# Patient Record
Sex: MALE | Race: WHITE | NOT HISPANIC OR LATINO | URBAN - METROPOLITAN AREA
[De-identification: names, ages, dates, MRNs, and addresses within clinical notes are randomized per-mention and may not be internally consistent; named-entity substitution may affect disease eponyms.]

---

## 2020-02-09 ENCOUNTER — INPATIENT (INPATIENT)
Facility: HOSPITAL | Age: 80
LOS: 4 days | Discharge: NURSING, REHAB OR LONG-TERM CARE | DRG: 551 | End: 2020-02-14
Attending: FAMILY MEDICINE | Admitting: FAMILY MEDICINE
Payer: MEDICARE

## 2020-02-09 VITALS
RESPIRATION RATE: 16 BRPM | WEIGHT: 138.89 LBS | SYSTOLIC BLOOD PRESSURE: 195 MMHG | HEART RATE: 100 BPM | OXYGEN SATURATION: 100 % | TEMPERATURE: 98 F | HEIGHT: 66 IN | DIASTOLIC BLOOD PRESSURE: 122 MMHG

## 2020-02-09 DIAGNOSIS — M51.26 OTHER INTERVERTEBRAL DISC DISPLACEMENT, LUMBAR REGION: ICD-10-CM

## 2020-02-09 DIAGNOSIS — R53.1 WEAKNESS: ICD-10-CM

## 2020-02-09 DIAGNOSIS — M50.21 OTHER CERVICAL DISC DISPLACEMENT, HIGH CERVICAL REGION: ICD-10-CM

## 2020-02-09 DIAGNOSIS — M43.16 SPONDYLOLISTHESIS, LUMBAR REGION: ICD-10-CM

## 2020-02-09 LAB
ALBUMIN SERPL ELPH-MCNC: 4 G/DL — SIGNIFICANT CHANGE UP (ref 3.3–5)
ALP SERPL-CCNC: 77 U/L — SIGNIFICANT CHANGE UP (ref 40–120)
ALT FLD-CCNC: 24 U/L — SIGNIFICANT CHANGE UP (ref 12–78)
ANION GAP SERPL CALC-SCNC: 6 MMOL/L — SIGNIFICANT CHANGE UP (ref 5–17)
APPEARANCE UR: CLEAR — SIGNIFICANT CHANGE UP
APTT BLD: 37.8 SEC — HIGH (ref 27.5–36.3)
AST SERPL-CCNC: 28 U/L — SIGNIFICANT CHANGE UP (ref 15–37)
BASOPHILS # BLD AUTO: 0.01 K/UL — SIGNIFICANT CHANGE UP (ref 0–0.2)
BASOPHILS NFR BLD AUTO: 0.2 % — SIGNIFICANT CHANGE UP (ref 0–2)
BILIRUB SERPL-MCNC: 1.1 MG/DL — SIGNIFICANT CHANGE UP (ref 0.2–1.2)
BILIRUB UR-MCNC: NEGATIVE — SIGNIFICANT CHANGE UP
BUN SERPL-MCNC: 20 MG/DL — SIGNIFICANT CHANGE UP (ref 7–23)
CALCIUM SERPL-MCNC: 9.1 MG/DL — SIGNIFICANT CHANGE UP (ref 8.5–10.1)
CHLORIDE SERPL-SCNC: 102 MMOL/L — SIGNIFICANT CHANGE UP (ref 96–108)
CO2 SERPL-SCNC: 26 MMOL/L — SIGNIFICANT CHANGE UP (ref 22–31)
COLOR SPEC: YELLOW — SIGNIFICANT CHANGE UP
CREAT SERPL-MCNC: 1.16 MG/DL — SIGNIFICANT CHANGE UP (ref 0.5–1.3)
DIFF PNL FLD: ABNORMAL
EOSINOPHIL # BLD AUTO: 0.02 K/UL — SIGNIFICANT CHANGE UP (ref 0–0.5)
EOSINOPHIL NFR BLD AUTO: 0.3 % — SIGNIFICANT CHANGE UP (ref 0–6)
GLUCOSE SERPL-MCNC: 93 MG/DL — SIGNIFICANT CHANGE UP (ref 70–99)
GLUCOSE UR QL: NEGATIVE MG/DL — SIGNIFICANT CHANGE UP
HCT VFR BLD CALC: 38.9 % — LOW (ref 39–50)
HGB BLD-MCNC: 13.9 G/DL — SIGNIFICANT CHANGE UP (ref 13–17)
IMM GRANULOCYTES NFR BLD AUTO: 0.3 % — SIGNIFICANT CHANGE UP (ref 0–1.5)
INR BLD: 1.14 RATIO — SIGNIFICANT CHANGE UP (ref 0.88–1.16)
KETONES UR-MCNC: ABNORMAL
LEUKOCYTE ESTERASE UR-ACNC: NEGATIVE — SIGNIFICANT CHANGE UP
LYMPHOCYTES # BLD AUTO: 0.94 K/UL — LOW (ref 1–3.3)
LYMPHOCYTES # BLD AUTO: 15.6 % — SIGNIFICANT CHANGE UP (ref 13–44)
MCHC RBC-ENTMCNC: 30.6 PG — SIGNIFICANT CHANGE UP (ref 27–34)
MCHC RBC-ENTMCNC: 35.7 GM/DL — SIGNIFICANT CHANGE UP (ref 32–36)
MCV RBC AUTO: 85.7 FL — SIGNIFICANT CHANGE UP (ref 80–100)
MONOCYTES # BLD AUTO: 0.58 K/UL — SIGNIFICANT CHANGE UP (ref 0–0.9)
MONOCYTES NFR BLD AUTO: 9.6 % — SIGNIFICANT CHANGE UP (ref 2–14)
NEUTROPHILS # BLD AUTO: 4.45 K/UL — SIGNIFICANT CHANGE UP (ref 1.8–7.4)
NEUTROPHILS NFR BLD AUTO: 74 % — SIGNIFICANT CHANGE UP (ref 43–77)
NITRITE UR-MCNC: NEGATIVE — SIGNIFICANT CHANGE UP
PCP SPEC-MCNC: SIGNIFICANT CHANGE UP
PH UR: 6.5 — SIGNIFICANT CHANGE UP (ref 5–8)
PLATELET # BLD AUTO: 184 K/UL — SIGNIFICANT CHANGE UP (ref 150–400)
POTASSIUM SERPL-MCNC: 3.6 MMOL/L — SIGNIFICANT CHANGE UP (ref 3.5–5.3)
POTASSIUM SERPL-SCNC: 3.6 MMOL/L — SIGNIFICANT CHANGE UP (ref 3.5–5.3)
PROT SERPL-MCNC: 7.1 GM/DL — SIGNIFICANT CHANGE UP (ref 6–8.3)
PROT UR-MCNC: NEGATIVE MG/DL — SIGNIFICANT CHANGE UP
PROTHROM AB SERPL-ACNC: 12.7 SEC — SIGNIFICANT CHANGE UP (ref 10–12.9)
RBC # BLD: 4.54 M/UL — SIGNIFICANT CHANGE UP (ref 4.2–5.8)
RBC # FLD: 12.2 % — SIGNIFICANT CHANGE UP (ref 10.3–14.5)
SODIUM SERPL-SCNC: 134 MMOL/L — LOW (ref 135–145)
SP GR SPEC: 1.01 — SIGNIFICANT CHANGE UP (ref 1.01–1.02)
UROBILINOGEN FLD QL: NEGATIVE MG/DL — SIGNIFICANT CHANGE UP
WBC # BLD: 6.02 K/UL — SIGNIFICANT CHANGE UP (ref 3.8–10.5)
WBC # FLD AUTO: 6.02 K/UL — SIGNIFICANT CHANGE UP (ref 3.8–10.5)

## 2020-02-09 PROCEDURE — 72170 X-RAY EXAM OF PELVIS: CPT | Mod: 26

## 2020-02-09 PROCEDURE — 74177 CT ABD & PELVIS W/CONTRAST: CPT

## 2020-02-09 PROCEDURE — 82550 ASSAY OF CK (CPK): CPT

## 2020-02-09 PROCEDURE — 83735 ASSAY OF MAGNESIUM: CPT

## 2020-02-09 PROCEDURE — 86780 TREPONEMA PALLIDUM: CPT

## 2020-02-09 PROCEDURE — 70551 MRI BRAIN STEM W/O DYE: CPT

## 2020-02-09 PROCEDURE — 70450 CT HEAD/BRAIN W/O DYE: CPT | Mod: 26

## 2020-02-09 PROCEDURE — 71045 X-RAY EXAM CHEST 1 VIEW: CPT | Mod: 26

## 2020-02-09 PROCEDURE — 97162 PT EVAL MOD COMPLEX 30 MIN: CPT | Mod: GP

## 2020-02-09 PROCEDURE — 85610 PROTHROMBIN TIME: CPT

## 2020-02-09 PROCEDURE — 85730 THROMBOPLASTIN TIME PARTIAL: CPT

## 2020-02-09 PROCEDURE — 84443 ASSAY THYROID STIM HORMONE: CPT

## 2020-02-09 PROCEDURE — 82607 VITAMIN B-12: CPT

## 2020-02-09 PROCEDURE — 72141 MRI NECK SPINE W/O DYE: CPT

## 2020-02-09 PROCEDURE — 72131 CT LUMBAR SPINE W/O DYE: CPT | Mod: 26

## 2020-02-09 PROCEDURE — 36415 COLL VENOUS BLD VENIPUNCTURE: CPT

## 2020-02-09 PROCEDURE — 74220 X-RAY XM ESOPHAGUS 1CNTRST: CPT

## 2020-02-09 PROCEDURE — 82085 ASSAY OF ALDOLASE: CPT

## 2020-02-09 PROCEDURE — 74018 RADEX ABDOMEN 1 VIEW: CPT

## 2020-02-09 PROCEDURE — 84181 WESTERN BLOT TEST: CPT

## 2020-02-09 PROCEDURE — 84100 ASSAY OF PHOSPHORUS: CPT

## 2020-02-09 PROCEDURE — 93010 ELECTROCARDIOGRAM REPORT: CPT

## 2020-02-09 PROCEDURE — 80053 COMPREHEN METABOLIC PANEL: CPT

## 2020-02-09 PROCEDURE — 99223 1ST HOSP IP/OBS HIGH 75: CPT

## 2020-02-09 PROCEDURE — 85025 COMPLETE CBC W/AUTO DIFF WBC: CPT

## 2020-02-09 PROCEDURE — 86618 LYME DISEASE ANTIBODY: CPT

## 2020-02-09 PROCEDURE — 83520 IMMUNOASSAY QUANT NOS NONAB: CPT

## 2020-02-09 PROCEDURE — 82746 ASSAY OF FOLIC ACID SERUM: CPT

## 2020-02-09 PROCEDURE — 72125 CT NECK SPINE W/O DYE: CPT | Mod: 26

## 2020-02-09 PROCEDURE — 97116 GAIT TRAINING THERAPY: CPT | Mod: GP

## 2020-02-09 PROCEDURE — 72128 CT CHEST SPINE W/O DYE: CPT | Mod: 26

## 2020-02-09 PROCEDURE — 86256 FLUORESCENT ANTIBODY TITER: CPT

## 2020-02-09 PROCEDURE — 84238 ASSAY NONENDOCRINE RECEPTOR: CPT

## 2020-02-09 PROCEDURE — 72148 MRI LUMBAR SPINE W/O DYE: CPT

## 2020-02-09 PROCEDURE — 85652 RBC SED RATE AUTOMATED: CPT

## 2020-02-09 PROCEDURE — 97530 THERAPEUTIC ACTIVITIES: CPT | Mod: GP

## 2020-02-09 PROCEDURE — 72148 MRI LUMBAR SPINE W/O DYE: CPT | Mod: 26

## 2020-02-09 RX ORDER — SODIUM CHLORIDE 9 MG/ML
1000 INJECTION INTRAMUSCULAR; INTRAVENOUS; SUBCUTANEOUS ONCE
Refills: 0 | Status: COMPLETED | OUTPATIENT
Start: 2020-02-09 | End: 2020-02-09

## 2020-02-09 RX ORDER — ACETAMINOPHEN 500 MG
650 TABLET ORAL EVERY 4 HOURS
Refills: 0 | Status: DISCONTINUED | OUTPATIENT
Start: 2020-02-09 | End: 2020-02-14

## 2020-02-09 RX ORDER — METOPROLOL TARTRATE 50 MG
50 TABLET ORAL DAILY
Refills: 0 | Status: DISCONTINUED | OUTPATIENT
Start: 2020-02-09 | End: 2020-02-10

## 2020-02-09 RX ORDER — HEPARIN SODIUM 5000 [USP'U]/ML
5000 INJECTION INTRAVENOUS; SUBCUTANEOUS EVERY 12 HOURS
Refills: 0 | Status: DISCONTINUED | OUTPATIENT
Start: 2020-02-09 | End: 2020-02-14

## 2020-02-09 RX ORDER — SODIUM CHLORIDE 9 MG/ML
1000 INJECTION INTRAMUSCULAR; INTRAVENOUS; SUBCUTANEOUS
Refills: 0 | Status: DISCONTINUED | OUTPATIENT
Start: 2020-02-09 | End: 2020-02-10

## 2020-02-09 RX ORDER — LOSARTAN POTASSIUM 100 MG/1
50 TABLET, FILM COATED ORAL DAILY
Refills: 0 | Status: DISCONTINUED | OUTPATIENT
Start: 2020-02-09 | End: 2020-02-14

## 2020-02-09 RX ORDER — GABAPENTIN 400 MG/1
300 CAPSULE ORAL DAILY
Refills: 0 | Status: DISCONTINUED | OUTPATIENT
Start: 2020-02-09 | End: 2020-02-14

## 2020-02-09 RX ORDER — SERTRALINE 25 MG/1
50 TABLET, FILM COATED ORAL DAILY
Refills: 0 | Status: DISCONTINUED | OUTPATIENT
Start: 2020-02-09 | End: 2020-02-14

## 2020-02-09 RX ORDER — SERTRALINE 25 MG/1
0 TABLET, FILM COATED ORAL
Qty: 0 | Refills: 0 | DISCHARGE

## 2020-02-09 RX ORDER — METOPROLOL TARTRATE 50 MG
25 TABLET ORAL ONCE
Refills: 0 | Status: COMPLETED | OUTPATIENT
Start: 2020-02-09 | End: 2020-02-09

## 2020-02-09 RX ORDER — ACETAMINOPHEN 500 MG
650 TABLET ORAL ONCE
Refills: 0 | Status: COMPLETED | OUTPATIENT
Start: 2020-02-09 | End: 2020-02-09

## 2020-02-09 RX ADMIN — Medication 650 MILLIGRAM(S): at 18:35

## 2020-02-09 RX ADMIN — Medication 25 MILLIGRAM(S): at 19:50

## 2020-02-09 RX ADMIN — SODIUM CHLORIDE 1000 MILLILITER(S): 9 INJECTION INTRAMUSCULAR; INTRAVENOUS; SUBCUTANEOUS at 16:11

## 2020-02-09 RX ADMIN — SODIUM CHLORIDE 1000 MILLILITER(S): 9 INJECTION INTRAMUSCULAR; INTRAVENOUS; SUBCUTANEOUS at 17:11

## 2020-02-09 RX ADMIN — Medication 650 MILLIGRAM(S): at 18:09

## 2020-02-09 RX ADMIN — Medication 10 MILLIGRAM(S): at 23:52

## 2020-02-09 NOTE — ED ADULT NURSE REASSESSMENT NOTE - NS ED NURSE REASSESS COMMENT FT1
Pt repositioned in stretcher and medicated as per MD orders. Pt given menu and wife at bedside assisted in ordering dinner for patient. Plan is for patient to be admitted to hospital. Awaiting disposition and available bed. Comfort and safety maintained.

## 2020-02-09 NOTE — ED PROVIDER NOTE - PROGRESS NOTE DETAILS
signed Eileen Loco PA-C   79M BIB family from home because they couldn't get him up out of bed due to weakness. Wife and son note 15lb wt loss since honorio and general decline and weakness. pt was pursuing outpt workup including PMD Jw, Neuro Dr Jordan, and urology. Had outpt abd sono and abd CT scan which were negative. Pt scheduled for outpt MRI of brain but has not had it yet. Pt has been getting progressively weaker over the past few weeks, normally uses a cane but has needed a walker for 3-4 weeks. Pt fell (unwitnessed) 2 days ago and wife found him on the kitchen floor. Son has had to help pt out of bed every morning but today was unable to bc the pt was too weak. They note pt has had increasing difficulty with going to the bathroom recently. No fever/CP/ N/V/D. Poor appetite. PMH HTN, BPH, sciatica. Pt appears tired and uncomfortable but does not identify anything specific that bothers him. Pt speaks in a slow, hesitant manner, but is oriented x3 without alexia or anomia. Pt with weakness of hip flexors. rectal temp 98.7, normal rectal tone and no perirectal anesthesia or saddle anesthesia. No spinal TTP. Plan labs, CT, UA, admit. signed Eileen Loco PA-C   No significant findings on labwork or imaging. Will admit for new inability to walk due to weakness, inpt workup. Pt agrees with admission and plan of care. Case discussed with and admission accepted by hospitalist Dr. Robles. signed Eileen Loco PA-C   Stat lumbar MRI ordered to r/o cauda equina. Pt agrees with plan of  care. MRI team called in. Dr. Gayle:  MR L-spine: no cauda equina, + mult. levels DDD.

## 2020-02-09 NOTE — ED PROVIDER NOTE - MUSCULOSKELETAL, MLM
MAEx4. No focal swelling or tenderness. Poor SLR 10 degrees b/l, due to weakness, good passive ROM of hips and pain without pain. Pelvis non tender and stable.

## 2020-02-09 NOTE — ED PROVIDER NOTE - CARE PLAN
Principal Discharge DX:	Weakness  Secondary Diagnosis:	Failure to thrive in adult  Secondary Diagnosis:	Unable to walk

## 2020-02-09 NOTE — H&P ADULT - NEUROLOGICAL DETAILS
alert and oriented x 3/responds to verbal commands/sensation intact/strength decreased/cranial nerves intact/responds to pain

## 2020-02-09 NOTE — ED ADULT TRIAGE NOTE - CHIEF COMPLAINT QUOTE
Pt. to the ED BIBA and Son from Home CO Failure to Thrive- Son Reports that patient is not able to care for himself anymore and states mental status has been declining for the past few weeks. Hx. of HTN and Depression- Denies SI and HI

## 2020-02-09 NOTE — ED PROVIDER NOTE - OBJECTIVE STATEMENT
79 YOM PMHx HTN, BPH, sciatica presents to ED BIB family for progressively worsening appetite, weight loss and "difficulty with mobility" x3-4 months. Family report pt had "chest cold" with persistent cough a few months ago, had course of PO Abx with last dose shortly after 01/01. Per family pt with depressed appearance, had evaluation by neurologist Dr. Jordan and has been taking an antidepressant for the past 1 week. Pt has been using a walker to ambulate due to worsening of weakness, but now son says he has to pick pt up to move him. Pt fell on night of 02/07 while not using walker, had to be picked up by family. Since fall pt has had 2 episodes of urinary incontinence at night. Family say pt does not localize pain, but appears weak. Pt lives with spouse and receives assistance from family. Denies any pain, numbness, N/V/D, fever or chills. Takes Advil for symptom management. No BT/AC. NKDA. Non-smoker. No EtOH use. No illicit drug use.

## 2020-02-09 NOTE — H&P ADULT - HISTORY OF PRESENT ILLNESS
80 y/o M with PMH of BPH, spinal stenosis, HTN, dyslipidemia, pre-DM, anxiety, p/w increasing weakness, weight loss and difficulty swallowing. Wife at bedside states that patient has been having issues where his legs collapsed from under him intermittently for the last 2 years. He has been using a cane for the last 2 years. During  / new year holiday season patient developed bronchitis, and since then patient has been getting progressively worse. Patient switched to a walker 3 weeks ago. Two days ago patient tried to ambulate without walker, and wife found him sitting on the kitchen floor, does not think he hit his head. Wife states since the fall, patient has been getting even weaker over the last 2 days. Patient's son has to pick him in order to move him, which is significantly worse. Had two episodes of urinary incontinence as well. States that 2 weeks ago he saw a neurologist, and when pin prick physical exam maneuver was performed, patient was unable to feel much below his knees. Denies saddle anesthesia.     Family states that patient had been losing weight over time. However, in the last month weight loss has been accelerated. States he lost about 15 lbs over 1 month. Wife states that patient also has decreased appetite. Patient states that he is nervous about swallowing as he has some difficulty swallowing, and he has even choken on a pill previously. Wife states that patient has an upcoming gastroenterology appointment to address weight loss and dysphagia. Patient denies nausea, vomiting, abdominal pain, diarrhea.    Wife states that patient had a cystoscopy on Thursday by urologist Dr. Godoy, and was told he did not see masses on it. Patient was told that he had elevated PSA ("4 something" as per wife) and enlarged prostate done on recent CT abdomen.     Patient had a shot of steroids about 6 months provided him with temporary relief.     Patient also has been following up with neurologist who recommended brain MRI, which he hasn't gotten yet.    PSH: Denies    Social Hx: Denies x 3    Family Hx: Father -  at 92 y/o from old age, mother -  at 88 y/o after a fall

## 2020-02-09 NOTE — ED ADULT NURSE NOTE - MUSCULOSKELETAL ASSESSMENT
Procedure To Be Performed At Next Visit: Biopsy by shave method Detail Level: Zone Introduction Text (Please End With A Colon): Tresa Palacios Introduction Text (Please End With A Colon): Larry Williamson Introduction Text (Please End With A Colon): Rosaline Siddiqui Procedure To Be Performed At Next Visit: Cryotherapy Introduction Text (Please End With A Colon): Sima Zhu - - -

## 2020-02-09 NOTE — ED PROVIDER NOTE - ENMT, MLM
Airway patent. Oropharynx clear. Mucous membranes slightly dry. Normal cephalic atraumatic. No cronin signs, or signs of facial injury.

## 2020-02-09 NOTE — ED ADULT NURSE NOTE - OBJECTIVE STATEMENT
Pt BIBA from home for failure to thrieve. As per pts wife, pts condition has been on a steady decline but has gotten worse since December. Pt with 15lb weight loss since Christmas. Pt has been following up with PMD as well as neurology (Dr. Jordan) and was scheduled to see a gastroenterologist tomorrow. As per patients wife, on Friday, she was napping and pt got up went into the kitchen. Pt states that he was feeling good, so he did not use his walker and fell. Pt was unable to get off the ground by himself and had to wait until his son and son in law got home from work to pick him up. Wife and son were able to sit him up against the cabinets until they were able to get him up. Pt did not seek medical attention at that time. Pt has been using a cane to walk for the past two years, but recently over the past month has been using a walker. Pt unable since fall to get himself out of bed. Pts family states that they are unable to get him out of bed and when they were able to get him. he was unable to walk on his own. Family called to have him brought to hospital today, as they were unable to get him out of bed.

## 2020-02-09 NOTE — ED PROVIDER NOTE - ATTENDING CONTRIBUTION TO CARE
I, Bj Gayle MD, personally saw the patient with the PA, and completed the key components of the history and physical exam. I then discussed the management plan with the PA.

## 2020-02-09 NOTE — H&P ADULT - ASSESSMENT
78 y/o M with PMH of BPH, spinal stenosis, HTN, dyslipidemia, pre-DM, anxiety, p/w increasing weakness, weight loss and difficulty swallowing    *Increasing weakness w/ severe spinal stenosis on CT scan  -MRI pending  -Ortho spine consult  -Fall precautions  -Neuro consult   -CTH negative for acute findings  -MRI of brain     *Weight loss / Decreased appetite / Difficulty swallowing  -GI consult  -Will need to r/o underlying malignancy   -IVF    *Hematuria / Elevated PSA in the setting of decreased appetite / weight loss  -Will need to r/o prostate as a possible source of malignancy  - consult  -Patient is s/p recent cystoscopy     *HTN - uncontrolled  -Low salt diet  -Valsartan non-formulary, switched to losartan and increased dose  -C/w lopressor     *Dyslipidemia / Pre-DM / anxiety  -C/w home meds and f/u outpatient for further management     *DVT ppx  -Heparin subQ      IMPROVE VTE Individual Risk Assessment    RISK                                                                Points    [  ] Previous VTE                                                  3    [  ] Thrombophilia                                               2    [  ] Lower limb paralysis                                      2        (unable to hold up >15 seconds)      [  ] Current Cancer                                              2         (within 6 months)    [ 1 ] Immobilization > 24 hrs                                1    [  ] ICU/CCU stay > 24 hours                              1    [1  ] Age > 60                                                      1    IMPROVE VTE Score ____2_____    IMPROVE Score 0-1: Low Risk, No VTE prophylaxis required for most patients, encourage ambulation.   IMPROVE Score 2-3: At risk, pharmacologic VTE prophylaxis is indicated for most patients (in the absence of a contraindication)  IMPROVE Score > or = 4: High Risk, pharmacologic VTE prophylaxis is indicated for most patients (in the absence of a contraindication) 80 y/o M with PMH of BPH, spinal stenosis, HTN, dyslipidemia, pre-DM, anxiety, p/w increasing weakness, weight loss and difficulty swallowing    *Increasing weakness w/ severe spinal stenosis on CT scan  -MRI L-spine pending  -Ortho spine consult  -Fall precautions  -Neuro consult   -CTH negative for acute findings  -Will order MRI of brain      *Weight loss / Decreased appetite / Difficulty swallowing  -GI consult  -Will need to r/o underlying malignancy   -IVF    *Hematuria / Elevated PSA in the setting of decreased appetite / weight loss  -Will need to r/o prostate as a possible source of malignancy  - consult  -Patient is s/p recent cystoscopy     *HTN - uncontrolled  -Low salt diet  -Valsartan non-formulary, switched to losartan and increased dose  -C/w lopressor     *Dyslipidemia / Pre-DM / anxiety  -C/w home meds and f/u outpatient for further management     *DVT ppx  -Heparin subQ          IMPROVE VTE Individual Risk Assessment    RISK                                                                Points    [  ] Previous VTE                                                  3    [  ] Thrombophilia                                               2    [  ] Lower limb paralysis                                      2        (unable to hold up >15 seconds)      [  ] Current Cancer                                              2         (within 6 months)    [ 1 ] Immobilization > 24 hrs                                1    [  ] ICU/CCU stay > 24 hours                              1    [1  ] Age > 60                                                      1    IMPROVE VTE Score ____2_____    IMPROVE Score 0-1: Low Risk, No VTE prophylaxis required for most patients, encourage ambulation.   IMPROVE Score 2-3: At risk, pharmacologic VTE prophylaxis is indicated for most patients (in the absence of a contraindication)  IMPROVE Score > or = 4: High Risk, pharmacologic VTE prophylaxis is indicated for most patients (in the absence of a contraindication) 80 y/o M with PMH of BPH, spinal stenosis, HTN, dyslipidemia, pre-DM, anxiety, p/w increasing weakness, weight loss and difficulty swallowing    *Increasing weakness w/ compression deformities and severe spinal stenosis on CT scan w/ impingement of exiting nerve roots at neural foramina   -Stat MRI L-spine pending  -Ortho spine consult  -Fall precautions  -Neuro consult   -CTH negative for acute findings  -Will order MRI of brain      *Weight loss / Decreased appetite / Difficulty swallowing  -GI consult  -Will need to r/o underlying malignancy   -IVF  -CT - small hypodensity in liver    *Hematuria / Elevated PSA in the setting of decreased appetite / weight loss  -Will need to r/o prostate as a possible source of malignancy  - consult  -Patient is s/p recent cystoscopy   -CT - small hypodensity on R kidney     *HTN - uncontrolled  -Low salt diet  -Valsartan non-formulary, switched to losartan and increased dose  -C/w lopressor     *Dyslipidemia / Pre-DM / anxiety  -C/w home meds and f/u outpatient for further management     *DVT ppx  -Heparin subQ              IMPROVE VTE Individual Risk Assessment    RISK                                                                Points    [  ] Previous VTE                                                  3    [  ] Thrombophilia                                               2    [  ] Lower limb paralysis                                      2        (unable to hold up >15 seconds)      [  ] Current Cancer                                              2         (within 6 months)    [ 1 ] Immobilization > 24 hrs                                1    [  ] ICU/CCU stay > 24 hours                              1    [1  ] Age > 60                                                      1    IMPROVE VTE Score ____2_____    IMPROVE Score 0-1: Low Risk, No VTE prophylaxis required for most patients, encourage ambulation.   IMPROVE Score 2-3: At risk, pharmacologic VTE prophylaxis is indicated for most patients (in the absence of a contraindication)  IMPROVE Score > or = 4: High Risk, pharmacologic VTE prophylaxis is indicated for most patients (in the absence of a contraindication)

## 2020-02-10 LAB
ALBUMIN SERPL ELPH-MCNC: 3.9 G/DL — SIGNIFICANT CHANGE UP (ref 3.3–5)
ALP SERPL-CCNC: 82 U/L — SIGNIFICANT CHANGE UP (ref 40–120)
ALT FLD-CCNC: 22 U/L — SIGNIFICANT CHANGE UP (ref 12–78)
ANION GAP SERPL CALC-SCNC: 7 MMOL/L — SIGNIFICANT CHANGE UP (ref 5–17)
APTT BLD: 43.8 SEC — HIGH (ref 27.5–36.3)
AST SERPL-CCNC: 28 U/L — SIGNIFICANT CHANGE UP (ref 15–37)
BASOPHILS # BLD AUTO: 0.01 K/UL — SIGNIFICANT CHANGE UP (ref 0–0.2)
BASOPHILS NFR BLD AUTO: 0.2 % — SIGNIFICANT CHANGE UP (ref 0–2)
BILIRUB SERPL-MCNC: 1.2 MG/DL — SIGNIFICANT CHANGE UP (ref 0.2–1.2)
BUN SERPL-MCNC: 19 MG/DL — SIGNIFICANT CHANGE UP (ref 7–23)
CALCIUM SERPL-MCNC: 8.9 MG/DL — SIGNIFICANT CHANGE UP (ref 8.5–10.1)
CHLORIDE SERPL-SCNC: 104 MMOL/L — SIGNIFICANT CHANGE UP (ref 96–108)
CK SERPL-CCNC: 158 U/L — SIGNIFICANT CHANGE UP (ref 26–308)
CO2 SERPL-SCNC: 24 MMOL/L — SIGNIFICANT CHANGE UP (ref 22–31)
CREAT SERPL-MCNC: 1.01 MG/DL — SIGNIFICANT CHANGE UP (ref 0.5–1.3)
CULTURE RESULTS: SIGNIFICANT CHANGE UP
EOSINOPHIL # BLD AUTO: 0.01 K/UL — SIGNIFICANT CHANGE UP (ref 0–0.5)
EOSINOPHIL NFR BLD AUTO: 0.2 % — SIGNIFICANT CHANGE UP (ref 0–6)
ERYTHROCYTE [SEDIMENTATION RATE] IN BLOOD: 5 MM/HR — SIGNIFICANT CHANGE UP (ref 0–20)
GLUCOSE SERPL-MCNC: 82 MG/DL — SIGNIFICANT CHANGE UP (ref 70–99)
HCT VFR BLD CALC: 40.8 % — SIGNIFICANT CHANGE UP (ref 39–50)
HGB BLD-MCNC: 14.7 G/DL — SIGNIFICANT CHANGE UP (ref 13–17)
IMM GRANULOCYTES NFR BLD AUTO: 0.3 % — SIGNIFICANT CHANGE UP (ref 0–1.5)
INR BLD: 1.11 RATIO — SIGNIFICANT CHANGE UP (ref 0.88–1.16)
LYMPHOCYTES # BLD AUTO: 1.2 K/UL — SIGNIFICANT CHANGE UP (ref 1–3.3)
LYMPHOCYTES # BLD AUTO: 18.6 % — SIGNIFICANT CHANGE UP (ref 13–44)
MAGNESIUM SERPL-MCNC: 2.2 MG/DL — SIGNIFICANT CHANGE UP (ref 1.6–2.6)
MCHC RBC-ENTMCNC: 31.3 PG — SIGNIFICANT CHANGE UP (ref 27–34)
MCHC RBC-ENTMCNC: 36 GM/DL — SIGNIFICANT CHANGE UP (ref 32–36)
MCV RBC AUTO: 86.8 FL — SIGNIFICANT CHANGE UP (ref 80–100)
MONOCYTES # BLD AUTO: 0.47 K/UL — SIGNIFICANT CHANGE UP (ref 0–0.9)
MONOCYTES NFR BLD AUTO: 7.3 % — SIGNIFICANT CHANGE UP (ref 2–14)
NEUTROPHILS # BLD AUTO: 4.74 K/UL — SIGNIFICANT CHANGE UP (ref 1.8–7.4)
NEUTROPHILS NFR BLD AUTO: 73.4 % — SIGNIFICANT CHANGE UP (ref 43–77)
PHOSPHATE SERPL-MCNC: 3 MG/DL — SIGNIFICANT CHANGE UP (ref 2.5–4.5)
PLATELET # BLD AUTO: 194 K/UL — SIGNIFICANT CHANGE UP (ref 150–400)
POTASSIUM SERPL-MCNC: 3.9 MMOL/L — SIGNIFICANT CHANGE UP (ref 3.5–5.3)
POTASSIUM SERPL-SCNC: 3.9 MMOL/L — SIGNIFICANT CHANGE UP (ref 3.5–5.3)
PROT SERPL-MCNC: 7 GM/DL — SIGNIFICANT CHANGE UP (ref 6–8.3)
PROTHROM AB SERPL-ACNC: 12.4 SEC — SIGNIFICANT CHANGE UP (ref 10–12.9)
RBC # BLD: 4.7 M/UL — SIGNIFICANT CHANGE UP (ref 4.2–5.8)
RBC # FLD: 12.3 % — SIGNIFICANT CHANGE UP (ref 10.3–14.5)
SODIUM SERPL-SCNC: 135 MMOL/L — SIGNIFICANT CHANGE UP (ref 135–145)
SPECIMEN SOURCE: SIGNIFICANT CHANGE UP
TSH SERPL-MCNC: 0.67 UU/ML — SIGNIFICANT CHANGE UP (ref 0.34–4.82)
WBC # BLD: 6.45 K/UL — SIGNIFICANT CHANGE UP (ref 3.8–10.5)
WBC # FLD AUTO: 6.45 K/UL — SIGNIFICANT CHANGE UP (ref 3.8–10.5)

## 2020-02-10 PROCEDURE — 99221 1ST HOSP IP/OBS SF/LOW 40: CPT

## 2020-02-10 PROCEDURE — 99223 1ST HOSP IP/OBS HIGH 75: CPT

## 2020-02-10 PROCEDURE — 72141 MRI NECK SPINE W/O DYE: CPT | Mod: 26

## 2020-02-10 PROCEDURE — 99233 SBSQ HOSP IP/OBS HIGH 50: CPT

## 2020-02-10 PROCEDURE — 74220 X-RAY XM ESOPHAGUS 1CNTRST: CPT | Mod: 26

## 2020-02-10 PROCEDURE — 70551 MRI BRAIN STEM W/O DYE: CPT | Mod: 26

## 2020-02-10 RX ORDER — DEXAMETHASONE 0.5 MG/5ML
ELIXIR ORAL
Refills: 0 | Status: COMPLETED | OUTPATIENT
Start: 2020-02-10 | End: 2020-02-12

## 2020-02-10 RX ORDER — DEXAMETHASONE 0.5 MG/5ML
4 ELIXIR ORAL EVERY 8 HOURS
Refills: 0 | Status: COMPLETED | OUTPATIENT
Start: 2020-02-11 | End: 2020-02-11

## 2020-02-10 RX ORDER — HYDRALAZINE HCL 50 MG
25 TABLET ORAL ONCE
Refills: 0 | Status: COMPLETED | OUTPATIENT
Start: 2020-02-10 | End: 2020-02-10

## 2020-02-10 RX ORDER — METOPROLOL TARTRATE 50 MG
50 TABLET ORAL
Refills: 0 | Status: DISCONTINUED | OUTPATIENT
Start: 2020-02-10 | End: 2020-02-14

## 2020-02-10 RX ORDER — OXYCODONE AND ACETAMINOPHEN 5; 325 MG/1; MG/1
1 TABLET ORAL EVERY 6 HOURS
Refills: 0 | Status: DISCONTINUED | OUTPATIENT
Start: 2020-02-10 | End: 2020-02-14

## 2020-02-10 RX ORDER — DEXAMETHASONE 0.5 MG/5ML
2 ELIXIR ORAL EVERY 8 HOURS
Refills: 0 | Status: COMPLETED | OUTPATIENT
Start: 2020-02-12 | End: 2020-02-12

## 2020-02-10 RX ORDER — DEXAMETHASONE 0.5 MG/5ML
6 ELIXIR ORAL EVERY 8 HOURS
Refills: 0 | Status: COMPLETED | OUTPATIENT
Start: 2020-02-10 | End: 2020-02-11

## 2020-02-10 RX ORDER — HYDRALAZINE HCL 50 MG
10 TABLET ORAL ONCE
Refills: 0 | Status: COMPLETED | OUTPATIENT
Start: 2020-02-10 | End: 2020-02-10

## 2020-02-10 RX ADMIN — LOSARTAN POTASSIUM 50 MILLIGRAM(S): 100 TABLET, FILM COATED ORAL at 00:05

## 2020-02-10 RX ADMIN — Medication 10 MILLIGRAM(S): at 12:05

## 2020-02-10 RX ADMIN — Medication 50 MILLIGRAM(S): at 05:40

## 2020-02-10 RX ADMIN — Medication 10 MILLIGRAM(S): at 17:13

## 2020-02-10 RX ADMIN — GABAPENTIN 300 MILLIGRAM(S): 400 CAPSULE ORAL at 14:34

## 2020-02-10 RX ADMIN — Medication 50 MILLIGRAM(S): at 18:39

## 2020-02-10 RX ADMIN — SODIUM CHLORIDE 75 MILLILITER(S): 9 INJECTION INTRAMUSCULAR; INTRAVENOUS; SUBCUTANEOUS at 02:44

## 2020-02-10 RX ADMIN — HEPARIN SODIUM 5000 UNIT(S): 5000 INJECTION INTRAVENOUS; SUBCUTANEOUS at 18:39

## 2020-02-10 RX ADMIN — Medication 6 MILLIGRAM(S): at 21:23

## 2020-02-10 RX ADMIN — HEPARIN SODIUM 5000 UNIT(S): 5000 INJECTION INTRAVENOUS; SUBCUTANEOUS at 05:40

## 2020-02-10 RX ADMIN — SERTRALINE 50 MILLIGRAM(S): 25 TABLET, FILM COATED ORAL at 14:34

## 2020-02-10 RX ADMIN — Medication 25 MILLIGRAM(S): at 03:06

## 2020-02-10 RX ADMIN — Medication 10 MILLIGRAM(S): at 05:39

## 2020-02-10 NOTE — PHYSICAL THERAPY INITIAL EVALUATION ADULT - PERTINENT HX OF CURRENT PROBLEM, REHAB EVAL
increasing weakness, difficulty transferring/walking x3-4 months requiring a walker past 3 weeks ,fell night of 2/7 without walker and had to be picked up by son ,decreased PO intake,?difficulty swallowing

## 2020-02-10 NOTE — CONSULT NOTE ADULT - ASSESSMENT
79 year old man hx of worsening gait, c/o general weakness, diff swallowing. On exam appears depressed, evidence of peripheral neuropathy ? cervical myelopathy, ? myasthenia.  Ct of head showed no acute changes, T of spine no fx, MR L/S DDD.  suggest: MR cervical spine wo  acetylcholine receptor abs, CPK, aldolase, esr, vit b12, folate, rpr, lymes abs  may benefit from outpatient emg./ncv study

## 2020-02-10 NOTE — CONSULT NOTE ADULT - SUBJECTIVE AND OBJECTIVE BOX
Neurology Consult requested by:   Patient is a 79y old  Male who presents with a chief complaint of increasing weakness, weight loss and difficulty swallowing (10 Feb 2020 10:52)     HPI:  78 y/o M with PMH of BPH, spinal stenosis, HTN, dyslipidemia, pre-DM, anxiety, p/w increasing weakness, weight loss and difficulty swallowing. Wife at bedside states that patient has been having issues where his legs collapsed from under him intermittently for the last 2 years. worsening weakness, denies leg pain, or back pain, no B/B incontinence. Hx of peripheral neuropathy. Saw outside neurologist about 2 weeks ago, ordered blood work, MR, not done.  No dizziness, vertigo, no headaches, no double vision. Occasional trouble swallowing, pills, losing weight.    PSH: Denies    Social Hx: Denies x 3    Family Hx: Father -  at 94 y/o from old age, mother -  at 86 y/o after a fall (2020 21:01)    PAST MEDICAL & SURGICAL HISTORY:  Sciatica  BPH (benign prostatic hyperplasia)  HTN (hypertension)    FAMILY HISTORY:    Social Hx:  Nonsmoker, no drug or alcohol use  Medications and Allergies ReviewedMEDICATIONS  (STANDING):  busPIRone 10 milliGRAM(s) Oral two times a day  gabapentin 300 milliGRAM(s) Oral daily  heparin  Injectable 5000 Unit(s) SubCutaneous every 12 hours  losartan 50 milliGRAM(s) Oral daily  metoprolol tartrate Oral Tab/Cap - Peds 50 milliGRAM(s) Oral daily  sertraline 50 milliGRAM(s) Oral daily     ROS: Pertinent positives in HPI, all other ROS were reviewed and are negative.      Examination:   Vital Signs Last 24 Hrs  T(C): 36.6 (10 Feb 2020 10:52), Max: 37.1 (10 Feb 2020 05:08)  T(F): 97.8 (10 Feb 2020 10:52), Max: 98.7 (10 Feb 2020 05:08)  HR: 73 (10 Feb 2020 10:52) (65 - 100)  BP: 194/86 (10 Feb 2020 10:52) (158/87 - 201/90)  BP(mean): --  RR: 18 (10 Feb 2020 10:52) (16 - 23)  SpO2: 97% (10 Feb 2020 10:52) (96% - 100%)  General: Cooperative, NAD   NECK: supple, no masses  ENT: Normal hearing   Vascular : no carotid bruits,   Lungs: CTAB  Chest: RRR, no murmurs  Extremities: nontender, no edema  Musculoskeletal: no adventitious movements, no joint stiffness  Skin: no rash    Neurological Examination:  NIHSS:  MS: AOx3. flat affect, jaciel voice, can name and repeat, follows 2 step commands   CN: VFFTC, PERLL, EOMI, V1-3 sensation intact, face symmetric, hearing intact, palate elevates symmetrically, tongue midline, SCM equal bilaterally  Motor: distal wasting, tone diff to assess, no tremor, no rigidity or bradykinesia.  ~4/5 all over   Sens: Intact to light touch, JPS impaired in LE.    Reflexes: 1-2/4 all over, downgoing toes b/l  Coord:  No dysmetria, MIKE intact   Gait: Cannot test    Comprehensive Metabolic Panel (02.10.20 @ 08:48)    Sodium, Serum: 135 mmol/L    Potassium, Serum: 3.9 mmol/L    Chloride, Serum: 104 mmol/L    Carbon Dioxide, Serum: 24 m< from: CT Head No Cont (20 @ 15:46) >    Anion Gap, Serum: 7 mmol/L    Blood Urea Nitrogen, Serum: 19 mg/dL    Creatinine, Serum: 1.01 mg/dL    Glucose, Serum: 82 mg/dL    Calcium, Total Serum: 8.9 mg/dL    Protein Total, Serum: 7.0 gm/dL    Albumin, Serum: 3.9 g/dL    Bilirubin Total, Serum: 1.2 mg/dL    Alkaline Phosphatase, Serum: 82 U/L    Aspartate Aminotransferase (AST/SGOT): 28 U/L    Alanine Aminotransferase (ALT/SGPT): 22 U/L    eGFR if Non : 71:      MPRESSION:  No acute intracranial hemorrhage  No acute fracture of cervical, thoracic, lumbar spine.  Extensive degenerative changes lumbar spine with multilevel severe spinal canal stenosis and severe bilateral neural foraminal narrowing. MRI examrecommended.    < from: MR Lumbar Spine No Cont (20 @ 21:20) >  FINDINGS:   Vertebrae: Degenerative endplate marrow signal changes. No acute fracture.   Spinal cord: Normal signal. No cord compression.   T12-L1: Degenerative disc and spondylitic changes. 3 mm left lateral disc   protrusion with associated annular tear. No central canal stenosis. Mild   bilateral foraminal stenosis.   L1-L2: Degenerative disc and spondylitic changes. Far left lateral annular tear   without herniation. No central canal stenosis. Mild right and moderate left   foraminal stenosis.   L2-L3: Degenerative disc and spondylitic changes. Mild-to-moderate central   canal stenosis. Moderate right and mild left foraminal stenosis.   L3-L4: Degenerative disc and spondylitic changes. 4 mm right paracentral disc   protrusion with effacement of epidural fat at the right lateral recess.   Moderate central canal stenosis. Moderate bilateral foraminal stenosis.   L4-L5: Degenerative disc and spondylitic changes. Grade 1 anterolisthesis   L4-L5. Severe central canal stenosis. Severe right moderate to severe left   foraminal stenosis.   L5-S1: Degenerative disc and spondylitic changes. Central annular tear without   herniation. Moderate to severe left moderate right foraminal stenosis.     Soft tissues: Unremarkable.     IMPRESSION:   Degenerative changes as described. Degenerative changes as described, worse at   L4-L5. 3 mm left lateral disc protrusion T12-L1. 4 mm right paracentral disc   protrusion L3-L4.    < end of copied text >

## 2020-02-10 NOTE — PHYSICAL THERAPY INITIAL EVALUATION ADULT - PLANNED THERAPY INTERVENTIONS, PT EVAL
strengthening/Home safety/fall prevention education/stretching/bed mobility training/gait training/transfer training/ROM

## 2020-02-10 NOTE — PHYSICAL THERAPY INITIAL EVALUATION ADULT - GENERAL OBSERVATIONS, REHAB EVAL
resting supine in bed,wife and daughter at bedside very talkative and provide bulk of history,pt appears depressed

## 2020-02-10 NOTE — CONSULT NOTE ADULT - ASSESSMENT
78yo male with multiple medical issues and failure to thrive  it is unclear why he is not eating - ?dysphagia vs decreased appetite  will check barium esophagram and consider egd pending results  will follow bowel regimen  d/w pt and family in detail

## 2020-02-10 NOTE — PHYSICAL THERAPY INITIAL EVALUATION ADULT - TRANSFER SKILLS, REHAB EVAL
needs device and assist/most recently needing to be picked up by son to move one place to another due to weakness

## 2020-02-10 NOTE — CONSULT NOTE ADULT - SUBJECTIVE AND OBJECTIVE BOX
Asked to evaluated this pleasant  80 y/o male  progressive difficulty with standing and walking.  Patient indicates symptoms have been ongoing for 2+ years but have been  worsening.  Now unable to tolerate standing / walking despite the use of a walker.  Patient reports minimal lower back or lower extremity pain rather over all weakness in his legs  that forces him to sit.   Has had episodic urinary inconstance as well.  Currently being followed by urology , Dr Godoy.  Patient was brought to the ED and underwent CT / MR lumbar spine due to progressive symptoms.   Exam.  A & O x3 -   Chest - clear   Abd - soft no masses  Lumbar spine., No visual abnormalities on inspection.   Non tender to percussion   Neuro - 5/5 strength is tested in  Gastroc, EHL,  Atib,  Quad, Hip flexors  b/l   Sensory intact  b/l lower able to differentiate between sharp dull   Significant stiffness, very limited ROM hips b/l  painless    MR / CT lumbar spine reviewed  Multilevel lumbar DDD   Severe stenosis noted L4-5, L5-S1  moderate  stenosis L3-4     -Clinical presentation consistent with MRI / CT findings of severe lumbar stenosis with neurogenic claudication.    -Will attempt an non surgical approach initially.,     - initiate IV Decadron taper  8mg x 2  6mg x 2  4mg x 2   - initiate Neurontin 300mg QHS  - Observation  24 - 48 hours,    - If no response will consider possible surgical options.   - Case discussed with Dr Morrison.

## 2020-02-10 NOTE — CONSULT NOTE ADULT - REASON FOR ADMISSION
increasing weakness, weight loss and difficulty swallowing

## 2020-02-10 NOTE — PROGRESS NOTE ADULT - SUBJECTIVE AND OBJECTIVE BOX
CHIEF COMPLAINT/Diagnosis: spinal stenosis/ weakness/ debility/ weight loss/ poor apetitis    SUBJECTIVE: no complaints; patient states he doesnt feel steady on his feet.    REVIEW OF SYSTEMS:    CONSTITUTIONAL: No weakness, fevers or chills  EYES/ENT: No visual changes;  No vertigo or throat pain   NECK: No pain or stiffness  RESPIRATORY: No cough, wheezing, hemoptysis; No shortness of breath  CARDIOVASCULAR: No chest pain or palpitations  GASTROINTESTINAL: No abdominal or epigastric pain. No nausea, vomiting, or hematemesis; No diarrhea or constipation. No melena or hematochezia.  GENITOURINARY: No dysuria, frequency or hematuria  NEUROLOGICAL: No numbness or weakness  SKIN: No itching, burning, rashes, or lesions   All other review of systems is negative unless indicated above    Vital Signs Last 24 Hrs  T(C): 36.6 (10 Feb 2020 10:52), Max: 37.1 (10 Feb 2020 05:08)  T(F): 97.8 (10 Feb 2020 10:52), Max: 98.7 (10 Feb 2020 05:08)  HR: 88 (10 Feb 2020 15:24) (65 - 88)  BP: 159/79 (10 Feb 2020 15:24) (158/87 - 201/90)  BP(mean): --  RR: 18 (10 Feb 2020 10:52) (16 - 23)  SpO2: 97% (10 Feb 2020 10:52) (96% - 100%)    I&O's Summary    09 Feb 2020 07:01  -  10 Feb 2020 07:00  --------------------------------------------------------  IN: 300 mL / OUT: 200 mL / NET: 100 mL    10 Feb 2020 07:01  -  10 Feb 2020 16:56  --------------------------------------------------------  IN: 550 mL / OUT: 0 mL / NET: 550 mL        CAPILLARY BLOOD GLUCOSE          PHYSICAL EXAM:    Constitutional: NAD, awake and alert, well-developed  HEENT: PERR, EOMI, Normal Hearing, MMM  Neck: Soft and supple, No LAD, No JVD  Respiratory: Breath sounds are clear bilaterally, No wheezing, rales or rhonchi  Cardiovascular: S1 and S2, regular rate and rhythm, no Murmurs, gallops or rubs  Gastrointestinal: Bowel Sounds present, soft, nontender, nondistended, no guarding, no rebound  Extremities: No peripheral edema  Vascular: 2+ peripheral pulses  Neurological: A/O x 3, no focal deficits  Musculoskeletal: 5/5 strength b/l upper and lower extremities  Skin: No rashes    MEDICATIONS:  MEDICATIONS  (STANDING):  busPIRone 10 milliGRAM(s) Oral two times a day  dexAMETHasone  Injectable   IV Push   dexAMETHasone  Injectable 6 milliGRAM(s) IV Push every 8 hours  gabapentin 300 milliGRAM(s) Oral daily  heparin  Injectable 5000 Unit(s) SubCutaneous every 12 hours  losartan 50 milliGRAM(s) Oral daily  metoprolol tartrate Oral Tab/Cap - Peds 50 milliGRAM(s) Oral daily  sertraline 50 milliGRAM(s) Oral daily      LABS: All Labs Reviewed:                        14.7   6.45  )-----------( 194      ( 10 Feb 2020 08:48 )             40.8     02-10    135  |  104  |  19  ----------------------------<  82  3.9   |  24  |  1.01    Ca    8.9      10 Feb 2020 08:48  Phos  3.0     02-10  Mg     2.2     02-10    TPro  7.0  /  Alb  3.9  /  TBili  1.2  /  DBili  x   /  AST  28  /  ALT  22  /  AlkPhos  82  02-10    PT/INR - ( 10 Feb 2020 08:48 )   PT: 12.4 sec;   INR: 1.11 ratio         PTT - ( 10 Feb 2020 08:48 )  PTT:43.8 sec  CARDIAC MARKERS ( 09 Feb 2020 15:19 )  0.015 ng/mL / x     / x     / x     / x          Blood Culture: 02-09 @ 16:54  Organism --  Gram Stain Blood -- Gram Stain --  Specimen Source .Urine None  Culture-Blood --          Assessment and Plan:   	    78 y/o M with PMH of BPH, spinal stenosis, HTN, dyslipidemia, pre-DM, anxiety, p/w increasing weakness, weight loss and difficulty swallowing      *weakenss/ debility  -patient unsteady on his feet, need excercise and pt excerceises  -unclear if he will go to rehab, but he said he will think about it  -physical therapy to see patient    *spinal stenosis  -MRI with spinal stenosis  -Ortho spine consult  -Fall precautions  -Neuro consult   -CTH negative for acute findings    *Weight loss / Decreased appetite / Difficulty swallowing  -GI consult  -Will need to r/o underlying malignancy   -IVF  -CT - small hypodensity in liver  -esophagram today    *Hematuria / Elevated PSA in the setting of decreased appetite / weight loss  -Will need to r/o prostate as a possible source of malignancy  - consult  -Patient is s/p recent cystoscopy   -CT - small hypodensity on R kidney     *HTN - uncontrolled  -Low salt diet  -Valsartan non-formulary, switched to losartan and increased dose  -C/w lopressor     *Dyslipidemia / Pre-DM / anxiety  -C/w home meds and f/u outpatient for further management     *DVT ppx  -Heparin subQ CHIEF COMPLAINT/Diagnosis: spinal stenosis/ weakness/ debility/ weight loss/ poor apetitis    SUBJECTIVE: no complaints; patient states he doesnt feel steady on his feet.    REVIEW OF SYSTEMS:    CONSTITUTIONAL: No weakness, fevers or chills  EYES/ENT: No visual changes;  No vertigo or throat pain   NECK: No pain or stiffness  RESPIRATORY: No cough, wheezing, hemoptysis; No shortness of breath  CARDIOVASCULAR: No chest pain or palpitations  GASTROINTESTINAL: No abdominal or epigastric pain. No nausea, vomiting, or hematemesis; No diarrhea or constipation. No melena or hematochezia.  GENITOURINARY: No dysuria, frequency or hematuria  NEUROLOGICAL: No numbness or weakness  SKIN: No itching, burning, rashes, or lesions   All other review of systems is negative unless indicated above    Vital Signs Last 24 Hrs  T(C): 36.6 (10 Feb 2020 10:52), Max: 37.1 (10 Feb 2020 05:08)  T(F): 97.8 (10 Feb 2020 10:52), Max: 98.7 (10 Feb 2020 05:08)  HR: 88 (10 Feb 2020 15:24) (65 - 88)  BP: 159/79 (10 Feb 2020 15:24) (158/87 - 201/90)  BP(mean): --  RR: 18 (10 Feb 2020 10:52) (16 - 23)  SpO2: 97% (10 Feb 2020 10:52) (96% - 100%)    I&O's Summary    09 Feb 2020 07:01  -  10 Feb 2020 07:00  --------------------------------------------------------  IN: 300 mL / OUT: 200 mL / NET: 100 mL    10 Feb 2020 07:01  -  10 Feb 2020 16:56  --------------------------------------------------------  IN: 550 mL / OUT: 0 mL / NET: 550 mL        CAPILLARY BLOOD GLUCOSE          PHYSICAL EXAM:    Constitutional: NAD, awake and alert, well-developed  HEENT: PERR, EOMI, Normal Hearing, MMM  Neck: Soft and supple, No LAD, No JVD  Respiratory: Breath sounds are clear bilaterally, No wheezing, rales or rhonchi  Cardiovascular: S1 and S2, regular rate and rhythm, no Murmurs, gallops or rubs  Gastrointestinal: Bowel Sounds present, soft, nontender, nondistended, no guarding, no rebound  Extremities: No peripheral edema  Vascular: 2+ peripheral pulses  Neurological: A/O x 3, no focal deficits  Musculoskeletal: 5/5 strength b/l upper and lower extremities  Skin: No rashes    MEDICATIONS:  MEDICATIONS  (STANDING):  busPIRone 10 milliGRAM(s) Oral two times a day  dexAMETHasone  Injectable   IV Push   dexAMETHasone  Injectable 6 milliGRAM(s) IV Push every 8 hours  gabapentin 300 milliGRAM(s) Oral daily  heparin  Injectable 5000 Unit(s) SubCutaneous every 12 hours  losartan 50 milliGRAM(s) Oral daily  metoprolol tartrate Oral Tab/Cap - Peds 50 milliGRAM(s) Oral daily  sertraline 50 milliGRAM(s) Oral daily      LABS: All Labs Reviewed:                        14.7   6.45  )-----------( 194      ( 10 Feb 2020 08:48 )             40.8     02-10    135  |  104  |  19  ----------------------------<  82  3.9   |  24  |  1.01    Ca    8.9      10 Feb 2020 08:48  Phos  3.0     02-10  Mg     2.2     02-10    TPro  7.0  /  Alb  3.9  /  TBili  1.2  /  DBili  x   /  AST  28  /  ALT  22  /  AlkPhos  82  02-10    PT/INR - ( 10 Feb 2020 08:48 )   PT: 12.4 sec;   INR: 1.11 ratio         PTT - ( 10 Feb 2020 08:48 )  PTT:43.8 sec  CARDIAC MARKERS ( 09 Feb 2020 15:19 )  0.015 ng/mL / x     / x     / x     / x          Blood Culture: 02-09 @ 16:54  Organism --  Gram Stain Blood -- Gram Stain --  Specimen Source .Urine None  Culture-Blood --          Assessment and Plan:   	    80 y/o M with PMH of BPH, spinal stenosis, HTN, dyslipidemia, pre-DM, anxiety, p/w increasing weakness, weight loss and difficulty swallowing      *weakenss/ debility  -patient unsteady on his feet, need excercise and pt excerceises  -unclear if he will go to rehab, but he said he will think about it  -physical therapy to see patient  -spinal stenosis    *spinal stenosis  -MRI with spinal stenosis  -Ortho spine consult apprecaited  -patient started on decardron  -possible lower ext claudication second stenosis  -Fall precautions  -CTH negative for acute findings    *Weight loss / Decreased appetite / Difficulty swallowing  -GI consult  -Will need to r/o underlying malignancy   -IVF  -CT - small hypodensity in liver  -esophagram today    *Hematuria / Elevated PSA in the setting of decreased appetite / weight loss  -Will need to r/o prostate as a possible source of malignancy  - consult  -Patient is s/p recent cystoscopy   -CT - small hypodensity on R kidney     *HTN - uncontrolled  -Low salt diet  -Valsartan non-formulary, switched to losartan and increased dose  -C/w lopressor     *Dyslipidemia / Pre-DM / anxiety  -C/w home meds and f/u outpatient for further management     *DVT ppx  -Heparin subQ

## 2020-02-10 NOTE — CONSULT NOTE ADULT - SUBJECTIVE AND OBJECTIVE BOX
Patient is a 79y old  Male who presents with a chief complaint of increasing weakness, weight loss and difficulty swallowing (09 Feb 2020 21:01)      HPI:  80 y/o M with PMH of BPH, spinal stenosis, HTN, dyslipidemia, pre-DM, anxiety, p/w increasing weakness, weight loss and difficulty swallowing. Wife at bedside states that patient has been having issues where his legs collapsed from under him intermittently for the last 2 years. He has been using a cane for the last 2 years. During christmas / new year holiday season patient developed bronchitis, and since then patient has been getting progressively worse. Patient switched to a walker 3 weeks ago. Two days ago patient tried to ambulate without walker, and wife found him sitting on the kitchen floor, does not think he hit his head. Wife states since the fall, patient has been getting even weaker over the last 2 days. Patient's son has to pick him in order to move him, which is significantly worse. Had two episodes of urinary incontinence as well. States that 2 weeks ago he saw a neurologist, and when pin prick physical exam maneuver was performed, patient was unable to feel much below his knees. Denies saddle anesthesia.     Family states that patient had been losing weight over time. However, in the last month weight loss has been accelerated. States he lost about 15 lbs over 1 month. Wife states that patient also has decreased appetite. Patient states that he is nervous about swallowing as he has some difficulty swallowing, and he has even choken on a pill previously. Wife states that patient has an upcoming gastroenterology appointment to address weight loss and dysphagia. Patient denies nausea, vomiting, abdominal pain, diarrhea.  This AM, he ate scrambled eggs without difficulty.  He is able to answer questions but details of why he not eating or issues with his bowel movements are difficult to obtain    PAST MEDICAL & SURGICAL HISTORY:  Sciatica  BPH (benign prostatic hyperplasia)  HTN (hypertension)      MEDICATIONS  (STANDING):  busPIRone 10 milliGRAM(s) Oral two times a day  gabapentin 300 milliGRAM(s) Oral daily  heparin  Injectable 5000 Unit(s) SubCutaneous every 12 hours  losartan 50 milliGRAM(s) Oral daily  metoprolol tartrate Oral Tab/Cap - Peds 50 milliGRAM(s) Oral daily  sertraline 50 milliGRAM(s) Oral daily    MEDICATIONS  (PRN):  acetaminophen   Tablet .. 650 milliGRAM(s) Oral every 4 hours PRN Mild Pain (1 - 3)      Allergies  No Known Allergies    SOCIAL HISTORY: lives home, no etoh, tobacco    FAMILY HISTORY: noncontributory      REVIEW OF SYSTEMS:    CONSTITUTIONAL: weakness  EYES/ENT: No visual changes;  No vertigo or throat pain   NECK: No pain or stiffness  RESPIRATORY: No cough, wheezing, hemoptysis; No shortness of breath  CARDIOVASCULAR: No chest pain or palpitations  GASTROINTESTINAL: as above  GENITOURINARY: No dysuria, frequency or hematuria  NEUROLOGICAL: weakness, numbness  SKIN: No itching, burning, rashes, or lesions   PSYCH: Normal mood and affect  All other review of systems is negative unless indicated above.    Vital Signs Last 24 Hrs  T(C): 37.1 (10 Feb 2020 05:08), Max: 37.1 (10 Feb 2020 05:08)  T(F): 98.7 (10 Feb 2020 05:08), Max: 98.7 (10 Feb 2020 05:08)  HR: 78 (10 Feb 2020 05:08) (65 - 100)  BP: 189/82 (10 Feb 2020 05:08) (158/87 - 201/90)  BP(mean): --  RR: 18 (10 Feb 2020 05:08) (16 - 23)  SpO2: 98% (10 Feb 2020 05:08) (96% - 100%)    PHYSICAL EXAM:  Constitutional: NAD  HEENT: MMM  Neck: No LAD  Respiratory: CTAB  Cardiovascular: S1 and S2, RRR, no M/G/R  Gastrointestinal: BS+, soft, NT/ND  Extremities: No peripheral edema  Vascular: 2+ peripheral pulses  Neurological: A/O x 3, some weakness in extremieties 3-4/5 nonfocal  Psychiatric: Normal mood, flat affect  Skin: No rashes    LABS:                        14.7   6.45  )-----------( 194      ( 10 Feb 2020 08:48 )             40.8     02-10    135  |  104  |  19  ----------------------------<  82  3.9   |  24  |  1.01    Ca    8.9      10 Feb 2020 08:48  Phos  3.0     02-10  Mg     2.2     02-10    TPro  7.0  /  Alb  3.9  /  TBili  1.2  /  DBili  x   /  AST  28  /  ALT  22  /  AlkPhos  82  02-10    PT/INR - ( 10 Feb 2020 08:48 )   PT: 12.4 sec;   INR: 1.11 ratio         PTT - ( 10 Feb 2020 08:48 )  PTT:43.8 sec  LIVER FUNCTIONS - ( 10 Feb 2020 08:48 )  Alb: 3.9 g/dL / Pro: 7.0 gm/dL / ALK PHOS: 82 U/L / ALT: 22 U/L / AST: 28 U/L / GGT: x             RADIOLOGY & ADDITIONAL STUDIES:

## 2020-02-10 NOTE — PHYSICAL THERAPY INITIAL EVALUATION ADULT - CRITERIA FOR SKILLED THERAPEUTIC INTERVENTIONS
risk reduction/prevention/therapy frequency/anticipated discharge recommendation/predicted duration of therapy intervention/rehab potential/impairments found/functional limitations in following categories/anticipated equipment needs at discharge

## 2020-02-10 NOTE — PHYSICAL THERAPY INITIAL EVALUATION ADULT - ACTIVE RANGE OF MOTION EXAMINATION, REHAB EVAL
min. diminished DF B ankles/bilateral  lower extremity Active ROM was WFL (within functional limits)/bilateral upper extremity Active ROM was WFL (within functional limits) bilateral upper extremity Active ROM was WFL (within functional limits)/bilateral  lower extremity Active ROM was WFL (within functional limits)/except L shoulder with mod.limitation of overhead elevation ~90 degrees with pain/impingement symptoms radiating into L deltoid mm., min. diminished DF B ankles

## 2020-02-10 NOTE — PHYSICAL THERAPY INITIAL EVALUATION ADULT - DIAGNOSIS, PT EVAL
Failure to thrive, generalized weakness ,gait impairment,+fall weight loss x15 lbs  in past month Failure to thrive, generalized weakness ,gait impairment,+fall ,+weight loss x15 lbs  in past month, decreased PO intake Failure to thrive, generalized weakness ,gait impairment, +fall ,+weight loss x15 lbs  in past month, decreased PO intake

## 2020-02-10 NOTE — PHYSICAL THERAPY INITIAL EVALUATION ADULT - PATIENT/FAMILY/SIGNIFICANT OTHER GOALS STATEMENT, PT EVAL
pt/wife c/o difficulty walking,collapsing/giving way ,wife unable to pick patient up due to h/o hip replacement and back problems pt/wife c/o difficulty walking, collapsing/giving way ,wife unable to pick patient up due to h/o hip replacement and back problems

## 2020-02-11 LAB
B BURGDOR C6 AB SER-ACNC: NEGATIVE — SIGNIFICANT CHANGE UP
B BURGDOR IGG+IGM SER-ACNC: 0.31 INDEX — SIGNIFICANT CHANGE UP (ref 0.01–0.89)
FOLATE SERPL-MCNC: 13.4 NG/ML — SIGNIFICANT CHANGE UP
T PALLIDUM AB TITR SER: NEGATIVE — SIGNIFICANT CHANGE UP
VIT B12 SERPL-MCNC: <150 PG/ML — LOW (ref 232–1245)

## 2020-02-11 PROCEDURE — 99232 SBSQ HOSP IP/OBS MODERATE 35: CPT

## 2020-02-11 PROCEDURE — 99233 SBSQ HOSP IP/OBS HIGH 50: CPT

## 2020-02-11 RX ORDER — POLYETHYLENE GLYCOL 3350 17 G/17G
17 POWDER, FOR SOLUTION ORAL
Refills: 0 | Status: DISCONTINUED | OUTPATIENT
Start: 2020-02-11 | End: 2020-02-14

## 2020-02-11 RX ORDER — PREGABALIN 225 MG/1
1000 CAPSULE ORAL DAILY
Refills: 0 | Status: DISCONTINUED | OUTPATIENT
Start: 2020-02-11 | End: 2020-02-14

## 2020-02-11 RX ADMIN — POLYETHYLENE GLYCOL 3350 17 GRAM(S): 17 POWDER, FOR SOLUTION ORAL at 17:59

## 2020-02-11 RX ADMIN — HEPARIN SODIUM 5000 UNIT(S): 5000 INJECTION INTRAVENOUS; SUBCUTANEOUS at 05:42

## 2020-02-11 RX ADMIN — GABAPENTIN 300 MILLIGRAM(S): 400 CAPSULE ORAL at 14:46

## 2020-02-11 RX ADMIN — Medication 50 MILLIGRAM(S): at 05:42

## 2020-02-11 RX ADMIN — LOSARTAN POTASSIUM 50 MILLIGRAM(S): 100 TABLET, FILM COATED ORAL at 05:42

## 2020-02-11 RX ADMIN — Medication 4 MILLIGRAM(S): at 14:47

## 2020-02-11 RX ADMIN — SERTRALINE 50 MILLIGRAM(S): 25 TABLET, FILM COATED ORAL at 14:46

## 2020-02-11 RX ADMIN — Medication 4 MILLIGRAM(S): at 21:29

## 2020-02-11 RX ADMIN — HEPARIN SODIUM 5000 UNIT(S): 5000 INJECTION INTRAVENOUS; SUBCUTANEOUS at 17:59

## 2020-02-11 RX ADMIN — Medication 6 MILLIGRAM(S): at 05:42

## 2020-02-11 RX ADMIN — Medication 50 MILLIGRAM(S): at 17:59

## 2020-02-11 RX ADMIN — Medication 10 MILLIGRAM(S): at 05:41

## 2020-02-11 RX ADMIN — Medication 10 MILLIGRAM(S): at 17:55

## 2020-02-11 RX ADMIN — PREGABALIN 1000 MICROGRAM(S): 225 CAPSULE ORAL at 14:42

## 2020-02-11 NOTE — PROGRESS NOTE ADULT - ASSESSMENT
79 year old man hx of worsening gait, c/o general weakness, diff swallowing. No long tract findings, evidence of peripheral neuropathy.  MR head shows no acute findings, MR cervical spine mx level spondylosis. low vit B12. Could ex[plain patients neuropathy, gait difficulty.  suggest: vit B12 1000 mcg, IM q day x 7 then weekly x 4, then monthly  PT  outpatient emg/ncv

## 2020-02-11 NOTE — PROGRESS NOTE ADULT - SUBJECTIVE AND OBJECTIVE BOX
(285)  Severe spinal stenosis -  weakness, inability to walk   CA workup / in progress  weight loss,   Patient seen and evaluated,    drowsy on exam.,   Able to follow commands loosely  Neuro remains full and intact  On decadron taper for severe stenosis / weakness.    unable to determine if effective as of yet.   recommend daily PT evaluation , oob with assistance.   continued decadron taper / Neurontin    observation   case reviewed with Dr Morrison.

## 2020-02-11 NOTE — PROGRESS NOTE ADULT - SUBJECTIVE AND OBJECTIVE BOX
CHIEF COMPLAINT/Diagnosis: spinal stenosis/ debility/ unable to ambulate    SUBJECTIVE: no complaints    REVIEW OF SYSTEMS:    CONSTITUTIONAL: No weakness, fevers or chills  EYES/ENT: No visual changes;  No vertigo or throat pain   NECK: No pain or stiffness  RESPIRATORY: No cough, wheezing, hemoptysis; No shortness of breath  CARDIOVASCULAR: No chest pain or palpitations  GASTROINTESTINAL: No abdominal or epigastric pain. No nausea, vomiting, or hematemesis; No diarrhea or constipation. No melena or hematochezia.  GENITOURINARY: No dysuria, frequency or hematuria  NEUROLOGICAL: No numbness or weakness  SKIN: No itching, burning, rashes, or lesions   All other review of systems is negative unless indicated above    Vital Signs Last 24 Hrs  T(C): 36.6 (11 Feb 2020 11:41), Max: 37.1 (11 Feb 2020 05:15)  T(F): 97.8 (11 Feb 2020 11:41), Max: 98.8 (11 Feb 2020 05:15)  HR: 75 (11 Feb 2020 11:41) (75 - 89)  BP: 116/65 (11 Feb 2020 11:41) (116/65 - 165/72)  BP(mean): --  RR: 17 (11 Feb 2020 11:41) (17 - 18)  SpO2: 97% (11 Feb 2020 11:41) (97% - 98%)    I&O's Summary    10 Feb 2020 07:01  -  11 Feb 2020 07:00  --------------------------------------------------------  IN: 550 mL / OUT: 0 mL / NET: 550 mL        CAPILLARY BLOOD GLUCOSE          PHYSICAL EXAM:    Constitutional: NAD, awake and alert, well-developed  HEENT: PERR, EOMI, Normal Hearing, MMM  Neck: Soft and supple, No LAD, No JVD  Respiratory: Breath sounds are clear bilaterally, No wheezing, rales or rhonchi  Cardiovascular: S1 and S2, regular rate and rhythm, no Murmurs, gallops or rubs  Gastrointestinal: Bowel Sounds present, soft, nontender, nondistended, no guarding, no rebound  Extremities: No peripheral edema  Vascular: 2+ peripheral pulses  Neurological: A/O x 3, no focal deficits  Musculoskeletal: 5/5 strength b/l upper and lower extremities  Skin: No rashes    MEDICATIONS:  MEDICATIONS  (STANDING):  busPIRone 10 milliGRAM(s) Oral two times a day  cyanocobalamin Injectable 1000 MICROGram(s) IntraMuscular daily  dexAMETHasone  Injectable   IV Push   dexAMETHasone  Injectable 4 milliGRAM(s) IV Push every 8 hours  gabapentin 300 milliGRAM(s) Oral daily  heparin  Injectable 5000 Unit(s) SubCutaneous every 12 hours  losartan 50 milliGRAM(s) Oral daily  metoprolol tartrate 50 milliGRAM(s) Oral two times a day  sertraline 50 milliGRAM(s) Oral daily      LABS: All Labs Reviewed:                        14.7   6.45  )-----------( 194      ( 10 Feb 2020 08:48 )             40.8     02-10    135  |  104  |  19  ----------------------------<  82  3.9   |  24  |  1.01    Ca    8.9      10 Feb 2020 08:48  Phos  3.0     02-10  Mg     2.2     02-10    TPro  7.0  /  Alb  3.9  /  TBili  1.2  /  DBili  x   /  AST  28  /  ALT  22  /  AlkPhos  82  02-10    PT/INR - ( 10 Feb 2020 08:48 )   PT: 12.4 sec;   INR: 1.11 ratio         PTT - ( 10 Feb 2020 08:48 )  PTT:43.8 sec  CARDIAC MARKERS ( 10 Feb 2020 16:41 )  x     / x     / 158 U/L / x     / x      CARDIAC MARKERS ( 09 Feb 2020 15:19 )  0.015 ng/mL / x     / x     / x     / x          Blood Culture: 02-09 @ 16:54  Organism --  Gram Stain Blood -- Gram Stain --  Specimen Source .Urine None  Culture-Blood --            Assessment and Plan:   	    78 y/o M with PMH of BPH, spinal stenosis, HTN, dyslipidemia, pre-DM, anxiety, p/w increasing weakness, weight loss and difficulty swallowing      *weakenss/ debility  -patient unsteady on his feet, need excercise and pt excerceises  -unclear if he will go to rehab, but he said he will think about it  -spinal stenosis > ortho following, input apprecaited    *spinal stenosis  -MRI with spinal stenosis  -Ortho spine consult apprecaited  -patient started on decardron  -possible lower ext claudication second stenosis >> possible laminectomy vs dc to rehab  -Fall precautions  -CTH negative for acute findings    *Weight loss / Decreased appetite / Difficulty swallowing  -Will need to r/o underlying malignancy   -CT - small hypodensity in liver  -esophagram normal  -GI to eval for egd / colonoscopy    *Hematuria / Elevated PSA in the setting of decreased appetite / weight loss  -Will need to r/o prostate as a possible source of malignancy  - consult  -Patient is s/p recent cystoscopy   -CT - small hypodensity on R kidney     *HTN - uncontrolled  -Low salt diet  -Valsartan non-formulary, switched to losartan and increased dose  -C/w lopressor >> increased dose. BP now improved.    *Dyslipidemia / Pre-DM / anxiety  -C/w home meds and f/u outpatient for further management     *DVT ppx  -Heparin subQ

## 2020-02-11 NOTE — PROGRESS NOTE ADULT - SUBJECTIVE AND OBJECTIVE BOX
Patient seen and examined  Chart reviewed  Case discussed with wife and daughter    patient was fully ambulatory up until December  Progressive gait issues, numbness, weakness inability to stand and walk  Minimal axial LBP  Some "hip" pain bilaterally    Has a hx of "stenosis" treated with RODRIGO in the past    Sitting up at bedside  Minimal EHL/ANT tib weakness on exam - non-focal  Sens grossly intact (while sitting)  No axial LBP,tenderness  No hyper-reflexia or long tract signs    MRI - SEVERE stenosis at L4-5 associated with Gr1 spondy and HNP, moderate/severe stenosis L3-4 > L2-3  C-Spine - multiple DDD, mild mod stenosis C3-C7, no cord signal changes. Lateral HNP T2-3    Labs reviewed - nl WBC, BUN/CR, Albumin  Decreased Vit B12    IMP - severe lumbar stenosis, chronic  acute exacerbation since December    Admitted for weight loss, generalized fatigue  Discussed semi-elective indications for laminectomy +/- fusion with patient and family  May consider this at this admission of other W/U negative and can be cleared as a reasonable surgical risk vs D/C to rehab.  Will follow...    LAURA Curry MD

## 2020-02-11 NOTE — PROGRESS NOTE ADULT - ASSESSMENT
78yo male with weakness  he has been losing weight and noted some issue with swallowing  barium esophagram normal  he is still quite weak  will check ct abd/pelvis and consider egd/colonoscopy pending results  d/w pt and his wife and daughter in detail

## 2020-02-11 NOTE — PROGRESS NOTE ADULT - SUBJECTIVE AND OBJECTIVE BOX
Patient is a 79y old  Male who presents with a chief complaint of increasing weakness, weight loss and difficulty swallowing (11 Feb 2020 08:54)      HPI:  pt has been eating with assistance and encouragement  he does not have much of an appetite  he denies pain    PAST MEDICAL & SURGICAL HISTORY:  Sciatica  BPH (benign prostatic hyperplasia)  HTN (hypertension)    MEDICATIONS  (STANDING):  busPIRone 10 milliGRAM(s) Oral two times a day  cyanocobalamin Injectable 1000 MICROGram(s) IntraMuscular daily  dexAMETHasone  Injectable   IV Push   dexAMETHasone  Injectable 4 milliGRAM(s) IV Push every 8 hours  gabapentin 300 milliGRAM(s) Oral daily  heparin  Injectable 5000 Unit(s) SubCutaneous every 12 hours  losartan 50 milliGRAM(s) Oral daily  metoprolol tartrate 50 milliGRAM(s) Oral two times a day  sertraline 50 milliGRAM(s) Oral daily    MEDICATIONS  (PRN):  acetaminophen   Tablet .. 650 milliGRAM(s) Oral every 4 hours PRN Mild Pain (1 - 3)  oxycodone    5 mG/acetaminophen 325 mG 1 Tablet(s) Oral every 6 hours PRN Severe Pain (7 - 10)    Allergies  No Known Allergies      REVIEW OF SYSTEMS:    CONSTITUTIONAL: weakness  RESPIRATORY: No cough, wheezing, hemoptysis; No shortness of breath  CARDIOVASCULAR: No chest pain or palpitations  GASTROINTESTINAL: decreased appetite  All other review of systems is negative unless indicated above.    Vital Signs Last 24 Hrs  T(C): 36.6 (11 Feb 2020 11:41), Max: 37.1 (11 Feb 2020 05:15)  T(F): 97.8 (11 Feb 2020 11:41), Max: 98.8 (11 Feb 2020 05:15)  HR: 75 (11 Feb 2020 11:41) (75 - 89)  BP: 116/65 (11 Feb 2020 11:41) (116/65 - 165/72)  BP(mean): --  RR: 17 (11 Feb 2020 11:41) (17 - 18)  SpO2: 97% (11 Feb 2020 11:41) (97% - 98%)    PHYSICAL EXAM:  Constitutional: NAD,     Gastrointestinal: BS+, soft, nontender  Extremities: No peripheral edema  Psychiatric: Normal mood, flat affect  Skin: No rashes    LABS:                        14.7   6.45  )-----------( 194      ( 10 Feb 2020 08:48 )             40.8     02-10    135  |  104  |  19  ----------------------------<  82  3.9   |  24  |  1.01    Ca    8.9      10 Feb 2020 08:48  Phos  3.0     02-10  Mg     2.2     02-10    TPro  7.0  /  Alb  3.9  /  TBili  1.2  /  DBili  x   /  AST  28  /  ALT  22  /  AlkPhos  82  02-10    PT/INR - ( 10 Feb 2020 08:48 )   PT: 12.4 sec;   INR: 1.11 ratio         PTT - ( 10 Feb 2020 08:48 )  PTT:43.8 sec  LIVER FUNCTIONS - ( 10 Feb 2020 08:48 )  Alb: 3.9 g/dL / Pro: 7.0 gm/dL / ALK PHOS: 82 U/L / ALT: 22 U/L / AST: 28 U/L / GGT: x             RADIOLOGY & ADDITIONAL STUDIES:

## 2020-02-11 NOTE — PROGRESS NOTE ADULT - SUBJECTIVE AND OBJECTIVE BOX
79 year old man hx of worsening gait, c/o general weakness, diff swallowing. On exam appears depressed, evidence of peripheral neuropathy Ct of head showed no acute changes, T of spine no fx, MR L/S DDD. Today feels about the same.    MEDICATIONS  (STANDING):  busPIRone 10 milliGRAM(s) Oral two times a day  cyanocobalamin Injectable 1000 MICROGram(s) IntraMuscular daily  dexAMETHasone  Injectable   IV Push   dexAMETHasone  Injectable 4 milliGRAM(s) IV Push every 8 hours  gabapentin 300 milliGRAM(s) Oral daily  heparin  Injectable 5000 Unit(s) SubCutaneous every 12 hours  losartan 50 milliGRAM(s) Oral daily  metoprolol tartrate 50 milliGRAM(s) Oral two times a day  sertraline 50 milliGRAM(s) Oral daily    MEDICATIONS  (PRN):  acetaminophen   Tablet .. 650 milliGRAM(s) Oral every 4 hours PRN Mild Pain (1 - 3)  oxycodone    5 mG/acetaminophen 325 mG 1 Tablet(s) Oral every 6 hours PRN Severe Pain (7 - 10)      Neurological Exam:  MS: AOx3. flat affect, jaciel voice, follows commands   CN: VFFTC, PERLL, EOMI, V1-3 sensation intact, face symmetric, hearing intact, palate elevates symmetrically, tongue midline, SCM equal bilaterally  Motor: distal wasting, tone diff to assess, no tremor, no rigidity or bradykinesia.  ~4/5 all over   Sens: Intact to light touch, JPS impaired in LE.    Reflexes: 1-2/4 all over, downgoing toes b/l  Coord:  No dysmetria, MIKE intact   Gait: Cannot test      Lab:  Thyroid Stimulating Hormone, Serum: 0.67 uU/mL (02.10.20 @ 16:41)  Treponema Pallidum Antibody Interpretation: Negative:  Folate, Serum: 13.4 ng/mL (02.10.20 @ 16:41)  Vitamin B12, Serum: <150: Test Repeated pg/mL (02.10.20 @ 16:41)    < from: MR Cervical Spine No Cont (02.10.20 @ 18:04) >  IMPRESSION:    No acute findings. Severe spondylosis with spinal stenosis and mild spinal cord flattening at C6-7 and multilevel foraminal stenosis as above    < from: MR Head No Cont (02.10.20 @ 18:04) >  Comparison CT performed the prior day    There is no mass effect, cortical edema or hydrocephalus. There is mild age typical chronic microvascular ischemic change in the periventricular white matter. There is no evidence of acute infarct or previous parenchymal hemorrhage. The orbital and sellar contents and cerebellar tonsils are within normal limits.    IMPRESSION:    No acute findings    < end of copied text >          Radiology report:  CT Head  MRI brain  MRA brain/carotids  EEG

## 2020-02-12 LAB — ALDOLASE SERPL-CCNC: 4 U/L — SIGNIFICANT CHANGE UP (ref 3.3–10.3)

## 2020-02-12 PROCEDURE — 74018 RADEX ABDOMEN 1 VIEW: CPT | Mod: 26

## 2020-02-12 PROCEDURE — 99232 SBSQ HOSP IP/OBS MODERATE 35: CPT

## 2020-02-12 RX ORDER — MULTIVIT WITH MIN/MFOLATE/K2 340-15/3 G
1 POWDER (GRAM) ORAL ONCE
Refills: 0 | Status: COMPLETED | OUTPATIENT
Start: 2020-02-12 | End: 2020-02-12

## 2020-02-12 RX ADMIN — OXYCODONE AND ACETAMINOPHEN 1 TABLET(S): 5; 325 TABLET ORAL at 07:45

## 2020-02-12 RX ADMIN — HEPARIN SODIUM 5000 UNIT(S): 5000 INJECTION INTRAVENOUS; SUBCUTANEOUS at 17:16

## 2020-02-12 RX ADMIN — Medication 2 MILLIGRAM(S): at 05:28

## 2020-02-12 RX ADMIN — POLYETHYLENE GLYCOL 3350 17 GRAM(S): 17 POWDER, FOR SOLUTION ORAL at 05:28

## 2020-02-12 RX ADMIN — PREGABALIN 1000 MICROGRAM(S): 225 CAPSULE ORAL at 13:47

## 2020-02-12 RX ADMIN — Medication 50 MILLIGRAM(S): at 17:16

## 2020-02-12 RX ADMIN — GABAPENTIN 300 MILLIGRAM(S): 400 CAPSULE ORAL at 13:17

## 2020-02-12 RX ADMIN — Medication 50 MILLIGRAM(S): at 05:28

## 2020-02-12 RX ADMIN — Medication 2 MILLIGRAM(S): at 13:17

## 2020-02-12 RX ADMIN — Medication 10 MILLIGRAM(S): at 17:16

## 2020-02-12 RX ADMIN — Medication 1 BOTTLE: at 18:48

## 2020-02-12 RX ADMIN — LOSARTAN POTASSIUM 50 MILLIGRAM(S): 100 TABLET, FILM COATED ORAL at 05:28

## 2020-02-12 RX ADMIN — OXYCODONE AND ACETAMINOPHEN 1 TABLET(S): 5; 325 TABLET ORAL at 08:35

## 2020-02-12 RX ADMIN — SERTRALINE 50 MILLIGRAM(S): 25 TABLET, FILM COATED ORAL at 13:17

## 2020-02-12 RX ADMIN — Medication 10 MILLIGRAM(S): at 05:28

## 2020-02-12 RX ADMIN — HEPARIN SODIUM 5000 UNIT(S): 5000 INJECTION INTRAVENOUS; SUBCUTANEOUS at 05:28

## 2020-02-12 NOTE — PROGRESS NOTE ADULT - SUBJECTIVE AND OBJECTIVE BOX
CHIEF COMPLAINT/diagnosis: spinal stenosis/ debility/ poor ambulation/ weight loss w/ poor apetitie    SUBJECTIVE: no complaints    REVIEW OF SYSTEMS:    CONSTITUTIONAL: No weakness, fevers or chills  EYES/ENT: No visual changes;  No vertigo or throat pain   NECK: No pain or stiffness  RESPIRATORY: No cough, wheezing, hemoptysis; No shortness of breath  CARDIOVASCULAR: No chest pain or palpitations  GASTROINTESTINAL: No abdominal or epigastric pain. No nausea, vomiting, or hematemesis; No diarrhea or constipation. No melena or hematochezia.  GENITOURINARY: No dysuria, frequency or hematuria  NEUROLOGICAL: No numbness or weakness  SKIN: No itching, burning, rashes, or lesions   All other review of systems is negative unless indicated above    Vital Signs Last 24 Hrs  T(C): 36.5 (12 Feb 2020 11:40), Max: 37 (11 Feb 2020 16:45)  T(F): 97.7 (12 Feb 2020 11:40), Max: 98.6 (11 Feb 2020 16:45)  HR: 63 (12 Feb 2020 11:40) (63 - 79)  BP: 130/58 (12 Feb 2020 11:40) (130/58 - 158/83)  BP(mean): --  RR: 17 (12 Feb 2020 11:40) (17 - 18)  SpO2: 100% (12 Feb 2020 11:40) (98% - 100%)    I&O's Summary      CAPILLARY BLOOD GLUCOSE          PHYSICAL EXAM:    Constitutional: NAD, awake and alert, well-developed  HEENT: PERR, EOMI, Normal Hearing, MMM  Neck: Soft and supple, No LAD, No JVD  Respiratory: Breath sounds are clear bilaterally, No wheezing, rales or rhonchi  Cardiovascular: S1 and S2, regular rate and rhythm, no Murmurs, gallops or rubs  Gastrointestinal: Bowel Sounds present, soft, nontender, nondistended, no guarding, no rebound  Extremities: No peripheral edema  Vascular: 2+ peripheral pulses  Neurological: A/O x 3, no focal deficits  Musculoskeletal: 5/5 strength b/l upper and lower extremities  Skin: No rashes    MEDICATIONS:  MEDICATIONS  (STANDING):  busPIRone 10 milliGRAM(s) Oral two times a day  cyanocobalamin Injectable 1000 MICROGram(s) IntraMuscular daily  dexAMETHasone  Injectable   IV Push   dexAMETHasone  Injectable 2 milliGRAM(s) IV Push every 8 hours  gabapentin 300 milliGRAM(s) Oral daily  heparin  Injectable 5000 Unit(s) SubCutaneous every 12 hours  losartan 50 milliGRAM(s) Oral daily  metoprolol tartrate 50 milliGRAM(s) Oral two times a day  polyethylene glycol 3350 17 Gram(s) Oral two times a day  sertraline 50 milliGRAM(s) Oral daily      LABS: All Labs Reviewed:            CARDIAC MARKERS ( 10 Feb 2020 16:41 )  x     / x     / 158 U/L / x     / x          Blood Culture: 02-09 @ 16:54  Organism --  Gram Stain Blood -- Gram Stain --  Specimen Source .Urine None  Culture-Blood --        Assessment and Plan:   	    80 y/o M with PMH of BPH, spinal stenosis, HTN, dyslipidemia, pre-DM, anxiety, p/w increasing weakness, weight loss and difficulty swallowing      *weakenss/ debility  -patient unsteady on his feet, need physical therapy  -unclear if he will go to rehab, but he said he will think about it  -spinal stenosis > ortho following, input apprecaited    *spinal stenosis  -MRI with spinal stenosis  -Ortho spine consult apprecaited  -patient started on decardron  -possible lower ext claudication second stenosis >> possible laminectomy vs dc to rehab  -Fall precautions  -CTH negative for acute findings    *Weight loss / Decreased appetite / Difficulty swallowing  -Will need to r/o underlying malignancy   -CT - small hypodensity in liver  -esophagram normal  -GI to eval for egd / colonoscopy    *Hematuria / Elevated PSA in the setting of decreased appetite / weight loss  -Will need to r/o prostate as a possible source of malignancy  - consult  -Patient is s/p recent cystoscopy   -CT - small hypodensity on R kidney     *HTN - uncontrolled  -Low salt diet  -Valsartan non-formulary, switched to losartan and increased dose  -C/w lopressor >> increased dose. BP now improved.    *Dyslipidemia / Pre-DM / anxiety  -C/w home meds and f/u outpatient for further management     *DVT ppx  -Heparin subQ

## 2020-02-12 NOTE — PROGRESS NOTE ADULT - ASSESSMENT
80y/o male with weakness, TEZ, weight loss of unclear etiology.   he has been losing weight and noted some issue with swallowing.   barium esophagram normal, now awaiting ct scan as unable to complete yesterday due to barium.   pending ct scan, consider egd / colonoscopy?   Discussed with pt, nurse, Dr. Leal.

## 2020-02-12 NOTE — PROGRESS NOTE ADULT - SUBJECTIVE AND OBJECTIVE BOX
Patient is a 79y old  Male who presents with a chief complaint of increasing weakness, weight loss and difficulty swallowing (11 Feb 2020 08:54)      2/12/2020  pt has been eating with assistance and encouragement  he does not have much of an appetite  he denies pain  awaiting ct scan.   esophagram was negative.     PAST MEDICAL & SURGICAL HISTORY:  Sciatica  BPH (benign prostatic hyperplasia)  HTN (hypertension)    MEDICATIONS  (STANDING):  busPIRone 10 milliGRAM(s) Oral two times a day  cyanocobalamin Injectable 1000 MICROGram(s) IntraMuscular daily  dexAMETHasone  Injectable   IV Push   dexAMETHasone  Injectable 4 milliGRAM(s) IV Push every 8 hours  gabapentin 300 milliGRAM(s) Oral daily  heparin  Injectable 5000 Unit(s) SubCutaneous every 12 hours  losartan 50 milliGRAM(s) Oral daily  metoprolol tartrate 50 milliGRAM(s) Oral two times a day  sertraline 50 milliGRAM(s) Oral daily    MEDICATIONS  (PRN):  acetaminophen   Tablet .. 650 milliGRAM(s) Oral every 4 hours PRN Mild Pain (1 - 3)  oxycodone    5 mG/acetaminophen 325 mG 1 Tablet(s) Oral every 6 hours PRN Severe Pain (7 - 10)    Allergies  No Known Allergies      REVIEW OF SYSTEMS:    CONSTITUTIONAL: weakness  RESPIRATORY: No cough, wheezing, hemoptysis; No shortness of breath  CARDIOVASCULAR: No chest pain or palpitations  GASTROINTESTINAL: decreased appetite  All other review of systems is negative unless indicated above.    Vital Signs Last 24 Hrs  T(C): 36.6 (11 Feb 2020 11:41), Max: 37.1 (11 Feb 2020 05:15)  T(F): 97.8 (11 Feb 2020 11:41), Max: 98.8 (11 Feb 2020 05:15)  HR: 75 (11 Feb 2020 11:41) (75 - 89)  BP: 116/65 (11 Feb 2020 11:41) (116/65 - 165/72)  BP(mean): --  RR: 17 (11 Feb 2020 11:41) (17 - 18)  SpO2: 97% (11 Feb 2020 11:41) (97% - 98%)    PHYSICAL EXAM:  Constitutional: NAD,   Gastrointestinal: BS+, soft, nontender  Extremities: No peripheral edema  Psychiatric: Normal mood, flat affect  Skin: No rashes    LABS:                        14.7   6.45  )-----------( 194      ( 10 Feb 2020 08:48 )             40.8     02-10    135  |  104  |  19  ----------------------------<  82  3.9   |  24  |  1.01    Ca    8.9      10 Feb 2020 08:48  Phos  3.0     02-10  Mg     2.2     02-10    TPro  7.0  /  Alb  3.9  /  TBili  1.2  /  DBili  x   /  AST  28  /  ALT  22  /  AlkPhos  82  02-10    PT/INR - ( 10 Feb 2020 08:48 )   PT: 12.4 sec;   INR: 1.11 ratio         PTT - ( 10 Feb 2020 08:48 )  PTT:43.8 sec  LIVER FUNCTIONS - ( 10 Feb 2020 08:48 )  Alb: 3.9 g/dL / Pro: 7.0 gm/dL / ALK PHOS: 82 U/L / ALT: 22 U/L / AST: 28 U/L / GGT: x             RADIOLOGY & ADDITIONAL STUDIES:

## 2020-02-12 NOTE — PROGRESS NOTE ADULT - ATTENDING COMMENTS
See NP note for details. Lungs - clear, cor - RRR, abd soft, NT. Patient getting cleaned out prior to CT scan.

## 2020-02-13 DIAGNOSIS — R53.1 WEAKNESS: ICD-10-CM

## 2020-02-13 LAB — ACHR BIND AB SER-ACNC: <0.3 NMOL/L — SIGNIFICANT CHANGE UP

## 2020-02-13 PROCEDURE — 99231 SBSQ HOSP IP/OBS SF/LOW 25: CPT

## 2020-02-13 PROCEDURE — 74177 CT ABD & PELVIS W/CONTRAST: CPT | Mod: 26

## 2020-02-13 PROCEDURE — 99232 SBSQ HOSP IP/OBS MODERATE 35: CPT

## 2020-02-13 RX ADMIN — Medication 10 MILLIGRAM(S): at 05:12

## 2020-02-13 RX ADMIN — GABAPENTIN 300 MILLIGRAM(S): 400 CAPSULE ORAL at 14:10

## 2020-02-13 RX ADMIN — Medication 10 MILLIGRAM(S): at 18:31

## 2020-02-13 RX ADMIN — Medication 50 MILLIGRAM(S): at 18:31

## 2020-02-13 RX ADMIN — HEPARIN SODIUM 5000 UNIT(S): 5000 INJECTION INTRAVENOUS; SUBCUTANEOUS at 05:12

## 2020-02-13 RX ADMIN — SERTRALINE 50 MILLIGRAM(S): 25 TABLET, FILM COATED ORAL at 14:02

## 2020-02-13 RX ADMIN — POLYETHYLENE GLYCOL 3350 17 GRAM(S): 17 POWDER, FOR SOLUTION ORAL at 05:12

## 2020-02-13 RX ADMIN — LOSARTAN POTASSIUM 50 MILLIGRAM(S): 100 TABLET, FILM COATED ORAL at 05:12

## 2020-02-13 RX ADMIN — Medication 50 MILLIGRAM(S): at 05:12

## 2020-02-13 RX ADMIN — HEPARIN SODIUM 5000 UNIT(S): 5000 INJECTION INTRAVENOUS; SUBCUTANEOUS at 18:31

## 2020-02-13 RX ADMIN — OXYCODONE AND ACETAMINOPHEN 1 TABLET(S): 5; 325 TABLET ORAL at 21:30

## 2020-02-13 RX ADMIN — OXYCODONE AND ACETAMINOPHEN 1 TABLET(S): 5; 325 TABLET ORAL at 20:15

## 2020-02-13 RX ADMIN — PREGABALIN 1000 MICROGRAM(S): 225 CAPSULE ORAL at 14:10

## 2020-02-13 NOTE — DIETITIAN INITIAL EVALUATION ADULT. - PHYSICAL APPEARANCE
other (specify) NFPE significant for mild muscle wasting; temporal.  moderate fat wasting: rib/tricep.  no edema  kathy score of 15; no PU noted.

## 2020-02-13 NOTE — PROGRESS NOTE ADULT - SUBJECTIVE AND OBJECTIVE BOX
CHIEF COMPLAINT: increasing weakness, weight loss and difficulty swallowing  HISTORY OF PRESENT ILLNESS:  Family states that patient had been losing weight over time. However, in the last month weight loss has been accelerated. States he lost about 15 lbs over 1 month. Wife states that patient also has decreased appetite. Patient states that he is nervous about swallowing as he has some difficulty swallowing, and he has even choken on a pill previously. Wife states that patient has an upcoming gastroenterology appointment to address weight loss and dysphagia. Patient denies nausea, vomiting, abdominal pain, diarrhea.    2/13/20: Pt seen by me last 2 days prior to admission for incidental microhematuria and elevated psa. He underwent cystoscopy and there were no tumors. His psa was 4.1 and no masses on babatunde.    PAST MEDICAL & SURGICAL HISTORY:  Sciatica  BPH (benign prostatic hyperplasia)  HTN (hypertension)      REVIEW OF SYSTEMS:Same previous  MEDICATIONS  (STANDING):  busPIRone 10 milliGRAM(s) Oral two times a day  cyanocobalamin Injectable 1000 MICROGram(s) IntraMuscular daily  gabapentin 300 milliGRAM(s) Oral daily  heparin  Injectable 5000 Unit(s) SubCutaneous every 12 hours  losartan 50 milliGRAM(s) Oral daily  metoprolol tartrate 50 milliGRAM(s) Oral two times a day  polyethylene glycol 3350 17 Gram(s) Oral two times a day  sertraline 50 milliGRAM(s) Oral daily    MEDICATIONS  (PRN):  acetaminophen   Tablet .. 650 milliGRAM(s) Oral every 4 hours PRN Mild Pain (1 - 3)  oxycodone    5 mG/acetaminophen 325 mG 1 Tablet(s) Oral every 6 hours PRN Severe Pain (7 - 10)      PE: Currently comfortable. no abdominal pain nor distension. Np suprapubic pain nor distension. Pt wearing a diaper.  Allergies    No Known Allergies    Intolerances        SOCIAL HISTORY:Same previous    FAMILY HISTORY:      Vital Signs Last 24 Hrs  T(C): 36.6 (13 Feb 2020 20:13), Max: 37.2 (13 Feb 2020 16:35)  T(F): 97.8 (13 Feb 2020 20:13), Max: 98.9 (13 Feb 2020 16:35)  HR: 69 (13 Feb 2020 20:13) (58 - 69)  BP: 113/67 (13 Feb 2020 20:13) (113/67 - 152/67)  BP(mean): --  RR: 16 (13 Feb 2020 20:13) (16 - 18)  SpO2: 98% (13 Feb 2020 20:13) (98% - 100%)    PHYSICAL EXAM:Same previous    LABS:              Urine Culture:     RADIOLOGY & ADDITIONAL STUDIES:

## 2020-02-13 NOTE — PROGRESS NOTE ADULT - REASON FOR ADMISSION
increasing weakness, weight loss and difficulty swallowing

## 2020-02-13 NOTE — PROGRESS NOTE ADULT - SUBJECTIVE AND OBJECTIVE BOX
Patient is a 79y old  Male who presents with a chief complaint of increasing weakness, weight loss and difficulty swallowing (12 Feb 2020 12:45)      HPI:  pt notes low back pain  ate this AM and has been moving his bowels    PAST MEDICAL & SURGICAL HISTORY:  Sciatica  BPH (benign prostatic hyperplasia)  HTN (hypertension)    MEDICATIONS  (STANDING):  busPIRone 10 milliGRAM(s) Oral two times a day  cyanocobalamin Injectable 1000 MICROGram(s) IntraMuscular daily  gabapentin 300 milliGRAM(s) Oral daily  heparin  Injectable 5000 Unit(s) SubCutaneous every 12 hours  losartan 50 milliGRAM(s) Oral daily  metoprolol tartrate 50 milliGRAM(s) Oral two times a day  polyethylene glycol 3350 17 Gram(s) Oral two times a day  sertraline 50 milliGRAM(s) Oral daily    MEDICATIONS  (PRN):  acetaminophen   Tablet .. 650 milliGRAM(s) Oral every 4 hours PRN Mild Pain (1 - 3)  oxycodone    5 mG/acetaminophen 325 mG 1 Tablet(s) Oral every 6 hours PRN Severe Pain (7 - 10)    Allergies  No Known Allergies    REVIEW OF SYSTEMS:    CONSTITUTIONAL: weakness  RESPIRATORY: No cough, wheezing, hemoptysis; No shortness of breath  CARDIOVASCULAR: No chest pain or palpitations  GASTROINTESTINAL: as above  All other review of systems is negative unless indicated above.    Vital Signs Last 24 Hrs  T(C): 36.9 (13 Feb 2020 04:57), Max: 36.9 (13 Feb 2020 04:57)  T(F): 98.5 (13 Feb 2020 04:57), Max: 98.5 (13 Feb 2020 04:57)  HR: 64 (13 Feb 2020 04:57) (61 - 81)  BP: 152/67 (13 Feb 2020 04:57) (130/58 - 157/70)  BP(mean): --  RR: 16 (13 Feb 2020 04:57) (16 - 18)  SpO2: 98% (13 Feb 2020 04:57) (98% - 100%)    PHYSICAL EXAM:  Constitutional: NAD  Respiratory: CTAB  Cardiovascular: S1 and S2, RRR, no M/G/R  Gastrointestinal: BS+, soft, NT/ND  Flat affect, appears depressed and expresses overall frustation    LABS:                RADIOLOGY & ADDITIONAL STUDIES:

## 2020-02-13 NOTE — DIETITIAN INITIAL EVALUATION ADULT. - ADD RECOMMEND
1) add ensure enlive BID and gelatein once daily 2) add MVI with minerals daily to ensure 100% RDI met 3) consider checking vitamin D level 4) monitor PO intake/tolerance

## 2020-02-13 NOTE — DIETITIAN INITIAL EVALUATION ADULT. - MALNUTRITION
severe malnutrition in chronic illness r/t decreased ability to consume sufficient energy/protein due to fatigue causing decreased appetite AEB 7% wt loss x 1 mo; meeting <75% of ENN x 1 mo; mild muscle/mod fat wasting. severe malnutrition in chronic illness

## 2020-02-13 NOTE — PROGRESS NOTE ADULT - PROBLEM SELECTOR PLAN 1
Workup for microhematuria has been negative. His psa is 4.1 and the JENN is negative. The plan is to repeat the psa in 3 mos. No additional gu evaluation necessary at this time.

## 2020-02-13 NOTE — PROGRESS NOTE ADULT - SUBJECTIVE AND OBJECTIVE BOX
Pt seen and examined.  Wife and daughter at the bedside  His biggest complaint is overall weakness.  he does have a sense of weakness in the legs with standing and walking.  Did walk with a walker and PT today.  No sciaitca while in bed.  he has a known H/O spinal stenosis in the lumbar spine..  Currently undergoing Work up for malignancy and GI issues    Exam  lethargic on exam  A and O X3  5/5 strength in the Quads, Hamstrings, Hip flexors, Tib ant, gastrocs, EHL B/L  sensation intact in the legs    MRI L spine with severe stenosis in the L4-5 level and mod stenosis on the L2-3, L3-4 levels  The images were reviewed with the wife  She understands our discussion    Plan  He has generalized weakness and lethargy.  Undergoing W/U for a malignancy  He has severe spinal stenosis in the lumbar spine  If he were medical stable and optimized one option would be a decompression surgery.  However, Given his medical co morbidities and current medical status the benefits of a surgery do not outweigh the risks.  He is at high risk for complications associated with a spine surgery.  The family understands and agrees.  All questions answered  Cont pain control and PT  Cont current medical care and W/U

## 2020-02-13 NOTE — DIETITIAN INITIAL EVALUATION ADULT. - OTHER INFO
78yo male with PMH significant for spinal stenosis, HTN, dyslipidemia, Pre-DM, anxiety p/w increasing weakness, weight loss, and difficulty swallowing. s/p legs collapsing under him.  Pt admitted for weakness/debility, hematuria/elevated PSA in setting of decreased appetite/weight loss.  seen by GI: barium esophagram normal, awaiting CT scan. MR head shows no acute findings.  low vitamin B12 found, which could explain neuropathy and gait difficulty.

## 2020-02-13 NOTE — PROGRESS NOTE ADULT - ASSESSMENT
80yo male with weakness, weight loss  await ct scan abdomen/pelvis - has been on hold due to retained contrast from colon  pt is eating and having BM  d/w dr pickering

## 2020-02-13 NOTE — DIETITIAN INITIAL EVALUATION ADULT. - PERTINENT MEDS FT
MEDICATIONS  (STANDING):  busPIRone 10 milliGRAM(s) Oral two times a day  cyanocobalamin Injectable 1000 MICROGram(s) IntraMuscular daily  gabapentin 300 milliGRAM(s) Oral daily  heparin  Injectable 5000 Unit(s) SubCutaneous every 12 hours  losartan 50 milliGRAM(s) Oral daily  metoprolol tartrate 50 milliGRAM(s) Oral two times a day  polyethylene glycol 3350 17 Gram(s) Oral two times a day  sertraline 50 milliGRAM(s) Oral daily    MEDICATIONS  (PRN):  acetaminophen   Tablet .. 650 milliGRAM(s) Oral every 4 hours PRN Mild Pain (1 - 3)  oxycodone    5 mG/acetaminophen 325 mG 1 Tablet(s) Oral every 6 hours PRN Severe Pain (7 - 10)

## 2020-02-13 NOTE — DIETITIAN INITIAL EVALUATION ADULT. - NAME AND PHONE
Andreea Guajardo MA, RDN, CDN, [facility]  [phone] (office number)  [phone] (pager number)

## 2020-02-13 NOTE — PROGRESS NOTE ADULT - SUBJECTIVE AND OBJECTIVE BOX
CHIEF COMPLAINT/Diagnosis: spinal stenosis/ poor oral intake/ weight loss    SUBJECTIVE: no complaints    REVIEW OF SYSTEMS:    CONSTITUTIONAL: No weakness, fevers or chills  EYES/ENT: No visual changes;  No vertigo or throat pain   NECK: No pain or stiffness  RESPIRATORY: No cough, wheezing, hemoptysis; No shortness of breath  CARDIOVASCULAR: No chest pain or palpitations  GASTROINTESTINAL: No abdominal or epigastric pain. No nausea, vomiting, or hematemesis; No diarrhea or constipation. No melena or hematochezia.  GENITOURINARY: No dysuria, frequency or hematuria  NEUROLOGICAL: No numbness or weakness  SKIN: No itching, burning, rashes, or lesions   All other review of systems is negative unless indicated above    Vital Signs Last 24 Hrs  T(C): 36.5 (13 Feb 2020 11:13), Max: 36.9 (13 Feb 2020 04:57)  T(F): 97.7 (13 Feb 2020 11:13), Max: 98.5 (13 Feb 2020 04:57)  HR: 58 (13 Feb 2020 11:13) (58 - 81)  BP: 132/62 (13 Feb 2020 11:13) (132/62 - 157/70)  BP(mean): --  RR: 18 (13 Feb 2020 11:13) (16 - 18)  SpO2: 100% (13 Feb 2020 11:13) (98% - 100%)    I&O's Summary    12 Feb 2020 07:01  -  13 Feb 2020 07:00  --------------------------------------------------------  IN: 100 mL / OUT: 0 mL / NET: 100 mL        CAPILLARY BLOOD GLUCOSE          PHYSICAL EXAM:    Constitutional: NAD, awake and alert, well-developed  HEENT: PERR, EOMI, Normal Hearing, MMM  Neck: Soft and supple, No LAD, No JVD  Respiratory: Breath sounds are clear bilaterally, No wheezing, rales or rhonchi  Cardiovascular: S1 and S2, regular rate and rhythm, no Murmurs, gallops or rubs  Gastrointestinal: Bowel Sounds present, soft, nontender, nondistended, no guarding, no rebound  Extremities: No peripheral edema  Vascular: 2+ peripheral pulses  Neurological: A/O x 3, no focal deficits  Musculoskeletal: 5/5 strength b/l upper and lower extremities  Skin: No rashes    MEDICATIONS:  MEDICATIONS  (STANDING):  busPIRone 10 milliGRAM(s) Oral two times a day  cyanocobalamin Injectable 1000 MICROGram(s) IntraMuscular daily  gabapentin 300 milliGRAM(s) Oral daily  heparin  Injectable 5000 Unit(s) SubCutaneous every 12 hours  losartan 50 milliGRAM(s) Oral daily  metoprolol tartrate 50 milliGRAM(s) Oral two times a day  polyethylene glycol 3350 17 Gram(s) Oral two times a day  sertraline 50 milliGRAM(s) Oral daily      LABS: All Labs Reviewed:        Blood Culture: 02-09 @ 16:54  Organism --  Gram Stain Blood -- Gram Stain --  Specimen Source .Urine None  Culture-Blood --            Assessment and Plan:   	    78 y/o M with PMH of BPH, spinal stenosis, HTN, dyslipidemia, pre-DM, anxiety, p/w increasing weakness, weight loss and difficulty swallowing      *weakenss/ debility  -patient unsteady on his feet, need physical therapy  -spinal stenosis > ortho following, input apprecaited  -low vit b12 >> being repeleted daily.     *spinal stenosis  -MRI with spinal stenosis  -Ortho spine consult apprecaited  -patient started on decardron >> now completed decadron  -possible lower ext claudication second stenosis >> possible laminectomy vs dc to rehab    *Weight loss / Decreased appetite   -CT - small hypodensity in liver  -esophagram normal  -patietn had a CT with iv and oral contrast. there is no suspion for malignancy, d/w Dr. More whom is in agreement for outpatient followup     *history of Hematuria / Elevated PSA  -patietn has a urologist he has been seeing out.   -continued outpatient f/u .   -ct abd/pelvis did not reveal any suspion for mass    *HTN - uncontrolled  -Low salt diet  -Valsartan non-formulary, switched to losartan and increased dose  -C/w lopressor >> increased dose. BP now improved.    *Dyslipidemia / Pre-DM / anxiety  -C/w home meds and f/u outpatient for further management     *DVT ppx  -Heparin subQ    Plan: [place]hab unless ortho plans for any intervention. Left message with ortho Dr. Curry, still waiting response.

## 2020-02-13 NOTE — CHART NOTE - NSCHARTNOTEFT_GEN_A_CORE
Upon Nutritional Assessment by the Registered Dietitian your patient was determined to meet criteria / has evidence of the following diagnosis/diagnoses:          [ ]  Mild Protein Calorie Malnutrition        [ ]  Moderate Protein Calorie Malnutrition        [x] Severe Protein Calorie Malnutrition        [ ] Unspecified Protein Calorie Malnutrition        [ ] Underweight / BMI <19        [ ] Morbid Obesity / BMI > 40      Findings:  severe malnutrition in chronic illness r/t decreased ability to consume sufficient energy/protein due to fatigue causing decreased appetite AEB 7% wt loss x 1 mo; meeting <75% of ENN x 1 mo; mild muscle/mod fat wasting.    Findings as based on:  •  Comprehensive nutrition assessment and consultation  •  Calorie counts (nutrient intake analysis)  •  Food acceptance and intake status from observations by staff  •  Follow up  •  Patient education  •  Intervention secondary to interdisciplinary rounds  •   concerns      Treatment:    The following diet has been recommended:  1) add ensure enlive BID and gelatein once daily   2) add MVI with minerals daily to ensure 100% RDI met   3) consider checking vitamin D level   4) monitor PO intake/tolerance    PROVIDER Section:     By signing this assessment you are acknowledging and agree with the diagnosis/diagnoses assigned by the Registered Dietitian    Comments:

## 2020-02-14 VITALS
TEMPERATURE: 98 F | SYSTOLIC BLOOD PRESSURE: 107 MMHG | OXYGEN SATURATION: 99 % | HEART RATE: 62 BPM | RESPIRATION RATE: 18 BRPM | DIASTOLIC BLOOD PRESSURE: 51 MMHG

## 2020-02-14 LAB
ACHR BLOCK AB SER-ACNC: <15 — SIGNIFICANT CHANGE UP
ACRM MODULATING ANTIBODY: 0 NMOL/L — SIGNIFICANT CHANGE UP
PARANEOPLASTIC AB PNL SER: SIGNIFICANT CHANGE UP

## 2020-02-14 PROCEDURE — 99239 HOSP IP/OBS DSCHRG MGMT >30: CPT

## 2020-02-14 RX ORDER — METOPROLOL TARTRATE 50 MG
1 TABLET ORAL
Qty: 0 | Refills: 0 | DISCHARGE
Start: 2020-02-14

## 2020-02-14 RX ORDER — METOPROLOL TARTRATE 50 MG
0 TABLET ORAL
Qty: 0 | Refills: 0 | DISCHARGE

## 2020-02-14 RX ORDER — PREGABALIN 225 MG/1
1000 CAPSULE ORAL
Qty: 0 | Refills: 0 | DISCHARGE
Start: 2020-02-14 | End: 2020-02-17

## 2020-02-14 RX ORDER — POLYETHYLENE GLYCOL 3350 17 G/17G
17 POWDER, FOR SOLUTION ORAL
Qty: 0 | Refills: 0 | DISCHARGE
Start: 2020-02-14

## 2020-02-14 RX ADMIN — LOSARTAN POTASSIUM 50 MILLIGRAM(S): 100 TABLET, FILM COATED ORAL at 05:39

## 2020-02-14 RX ADMIN — SERTRALINE 50 MILLIGRAM(S): 25 TABLET, FILM COATED ORAL at 11:52

## 2020-02-14 RX ADMIN — POLYETHYLENE GLYCOL 3350 17 GRAM(S): 17 POWDER, FOR SOLUTION ORAL at 05:39

## 2020-02-14 RX ADMIN — Medication 10 MILLIGRAM(S): at 05:39

## 2020-02-14 RX ADMIN — HEPARIN SODIUM 5000 UNIT(S): 5000 INJECTION INTRAVENOUS; SUBCUTANEOUS at 05:39

## 2020-02-14 RX ADMIN — Medication 650 MILLIGRAM(S): at 11:30

## 2020-02-14 RX ADMIN — Medication 50 MILLIGRAM(S): at 05:39

## 2020-02-14 RX ADMIN — Medication 650 MILLIGRAM(S): at 10:51

## 2020-02-14 RX ADMIN — GABAPENTIN 300 MILLIGRAM(S): 400 CAPSULE ORAL at 11:52

## 2020-02-14 RX ADMIN — PREGABALIN 1000 MICROGRAM(S): 225 CAPSULE ORAL at 13:41

## 2020-02-14 NOTE — DISCHARGE NOTE NURSING/CASE MANAGEMENT/SOCIAL WORK - PATIENT PORTAL LINK FT
You can access the FollowMyHealth Patient Portal offered by [facility] by registering at the following website: [email or web address]. By joining [facility]’s FollowMyHealth portal, you will also be able to view your health information using other applications (apps) compatible with our system.

## 2020-02-14 NOTE — PROVIDER CONTACT NOTE (OTHER) - DATE AND TIME:
11-Feb-2020 20:49
14-Feb-2020 16:31
10-Feb-2020 09:10
09-Feb-2020 21:59
09-Feb-2020 22:01
09-Feb-2020 22:04
09-Feb-2020 22:07

## 2020-02-14 NOTE — PROVIDER CONTACT NOTE (OTHER) - SITUATION
Dr. [facility] - 2nd request.  spoke to [facility] at service regarding consult.
Faxed DC papers to PCP
DR. GIO BECERRA, SPOKE WITH KIRBY FROM MD'S OFFICE. WILL INFORM PCP, PATIENT IS ADMITTED TO . PLEASE FAX DISCHARGE PROVIDER NOTE AND SUMMARY -[phone]
severe lumbar stenosis, weakness    left message with ans service for dr to see pt in the morning
weakness    left message with ans service for dr to see pt in the morning
weight loss, decreased appetite, elevated PSA    left message with ans service for dr to see pt in the morning
weight loss, dysphagia    left message with ans service for dr to see pt in the morning

## 2020-02-14 NOTE — DISCHARGE NOTE PROVIDER - CARE PROVIDER_API CALL
Jasmine Curry)  Orthopaedic Surgery  [address] [place] [address], [address] [place] [address]  Phone: [phone]  Fax: [phone]  Follow Up Time:     PCP: Dr. Luevano,   Phone: (   )    -  Fax: (   )    -  Follow Up Time:

## 2020-02-14 NOTE — DISCHARGE NOTE PROVIDER - NSDCCPCAREPLAN_GEN_ALL_CORE_FT
PRINCIPAL DISCHARGE DIAGNOSIS  Diagnosis: Lumbar spinal stenosis  Assessment and Plan of Treatment: MRI - SEVERE stenosis at L4-5 associated with Gr1 spondy and HNP, moderate/severe stenosis L3-4 > L2-3  C-Spine - multiple DDD, mild mod stenosis C3-C7, no cord signal changes. Lateral HNP T2-3  Patient to follow up with Dr. Curry after rehab      SECONDARY DISCHARGE DIAGNOSES  Diagnosis: Weight loss  Assessment and Plan of Treatment: dinesh santana. Encourage oral hydration and increased oral intake.

## 2020-02-14 NOTE — DISCHARGE NOTE PROVIDER - PROVIDER TOKENS
PROVIDER:[TOKEN:[[id number]]],FREE:[LAST:[PCP: Dr. Luevano],PHONE:[(   )    -],FAX:[(   )    -]]

## 2020-02-14 NOTE — DISCHARGE NOTE PROVIDER - HOSPITAL COURSE
Vital Signs Last 24 Hrs    T(C): 36.8 (14 Feb 2020 11:20), Max: 37.2 (13 Feb 2020 16:35)    T(F): 98.3 (14 Feb 2020 11:20), Max: 98.9 (13 Feb 2020 16:35)    HR: 62 (14 Feb 2020 11:20) (58 - 69)    BP: 107/51 (14 Feb 2020 11:20) (107/51 - 158/69)    BP(mean): --    RR: 18 (14 Feb 2020 11:20) (16 - 18)    SpO2: 99% (14 Feb 2020 11:20) (98% - 99%)        HEENT:   pupils equal and reactive, EOMI, no oropharyngeal lesions, erythema, exudates, oral thrush        NECK:   supple, no carotid bruits, no palpable lymph nodes, no thyromegaly        CV:  +S1, +S2, regular, no murmurs or rubs        RESP:   lungs clear to auscultation bilaterally, no wheezing, rales, rhonchi, good air entry bilaterally        BREAST:  not examined        GI:  abdomen soft, non-tender, non-distended, normal BS, no bruits, no abdominal masses, no palpable masses        RECTAL:  not examined        :  not examined        MSK:   normal muscle tone, no atrophy, no rigidity, no contractions        EXT:   no clubbing, no cyanosis, no edema, no calf pain, swelling or erythema        VASCULAR:  pulses equal and symmetric in the upper and lower extremities        NEURO:  AAOX3, no focal neurological deficits, follows all commands, able to move extremities spontaneously        SKIN:  no ulcers, lesions or rashes                    Hospital Course:    	        78 y/o M with PMH of BPH, spinal stenosis, HTN, dyslipidemia, pre-DM, anxiety, p/w increasing weakness, weight loss and difficulty swallowing            *weakenss/ debility    -patient unsteady on his feet, need physical therapy    -spinal stenosis > ortho following, input apprecaited    -low vit b12 >> being repeleted daily IM x 7 days.  received 4/7 days while inpatient. then weekly x 4, then monthly        *spinal stenosis    -MRI with spinal stenosis    -Ortho spine consulted while inpatient, recco decompressive surgery. All risk and benefits  of procedure explained to patient and family. At this time family is refusing any surgical intervention and want patient to go to rehab first. They state they will f/u outpatient with dr. dubose  after rehab.     -patient completed decadron taper while inpatient            *Weight loss / Decreased appetite / debility    -CT - small hypodensity in liver; patient CT abd/pelvis w/ iv and oral contrast , wnls ; no findings to suggest malignancy. Patient has no alarm symptoms such as vommitting/nausea, abdominal pain, blood in stool.     -esophagram normal            *HTN - uncontrolled    -Low salt diet    -Valsartan non-formulary, switched to losartan and increased dose    -C/w lopressor >> increased dose. BP now improved.

## 2020-02-14 NOTE — DISCHARGE NOTE PROVIDER - NSDCMRMEDTOKEN_GEN_ALL_CORE_FT
busPIRone 10 mg oral tablet: 1 tab(s) orally 2 times a day  cyanocobalamin 1000 mcg/mL injectable solution: 1000 microgram(s) injectable once a day (after a meal)  cyanocobalamin 1000 mcg/mL injectable solution: 1000 microgram(s) injectable once a week  gabapentin 300 mg oral capsule: 300 milligram(s) orally once a day      metoprolol tartrate 50 mg oral tablet: 1 tab(s) orally 2 times a day  oxycodone-acetaminophen 5 mg-325 mg oral tablet: 1 tab(s) orally every 8 hours, As Needed - 10)  polyethylene glycol 3350 oral powder for reconstitution: 17 gram(s) orally 2 times a day, As Needed constipation  sertraline 50 mg oral tablet: 1 tab(s) orally once a day  valsartan 40 mg oral tablet: 40 milligram(s) orally once a day

## 2020-02-15 LAB — MAG AB SER-ACNC: NEGATIVE — SIGNIFICANT CHANGE UP

## 2020-02-18 LAB — ACHR MOD AB SER-ACNC: 20 — SIGNIFICANT CHANGE UP

## 2020-02-18 RX ORDER — PREGABALIN 225 MG/1
1000 CAPSULE ORAL
Qty: 0 | Refills: 0 | DISCHARGE
Start: 2020-02-18 | End: 2020-03-06

## 2020-02-20 DIAGNOSIS — M48.062 SPINAL STENOSIS, LUMBAR REGION WITH NEUROGENIC CLAUDICATION: ICD-10-CM

## 2020-02-20 DIAGNOSIS — R73.03 PREDIABETES: ICD-10-CM

## 2020-02-20 DIAGNOSIS — G62.9 POLYNEUROPATHY, UNSPECIFIED: ICD-10-CM

## 2020-02-20 DIAGNOSIS — I10 ESSENTIAL (PRIMARY) HYPERTENSION: ICD-10-CM

## 2020-02-20 DIAGNOSIS — M48.02 SPINAL STENOSIS, CERVICAL REGION: ICD-10-CM

## 2020-02-20 DIAGNOSIS — R31.21 ASYMPTOMATIC MICROSCOPIC HEMATURIA: ICD-10-CM

## 2020-02-20 DIAGNOSIS — E43 UNSPECIFIED SEVERE PROTEIN-CALORIE MALNUTRITION: ICD-10-CM

## 2020-02-20 DIAGNOSIS — E78.5 HYPERLIPIDEMIA, UNSPECIFIED: ICD-10-CM

## 2020-02-20 DIAGNOSIS — N40.0 BENIGN PROSTATIC HYPERPLASIA WITHOUT LOWER URINARY TRACT SYMPTOMS: ICD-10-CM

## 2020-02-20 DIAGNOSIS — R53.1 WEAKNESS: ICD-10-CM

## 2020-02-20 DIAGNOSIS — F41.9 ANXIETY DISORDER, UNSPECIFIED: ICD-10-CM

## 2020-04-28 ENCOUNTER — INPATIENT (INPATIENT)
Facility: HOSPITAL | Age: 80
LOS: 33 days | Discharge: NURSING, REHAB OR LONG-TERM CARE | DRG: 853 | End: 2020-06-01
Attending: STUDENT IN AN ORGANIZED HEALTH CARE EDUCATION/TRAINING PROGRAM | Admitting: FAMILY MEDICINE
Payer: MEDICARE

## 2020-04-28 ENCOUNTER — EMERGENCY (EMERGENCY)
Facility: HOSPITAL | Age: 80
LOS: 1 days | Discharge: ANOTHER HOSPITAL | End: 2020-04-28
Attending: EMERGENCY MEDICINE | Admitting: EMERGENCY MEDICINE
Payer: MEDICARE

## 2020-04-28 VITALS
SYSTOLIC BLOOD PRESSURE: 84 MMHG | DIASTOLIC BLOOD PRESSURE: 48 MMHG | WEIGHT: 134.92 LBS | OXYGEN SATURATION: 96 % | RESPIRATION RATE: 22 BRPM | HEART RATE: 105 BPM

## 2020-04-28 VITALS
TEMPERATURE: 98 F | SYSTOLIC BLOOD PRESSURE: 110 MMHG | DIASTOLIC BLOOD PRESSURE: 58 MMHG | HEART RATE: 85 BPM | OXYGEN SATURATION: 99 % | RESPIRATION RATE: 22 BRPM

## 2020-04-28 VITALS
SYSTOLIC BLOOD PRESSURE: 76 MMHG | DIASTOLIC BLOOD PRESSURE: 46 MMHG | TEMPERATURE: 98 F | OXYGEN SATURATION: 98 % | HEART RATE: 94 BPM | RESPIRATION RATE: 26 BRPM | WEIGHT: 134.92 LBS

## 2020-04-28 DIAGNOSIS — N17.9 ACUTE KIDNEY FAILURE, UNSPECIFIED: ICD-10-CM

## 2020-04-28 DIAGNOSIS — I10 ESSENTIAL (PRIMARY) HYPERTENSION: ICD-10-CM

## 2020-04-28 DIAGNOSIS — I95.9 HYPOTENSION, UNSPECIFIED: ICD-10-CM

## 2020-04-28 DIAGNOSIS — Z90.89 ACQUIRED ABSENCE OF OTHER ORGANS: Chronic | ICD-10-CM

## 2020-04-28 DIAGNOSIS — F41.9 ANXIETY DISORDER, UNSPECIFIED: ICD-10-CM

## 2020-04-28 DIAGNOSIS — D64.9 ANEMIA, UNSPECIFIED: ICD-10-CM

## 2020-04-28 DIAGNOSIS — U07.1 COVID-19: ICD-10-CM

## 2020-04-28 DIAGNOSIS — R74.0 NONSPECIFIC ELEVATION OF LEVELS OF TRANSAMINASE AND LACTIC ACID DEHYDROGENASE [LDH]: ICD-10-CM

## 2020-04-28 DIAGNOSIS — K92.2 GASTROINTESTINAL HEMORRHAGE, UNSPECIFIED: ICD-10-CM

## 2020-04-28 DIAGNOSIS — L89.159 PRESSURE ULCER OF SACRAL REGION, UNSPECIFIED STAGE: ICD-10-CM

## 2020-04-28 DIAGNOSIS — Z29.9 ENCOUNTER FOR PROPHYLACTIC MEASURES, UNSPECIFIED: ICD-10-CM

## 2020-04-28 PROBLEM — N40.0 BENIGN PROSTATIC HYPERPLASIA WITHOUT LOWER URINARY TRACT SYMPTOMS: Chronic | Status: ACTIVE | Noted: 2020-02-09

## 2020-04-28 PROBLEM — M54.30 SCIATICA, UNSPECIFIED SIDE: Chronic | Status: ACTIVE | Noted: 2020-02-09

## 2020-04-28 LAB
ABO RH CONFIRMATION: SIGNIFICANT CHANGE UP
ALBUMIN SERPL ELPH-MCNC: 1.8 G/DL — LOW (ref 3.3–5)
ALBUMIN SERPL ELPH-MCNC: 1.9 G/DL — LOW (ref 3.3–5)
ALP SERPL-CCNC: 195 U/L — HIGH (ref 30–120)
ALP SERPL-CCNC: 235 U/L — HIGH (ref 40–120)
ALT FLD-CCNC: 173 U/L — HIGH (ref 12–78)
ALT FLD-CCNC: 197 U/L DA — HIGH (ref 10–60)
ANION GAP SERPL CALC-SCNC: 3 MMOL/L — LOW (ref 5–17)
ANION GAP SERPL CALC-SCNC: 5 MMOL/L — SIGNIFICANT CHANGE UP (ref 5–17)
APPEARANCE UR: CLEAR — SIGNIFICANT CHANGE UP
APTT BLD: 43.7 SEC — HIGH (ref 28.5–37)
AST SERPL-CCNC: 230 U/L — HIGH (ref 15–37)
AST SERPL-CCNC: 237 U/L — HIGH (ref 10–40)
BASOPHILS # BLD AUTO: 0.01 K/UL — SIGNIFICANT CHANGE UP (ref 0–0.2)
BASOPHILS # BLD AUTO: 0.02 K/UL — SIGNIFICANT CHANGE UP (ref 0–0.2)
BASOPHILS NFR BLD AUTO: 0.1 % — SIGNIFICANT CHANGE UP (ref 0–2)
BASOPHILS NFR BLD AUTO: 0.1 % — SIGNIFICANT CHANGE UP (ref 0–2)
BILIRUB SERPL-MCNC: 0.6 MG/DL — SIGNIFICANT CHANGE UP (ref 0.2–1.2)
BILIRUB SERPL-MCNC: 1.8 MG/DL — HIGH (ref 0.2–1.2)
BILIRUB UR-MCNC: NEGATIVE — SIGNIFICANT CHANGE UP
BUN SERPL-MCNC: 54 MG/DL — HIGH (ref 7–23)
BUN SERPL-MCNC: 61 MG/DL — HIGH (ref 7–23)
CALCIUM SERPL-MCNC: 7.6 MG/DL — LOW (ref 8.5–10.1)
CALCIUM SERPL-MCNC: 8.1 MG/DL — LOW (ref 8.4–10.5)
CHLORIDE SERPL-SCNC: 101 MMOL/L — SIGNIFICANT CHANGE UP (ref 96–108)
CHLORIDE SERPL-SCNC: 107 MMOL/L — SIGNIFICANT CHANGE UP (ref 96–108)
CO2 SERPL-SCNC: 26 MMOL/L — SIGNIFICANT CHANGE UP (ref 22–31)
CO2 SERPL-SCNC: 29 MMOL/L — SIGNIFICANT CHANGE UP (ref 22–31)
COLOR SPEC: YELLOW — SIGNIFICANT CHANGE UP
CREAT SERPL-MCNC: 1.1 MG/DL — SIGNIFICANT CHANGE UP (ref 0.5–1.3)
CREAT SERPL-MCNC: 1.61 MG/DL — HIGH (ref 0.5–1.3)
D DIMER BLD IA.RAPID-MCNC: 561 NG/ML DDU — HIGH
DIFF PNL FLD: ABNORMAL
EOSINOPHIL # BLD AUTO: 0 K/UL — SIGNIFICANT CHANGE UP (ref 0–0.5)
EOSINOPHIL # BLD AUTO: 0.01 K/UL — SIGNIFICANT CHANGE UP (ref 0–0.5)
EOSINOPHIL NFR BLD AUTO: 0 % — SIGNIFICANT CHANGE UP (ref 0–6)
EOSINOPHIL NFR BLD AUTO: 0.1 % — SIGNIFICANT CHANGE UP (ref 0–6)
GLUCOSE SERPL-MCNC: 102 MG/DL — HIGH (ref 70–99)
GLUCOSE SERPL-MCNC: 92 MG/DL — SIGNIFICANT CHANGE UP (ref 70–99)
GLUCOSE UR QL: NEGATIVE MG/DL — SIGNIFICANT CHANGE UP
HCT VFR BLD CALC: 22.1 % — LOW (ref 39–50)
HCT VFR BLD CALC: 22.1 % — LOW (ref 39–50)
HGB BLD-MCNC: 6.8 G/DL — CRITICAL LOW (ref 13–17)
HGB BLD-MCNC: 7 G/DL — CRITICAL LOW (ref 13–17)
IMM GRANULOCYTES NFR BLD AUTO: 0.9 % — SIGNIFICANT CHANGE UP (ref 0–1.5)
IMM GRANULOCYTES NFR BLD AUTO: 1.2 % — SIGNIFICANT CHANGE UP (ref 0–1.5)
INR BLD: 1.33 RATIO — HIGH (ref 0.88–1.16)
KETONES UR-MCNC: NEGATIVE — SIGNIFICANT CHANGE UP
LACTATE SERPL-SCNC: 1.3 MMOL/L — SIGNIFICANT CHANGE UP (ref 0.7–2)
LACTATE SERPL-SCNC: 2.5 MMOL/L — HIGH (ref 0.7–2)
LEUKOCYTE ESTERASE UR-ACNC: ABNORMAL
LYMPHOCYTES # BLD AUTO: 0.69 K/UL — LOW (ref 1–3.3)
LYMPHOCYTES # BLD AUTO: 0.7 K/UL — LOW (ref 1–3.3)
LYMPHOCYTES # BLD AUTO: 3.5 % — LOW (ref 13–44)
LYMPHOCYTES # BLD AUTO: 3.8 % — LOW (ref 13–44)
MAGNESIUM SERPL-MCNC: 2 MG/DL — SIGNIFICANT CHANGE UP (ref 1.6–2.6)
MCHC RBC-ENTMCNC: 29.1 PG — SIGNIFICANT CHANGE UP (ref 27–34)
MCHC RBC-ENTMCNC: 29.3 PG — SIGNIFICANT CHANGE UP (ref 27–34)
MCHC RBC-ENTMCNC: 30.8 GM/DL — LOW (ref 32–36)
MCHC RBC-ENTMCNC: 31.7 GM/DL — LOW (ref 32–36)
MCV RBC AUTO: 92.5 FL — SIGNIFICANT CHANGE UP (ref 80–100)
MCV RBC AUTO: 94.4 FL — SIGNIFICANT CHANGE UP (ref 80–100)
MONOCYTES # BLD AUTO: 0.95 K/UL — HIGH (ref 0–0.9)
MONOCYTES # BLD AUTO: 0.99 K/UL — HIGH (ref 0–0.9)
MONOCYTES NFR BLD AUTO: 4.8 % — SIGNIFICANT CHANGE UP (ref 2–14)
MONOCYTES NFR BLD AUTO: 5.4 % — SIGNIFICANT CHANGE UP (ref 2–14)
NEUTROPHILS # BLD AUTO: 16.58 K/UL — HIGH (ref 1.8–7.4)
NEUTROPHILS # BLD AUTO: 17.73 K/UL — HIGH (ref 1.8–7.4)
NEUTROPHILS NFR BLD AUTO: 89.8 % — HIGH (ref 43–77)
NEUTROPHILS NFR BLD AUTO: 90.3 % — HIGH (ref 43–77)
NITRITE UR-MCNC: NEGATIVE — SIGNIFICANT CHANGE UP
NRBC # BLD: 0 /100 WBCS — SIGNIFICANT CHANGE UP (ref 0–0)
NRBC # BLD: 0 /100 WBCS — SIGNIFICANT CHANGE UP (ref 0–0)
NT-PROBNP SERPL-SCNC: 47 PG/ML — SIGNIFICANT CHANGE UP (ref 0–450)
OB PNL STL: POSITIVE
PH UR: 5 — SIGNIFICANT CHANGE UP (ref 5–8)
PHOSPHATE SERPL-MCNC: 2.4 MG/DL — LOW (ref 2.5–4.5)
PLATELET # BLD AUTO: 234 K/UL — SIGNIFICANT CHANGE UP (ref 150–400)
PLATELET # BLD AUTO: 241 K/UL — SIGNIFICANT CHANGE UP (ref 150–400)
POTASSIUM SERPL-MCNC: 4.6 MMOL/L — SIGNIFICANT CHANGE UP (ref 3.5–5.3)
POTASSIUM SERPL-MCNC: 5 MMOL/L — SIGNIFICANT CHANGE UP (ref 3.5–5.3)
POTASSIUM SERPL-SCNC: 4.6 MMOL/L — SIGNIFICANT CHANGE UP (ref 3.5–5.3)
POTASSIUM SERPL-SCNC: 5 MMOL/L — SIGNIFICANT CHANGE UP (ref 3.5–5.3)
PROT SERPL-MCNC: 5.1 G/DL — LOW (ref 6–8.3)
PROT SERPL-MCNC: 5.6 G/DL — LOW (ref 6–8.3)
PROT UR-MCNC: 30 MG/DL
PROTHROM AB SERPL-ACNC: 14.9 SEC — HIGH (ref 10–12.9)
RBC # BLD: 2.34 M/UL — LOW (ref 4.2–5.8)
RBC # BLD: 2.39 M/UL — LOW (ref 4.2–5.8)
RBC # FLD: 13.7 % — SIGNIFICANT CHANGE UP (ref 10.3–14.5)
RBC # FLD: 14.1 % — SIGNIFICANT CHANGE UP (ref 10.3–14.5)
SARS-COV-2 RNA SPEC QL NAA+PROBE: DETECTED
SODIUM SERPL-SCNC: 133 MMOL/L — LOW (ref 135–145)
SODIUM SERPL-SCNC: 138 MMOL/L — SIGNIFICANT CHANGE UP (ref 135–145)
SP GR SPEC: 1.01 — SIGNIFICANT CHANGE UP (ref 1.01–1.02)
TROPONIN I SERPL-MCNC: 0 NG/ML — LOW (ref 0.02–0.06)
UROBILINOGEN FLD QL: NEGATIVE MG/DL — SIGNIFICANT CHANGE UP
WBC # BLD: 18.45 K/UL — HIGH (ref 3.8–10.5)
WBC # BLD: 19.63 K/UL — HIGH (ref 3.8–10.5)
WBC # FLD AUTO: 18.45 K/UL — HIGH (ref 3.8–10.5)
WBC # FLD AUTO: 19.63 K/UL — HIGH (ref 3.8–10.5)

## 2020-04-28 PROCEDURE — P9016: CPT

## 2020-04-28 PROCEDURE — 85027 COMPLETE CBC AUTOMATED: CPT

## 2020-04-28 PROCEDURE — 85730 THROMBOPLASTIN TIME PARTIAL: CPT

## 2020-04-28 PROCEDURE — 86140 C-REACTIVE PROTEIN: CPT

## 2020-04-28 PROCEDURE — 85379 FIBRIN DEGRADATION QUANT: CPT

## 2020-04-28 PROCEDURE — 81001 URINALYSIS AUTO W/SCOPE: CPT

## 2020-04-28 PROCEDURE — 83605 ASSAY OF LACTIC ACID: CPT

## 2020-04-28 PROCEDURE — 36430 TRANSFUSION BLD/BLD COMPNT: CPT

## 2020-04-28 PROCEDURE — 86901 BLOOD TYPING SEROLOGIC RH(D): CPT

## 2020-04-28 PROCEDURE — 87150 DNA/RNA AMPLIFIED PROBE: CPT

## 2020-04-28 PROCEDURE — 93010 ELECTROCARDIOGRAM REPORT: CPT

## 2020-04-28 PROCEDURE — 99285 EMERGENCY DEPT VISIT HI MDM: CPT | Mod: 25

## 2020-04-28 PROCEDURE — 36415 COLL VENOUS BLD VENIPUNCTURE: CPT

## 2020-04-28 PROCEDURE — 80053 COMPREHEN METABOLIC PANEL: CPT

## 2020-04-28 PROCEDURE — 99223 1ST HOSP IP/OBS HIGH 75: CPT | Mod: CS,GC,AI

## 2020-04-28 PROCEDURE — 71045 X-RAY EXAM CHEST 1 VIEW: CPT

## 2020-04-28 PROCEDURE — 93005 ELECTROCARDIOGRAM TRACING: CPT

## 2020-04-28 PROCEDURE — 87040 BLOOD CULTURE FOR BACTERIA: CPT

## 2020-04-28 PROCEDURE — 82272 OCCULT BLD FECES 1-3 TESTS: CPT

## 2020-04-28 PROCEDURE — 99284 EMERGENCY DEPT VISIT MOD MDM: CPT | Mod: CS

## 2020-04-28 PROCEDURE — 87635 SARS-COV-2 COVID-19 AMP PRB: CPT

## 2020-04-28 PROCEDURE — 85610 PROTHROMBIN TIME: CPT

## 2020-04-28 PROCEDURE — 86850 RBC ANTIBODY SCREEN: CPT

## 2020-04-28 PROCEDURE — 11046 DBRDMT MUSC&/FSCA EA ADDL: CPT

## 2020-04-28 PROCEDURE — 84484 ASSAY OF TROPONIN QUANT: CPT

## 2020-04-28 PROCEDURE — 84145 PROCALCITONIN (PCT): CPT

## 2020-04-28 PROCEDURE — 86923 COMPATIBILITY TEST ELECTRIC: CPT

## 2020-04-28 PROCEDURE — 83880 ASSAY OF NATRIURETIC PEPTIDE: CPT

## 2020-04-28 PROCEDURE — 99291 CRITICAL CARE FIRST HOUR: CPT | Mod: CS

## 2020-04-28 PROCEDURE — 87086 URINE CULTURE/COLONY COUNT: CPT

## 2020-04-28 PROCEDURE — 11043 DBRDMT MUSC&/FSCA 1ST 20/<: CPT

## 2020-04-28 PROCEDURE — 86900 BLOOD TYPING SEROLOGIC ABO: CPT

## 2020-04-28 PROCEDURE — 71045 X-RAY EXAM CHEST 1 VIEW: CPT | Mod: 26

## 2020-04-28 PROCEDURE — 82728 ASSAY OF FERRITIN: CPT

## 2020-04-28 RX ORDER — COLLAGENASE CLOSTRIDIUM HIST. 250 UNIT/G
1 OINTMENT (GRAM) TOPICAL THREE TIMES A DAY
Refills: 0 | Status: DISCONTINUED | OUTPATIENT
Start: 2020-04-28 | End: 2020-05-15

## 2020-04-28 RX ORDER — ACETAMINOPHEN 500 MG
2 TABLET ORAL
Qty: 0 | Refills: 0 | DISCHARGE

## 2020-04-28 RX ORDER — IRBESARTAN 75 MG/1
1 TABLET ORAL
Qty: 0 | Refills: 0 | DISCHARGE

## 2020-04-28 RX ORDER — SERTRALINE 25 MG/1
1 TABLET, FILM COATED ORAL
Qty: 0 | Refills: 0 | DISCHARGE

## 2020-04-28 RX ORDER — SERTRALINE 25 MG/1
100 TABLET, FILM COATED ORAL DAILY
Refills: 0 | Status: DISCONTINUED | OUTPATIENT
Start: 2020-04-28 | End: 2020-06-01

## 2020-04-28 RX ORDER — METOPROLOL TARTRATE 50 MG
1 TABLET ORAL
Qty: 0 | Refills: 0 | DISCHARGE

## 2020-04-28 RX ORDER — MIDODRINE HYDROCHLORIDE 2.5 MG/1
10 TABLET ORAL THREE TIMES A DAY
Refills: 0 | Status: DISCONTINUED | OUTPATIENT
Start: 2020-04-28 | End: 2020-04-28

## 2020-04-28 RX ORDER — PIPERACILLIN AND TAZOBACTAM 4; .5 G/20ML; G/20ML
3.38 INJECTION, POWDER, LYOPHILIZED, FOR SOLUTION INTRAVENOUS EVERY 8 HOURS
Refills: 0 | Status: COMPLETED | OUTPATIENT
Start: 2020-04-29 | End: 2020-05-03

## 2020-04-28 RX ORDER — SODIUM CHLORIDE 9 MG/ML
1000 INJECTION INTRAMUSCULAR; INTRAVENOUS; SUBCUTANEOUS ONCE
Refills: 0 | Status: COMPLETED | OUTPATIENT
Start: 2020-04-28 | End: 2020-04-28

## 2020-04-28 RX ORDER — PREGABALIN 225 MG/1
0 CAPSULE ORAL
Qty: 0 | Refills: 0 | DISCHARGE

## 2020-04-28 RX ORDER — ACETAMINOPHEN 500 MG
650 TABLET ORAL ONCE
Refills: 0 | Status: COMPLETED | OUTPATIENT
Start: 2020-04-28 | End: 2020-04-28

## 2020-04-28 RX ORDER — PREGABALIN 225 MG/1
1000 CAPSULE ORAL DAILY
Refills: 0 | Status: DISCONTINUED | OUTPATIENT
Start: 2020-04-28 | End: 2020-05-01

## 2020-04-28 RX ORDER — ASCORBIC ACID 60 MG
1 TABLET,CHEWABLE ORAL
Qty: 0 | Refills: 0 | DISCHARGE

## 2020-04-28 RX ORDER — THIAMINE MONONITRATE (VIT B1) 100 MG
4 TABLET ORAL
Qty: 0 | Refills: 0 | DISCHARGE

## 2020-04-28 RX ORDER — PANTOPRAZOLE SODIUM 20 MG/1
40 TABLET, DELAYED RELEASE ORAL EVERY 12 HOURS
Refills: 0 | Status: DISCONTINUED | OUTPATIENT
Start: 2020-04-28 | End: 2020-04-30

## 2020-04-28 RX ORDER — GABAPENTIN 400 MG/1
300 CAPSULE ORAL
Qty: 0 | Refills: 0 | DISCHARGE

## 2020-04-28 RX ORDER — MIDODRINE HYDROCHLORIDE 2.5 MG/1
10 TABLET ORAL THREE TIMES A DAY
Refills: 0 | Status: DISCONTINUED | OUTPATIENT
Start: 2020-04-28 | End: 2020-04-30

## 2020-04-28 RX ORDER — METOPROLOL TARTRATE 50 MG
50 TABLET ORAL DAILY
Refills: 0 | Status: DISCONTINUED | OUTPATIENT
Start: 2020-04-28 | End: 2020-04-29

## 2020-04-28 RX ORDER — PIPERACILLIN AND TAZOBACTAM 4; .5 G/20ML; G/20ML
3.38 INJECTION, POWDER, LYOPHILIZED, FOR SOLUTION INTRAVENOUS ONCE
Refills: 0 | Status: COMPLETED | OUTPATIENT
Start: 2020-04-28 | End: 2020-04-28

## 2020-04-28 RX ORDER — MENTHOL AND METHYL SALICYLATE 10; 30 G/100G; G/100G
0 STICK TOPICAL
Qty: 0 | Refills: 0 | DISCHARGE

## 2020-04-28 RX ORDER — ENOXAPARIN SODIUM 100 MG/ML
0 INJECTION SUBCUTANEOUS
Qty: 0 | Refills: 0 | DISCHARGE

## 2020-04-28 RX ORDER — VALSARTAN 80 MG/1
40 TABLET ORAL
Qty: 0 | Refills: 0 | DISCHARGE

## 2020-04-28 RX ORDER — LOPERAMIDE HCL 2 MG
1 TABLET ORAL
Qty: 0 | Refills: 0 | DISCHARGE

## 2020-04-28 RX ORDER — METOPROLOL TARTRATE 50 MG
50 TABLET ORAL DAILY
Refills: 0 | Status: DISCONTINUED | OUTPATIENT
Start: 2020-04-28 | End: 2020-04-28

## 2020-04-28 RX ORDER — COLLAGENASE CLOSTRIDIUM HIST. 250 UNIT/G
1 OINTMENT (GRAM) TOPICAL
Qty: 0 | Refills: 0 | DISCHARGE

## 2020-04-28 RX ADMIN — SODIUM CHLORIDE 1000 MILLILITER(S): 9 INJECTION INTRAMUSCULAR; INTRAVENOUS; SUBCUTANEOUS at 17:08

## 2020-04-28 RX ADMIN — PIPERACILLIN AND TAZOBACTAM 200 GRAM(S): 4; .5 INJECTION, POWDER, LYOPHILIZED, FOR SOLUTION INTRAVENOUS at 23:14

## 2020-04-28 RX ADMIN — PANTOPRAZOLE SODIUM 40 MILLIGRAM(S): 20 TABLET, DELAYED RELEASE ORAL at 23:14

## 2020-04-28 RX ADMIN — Medication 650 MILLIGRAM(S): at 17:20

## 2020-04-28 NOTE — H&P ADULT - PROBLEM SELECTOR PLAN 3
- Found to have Elevated LFTs likely 2/2 anemia  - Avoid hepatotoxic drugs  - Check RUQ sono  - Trend LFTs  - GI Dr. Le consulted, recs appreciated In setting of GI bleed  - Continue midodrine TID, with hold parameters Found to have confusion today  - F/u CT Head

## 2020-04-28 NOTE — H&P ADULT - NSHPREVIEWOFSYSTEMS_GEN_ALL_CORE
Constitutional: denies fever, chills, diaphoresis   HEENT: denies blurry vision, difficulty hearing  Respiratory: denies SOB, HERRERA, cough, sputum production, wheezing, hemoptysis  Cardiovascular: denies CP, palpitations, edema  Gastrointestinal: denies nausea, vomiting, diarrhea, constipation, abdominal pain, melena, hematochezia   Genitourinary: denies dysuria, frequency, urgency, hematuria   Skin/Breast: denies rash, itching  Musculoskeletal: denies myalgias, joint swelling, muscle weakness  Neurologic: denies headache, weakness, dizziness, paresthesias, numbness/tingling  Psychiatric: denies feeling anxious, depressed, suicidal, homicidal thoughts  Hematology/Oncology: denies bruising, tender or enlarged lymph nodes   ROS negative except as noted above Constitutional: Admits to fevers. denies chills, diaphoresis   HEENT: denies blurry vision, difficulty hearing  Respiratory: denies SOB, HERRERA, cough, sputum production, wheezing, hemoptysis  Cardiovascular: denies CP, palpitations, edema  Gastrointestinal: Admits to diarrhea. denies nausea, vomiting, abdominal pain, melena, hematochezia   Genitourinary: denies dysuria, frequency, urgency, hematuria   Musculoskeletal: Admits to weakness. denies myalgias, joint swelling  Neurologic: denies headache, dizziness, paresthesias, numbness/tingling

## 2020-04-28 NOTE — GOALS OF CARE CONVERSATION - ADVANCED CARE PLANNING - WHAT MATTERS MOST
Patient's comfort, skin breakdown, patient's declining weight, being able to see the patient should he be passing

## 2020-04-28 NOTE — ED PROVIDER NOTE - OBJECTIVE STATEMENT
78 y/o M with hx of 78 y/o M with hx of HTN, BPH, spinal stenosis biba from [facility] for evaluation of weakness and hypotension. As per transfer papers, pt with altered mental status after lunch today, was +covid-19 early April. Pt admits to generalized weakness. Pt is full code.  PMD: Dr. Rodas 80 y/o M with hx of HTN, BPH, spinal stenosis biba from [facility] for evaluation of weakness and hypotension. As per transfer papers, pt with altered mental status after lunch today, was +covid-19 early April. Pt admits to generalized weakness. Pt is full code. Pt received 1L NS by EMS.   PMD: Dr. Rodas

## 2020-04-28 NOTE — GOALS OF CARE CONVERSATION - ADVANCED CARE PLANNING - CONVERSATION DETAILS
RN spoke with medical team at [facility] for update on patient. RN then reached out to patient's wife and HCP. RN spoke with wife regarding patient's status and advanced directives order. Wife was under the impression that she had previously signed a DNR/DNI order at [facility], however, towards the end of the conversation discovered her paperwork from [facility] stated that patient was still a full code. RN and wife discussed at length that deciding against intubation/ resuscitation did not mean that patient would not be medically treated (ie supplemental oxygen, blood transfusion, antibiotics, etc). Daughter was in the background of the conversation stating she did not want her father intubated at this time. Wife stated she was not ready to make the decision at this point over the phone, but the patient would not extraordinary measures carried out. RN explained extraordinary measures was a flexible term and would need to be further investigated. Wife asked if patient deteriorated and required a breathing tube, to please call her before because she does not believe she will consent for one.     RN called back medical team at [facility] and updated them on conversation with wife.

## 2020-04-28 NOTE — H&P ADULT - PROBLEM SELECTOR PLAN 10
IMPROVE VTE Individual Risk Assessment          RISK                                                          Points  [  ] Previous VTE                                                3  [  ] Thrombophilia                                             2  [  ] Lower limb paralysis                                   2        (unable to hold up >15 seconds)    [  ] Current Cancer                                             2         (within 6 months)  [  ] Immobilization > 24 hrs                              1  [  ] ICU/CCU stay > 24 hours                             1  [ x ] Age > 60                                                         1    IMPROVE VTE Score: 1    DVT PPX: Will give SCDs in setting of GI bleed  Code Status: Pt is DNR, but NOT DNI, MOLST form in chart

## 2020-04-28 NOTE — CONSULT NOTE ADULT - ASSESSMENT
A:    79yMale  HD # 1  DNR    Here for:    1. Anemia, suspect 2/2  2. GIB  3. Sepsis, suspect 2/2  4. Sacral decub    P/recommendations:    BP borderline, however improving with volume and blood products  Pt has a signed MOLST for DNR and limited medical interventions.    Recommend the following:    Broad spectrum abx; zosyn 3.375g IV TID extended infusion + vancomycin 1g IV x 1 dose.  Surgical consult for wound debridement; may be source of infection.  ID consult.  Continue IVF.  Give 2nd unit pRBC.  GI consult.  40mg protonix IVP BID.  Hold anti hypertensives given borderline BP.   If BP remains borderline despite resuscitation start midodrine PO.    Given improving hemodynamics and mental status, overall clinical picture with resuscitation and goals of care status, will not accept to ICU at this time.    Feel free to call ICU service back if any additional concerns. Thank You.

## 2020-04-28 NOTE — ED ADULT NURSE NOTE - NSIMPLEMENTINTERV_GEN_ALL_ED
Implemented All Fall with Harm Risk Interventions:  [place] to call system. Call bell, personal items and telephone within reach. Instruct patient to call for assistance. Room bathroom lighting operational. Non-slip footwear when patient is off stretcher. Physically safe environment: no spills, clutter or unnecessary equipment. Stretcher in lowest position, wheels locked, appropriate side rails in place. Provide visual cue, wrist band, yellow gown, etc. Monitor gait and stability. Monitor for mental status changes and reorient to person, place, and time. Review medications for side effects contributing to fall risk. Reinforce activity limits and safety measures with patient and family. Provide visual clues: red socks.

## 2020-04-28 NOTE — H&P ADULT - NSHPSOCIALHISTORY_GEN_ALL_CORE
Patient denies smoking, denies EtOH. Denies having had flu shot. Lives with wife and son at home and ambulates with cane. Wife performs most ADLs for patient.

## 2020-04-28 NOTE — H&P ADULT - PROBLEM SELECTOR PLAN 5
- Cr 1.61, likely pre-renal, Baseline Cr 1.01  - Avoid nephrotoxic meds Poss source of infection  - Leukocytosis, Lactate 2.5  - Wound care consulted COVID Treatment Plan:  - Maintain on airborne isolation.  - Not on O2, but provide supplemental O2 as needed  - Limit use of NSAIDs.  - Would avoid nebulized preparations to limit risk of aerosol formation.  - Labs Testing: Daily or As Needed: CBC w/ diff, CMP; Every 3 days: CRP, Ferritin, Procalcitonin.  - Goals of Care discussion had with patient/surrogate: (free text here)  - F/u AM CBC, CMP - Found to have Elevated LFTs likely 2/2 anemia  - Avoid hepatotoxic drugs  - Check RUQ sono  - Trend LFTs  - GI Dr. eL consulted, recs appreciated  - F/u CT abd/pelvis, U/S Abd, CT chest

## 2020-04-28 NOTE — ED PROVIDER NOTE - CLINICAL SUMMARY MEDICAL DECISION MAKING FREE TEXT BOX
80 yo M with hx of htn, bph biba from [facility] for eval of weakness and hypotension. BP 76/46, temp: 100.2F rectal, O2 sat 100%RA, appears ill and cachetic, will give IVF, get labs, ua, ekg, cxr, cultures, re-assess 80 yo M with hx of htn, bph biba from [facility] for eval of weakness and hypotension. BP 76/46, temp: 100.2F rectal, O2 sat 100%RA, appears lethargic and very weak, will give IVF, get labs, ua, ekg, cxr, cultures, re-assess

## 2020-04-28 NOTE — H&P ADULT - PROBLEM SELECTOR PLAN 2
COVID Treatment Plan:  - Maintain on airborne isolation.  - Not on O2, but provide supplemental O2 as needed  - Limit use of NSAIDs.  - Would avoid nebulized preparations to limit risk of aerosol formation.  - Labs Testing: Daily or As Needed: CBC w/ diff, CMP; Every 3 days: CRP, Ferritin, Procalcitonin.  - Goals of Care discussion had with patient/surrogate: (free text here)  - F/u AM CBC, CMP Poss source of infection  - Leukocytosis, Lactate 2.5  - Continue IV Zosyn  - Collagenase ointment application  - Wound care consulted

## 2020-04-28 NOTE — ED ADULT TRIAGE NOTE - CHIEF COMPLAINT QUOTE
transfer from [facility], sent in for low hemoglobin, and low blood pressure- 1st unit of blood ongoing

## 2020-04-28 NOTE — H&P ADULT - PROBLEM SELECTOR PLAN 1
Anemia poss 2/2 lower GIB   - Hgb 6.8, Lactate 2.5 as per [place] admission labs  - FOBT+  - s/p 1 unit pRBC, 2L NS bolus in [place] ED  - F/u AM CBC, CMP  - GI Dr. Le consulted, recs appreciated Anemia poss 2/2 lower GIB   - Hgb 6.8, Lactate 2.5 as per [place] admission labs  - FOBT+  - s/p 1 unit pRBC, 2L NS bolus in [place] ED  - F/u AM CBC, CMP, Iron studies, TSH, B12  - GI Dr. Le consulted, recs appreciated Anemia poss 2/2 lower GIB   - Hgb 6.8, Lactate 2.5 as per [place] admission labs  - FOBT+  - s/p 1 unit pRBC, 2L NS bolus in [place] ED  - F/u AM CBC, CMP, Iron studies, TSH, B12  - F/u CT abd/pelvis, U/S Abd, CT chest  - GI Dr. Le consulted, recs appreciated

## 2020-04-28 NOTE — H&P ADULT - NSHPOUTPATIENTPROVIDERS_GEN_ALL_CORE
[facility]  Cardiologist- Dr. Ferrera  Neurologist- Dr. Jordan PMD- Dr. Isac Escalera- [facility]  Cardiologist- Dr. Ferrera  Neurologist- Dr. Jordan

## 2020-04-28 NOTE — ED PROVIDER NOTE - ATTENDING CONTRIBUTION TO CARE
78 yo male hx of htn, bph, spinal stenosis, transferred from [place] anemia, GI bleed, hypotension, receiving first unit PRBC, +covid, patient poor historian. Currently full code    Gen: Alert, NAD, pale appearing  Head/eyes: NC/AT, PERRL, EOMI, pale conjunctiva  ENT: airway patent  Neck: supple, no tenderness/meningismus/JVD, Trachea midline  Pulm/lung: decreased breath sounds b/l, normal resp effort, no wheeze/stridor/retractions  CV/heart: RRR, no M/R/G, +2 dist pulses (radial, pedal DP/PT, popliteal)  GI/Abd: soft, NT/ND, +BS, no guarding/rebound tenderness  Musculoskeletal: no edema/erythema/cyanosis, FROM in all extremities, no C/T/L spine ttp  Skin: no rash, no vesicles, no petechaie, no ecchymosis, no swelling  Neuro: AAOx3, CN 2-12 intact, normal sensation, 5/5 motor strength in all extremities    anemia receiving blood currently, guaiac positive, will consult GI, ICU, admit

## 2020-04-28 NOTE — ED PROVIDER NOTE - CARE PLAN
Principal Discharge DX:	Gastrointestinal hemorrhage, unspecified gastrointestinal hemorrhage type  Secondary Diagnosis:	COVID-19 virus detected

## 2020-04-28 NOTE — H&P ADULT - PROBLEM SELECTOR PLAN 7
- On Sertraline at home Now hypotensive on admission  - Will hold off on home Valsartan  - Continue home Metoprolol with hold parameters - Cr 1.61, likely pre-renal, Baseline Cr 1.01  - Avoid nephrotoxic meds

## 2020-04-28 NOTE — ED ADULT NURSE NOTE - CHIEF COMPLAINT QUOTE
transfer from [facility], sent in for low hemoglobin, and low blood pressure- 1st unit of blood ongoing

## 2020-04-28 NOTE — CONSULT NOTE ADULT - SUBJECTIVE AND OBJECTIVE BOX
History of Present Illness: The patient is a 79 year old male with a history of HTN, HL, pre-DM, anxiety, spinal stenosis who presents with weakness and AMS. The patient is a poor historian. He was noted to be hypotensive on arrival in 70s/40s    Past Medical/Surgical History:    Medications:  Home Medications:  Yobany Sharif topical gel: Apply topically to affected area 2 times a day (28 Apr 2020 16:31)  gabapentin 300 mg oral capsule: 300 milligram(s) orally 2 times a day (28 Apr 2020 16:31)  Imodium A-D 2 mg oral tablet: 1 tab(s) orally every 8 hours, As Needed (28 Apr 2020 16:31)  irbesartan 75 mg oral tablet: 1 tab(s) orally once a day (28 Apr 2020 16:31)  Lovenox 40 mg/0.4 mL injectable solution: injectable once a day (28 Apr 2020 16:31)  polyethylene glycol 3350 oral powder for reconstitution: 17 gram(s) orally 2 times a day, As Needed constipation (28 Apr 2020 16:31)  Santyl 250 units/g topical ointment: Apply topically to affected area once a day (28 Apr 2020 16:31)  sertraline 100 mg oral tablet: 1 tab(s) orally once a day (28 Apr 2020 16:31)  thiamine 100 mg oral tablet: 4 tab(s) orally once a day for 5 days (28 Apr 2020 16:31)  Toprol- mg oral tablet, extended release: 1 tab(s) orally once a day (28 Apr 2020 16:31)  Tylenol 325 mg oral tablet: 2 tab(s) orally every 8 hours (28 Apr 2020 16:31)  Vitamin B12 1000 mcg/mL injectable solution: injectable every 4 weeks (28 Apr 2020 16:31)  Vitamin C 500 mg oral capsule: 1 cap(s) orally once a day (28 Apr 2020 16:31)      Family History: Non-contributory family history of premature cardiovascular atherosclerotic disease    Social History: No tobacco, alcohol or drug use    Review of Systems:  General: No fevers, chills, weight loss or gain  Skin: No rashes, color changes  Cardiovascular: No chest pain, orthopnea  Respiratory: No shortness of breath, cough  Gastrointestinal: No nausea, abdominal pain  Genitourinary: No incontinence, pain with urination  Musculoskeletal: No pain, swelling, decreased range of motion  Neurological: No headache, weakness  Psychiatric: No depression, anxiety  Endocrine: No weight loss or gain, increased thirst  All other systems are comprehensively negative.    Physical Exam:  Vitals:        Vital Signs Last 24 Hrs  T(C): 37.2 (28 Apr 2020 18:40), Max: 37.9 (28 Apr 2020 16:09)  T(F): 98.9 (28 Apr 2020 18:40), Max: 100.2 (28 Apr 2020 16:09)  HR: 78 (28 Apr 2020 18:40) (78 - 94)  BP: 106/78 (28 Apr 2020 18:40) (76/46 - 106/78)  BP(mean): --  RR: 18 (28 Apr 2020 18:40) (18 - 26)  SpO2: 98% (28 Apr 2020 15:59) (98% - 98%)  General: NAD  HEENT: MMM  Neck: No JVD, no carotid bruit  Lungs: CTAB  CV: RRR, nl S1/S2, no M/R/G  Abdomen: S/NT/ND, +BS  Extremities: No LE edema, no cyanosis  Neuro: Non-focal  Skin: No rash    Labs:                        6.8    18.45 )-----------( 241      ( 28 Apr 2020 17:03 )             22.1     04-28    133<L>  |  101  |  61<H>  ----------------------------<  102<H>  5.0   |  29  |  1.61<H>    Ca    8.1<L>      28 Apr 2020 17:03    TPro  5.6<L>  /  Alb  1.9<L>  /  TBili  0.6  /  DBili  x   /  AST  237<H>  /  ALT  197<H>  /  AlkPhos  195<H>  04-28    CARDIAC MARKERS ( 28 Apr 2020 17:42 )  .000 ng/mL / x     / x     / x     / x          PT/INR - ( 28 Apr 2020 18:13 )   PT: 14.9 sec;   INR: 1.33 ratio         PTT - ( 28 Apr 2020 18:13 )  PTT:43.7 sec    ECG: Poor baseline, likely NSR, LAD, nonspecific ST abnormality, PVC

## 2020-04-28 NOTE — CONSULT NOTE ADULT - SUBJECTIVE AND OBJECTIVE BOX
ICU Progress Note    HPI:    S:    Pt seen and examined  HD # 1  DNR (MOLST noted)  PMHx HTN, BPH, spinal stenosis  From [facility]  Pt here fo weakness, hypotension  + COVID early April  Came to [facility], XF to [facility] for ICU evaluation    4/28 PM: s/p IVF and infusing 1u pRBC. SBP ~ 90, awake, conversive.    ROS: + weakness, fatigue  Denies coming, abdominal pain    Allergies    No Known Allergies    Intolerances        MEDICATIONS  (STANDING):  collagenase Ointment 1 Application(s) Topical three times a day  cyanocobalamin 1000 MICROGram(s) Oral daily  metoprolol succinate ER 50 milliGRAM(s) Oral daily  sertraline 100 milliGRAM(s) Oral daily    MEDICATIONS  (PRN):      Drug Dosing Weight  Height (cm): 86 (28 Apr 2020 20:58)  Weight (kg): 61.2 (28 Apr 2020 20:52)  BMI (kg/m2): 82.7 (28 Apr 2020 20:58)  BSA (m2): 1.04 (28 Apr 2020 20:58)      PAST MEDICAL & SURGICAL HISTORY:      FAMILY HISTORY:          ROS: See HPI; otherwise, all systems reviewed and negative.    O:    ICU Vital Signs Last 24 Hrs  T(C): --  T(F): --  HR: 105 (28 Apr 2020 20:52) (105 - 105)  BP: 84/48 (28 Apr 2020 20:52) (84/48 - 84/48)  BP(mean): --  ABP: --  ABP(mean): --  RR: 22 (28 Apr 2020 20:52) (22 - 22)  SpO2: 96% (28 Apr 2020 20:52) (96% - 96%)          I&O's Detail          PE:    Elderly M lying in bed  Awake, following commands answering questions; appears weak  Abd soft  + unstagable sacral DTI with surrounding erythema  Unstagable, non infected appearing B/L heel ulcers    remainder of exam deferred 2/2 COVID pandemic; will not change mgmt but will increase utilization of valuable PPE    LABS:                CAPILLARY BLOOD GLUCOSE

## 2020-04-28 NOTE — H&P ADULT - PROBLEM SELECTOR PLAN 4
Poss source of infection  - Leukocytosis, Lactate 2.5  - Wound care consulted - Found to have Elevated LFTs likely 2/2 anemia  - Avoid hepatotoxic drugs  - Check RUQ sono  - Trend LFTs  - GI Dr. Le consulted, recs appreciated In setting of GI bleed  - Continue midodrine TID, with hold parameters  - F/u CT abd/pelvis, U/S Abd, CT chest

## 2020-04-28 NOTE — ED PROVIDER NOTE - CARE PLAN
Principal Discharge DX:	Hypotension  Secondary Diagnosis:	2019 novel coronavirus disease (COVID-19) Principal Discharge DX:	Hypotension  Secondary Diagnosis:	2019 novel coronavirus disease (COVID-19)  Secondary Diagnosis:	Weakness generalized Principal Discharge DX:	Hypotension  Secondary Diagnosis:	2019 novel coronavirus disease (COVID-19)  Secondary Diagnosis:	Weakness generalized  Secondary Diagnosis:	Anemia Principal Discharge DX:	GI bleed  Secondary Diagnosis:	2019 novel coronavirus disease (COVID-19)  Secondary Diagnosis:	Anemia  Secondary Diagnosis:	Hypotension  Secondary Diagnosis:	Weakness generalized

## 2020-04-28 NOTE — ED PROVIDER NOTE - CRITICAL CARE PROVIDED
direct patient care (not related to procedure)/conducted a detailed discussion of DNR status/documentation/additional history taking/consult w/ pt's family directly relating to pts condition/interpretation of diagnostic studies/consultation with other physicians/telephone consultation with the patient's family

## 2020-04-28 NOTE — H&P ADULT - PROBLEM SELECTOR PROBLEM 5
DARIUS (acute kidney injury) Sacral decubitus ulcer 2019 novel coronavirus disease (COVID-19) Transaminitis

## 2020-04-28 NOTE — H&P ADULT - PROBLEM SELECTOR PLAN 9
IMPROVE VTE Individual Risk Assessment          RISK                                                          Points  [  ] Previous VTE                                                3  [  ] Thrombophilia                                             2  [  ] Lower limb paralysis                                   2        (unable to hold up >15 seconds)    [  ] Current Cancer                                             2         (within 6 months)  [  ] Immobilization > 24 hrs                              1  [  ] ICU/CCU stay > 24 hours                             1  [ x ] Age > 60                                                         1    IMPROVE VTE Score: 1    DVT PPX: Will give SCDs in setting of GI bleed IMPROVE VTE Individual Risk Assessment          RISK                                                          Points  [  ] Previous VTE                                                3  [  ] Thrombophilia                                             2  [  ] Lower limb paralysis                                   2        (unable to hold up >15 seconds)    [  ] Current Cancer                                             2         (within 6 months)  [  ] Immobilization > 24 hrs                              1  [  ] ICU/CCU stay > 24 hours                             1  [ x ] Age > 60                                                         1    IMPROVE VTE Score: 1    DVT PPX: Will give SCDs in setting of GI bleed  Code Status: Pt is DNR, but NOT DNI, MOLST form in chart - On Sertraline at home

## 2020-04-28 NOTE — ED ADULT NURSE NOTE - BREATH SOUNDS, MLM
Problem: Device-Related Complication Risk (Mechanical Ventilation, Invasive)  Goal: Optimal Device Function  Outcome: Ongoing (interventions implemented as appropriate)  PT remains intubated with ETT on Drager ventilator.  Blood gas reported.  No changes made at this time. Will monitor.        Clear

## 2020-04-28 NOTE — H&P ADULT - ASSESSMENT
78 y/o M with PMHx of HTN, BPH, spinal stenosis, dyslipidemia, anxiety, neuropathy, B12 deficiency who presents from [facility] rehab for evaluation of weakness and hypotension. Admitted for anemia poss 2/2 GI bleed. 80 y/o M with PMHx of HTN, BPH, spinal stenosis, dyslipidemia, anxiety, neuropathy, B12 deficiency who presents from [facility] rehab for evaluation of weakness and hypotension. Admitted for anemia and infection poss 2/2 GI bleed and sacral decub.

## 2020-04-28 NOTE — H&P ADULT - NSHPLABSRESULTS_GEN_ALL_CORE
Labs:                          7.0    19.63 )-----------( 234      ( 28 Apr 2020 22:41 )             22.1     04-28    138  |  107  |  54<H>  ----------------------------<  92  4.6   |  26  |  1.10    Ca    7.6<L>      28 Apr 2020 22:41  Phos  2.4     04-28  Mg     2.0     04-28    TPro  5.1<L>  /  Alb  1.8<L>  /  TBili  1.8<H>  /  DBili  x   /  AST  230<H>  /  ALT  173<H>  /  AlkPhos  235<H>  04-28    LIVER FUNCTIONS - ( 28 Apr 2020 22:41 )  Alb: 1.8 g/dL / Pro: 5.1 g/dL / ALK PHOS: 235 U/L / ALT: 173 U/L / AST: 230 U/L / GGT: x                 Active Medications  MEDICATIONS  (STANDING):  collagenase Ointment 1 Application(s) Topical three times a day  cyanocobalamin 1000 MICROGram(s) Oral daily  metoprolol succinate ER 50 milliGRAM(s) Oral daily  midodrine. 10 milliGRAM(s) Oral three times a day  pantoprazole  Injectable 40 milliGRAM(s) IV Push every 12 hours  piperacillin/tazobactam IVPB.. 3.375 Gram(s) IV Intermittent every 8 hours  sertraline 100 milliGRAM(s) Oral daily    MEDICATIONS  (PRN):

## 2020-04-28 NOTE — ED PROVIDER NOTE - PROGRESS NOTE DETAILS
Carolyn Gongora for attending Dr. Valladares: 78 y/o male with a PMHx of sciatica, BPH, and HTN, presents to the ED c/o low blood pressure and weakness. Pt was recently hospitalized at [place]. Was reportedly COVID + earlier this month. Was brought in from [place] for evaluation of hypotension and weakness today. Pt is awake, but lethargic. Unable to give any hx. Denies pain. No other complaints at this time. Physical Exam: Heart rate of 94. BP: 76/46. Temperature 100.1 rectally. O2 saturation of 98% on room air. PERRL; EOMI. Neck is supple. Lungs are clear. Heart S1, S2 Regular rate and rhythm. Abd soft. Extremities full range of motion intact. No edema. Neuro, extreme weakness. Nonfocal exam. Impression: hypotension, COVID +, and rule out sepsis. Will need admission for IV fluids and further treatment. Carolyn Gongora for attending Dr. Valladares: 78 y/o male with a PMHx of sciatica, BPH, and HTN, presents to the ED for low blood pressure and weakness. Pt was recently hospitalized at [place]. Was reportedly COVID + earlier this month. Was brought in from [place] for evaluation of hypotension and weakness today. Pt is awake, but lethargic. Unable to give any hx. Denies pain. No other complaints at this time. Physical Exam: Heart rate of 94. BP: 76/46. Temperature 100.1 rectally. O2 saturation of 98% on room air. PERRL; EOMI. Neck is supple. Lungs are clear. Heart S1, S2 Regular rate and rhythm. Abd soft. Extremities full range of motion intact. No edema. Neuro, extreme weakness. Nonfocal exam. Impression: hypotension, COVID +, and rule out sepsis. Will need admission for IV fluids and further treatment. Spoke to ICU attending, Dr. Ashli Sawyer, discussed case and results, accepted pt and will consult pt in [place] ED. Spoke to hospitalist, Dr. Hung, accepted pt. Spoke to BELGICA Kumar from goals of care, will discuss about DNR status with family and call back. Spoke to cardiologist, Dr. Don Ordonez, will consult pt in [place] ED now. BP: 92/50, RN to start transfusion, will call [place] ED for transfer. accepting physician at [facility] ED, Dr. Martinez BP: 92/50, prbcs for transfusion ordered, will call [place] ED for transfer.  Wife aware and agreed with transfer. Spoke to ICU attending at [facility], Dr. Ashli Sawyer, discussed case and results, accepted pt and will consult pt in [place] ED. Spoke to hospitalist at [facility], Dr. Hung, accepted pt. transfusion started prior to transfer.

## 2020-04-28 NOTE — ED PROVIDER NOTE - OBJECTIVE STATEMENT
Pt is a 78 yo male with pmhx of HTN, BPH, spinal stenosis from [facility] rehab transferred for admission for weakness and hypotension. As per transfer papers, pt with altered mental status after lunch today, was +covid-19 early April. Pt admits to generalized weakness. Pt is full code. Pt received 1L NS by EMS. Pt found to be guiac + anemia GI bleed receiving blood. limited history from pt due to mental status     PMD: Dr. Rodas

## 2020-04-28 NOTE — H&P ADULT - NSHPPHYSICALEXAM_GEN_ALL_CORE
Physical Exam:  General: Elderly, NAD  HEENT: NCAT, PERRL, EOMI b/l, dry mucous membranes   Neurology: A&Ox1-2, CN II-XII grossly intact, LE sensation intact  Respiratory: CTA B/L, No W/R/R  CV: Tachycardic, +S1/S2, no murmurs, rubs or gallops  Abdominal: Soft, NT, ND +BSx4  Extremities: No C/C/E, +2 DT pulses b/l  MSK: 2/5 LLE, 1/5 RLE, 5/5 UE b/l Physical Exam:  General: Elderly, NAD  HEENT: NCAT, PERRL, EOMI b/l, dry mucous membranes   Neurology: A&Ox1-2, CN II-XII grossly intact, LE sensation intact  Respiratory: CTA B/L, No W/R/R  CV: Tachycardic, +S1/S2, no murmurs, rubs or gallops  Abdominal: Soft, NT, ND +BSx4  Extremities: No C/C/E, +2 DT pulses b/l  MSK: 2/5 LLE, 1/5 RLE, 5/5 UE b/l  skin: unstageable decub:: slight oozing, no active bleeding.

## 2020-04-28 NOTE — ED PROVIDER NOTE - CLINICAL SUMMARY MEDICAL DECISION MAKING FREE TEXT BOX
Pt is a 80 yo male transferred from [place] for ICU eval and admission for GI bleed covid hypotension and weakness  pt receiving fluids and blood transfusion

## 2020-04-28 NOTE — H&P ADULT - HISTORY OF PRESENT ILLNESS
80 y/o M with PMHx of HTN, BPH, spinal stenosis, dyslipidemia, anxiety, who presents from [facility] for evaluation of weakness and hypotension. As per transfer papers, pt with altered mental status after lunch today, was +covid-19 early April. Pt admits to generalized weakness.     CHARTING IN PROGRESS    In the ED  Vitals:   Labs(In [place]) significant for: wbc 18.45, hgb 6.8, FOBT+,   Coag studies: PT 14.9, INR 1.33, aPTT 43.7, d-dimer 561  Chem panel: Lactate 2.5, Na 133, AG 3, BUN/Cr 61/1.61, Alb 1.9, Alk phos 195, ,   UA neg  Covid-19 PCR Positive   EKG shows  CXR shows: Mild R atelectasis  In [place] ED, s/p 2L NS bolus, 1 unit pRBC 78 y/o M with PMHx of HTN, BPH, spinal stenosis, dyslipidemia, anxiety, neuropathy, B12 deficiency who presents from [facility] rehab for evaluation of weakness and hypotension. As per transfer papers, pt with altered mental status after lunch today, was +covid-19 early April. Pt admits to generalized weakness. Lives with wife, Stephany and son. He came from [facility] in [place] since Feb 14. Got phone call from [facility] today because pt was confused. Also had bed sore. Contracted Covid-19 in [facility] rehab and first started having symptoms on Mar 25, reporting symptoms of cough and low grade fever, diarrhea. Denies any chest pain, shortness of breath, headache, dizziness. History obtained from wife, Stephany([phone]).     CHARTING IN PROGRESS    In the ED  Vitals:   Labs(In [place]) significant for: wbc 18.45, hgb 6.8, FOBT+,   Coag studies: PT 14.9, INR 1.33, aPTT 43.7, d-dimer 561  Chem panel: Lactate 2.5, Na 133, AG 3, BUN/Cr 61/1.61, Alb 1.9, Alk phos 195, ,   UA neg  Covid-19 PCR Positive   EKG shows  CXR shows: Mild R atelectasis  In [place] ED, s/p 2L NS bolus, 1 unit pRBC 78 y/o M with PMHx of HTN, BPH, spinal stenosis, dyslipidemia, anxiety, neuropathy, B12 deficiency who presents from [facility] rehab for evaluation of weakness and hypotension. As per transfer papers, pt with altered mental status after lunch today, was +covid-19 early April. Pt admits to generalized weakness. Lives with wife, Stephany and son. He came from [facility] in [place] since Feb 14. Got phone call from [facility] today because pt was confused. Also had bed sore. Contracted Covid-19 in [facility] rehab and first started having symptoms on Mar 25, reporting symptoms of cough and low grade fever, diarrhea. Denies any chest pain, shortness of breath, headache, dizziness. History obtained from wife, Stephany([phone]).     The date the pt first felt unwell: March 25  Fever or chills: yes [ x ]   no [  ]   - Tmax:   Fatigue, malaise or generalized weakness: yes [ x ]   no [  ]  Shortness of breath/dyspnea: yes [ x ]   no [  ]  Cough: yes [ x ]   no [  ], sputum production: yes [  ]   no [x  ]  Blood in sputum: yes [  ]   no [ x ]  Anorexia/po intolerance: yes [  ]   no [ x ]  Chest pain or chest tightness: yes [  ]   no [ x ]  Edema in legs: yes [  ]   no [ x ]  Myalgias: yes [  ]   no [ x ]  Headache: yes [  ]   no [x  ]  Sore throat: yes [  ]   no [ x ]  Rhinorrhea: yes [  ]   no [  x]  Abd pain: yes [  ]   no [ x ]  Nausea: yes [  ]   no [x  ]  Vomiting: yes [  ]   no [  x]  Diarrhea: yes [  ]   no [  ]  Skin rashes: yes [  ]   no [  ]  Loss of sense of smell/anosmia: yes [  ]   no [ x ]  Conjunctivitis: yes [  ]   no [ x ]  Recent travel: yes [  ]   no [ x ] - Location:   Any sick contacts: yes [x  ]   no [  ]: Contracted from [facility] re[facility]  Close contact with someone confirmed positive with COVID-19 / SARS-CoV2 in the last 14 days: yes [  ]   no [ x ]  Code status: DNR, but not DNI    ED course:        CHARTING IN PROGRESS    In the ED  Vitals: T--, , BP 84/48, RR 22, O2 96 on RA  Labs(In [place]) significant for: wbc 18.45, hgb 6.8, FOBT+,   Coag studies: PT 14.9, INR 1.33, aPTT 43.7, d-dimer 561  Chem panel: Lactate 2.5, Na 133, AG 3, BUN/Cr 61/1.61, Alb 1.9, Alk phos 195, ,   UA neg  Covid-19 PCR Positive   EKG shows  CXR in [place] ED shows: Mild R atelectasis  In [place] ED, s/p 2L NS bolus, 1 unit pRBC 80 y/o M with PMHx of HTN, BPH, spinal stenosis, dyslipidemia, anxiety, neuropathy, B12 deficiency who presents from [facility] for evaluation of weakness and hypotension. As per transfer papers, pt with confusion after lunch today. Pt admits to generalized weakness. Lives with wife, tSephany and son. He came from [facility] in [place] since Feb 14. Patient lives with wife and son at home and ambulates with cane. Contracted Covid-19 in [facility] rehab and first started having symptoms on Mar 25, reporting symptoms of cough and low grade fever, diarrhea. Denies any chest pain, shortness of breath, headache, dizziness. History obtained from wife, Stephany([phone]).   Of note, pt was noted to have sacral wounds today and reported low grade fever for two days and mental status changes today and loss of appetite.    The date the pt first felt unwell: March 25  Fever or chills: yes [ x ]   no [  ]   - Tmax:   Fatigue, malaise or generalized weakness: yes [ x ]   no [  ]  Shortness of breath/dyspnea: yes [ x ]   no [  ]  Cough: yes [ x ]   no [  ], sputum production: yes [  ]   no [x  ]  Blood in sputum: yes [  ]   no [ x ]  Anorexia/po intolerance: yes [  ]   no [ x ]  Chest pain or chest tightness: yes [  ]   no [ x ]  Edema in legs: yes [  ]   no [ x ]  Myalgias: yes [  ]   no [ x ]  Headache: yes [  ]   no [x  ]  Sore throat: yes [  ]   no [ x ]  Rhinorrhea: yes [  ]   no [  x]  Abd pain: yes [  ]   no [ x ]  Nausea: yes [  ]   no [x  ]  Vomiting: yes [  ]   no [  x]  Diarrhea: yes [  ]   no [  ]  Skin rashes: yes [  ]   no [  ]  Loss of sense of smell/anosmia: yes [  ]   no [ x ]  Conjunctivitis: yes [  ]   no [ x ]  Recent travel: yes [  ]   no [ x ] - Location:   Any sick contacts: yes [x  ]   no [  ]: Contracted from [facility] rehab  Close contact with someone confirmed positive with COVID-19 / SARS-CoV2 in the last 14 days: yes [  ]   no [ x ]  Code status: DNR, but not DNI    ED course:        CHARTING IN PROGRESS    In the ED  Vitals: T--, , BP 84/48, RR 22, O2 96 on RA  Labs(In [place]) significant for: wbc 18.45, hgb 6.8, FOBT+,   Coag studies: PT 14.9, INR 1.33, aPTT 43.7, d-dimer 561  Chem panel: Lactate 2.5, Na 133, AG 3, BUN/Cr 61/1.61, Alb 1.9, Alk phos 195, ,   UA neg  Covid-19 PCR Positive   EKG shows  CXR in [place] ED shows: Mild R atelectasis  In [place] ED, s/p 2L NS bolus, 1 unit pRBC 78 y/o M with PMHx of HTN, BPH, spinal stenosis, dyslipidemia, anxiety, neuropathy, B12 deficiency who presents from [facility] rehab for evaluation of weakness and hypotension. As per transfer papers, pt with confusion after lunch today. Pt admits to generalized weakness. Lives with wife, Stephany and son. He came from [facility] rehab in [place] since Feb 14. Patient lives with wife and son at home and ambulates with cane. Contracted Covid-19 in [facility] rehab and first started having symptoms on Mar 25, reporting symptoms of cough and low grade fever, diarrhea. Denies any chest pain, shortness of breath, headache, dizziness. History obtained from wife, Stephany([phone]) as pt is confused and a poor historian.   Of note, pt was noted to have sacral wounds today and reported low grade fever for two days and mental status changes today and loss of appetite.    The date the pt first felt unwell: March 25  Fever or chills: yes [ x ]   no [  ]   - Tmax:   Fatigue, malaise or generalized weakness: yes [ x ]   no [  ]  Shortness of breath/dyspnea: yes [ x ]   no [  ]  Cough: yes [ x ]   no [  ], sputum production: yes [  ]   no [x  ]  Blood in sputum: yes [  ]   no [ x ]  Anorexia/po intolerance: yes [  ]   no [ x ]  Chest pain or chest tightness: yes [  ]   no [ x ]  Edema in legs: yes [  ]   no [ x ]  Myalgias: yes [  ]   no [ x ]  Headache: yes [  ]   no [x  ]  Sore throat: yes [  ]   no [ x ]  Rhinorrhea: yes [  ]   no [  x]  Abd pain: yes [  ]   no [ x ]  Nausea: yes [  ]   no [x  ]  Vomiting: yes [  ]   no [  x]  Diarrhea: yes [  ]   no [  ]  Skin rashes: yes [  ]   no [  ]  Loss of sense of smell/anosmia: yes [  ]   no [ x ]  Conjunctivitis: yes [  ]   no [ x ]  Recent travel: yes [  ]   no [ x ] - Location:   Any sick contacts: yes [x  ]   no [  ]: Contracted from [facility] rehab  Close contact with someone confirmed positive with COVID-19 / SARS-CoV2 in the last 14 days: yes [  ]   no [ x ]  Code status: DNR, but not DNI    ED course:        CHARTING IN PROGRESS    In the ED  Vitals: T--, , BP 84/48, RR 22, O2 96 on RA  Labs(In [place]) significant for: wbc 18.45, hgb 6.8, FOBT+,   Coag studies: PT 14.9, INR 1.33, aPTT 43.7, d-dimer 561  Chem panel: Lactate 2.5, Na 133, AG 3, BUN/Cr 61/1.61, Alb 1.9, Alk phos 195, ,   UA neg  Covid-19 PCR Positive   EKG shows  CXR in [place] ED shows: Mild R atelectasis  In [place] ED, s/p 2L NS bolus, 1 unit pRBC 80 y/o M with PMHx of HTN, BPH, spinal stenosis, dyslipidemia, anxiety, neuropathy, B12 deficiency who presents from [facility] for evaluation of weakness and hypotension. As per transfer papers, pt with confusion after lunch today. Pt admits to generalized weakness. Lives with wife, Stephany and son. He came from [facility] in [place] since Feb 14. Patient lives with wife and son at home and ambulates with cane. Contracted Covid-19 in [facility] rehab and first started having symptoms on Mar 25, reporting symptoms of cough and low grade fever, diarrhea. Denies any chest pain, shortness of breath, headache, dizziness. History obtained from wife, Stephany([phone]) as pt is confused and a poor historian.   Of note, pt was noted to have sacral wounds today and reported low grade fever for two days and mental status changes today and loss of appetite.    The date the pt first felt unwell: March 25  Fever or chills: yes [ x ]   no [  ]   - Tmax:   Fatigue, malaise or generalized weakness: yes [ x ]   no [  ]  Shortness of breath/dyspnea: yes [ x ]   no [  ]  Cough: yes [ x ]   no [  ], sputum production: yes [  ]   no [x  ]  Blood in sputum: yes [  ]   no [ x ]  Anorexia/po intolerance: yes [  ]   no [ x ]  Chest pain or chest tightness: yes [  ]   no [ x ]  Edema in legs: yes [  ]   no [ x ]  Myalgias: yes [  ]   no [ x ]  Headache: yes [  ]   no [x  ]  Sore throat: yes [  ]   no [ x ]  Rhinorrhea: yes [  ]   no [  x]  Abd pain: yes [  ]   no [ x ]  Nausea: yes [  ]   no [x  ]  Vomiting: yes [  ]   no [  x]  Diarrhea: yes [  ]   no [  ]  Skin rashes: yes [  ]   no [  ]  Loss of sense of smell/anosmia: yes [  ]   no [ x ]  Conjunctivitis: yes [  ]   no [ x ]  Recent travel: yes [  ]   no [ x ] - Location:   Any sick contacts: yes [x  ]   no [  ]: Contracted from [facility] rehab  Close contact with someone confirmed positive with COVID-19 / SARS-CoV2 in the last 14 days: yes [  ]   no [ x ]  Code status: DNR, but not DNI      In the ED  Vitals: T--, , BP 84/48, RR 22, O2 96 on RA  Labs(In [place]) significant for: wbc 18.45, hgb 6.8, FOBT+,   Coag studies: PT 14.9, INR 1.33, aPTT 43.7, d-dimer 561  Chem panel: Lactate 2.5, Na 133, AG 3, BUN/Cr 61/1.61, Alb 1.9, Alk phos 195, ,   UA neg  Covid-19 PCR Positive   EKG shows  CXR in [place] ED shows: Mild R atelectasis  In [place] ED, s/p 2L NS bolus, 1 unit pRBC 80 y/o M with PMHx of HTN, BPH, spinal stenosis, dyslipidemia, anxiety, neuropathy, B12 deficiency who [facility] weakness and hypotension. As per transfer papers, pt with confusion after lunch today. Pt admits to generalized weakness. Lives with wife, Stephany and son. He came from [facility] in [place] since Feb 14. Patient lives with wife and son at home and ambulates with cane. Contracted Covid-19 in [facility] rehab and first started having symptoms on Mar 25, reporting symptoms of cough and low grade fever, diarrhea. Denies any chest pain, shortness of breath, headache, dizziness. History obtained from wife, Stephany([phone]) as pt is confused and a poor historian.   Of note, pt was noted to have sacral wounds today and reported low grade fever for two days and mental status changes today and loss of appetite.    The date the pt first felt unwell: March 25  Fever or chills: yes [ x ]   no [  ]   - Tmax:   Fatigue, malaise or generalized weakness: yes [ x ]   no [  ]  Shortness of breath/dyspnea: yes [ x ]   no [  ]  Cough: yes [ x ]   no [  ], sputum production: yes [  ]   no [x  ]  Blood in sputum: yes [  ]   no [ x ]  Anorexia/po intolerance: yes [  ]   no [ x ]  Chest pain or chest tightness: yes [  ]   no [ x ]  Edema in legs: yes [  ]   no [ x ]  Myalgias: yes [  ]   no [ x ]  Headache: yes [  ]   no [x  ]  Sore throat: yes [  ]   no [ x ]  Rhinorrhea: yes [  ]   no [  x]  Abd pain: yes [  ]   no [ x ]  Nausea: yes [  ]   no [x  ]  Vomiting: yes [  ]   no [  x]  Diarrhea: yes [  ]   no [  ]  Skin rashes: yes [  ]   no [  ]  Loss of sense of smell/anosmia: yes [  ]   no [ x ]  Conjunctivitis: yes [  ]   no [ x ]  Recent travel: yes [  ]   no [ x ] - Location:   Any sick contacts: yes [x  ]   no [  ]: Contracted from [facility] rehab  Close contact with someone confirmed positive with COVID-19 / SARS-CoV2 in the last 14 days: yes [  ]   no [ x ]  Code status: DNR, but not DNI      In the ED  Vitals: T--, , BP 84/48, RR 22, O2 96 on RA  Labs(In [place]) significant for: wbc 18.45, hgb 6.8, FOBT+,   Coag studies: PT 14.9, INR 1.33, aPTT 43.7, d-dimer 561  Chem panel: Lactate 2.5, Na 133, AG 3, BUN/Cr 61/1.61, Alb 1.9, Alk phos 195, ,   UA neg  Covid-19 PCR Positive   EKG shows Accelerated Junctional rhythm with occasional PVCs  CXR in [place] ED shows: Mild R atelectasis  In [place] ED, s/p 2L NS bolus, 1 unit pRBC

## 2020-04-28 NOTE — ED ADULT NURSE NOTE - NEURO ASSESSMENT
63yo h/o HTN was placed in CDU for workup of chest pain. He was evaluated in the ED with EKG, labs with cardiac enzymes and CXR which were unremarkable. He was monitored without incident, repeat EKG and enzymes ok. VS, exam as noted, pt comfortable and asymptomatic on my eval. CCTA revealed CAD-RAD 1 with luminal irregularity of the proximal left anterior descending coronary artery secondary to calcified plaques, but no evidence of flow-limiting stenosis of the coronary arteries. Additionally there are scattered calcified plaques in the distal left main and in   the proximal left anterior descending coronary arteries, which are not dense to meet the criteria of 130 [place] for Agatston calcium scoring   system.   Pt was given copies of workup, asa and atorvastatin were prescribed, and pt was d/c home to f/u with cardiology within 1 week.  · HPI Objective Statement: 61 yo M with pmhx of HTN, presenting with non-radiating, substernal intermittent chest pain and palpitations x 1 week. Describes his chest pain as a "jolt" that lasts seconds. Pain is moderate with no alleviating or aggravating factors. States he had a cough last week which he was given a Z-pack for. No sob, fever, chills, abdominal pain, nausea, vomiting, diarrhea, back pain, urinary symptoms, headache, dizziness,  paresthesias, or weakness. Pt states that he has never seen a cardiologist or had any stress testing. Reports taking metoprolol once last week accidently.
- - -

## 2020-04-28 NOTE — ED PROVIDER NOTE - PROGRESS NOTE DETAILS
spoke with ICU pa and GI paged   hospitalist aware of pt spoke with ICU pa and ULYSSES domingo spoke with Dr. Le   hospitalist aware of pt

## 2020-04-28 NOTE — H&P ADULT - ATTENDING COMMENTS
Impression/Plan  metabolic encephalopathy likely due to hypovolemic shock. Cannot exclude infectious source  -neuro checks q4h  -will followup ct head    Hypotension likely due to hypovolemia from anemia  s/p 2 liters of NS bolus  s/p 1 unit of PRBC in [facility]  To give one more unit  ICU called to evaluate :recommended midodrine and against ICU at this time    Transaminitis likely hepatopathy due to anemia.  Avoid hepatotoxic drugs  Baseline alp 120/alt 110  Check  RUQ sono    COVID 19 Infection; R/o other infectious sources… Blood and urine in process    Sacral decubti:: haritha need collagense, wound care consult    symptomatic anemia of 6.8 from 14… April 7: 10.5  Leukocytosis of 18  s/p 1 unit of pRBC  Will need to r/o GI bleed... low suspicious for decub as source  -check [place]  -Tranfuse one more unit  -Maintain active type and cross  -Tranfuse hb >7.0  -CT scan abdomen non con    DARIUS likely prerenal due to anemia and low perfusion state  Followup Cr in the am post transfusion  Holding off on Nephro consult    Code; DNR only  diet : Whole consistent withregular thin liquids    Greater than 15 minutes total face to face time spent with family and patient discussing advance care planning

## 2020-04-28 NOTE — CONSULT NOTE ADULT - ASSESSMENT
The patient is a 79 year old male with a history of HTN, HL, pre-DM, anxiety, spinal stenosis who presents with weakness and AMS in the setting of worsening anemia, dehydration, COVID-19.    Plan:  - ECG with above noted abnormalities; largely unchanged from prior  - Cardiac enzymes negative  - BNP normal at 47  - CXR with mild right atelectasis  - Received 2 L NS. Can continue IV fluid boluses as needed to improve BP.  - Hold antihypertensives  - Noted to be anemic to 6.8. Baseline in 2/20 was 14.7. Transfuse. Monitor hemoglobin and monitor for signs of bleeding.  - COVID-19 positive  - Not hypoxic on RA

## 2020-04-28 NOTE — H&P ADULT - PROBLEM SELECTOR PLAN 6
Now hypotensive on admission  - Will hold off on home Valsartan - Cr 1.61, likely pre-renal, Baseline Cr 1.01  - Avoid nephrotoxic meds COVID Treatment Plan:  - Maintain on airborne isolation.  - Not on O2, but provide supplemental O2 as needed  - Limit use of NSAIDs.  - Would avoid nebulized preparations to limit risk of aerosol formation.  - Labs Testing: Daily or As Needed: CBC w/ diff, CMP; Every 3 days: CRP, Ferritin, Procalcitonin.  - Goals of Care discussion had with patient/surrogate: (free text here)  - F/u AM CBC, CMP

## 2020-04-29 DIAGNOSIS — J18.9 PNEUMONIA, UNSPECIFIED ORGANISM: ICD-10-CM

## 2020-04-29 DIAGNOSIS — R41.82 ALTERED MENTAL STATUS, UNSPECIFIED: ICD-10-CM

## 2020-04-29 LAB
ABO RH CONFIRMATION: SIGNIFICANT CHANGE UP
ALBUMIN SERPL ELPH-MCNC: 1.9 G/DL — LOW (ref 3.3–5)
ALP SERPL-CCNC: 241 U/L — HIGH (ref 40–120)
ALT FLD-CCNC: 163 U/L — HIGH (ref 12–78)
ANION GAP SERPL CALC-SCNC: 5 MMOL/L — SIGNIFICANT CHANGE UP (ref 5–17)
AST SERPL-CCNC: 167 U/L — HIGH (ref 15–37)
BASOPHILS # BLD AUTO: 0.02 K/UL — SIGNIFICANT CHANGE UP (ref 0–0.2)
BASOPHILS NFR BLD AUTO: 0.1 % — SIGNIFICANT CHANGE UP (ref 0–2)
BILIRUB DIRECT SERPL-MCNC: 1.1 MG/DL — HIGH (ref 0.05–0.2)
BILIRUB INDIRECT FLD-MCNC: 0.6 MG/DL — SIGNIFICANT CHANGE UP (ref 0.2–1)
BILIRUB SERPL-MCNC: 1.7 MG/DL — HIGH (ref 0.2–1.2)
BUN SERPL-MCNC: 52 MG/DL — HIGH (ref 7–23)
CALCIUM SERPL-MCNC: 8.2 MG/DL — LOW (ref 8.5–10.1)
CHLORIDE SERPL-SCNC: 105 MMOL/L — SIGNIFICANT CHANGE UP (ref 96–108)
CO2 SERPL-SCNC: 27 MMOL/L — SIGNIFICANT CHANGE UP (ref 22–31)
CREAT SERPL-MCNC: 0.96 MG/DL — SIGNIFICANT CHANGE UP (ref 0.5–1.3)
CRP SERPL-MCNC: 20.52 MG/DL — HIGH (ref 0–0.4)
D DIMER BLD IA.RAPID-MCNC: 736 NG/ML DDU — HIGH
EOSINOPHIL # BLD AUTO: 0.01 K/UL — SIGNIFICANT CHANGE UP (ref 0–0.5)
EOSINOPHIL NFR BLD AUTO: 0.1 % — SIGNIFICANT CHANGE UP (ref 0–6)
FERRITIN SERPL-MCNC: 2627 NG/ML — HIGH (ref 30–400)
FERRITIN SERPL-MCNC: 2695 NG/ML — HIGH (ref 30–400)
FOLATE SERPL-MCNC: 7.8 NG/ML — SIGNIFICANT CHANGE UP
GLUCOSE SERPL-MCNC: 87 MG/DL — SIGNIFICANT CHANGE UP (ref 70–99)
HCT VFR BLD CALC: 26 % — LOW (ref 39–50)
HCT VFR BLD CALC: 29.4 % — LOW (ref 39–50)
HGB BLD-MCNC: 8.4 G/DL — LOW (ref 13–17)
HGB BLD-MCNC: 9.5 G/DL — LOW (ref 13–17)
IMM GRANULOCYTES NFR BLD AUTO: 1.4 % — SIGNIFICANT CHANGE UP (ref 0–1.5)
IRON SATN MFR SERPL: 34 UG/DL — LOW (ref 45–165)
IRON SATN MFR SERPL: 36 % — SIGNIFICANT CHANGE UP (ref 16–55)
LDH SERPL L TO P-CCNC: 216 U/L — SIGNIFICANT CHANGE UP (ref 50–242)
LYMPHOCYTES # BLD AUTO: 0.75 K/UL — LOW (ref 1–3.3)
LYMPHOCYTES # BLD AUTO: 3.8 % — LOW (ref 13–44)
MCHC RBC-ENTMCNC: 28.5 PG — SIGNIFICANT CHANGE UP (ref 27–34)
MCHC RBC-ENTMCNC: 32.3 GM/DL — SIGNIFICANT CHANGE UP (ref 32–36)
MCV RBC AUTO: 88.1 FL — SIGNIFICANT CHANGE UP (ref 80–100)
MONOCYTES # BLD AUTO: 0.84 K/UL — SIGNIFICANT CHANGE UP (ref 0–0.9)
MONOCYTES NFR BLD AUTO: 4.2 % — SIGNIFICANT CHANGE UP (ref 2–14)
MRSA PCR RESULT.: SIGNIFICANT CHANGE UP
NEUTROPHILS # BLD AUTO: 17.96 K/UL — HIGH (ref 1.8–7.4)
NEUTROPHILS NFR BLD AUTO: 90.4 % — HIGH (ref 43–77)
NRBC # BLD: 0 /100 WBCS — SIGNIFICANT CHANGE UP (ref 0–0)
PLATELET # BLD AUTO: 248 K/UL — SIGNIFICANT CHANGE UP (ref 150–400)
POTASSIUM SERPL-MCNC: 4.3 MMOL/L — SIGNIFICANT CHANGE UP (ref 3.5–5.3)
POTASSIUM SERPL-SCNC: 4.3 MMOL/L — SIGNIFICANT CHANGE UP (ref 3.5–5.3)
PROCALCITONIN SERPL-MCNC: 0.41 NG/ML — HIGH (ref 0.02–0.1)
PROT SERPL-MCNC: 5.6 G/DL — LOW (ref 6–8.3)
RBC # BLD: 2.95 M/UL — LOW (ref 4.2–5.8)
RBC # FLD: 15.8 % — HIGH (ref 10.3–14.5)
S AUREUS DNA NOSE QL NAA+PROBE: SIGNIFICANT CHANGE UP
SARS-COV-2 RNA SPEC QL NAA+PROBE: DETECTED
SODIUM SERPL-SCNC: 137 MMOL/L — SIGNIFICANT CHANGE UP (ref 135–145)
TIBC SERPL-MCNC: 94 UG/DL — LOW (ref 220–430)
TSH SERPL-MCNC: 0.63 UIU/ML — SIGNIFICANT CHANGE UP (ref 0.36–3.74)
UIBC SERPL-MCNC: 60 UG/DL — LOW (ref 110–370)
VIT B12 SERPL-MCNC: 582 PG/ML — SIGNIFICANT CHANGE UP (ref 232–1245)
WBC # BLD: 19.86 K/UL — HIGH (ref 3.8–10.5)
WBC # FLD AUTO: 19.86 K/UL — HIGH (ref 3.8–10.5)

## 2020-04-29 PROCEDURE — 74176 CT ABD & PELVIS W/O CONTRAST: CPT | Mod: 26

## 2020-04-29 PROCEDURE — 99221 1ST HOSP IP/OBS SF/LOW 40: CPT | Mod: 57

## 2020-04-29 PROCEDURE — 93010 ELECTROCARDIOGRAM REPORT: CPT

## 2020-04-29 PROCEDURE — 70450 CT HEAD/BRAIN W/O DYE: CPT | Mod: 26

## 2020-04-29 PROCEDURE — 71250 CT THORAX DX C-: CPT | Mod: 26

## 2020-04-29 PROCEDURE — 99233 SBSQ HOSP IP/OBS HIGH 50: CPT | Mod: CS,GC

## 2020-04-29 RX ORDER — SUCRALFATE 1 G
1 TABLET ORAL
Refills: 0 | Status: DISCONTINUED | OUTPATIENT
Start: 2020-04-29 | End: 2020-06-01

## 2020-04-29 RX ORDER — SODIUM CHLORIDE 0.65 %
1 AEROSOL, SPRAY (ML) NASAL
Refills: 0 | Status: DISCONTINUED | OUTPATIENT
Start: 2020-04-29 | End: 2020-06-01

## 2020-04-29 RX ORDER — SENNA PLUS 8.6 MG/1
2 TABLET ORAL AT BEDTIME
Refills: 0 | Status: DISCONTINUED | OUTPATIENT
Start: 2020-04-29 | End: 2020-05-04

## 2020-04-29 RX ADMIN — PIPERACILLIN AND TAZOBACTAM 25 GRAM(S): 4; .5 INJECTION, POWDER, LYOPHILIZED, FOR SOLUTION INTRAVENOUS at 06:05

## 2020-04-29 RX ADMIN — Medication 1 GRAM(S): at 22:36

## 2020-04-29 RX ADMIN — MIDODRINE HYDROCHLORIDE 10 MILLIGRAM(S): 2.5 TABLET ORAL at 11:22

## 2020-04-29 RX ADMIN — PIPERACILLIN AND TAZOBACTAM 25 GRAM(S): 4; .5 INJECTION, POWDER, LYOPHILIZED, FOR SOLUTION INTRAVENOUS at 16:35

## 2020-04-29 RX ADMIN — MIDODRINE HYDROCHLORIDE 10 MILLIGRAM(S): 2.5 TABLET ORAL at 06:05

## 2020-04-29 RX ADMIN — PREGABALIN 1000 MICROGRAM(S): 225 CAPSULE ORAL at 11:21

## 2020-04-29 RX ADMIN — PANTOPRAZOLE SODIUM 40 MILLIGRAM(S): 20 TABLET, DELAYED RELEASE ORAL at 06:05

## 2020-04-29 RX ADMIN — SERTRALINE 100 MILLIGRAM(S): 25 TABLET, FILM COATED ORAL at 11:22

## 2020-04-29 RX ADMIN — PIPERACILLIN AND TAZOBACTAM 25 GRAM(S): 4; .5 INJECTION, POWDER, LYOPHILIZED, FOR SOLUTION INTRAVENOUS at 22:36

## 2020-04-29 RX ADMIN — MIDODRINE HYDROCHLORIDE 10 MILLIGRAM(S): 2.5 TABLET ORAL at 16:34

## 2020-04-29 NOTE — CONSULT NOTE ADULT - SUBJECTIVE AND OBJECTIVE BOX
PULMONARY/CRITICAL CARE        Patient is a 79y old  Male who presents with a chief complaint of Generalized weakness (28 Apr 2020 22:20)    BRIEF HOSPITAL COURSE: ***80 y/o M with PMHx of HTN, BPH, spinal stenosis, dyslipidemia, anxiety, neuropathy, B12 deficiency who presents from [facility] rehab for evaluation of weakness and hypotension. As per transfer papers, pt with confusion after lunch today. Pt admits to generalized weakness. Lives with wife, Stephany and son. He came from [facility] rehab in [place] since Feb 14. Patient lives with wife and son at home and ambulates with cane. [facility] and first started having symptoms on Mar 25, reporting symptoms of cough and low grade fever, diarrhea. Denies any chest pain, shortness of breath, headache, dizziness. History obtained from wife, Stephany([phone]) as pt is confused and a poor historian.   Of note, pt was noted to have sacral wounds today and reported low grade fever for two days and mental status changes today and loss of appetite.    Events last 24 hours: ***    PAST MEDICAL & SURGICAL HISTORY:  H/O vitamin B deficiency  Spinal stenosis  HTN (hypertension)  History of tonsillectomy    Allergies    No Known Allergies    Intolerances      FAMILY HISTORY:  No pertinent family history in first degree relatives      Review of Systems:  unobtainable    Medications:  piperacillin/tazobactam IVPB.. 3.375 Gram(s) IV Intermittent every 8 hours    metoprolol succinate ER 50 milliGRAM(s) Oral daily  midodrine. 10 milliGRAM(s) Oral three times a day      sertraline 100 milliGRAM(s) Oral daily        pantoprazole  Injectable 40 milliGRAM(s) IV Push every 12 hours        cyanocobalamin 1000 MICROGram(s) Oral daily      collagenase Ointment 1 Application(s) Topical three times a day            ICU Vital Signs Last 24 Hrs  T(C): 37.1 (29 Apr 2020 06:05), Max: 37.3 (29 Apr 2020 03:49)  T(F): 98.7 (29 Apr 2020 06:05), Max: 99.1 (29 Apr 2020 03:49)  HR: 102 (29 Apr 2020 06:05) (87 - 105)  BP: 100/57 (29 Apr 2020 06:05) (84/48 - 100/57)  BP(mean): --  ABP: --  ABP(mean): --  RR: 18 (29 Apr 2020 06:05) (17 - 22)  SpO2: 96% (29 Apr 2020 06:05) (96% - 98%)    Vital Signs Last 24 Hrs  T(C): 37.1 (29 Apr 2020 06:05), Max: 37.3 (29 Apr 2020 03:49)  T(F): 98.7 (29 Apr 2020 06:05), Max: 99.1 (29 Apr 2020 03:49)  HR: 102 (29 Apr 2020 06:05) (87 - 105)  BP: 100/57 (29 Apr 2020 06:05) (84/48 - 100/57)  BP(mean): --  RR: 18 (29 Apr 2020 06:05) (17 - 22)  SpO2: 96% (29 Apr 2020 06:05) (96% - 98%)        I&O's Detail        LABS:                        8.4    19.86 )-----------( 248      ( 29 Apr 2020 07:23 )             26.0     04-28    138  |  107  |  54<H>  ----------------------------<  92  4.6   |  26  |  1.10    Ca    7.6<L>      28 Apr 2020 22:41  Phos  2.4     04-28  Mg     2.0     04-28    TPro  5.1<L>  /  Alb  1.8<L>  /  TBili  1.8<H>  /  DBili  x   /  AST  230<H>  /  ALT  173<H>  /  AlkPhos  235<H>  04-28          CAPILLARY BLOOD GLUCOSE            CULTURES:      Physical Examination:    General: No acute distress.  Elderly male sitting up comfortably on ra.    HEENT: Pupils equal, reactive to light.  Symmetric.    PULM: Clear to auscultation bilaterally, no significant sputum production    CVS: Regular rate and rhythm, no murmurs, rubs, or gallops    ABD: Soft, nondistended, nontender, normoactive bowel sounds, no masses    EXT: No edema, nontender    SKIN: Warm and well perfused, no rashes noted.    NEURO: Alert, confused, interactive, nonfocal    RADIOLOGY: ***< from: CT Chest No Cont (04.29.20 @ 01:24) >  EXAM:  CT ABDOMEN AND PELVIS                          EXAM:  CT CHEST                            PROCEDURE DATE:  04/29/2020          INTERPRETATION:  CT CHEST, ABDOMEN AND PELVIS WITHOUT CONTRAST    INDICATION: Shortness of breath. Hypotension. Altered mental status.     TECHNIQUE: Noncontrast CT of the chest, abdomen and pelvis. Images are reformatted in the sagittal and coronal planes.Postprocessed MIP reformatted chest images were created and reviewed.    COMPARISON: None.    FINDINGS:    Absence of intravenous contrast limits evaluation for traumatic injury, focal lesions, neoplasm, and vascular pathology.    Thorax:  Lines and tubes: None.  Airways: Tracheobronchial tree is patent.  Lungs and Pleura: Right basilar opacity containing air bronchograms. Mild patchy left lower lobe opacities are also identified. No significant pleural effusion. No pneumothorax.  Mediastinum and lymph nodes: No bulky adenopathy.  Heart: Trace pericardial effusion and/or thickening without significant cardiomegaly. Coronary artery calcification and/or stenting. Valvular calcifications.  Vessels: Atherosclerotic disease of the aorta and its branches. Ascending thoracic aorta measures up to 4.3 cm in maximum AP diameter.   Chest Wall: Within normal limits.    Abdomen/Pelvis:  Liver: No suspicious lesions.  Biliary: No significant dilatation. No calcified gallstones within the gallbladder.  Spleen: No suspicious lesions.  Pancreas: No inflammatory changes or ductal dilatation.  Adrenals: Normal.  Kidneys: No hydronephrosis. Bilateral renal cysts as well as too small to characterize hypodensities. Sub-cm nonobstructive right renal calculus.  Vessels: Atherosclerotic disease of the aorta and its branches.     GI tract: There is suggestion of focal circumferential wall thickening of distal ascending colonic loop without significant pericolonic stranding as best seen on images 78 through 79 of series 2. Consider nonemergent colonoscopy evaluation to exclude underlying malignancy. No evidence ofsmall bowel obstruction. No CT findings of acute appendicitis.    Peritoneum/retroperitoneum and mesentery: No free air. No organized fluid collection. No adenopathy.    Pelvic organs/Bladder: Heterogeneous prominent prostate with nodular density near the apex, cause mass effect and protrusion at the bladder base. Correlate with PSA and consider nonemergent prostatic workup to exclude malignancy. No significant bladder wall thickening.    Abdominal wall: Suggestion of left-sided sacral decubitus ulcer with punctate bubbles of air diffusely extending along symmetrically prominent left gluteal muscle and soft tissue folds. Partially imaged asymmetric density measuring 3.9 x 3.3 cm posteromedial to the left proximal femur on image 168 of series 2. Possibility of intramuscular hematoma considered in the differential.    Bones and soft tissues: Multilevel degenerative changes of the spine noted. Advanced osteoarthritis of bilateral hip joint. Advanced osteoarthritis of left shoulder joint withassociated soft tissue calcifications. Vague linear lucency identified at the level of the sacrococcygeal tip as best seen on images 149 through 151 of series 2. Correlate with point tenderness to exclude underlying nondisplaced fracture.    IMPRESSION:    Right basilar opacity containing air bronchograms. Additional patchy left lower lobe opacities. Bacterial and viral pneumonias including atypical agent such as COVID-19 considered in the differential. Correlate with clinical parameters, laboratory values and follow-up chest radiograph advised. No significant pleural effusion.     Suggestion of focal circumferential wall thickening of distal ascending colonic loop without significant pericolonic stranding. Consider nonemergent colonoscopy evaluation to exclude underlying malignancy. No evidence of small bowel obstruction.     Vague linear lucency identified at the level of the sacrococcygeal tip. Correlate with point tenderness to exclude underlying nondisplaced fracture.    Suggestion of left-sided sacral decubitus ulcer with punctate bubbles of air diffusely extending along symmetrically prominent left gluteal muscle and soft tissue folds. Partially imaged asymmetric density measuring 3.9 x 3.3 cm posteromedial to the left proximalfemur. Possibility of intramuscular hematoma considered in the differential.    Additional findings as mentioned above.    These results were discussed via telephone at 4/29/2020 3:55 AM by Dr. Baugh of radiology with Dr. Bailey, institution read-back verification policy was followed.                TATE BAUGH M.D., ATTENDING RADIOLOGIST  This document has been electronically signed. Apr 29 2020  4:10AM              < end of copied text >      CRITICAL CARE TIME SPENT: ***

## 2020-04-29 NOTE — CONSULT NOTE ADULT - ASSESSMENT
Elderly pt with COVID pneumonia, likely resolving; Questionable secondary bacterial infection.  Hypotension due to dehydration  Anemia  Sacral decubitus

## 2020-04-29 NOTE — PROGRESS NOTE ADULT - PROBLEM SELECTOR PLAN 2
Possible  source of infection  - Leukocytosis, Lactate 2.5  - Continue IV Zosyn  - Collagenase ointment application  - Wound care consulted Possible  source of infection  - Leukocytosis  - Continue IV Zosyn 3.375 gm q8hr   - Collagenase ointment application  - Wound care consulted

## 2020-04-29 NOTE — PROGRESS NOTE ADULT - PROBLEM SELECTOR PLAN 3
Found to have confusion today  - F/u CT Head Found to have confusion today  - CT Head - neg to acute stroke

## 2020-04-29 NOTE — CONSULT NOTE ADULT - ASSESSMENT
ASSESSMENT:  80 y/o M with PMHx of HTN, BPH, spinal stenosis, dyslipidemia, anxiety, neuropathy, B12 deficiency who presents from [facility] rehab for evaluation of weakness and hypotension, patient with L sided decub ulcer, WBC 19.8    PLAN:  -Admit to medicine, care per medicine  -IV abx  -Local wound care  -Labs in AM  -Discussed with Dr. Betancourt ASSESSMENT:  78 y/o M with PMHx of HTN, BPH, spinal stenosis, dyslipidemia, anxiety, neuropathy, B12 deficiency who presents from [facility] rehab for evaluation of weakness and hypotension, patient with L sided decub ulcer, WBC 19.8    PLAN:  -Admit to medicine, care per medicine  -IV abx  -Local wound care  -Possible surgical debridement vs crosshatching   -Labs in AM  -Discussed with Dr. Betancourt

## 2020-04-29 NOTE — PROGRESS NOTE ADULT - ASSESSMENT
The patient is a 79 year old male with a history of HTN, HL, pre-DM, anxiety, spinal stenosis who presents with weakness and AMS in the setting of worsening anemia, dehydration, COVID-19.    Plan:  - ECG with above noted abnormalities; largely unchanged from prior  - Cardiac enzymes negative  - BNP normal at 47  - CXR with mild right atelectasis  - Received multiple liters of IV fluids yesterday  - Received transfusion. Hemoglobin improved to 8.4.  - Hold metoprolol for now  - Continue midodrine 10 mg tid. If BP remains ok today, will attempt to wean off.  - COVID-19 positive  - Not hypoxic on RA

## 2020-04-29 NOTE — PROGRESS NOTE ADULT - ASSESSMENT
80 y/o M with PMHx of HTN, BPH, spinal stenosis, dyslipidemia, anxiety, neuropathy, B12 deficiency who presents from [facility] rehab for evaluation of weakness and hypotension. Admitted for anemia and infection poss 2/2 GI bleed and sacral decub.

## 2020-04-29 NOTE — CONSULT NOTE ADULT - ASSESSMENT
80 y/o M with PMHx of HTN, BPH, spinal stenosis, dyslipidemia, anxiety, neuropathy, B12 deficiency who presents from [facility] rehab for evaluation of weakness and hypotension    Anemia   (+) occult at [facility] 4/28  no overt gi bleeding; ?contaminant from large oozing sacral wound   trend h/h and transfuse prn for Hgb < 8  Protonix 40mg IVP BID for ?ugib   Carafate 1g PO QID   keep stools soft; monitor stool color   defer endoscopic eval as +Covid and increased risk for anesthesia in these pts   diet as tolerated     FTT/Dysphagia   unclear etiology    Esophagram at [facility] 2/2019 normal   cont ppi   consider appetite stimulant   consider swallow eval   defer endoscopic eval as +Covid and increased risk for anesthesia in these pts    COVID-19  monitor rep status   care per primary team appreciated

## 2020-04-29 NOTE — PROGRESS NOTE ADULT - NSHPATTENDINGPLANDISCUSS_GEN_ALL_CORE
Per verbal conversation with MD: US is same as last time, ok to send to endocrinology.    Called and spoke with patient gave MD message. Service to endo entered. Gave patient two endocrinologists contact info in the area.    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

## 2020-04-29 NOTE — PROGRESS NOTE ADULT - SUBJECTIVE AND OBJECTIVE BOX
Patient is a 79y old  Male who presents with a chief complaint of Generalized weakness (29 Apr 2020 07:41)      HPI:  78 y/o M with PMHx of HTN, BPH, spinal stenosis, dyslipidemia, anxiety, neuropathy, B12 deficiency who presents from [facility] rehab for evaluation of weakness and hypotension. As per transfer papers, pt with confusion after lunch today. Pt admits to generalized weakness. Lives with wife, Stephany and son. He came from [facility] rehab in [place] since Feb 14. Patient lives with wife and son at home and ambulates with cane. Contracted Covid-19 in [facility] rehab and first started having symptoms on Mar 25, reporting symptoms of cough and low grade fever, diarrhea. Denies any chest pain, shortness of breath, headache, dizziness. History obtained from wife, Stephany([phone]) as pt is confused and a poor historian.   Of note, pt was noted to have sacral wounds today and reported low grade fever for two days and mental status changes today and loss of appetite.            INTERVAL HPI/OVERNIGHT EVENTS:  T(C): 37.3 (04-29-20 @ 09:44), Max: 37.3 (04-29-20 @ 03:49)  HR: 92 (04-29-20 @ 09:44) (87 - 105)  BP: 112/59 (04-29-20 @ 09:44) (84/48 - 112/59)  RR: 20 (04-29-20 @ 09:44) (17 - 22)  SpO2: 97% (04-29-20 @ 09:44) (96% - 98%)  Wt(kg): --  I&O's Summary      REVIEW OF SYSTEMS:  CONSTITUTIONAL: No fever, weight loss, or fatigue  EYES: No eye pain, visual disturbances, or discharge  ENMT:  No difficulty hearing, tinnitus, vertigo;   No sinus or throat pain  NECK: No pain or stiffness  RESPIRATORY: No cough, wheezing, chills ,  No shortness of breath  CARDIOVASCULAR: No chest pain, palpitations, dizziness, or leg swelling  GASTROINTESTINAL: No abdominal or epigastric pain. No nausea, vomiting, or   hematemesis  No diarrhea or constipation.   GENITOURINARY: No dysuria, frequency, hematuria, or incontinence  NEUROLOGICAL: No headaches, memory loss, loss of strength, numbness,   SKIN: No itching, burning, rashes, or lesions   MUSCULOSKELETAL: No joint pain or swelling; No muscle, back, or extremity pain      PHYSICAL EXAM:  GENERAL: NAD, well-groomed, well-developed  HEAD:  Atraumatic, Normocephalic  EYES: EOMI, PERRLA, conjunctiva and sclera clear  ENMT: Moist mucous membranes , No lesions  NECK: Supple, No JVD,   NERVOUS SYSTEM:  Alert & Oriented X3,   Motor Strength 5/5 B/L upper and lower extremities; DTRs 2+ intact and symmetric  CHEST/LUNG: percussion bilaterally; No rales, rhonchi, wheezing,    HEART: Regular rate and rhythm; No murmurs, no tachy   ABDOMEN: Soft, Nontender, Nondistended; Bowel sounds present  EXTREMITIES:  2+ Peripheral Pulses, No clubbing, cyanosis, or edema  SKIN: No rashes or lesions    MEDICATIONS  (STANDING):  collagenase Ointment 1 Application(s) Topical three times a day  cyanocobalamin 1000 MICROGram(s) Oral daily  metoprolol succinate ER 50 milliGRAM(s) Oral daily  midodrine. 10 milliGRAM(s) Oral three times a day  pantoprazole  Injectable 40 milliGRAM(s) IV Push every 12 hours  piperacillin/tazobactam IVPB.. 3.375 Gram(s) IV Intermittent every 8 hours  sertraline 100 milliGRAM(s) Oral daily    MEDICATIONS  (PRN):      LABS:                        8.4    19.86 )-----------( 248      ( 29 Apr 2020 07:23 )             26.0     04-29    137  |  105  |  52<H>  ----------------------------<  87  4.3   |  27  |  0.96    Ca    8.2<L>      29 Apr 2020 07:23  Phos  2.4     04-28  Mg     2.2     04-29    TPro  5.6<L>  /  Alb  1.9<L>  /  TBili  1.7<H>  /  DBili  1.10<H>  /  AST  167<H>  /  ALT  163<H>  /  AlkPhos  241<H>  04-29                      RADIOLOGY & ADDITIONAL TESTS:    Imaging Personally Reviewed:       Advance Directives:  full code     Palliative Care: Patient is a 79y old  Male who presents with a chief complaint of Generalized weakness (29 Apr 2020 07:41)      HPI:  80 y/o M with PMHx of HTN, BPH, spinal stenosis, dyslipidemia, anxiety, neuropathy, B12 deficiency who presents from [facility] rehab for evaluation of weakness and hypotension. As per transfer papers, pt with confusion after lunch today. Pt admits to generalized weakness. Lives with wife, Stephany and son. He came from [facility] rehab in [place] since Feb 14. Patient lives with wife and son at home and ambulates with cane. Contracted Covid-19 in [facility] rehab and first started having symptoms on Mar 25, reporting symptoms of cough and low grade fever, diarrhea. Denies any chest pain, shortness of breath, headache, dizziness. History obtained from wife, Stephany([phone]) as pt is confused and a poor historian.   Of note, pt was noted to have sacral wounds today and reported low grade fever for two days and mental status changes today and loss of appetite.    Admitted for acute blood loss  anemia and infection possible  2/2 GI bleed and sacral decub  + .  pt seen and examine today  - lethargic , no sob , no fever ,  no rectal bleed + , no resp distress .         INTERVAL HPI/OVERNIGHT EVENTS:  T(C): 37.3 (04-29-20 @ 09:44), Max: 37.3 (04-29-20 @ 03:49)  HR: 92 (04-29-20 @ 09:44) (87 - 105)  BP: 112/59 (04-29-20 @ 09:44) (84/48 - 112/59)  RR: 20 (04-29-20 @ 09:44) (17 - 22)  SpO2: 97% (04-29-20 @ 09:44) (96% - 98%)  Wt(kg): --  I&O's Summary      REVIEW OF SYSTEMS:  unable to obtain sec to dementia     PHYSICAL EXAM:  GENERAL: NAD, well-groomed, weak +  HEAD:  Atraumatic, Normocephalic  EYES: EOMI, PERRLA, conjunctiva and sclera clear  ENMT: Moist mucous membranes , No lesions  NECK: Supple, No JVD,   NERVOUS SYSTEM:  Alert & Oriented X1  Motor Strength 5/5 B/L upper and lower extremities; DTRs 2+ intact and symmetric  CHEST/LUNG: bilaterally; No rales, rhonchi, wheezing,    HEART: Regular rate and rhythm; No murmurs, no tachy   ABDOMEN: Soft, Nontender, Nondistended; Bowel sounds present  EXTREMITIES:  2+ Peripheral Pulses, No clubbing, cyanosis, or edema  SKIN: No rashes or lesions ,  decubitus side    +  lt side hematoma +     MEDICATIONS  (STANDING):  collagenase Ointment 1 Application(s) Topical three times a day  cyanocobalamin 1000 MICROGram(s) Oral daily  metoprolol succinate ER 50 milliGRAM(s) Oral daily  midodrine. 10 milliGRAM(s) Oral three times a day  pantoprazole  Injectable 40 milliGRAM(s) IV Push every 12 hours  piperacillin/tazobactam IVPB.. 3.375 Gram(s) IV Intermittent every 8 hours  sertraline 100 milliGRAM(s) Oral daily    MEDICATIONS  (PRN):      LABS:                        8.4    19.86 )-----------( 248      ( 29 Apr 2020 07:23 )             26.0     04-29    137  |  105  |  52<H>  ----------------------------<  87  4.3   |  27  |  0.96    Ca    8.2<L>      29 Apr 2020 07:23  Phos  2.4     04-28  Mg     2.2     04-29    TPro  5.6<L>  /  Alb  1.9<L>  /  TBili  1.7<H>  /  DBili  1.10<H>  /  AST  167<H>  /  ALT  163<H>  /  AlkPhos  241<H>  04-29                      RADIOLOGY & ADDITIONAL TESTS:    Imaging Personally Reviewed:   no new test     Advance Directives:  full code     Palliative Care: appropriate

## 2020-04-29 NOTE — PROGRESS NOTE ADULT - PROBLEM SELECTOR PLAN 1
Anemia possible  2/2 lt hip hematoma   - Hgb 6.8, Lactate 2.5 as per [facility] admission labs  - FOBT+  - s/p 1 unit pRBC, 2L NS bolus in [facility] ED  - F/u AM CBC, CMP, Iron studies, TSH, B12  - F/u CT abd/pelvis, U/S Abd-  left-sided sacral decubitus ulcer with punctate bubbles of air diffusely extending along symmetrically prominent left gluteal muscle and soft tissue folds. Partially imaged asymmetric density measuring 3.9 x 3.3 cm posteromedial to the left proximal femur. Possibility of intramuscular hematoma considered in the differential.    - GI Dr. Le consulted, Anemia possible  2/2 lt hip hematoma   - Hgb 6.8, Lactate 2.5 as per [facility] admission labs  - FOBT+  - s/p 1 unit pRBC, 2L NS bolus in [facility] ED  - F/u AM CBC, CMP, Iron studies, TSH, B12  - F/u CT abd/pelvis, U/S Abd-  left-sided sacral decubitus ulcer with punctate bubbles of air diffusely extending along symmetrically prominent left gluteal muscle and soft tissue folds. Partially imaged asymmetric density measuring 3.9 x 3.3 cm posteromedial to the left proximal femur.   Possibility of intramuscular hematoma considered in the differential.  - GI Dr. Le consulted,

## 2020-04-29 NOTE — PROGRESS NOTE ADULT - PROBLEM SELECTOR PLAN 8
Now hypotensive on admission  - Will hold off on home Valsartan  - Continue home Metoprolol - bp stable. was  hypotensive on admission  - Will hold off on home Valsartan   and hold  off  Metoprolol - bp stable  off meds .

## 2020-04-29 NOTE — PROGRESS NOTE ADULT - ATTENDING COMMENTS
pt seen and examine today -   metabolic encephalopathy likely due to hypovolemic shock -   Hypotension likely due to hypovolemia from  acute blood loss  anemia  s/p 2 liters of NS bolus  s/p 2 unit of PRBC  , guiac positive , gi dr giordano   ICU called to evaluate :recommended midodrine and against ICU at this time  ct abd / pelvis lt hematoma     COVID 19 Infection  +   , ra pulse  ox above 92 , blood cult pending     Sacral decubti:s : will need collagenase, wound care consult      DNR only pt seen and examine today -    acute metabolic encephalopathy likely due to hypovolemic shock -   Hypotension likely due to hypovolemia from  acute blood loss  anemia/lt side hematoma possible lower gi bleed as colon thickening in ct scan but not candidate for colonoscopy as per gi dr giordano   s/p 2 liters of NS bolus  s/p 2 unit of PRBC  ,  fu hb/hct  repeat , guiac positive / no fresh blood per rectum .  ICU was  called to evaluate   recommended midodrine and against ICU at this time  ct abd / pelvis lt hematoma   COVID 19 Infection  +   , ra pulse  ox above 92 , blood cult pending   Sacral decubitus  - will need collagenase, wound care consult   DNR only.

## 2020-04-29 NOTE — PROGRESS NOTE ADULT - SUBJECTIVE AND OBJECTIVE BOX
Chief Complaint: Weakness, AMS    Interval Events: Transferred to [facility].    Review of Systems:  General: No fevers, chills, weight loss or gain  Skin: No rashes, color changes  Cardiovascular: No chest pain, orthopnea  Respiratory: No shortness of breath, cough  Gastrointestinal: No nausea, abdominal pain  Genitourinary: No incontinence, pain with urination  Musculoskeletal: No pain, swelling, decreased range of motion  Neurological: No headache, weakness  Psychiatric: No depression, anxiety  Endocrine: No weight loss or gain, increased thirst  All other systems are comprehensively negative.    Physical Exam:  Vitals:        Vital Signs Last 24 Hrs  T(C): 37.1 (29 Apr 2020 06:05), Max: 37.3 (29 Apr 2020 03:49)  T(F): 98.7 (29 Apr 2020 06:05), Max: 99.1 (29 Apr 2020 03:49)  HR: 90 (29 Apr 2020 07:56) (87 - 105)  BP: 110/61 (29 Apr 2020 07:56) (84/48 - 110/61)  BP(mean): --  RR: 17 (29 Apr 2020 07:56) (17 - 22)  SpO2: 96% (29 Apr 2020 07:56) (96% - 98%)  General: NAD  HEENT: MMM  Neck: No JVD, no carotid bruit  Extremities: No LE edema, no cyanosis  Neuro: AAOx3, non-focal  Skin: No rash    Labs:                        8.4    19.86 )-----------( 248      ( 29 Apr 2020 07:23 )             26.0     04-29    137  |  105  |  52<H>  ----------------------------<  87  4.3   |  27  |  0.96    Ca    8.2<L>      29 Apr 2020 07:23  Phos  2.4     04-28  Mg     2.2     04-29    TPro  5.6<L>  /  Alb  1.9<L>  /  TBili  1.7<H>  /  DBili  1.10<H>  /  AST  167<H>  /  ALT  163<H>  /  AlkPhos  241<H>  04-29            Telemetry: Sinus rhythm, PVCs

## 2020-04-29 NOTE — PROGRESS NOTE ADULT - PROBLEM SELECTOR PLAN 6
COVID Treatment Plan:  - Maintain on airborne isolation.  - Not on O2, but provide supplemental O2 as needed  - Limit use of NSAIDs.  - Would avoid nebulized preparations to limit risk of aerosol formation.  - Labs Testing: Daily or As Needed: CBC w/ diff, CMP; Every 3 days: CRP, Ferritin, Procalcitonin.  - Goals of Care discussion had with patient/surrogate: (free text here)  - F/u AM CBC, CMP COVID  +   - Maintain on airborne isolation.  - Not on O2, but provide supplemental O2 as needed  - Limit use of NSAIDs.  - Would avoid nebulized preparations to limit risk of aerosol formation.  - Labs Testing: Daily or As Needed: CBC w/ diff, CMP; Every 3 days: CRP, Ferritin, Procalcitonin.  - Goals of Care discussion had with patient/surrogate: (free text here)  - F/u AM CBC, CMP

## 2020-04-29 NOTE — CONSULT NOTE ADULT - SUBJECTIVE AND OBJECTIVE BOX
HPI:  78 y/o M with PMHx of HTN, BPH, spinal stenosis, dyslipidemia, anxiety, neuropathy, B12 deficiency resident of [facility] for evaluation of weakness and hypotension with mental status change. Tested COVID 19 positive early April. IN ED he was hypotensive. WBC 19K No SOB cough n/v/d. Pt is a poor historian. Has unstagable sacral decub ulcer. Also with Acute anemia.    Infectious Disease consult was called to evaluate pt and or antibiotic management.    Past Medical & Surgical Hx:  PAST MEDICAL & SURGICAL HISTORY:  H/O vitamin B deficiency  Spinal stenosis  HTN (hypertension)  History of tonsillectomy      Social History--  EtOH: denies   Tobacco: denies   Drug Use: denies       FAMILY HISTORY:  No pertinent family history in first degree relatives      Allergies  No Known Allergies    Intolerances  NONE      Home Medications:  Yobany Sharif topical gel: Apply topically to affected area 2 times a day (28 Apr 2020 23:38)  gabapentin 300 mg oral capsule: 1 cap(s) orally 2 times a day (28 Apr 2020 23:38)  Imodium A-D 2 mg oral tablet: orally every 8 hours, As Needed (28 Apr 2020 23:38)  Metoprolol Tartrate 50 mg oral tablet: 1 tab(s) orally 2 times a day (28 Apr 2020 23:38)  sertraline 100 mg oral tablet: 1 tab(s) orally once a day (28 Apr 2020 23:38)  valsartan 40 mg oral tablet: 1 tab(s) orally once a day (28 Apr 2020 23:38)  Vitamin B12 1000 mcg/mL injectable solution: injectable every 4 weeks (28 Apr 2020 23:38)      Current Inpatient Medications :    ANTIBIOTICS:   piperacillin/tazobactam IVPB.. 3.375 Gram(s) IV Intermittent every 8 hours      OTHER RELEVANT MEDICATIONS :  collagenase Ointment 1 Application(s) Topical three times a day  cyanocobalamin 1000 MICROGram(s) Oral daily  midodrine. 10 milliGRAM(s) Oral three times a day  pantoprazole  Injectable 40 milliGRAM(s) IV Push every 12 hours  senna 2 Tablet(s) Oral at bedtime  sertraline 100 milliGRAM(s) Oral daily  sodium chloride 0.65% Nasal 1 Spray(s) Both Nostrils two times a day  sucralfate suspension 1 Gram(s) Oral four times a day      ROS:  Unable to obtain due to : poor historian      Physical Exam:  Vital Signs Last 24 Hrs  T(C): 37 (29 Apr 2020 15:54), Max: 37.6 (29 Apr 2020 11:22)  T(F): 98.6 (29 Apr 2020 15:54), Max: 99.7 (29 Apr 2020 11:22)  HR: 104 (29 Apr 2020 15:54) (87 - 104)  BP: 104/63 (29 Apr 2020 15:54) (98/54 - 132/55)  RR: 19 (29 Apr 2020 15:54) (17 - 22)  SpO2: 94% (29 Apr 2020 15:54) (94% - 98%)      General:  no acute distress This elderly male Frail weak  Eyes: sclera anicteric, pupils equal and reactive to light  ENMT: buccal mucosa moist, pharynx not injected  Neck: supple, trachea midline  Lungs: clear, no wheeze/rhonchi  Cardiovascular: regular rate and rhythm, S1 S2  Abdomen: soft, nontender, no organomegaly present, bowel sounds normal  Neurological:  awake weak  Skin: Unstagable sacral decub  Lymph Nodes: no palpable cervical/supraclavicular lymph nodes enlargements  Extremities: no cyanosis/clubbing/edema    Labs:    Complete Blood Count + Automated Diff (04.29.20 @ 07:23)    WBC Count: 19.86 K/uL    RBC Count: 2.95 M/uL    Hemoglobin: 8.4 g/dL    Hematocrit: 26.0 %    Mean Cell Volume: 88.1 fl    Mean Cell Hemoglobin: 28.5 pg    Mean Cell Hemoglobin Conc: 32.3 gm/dL    Red Cell Distrib Width: 15.8 %    Platelet Count - Automated: 248 K/uL    Auto Neutrophil #: 17.96 K/uL    Auto Lymphocyte #: 0.75 K/uL    Auto Monocyte #: 0.84 K/uL    Auto Eosinophil #: 0.01 K/uL    Auto Basophil #: 0.02 K/uL    Auto Neutrophil %: 90.4: Differential percentages must be correlated with absolute numbers for  clinical significance. %    Auto Lymphocyte %: 3.8 %    Auto Monocyte %: 4.2 %    Auto Eosinophil %: 0.1 %    Auto Basophil %: 0.1 %    Auto Immature Granulocyte %: 1.4 %    Nucleated RBC: 0 /100 WBCs    04-29    137  |  105  |  52<H>  ----------------------------<  87  4.3   |  27  |  0.96    Ca    8.2<L>      29 Apr 2020 07:23  Phos  2.4     04-28  Mg     2.2     04-29    TPro  5.6<L>  /  Alb  1.9<L>  /  TBili  1.7<H>  /  DBili  1.10<H>  /  AST  167<H>  /  ALT  163<H>  /  AlkPhos  241<H>  04-29    RECENT CULTURES:  COVID-19 PCR . (04.28.20 @ 22:41)    COVID-19 PCR: Detected: This test has been validated by [facility] to be accurate;  though it has not been FDA cleared/approved by the usual pathway  As with all laboratory test, results should be correlated with clinical  findings.  https://www.fda.gov/media/809756/download  https://www.fda.gov/media/876084/download    RADIOLOGY & ADDITIONAL STUDIES:    EXAM:  CT ABDOMEN AND PELVIS                          EXAM:  CT CHEST                            PROCEDURE DATE:  04/29/2020          INTERPRETATION:  CT CHEST, ABDOMEN AND PELVIS WITHOUT CONTRAST    INDICATION: Shortness of breath. Hypotension. Altered mental status.     TECHNIQUE: Noncontrast CT of the chest, abdomen and pelvis. Images are reformatted in the sagittal and coronal planes.Postprocessed MIP reformatted chest images were created and reviewed.    COMPARISON: None.    FINDINGS:    Absence of intravenous contrast limits evaluation for traumatic injury, focal lesions, neoplasm, and vascular pathology.    Thorax:  Lines and tubes: None.  Airways: Tracheobronchial tree is patent.  Lungs and Pleura: Right basilar opacity containing air bronchograms. Mild patchy left lower lobe opacities are also identified. No significant pleural effusion. No pneumothorax.  Mediastinum and lymph nodes: No bulky adenopathy.  Heart: Trace pericardial effusion and/or thickening without significant cardiomegaly. Coronary artery calcification and/or stenting. Valvular calcifications.  Vessels: Atherosclerotic disease of the aorta and its branches. Ascending thoracic aorta measures up to 4.3 cm in maximum AP diameter.   Chest Wall: Within normal limits.    Abdomen/Pelvis:  Liver: No suspicious lesions.  Biliary: No significant dilatation. No calcified gallstones within the gallbladder.  Spleen: No suspicious lesions.  Pancreas: No inflammatory changes or ductal dilatation.  Adrenals: Normal.  Kidneys: No hydronephrosis. Bilateral renal cysts as well as too small to characterize hypodensities. Sub-cm nonobstructive right renal calculus.  Vessels: Atherosclerotic disease of the aorta and its branches.     GI tract: There is suggestion of focal circumferential wall thickening of distal ascending colonic loop without significant pericolonic stranding as best seen on images 78 through 79 of series 2. Consider nonemergent colonoscopy evaluation to exclude underlying malignancy. No evidence ofsmall bowel obstruction. No CT findings of acute appendicitis.    Peritoneum/retroperitoneum and mesentery: No free air. No organized fluid collection. No adenopathy.    Pelvic organs/Bladder: Heterogeneous prominent prostate with nodular density near the apex, cause mass effect and protrusion at the bladder base. Correlate with PSA and consider nonemergent prostatic workup to exclude malignancy. No significant bladder wall thickening.    Abdominal wall: Suggestion of left-sided sacral decubitus ulcer with punctate bubbles of air diffusely extending along symmetrically prominent left gluteal muscle and soft tissue folds. Partially imaged asymmetric density measuring 3.9 x 3.3 cm posteromedial to the left proximal femur on image 168 of series 2. Possibility of intramuscular hematoma considered in the differential.    Bones and soft tissues: Multilevel degenerative changes of the spine noted. Advanced osteoarthritis of bilateral hip joint. Advanced osteoarthritis of left shoulder joint withassociated soft tissue calcifications. Vague linear lucency identified at the level of the sacrococcygeal tip as best seen on images 149 through 151 of series 2. Correlate with point tenderness to exclude underlying nondisplaced fracture.    IMPRESSION:    Right basilar opacity containing air bronchograms. Additional patchy left lower lobe opacities. Bacterial and viral pneumonias including atypical agent such as COVID-19 considered in the differential. Correlate with clinical parameters, laboratory values and follow-up chest radiograph advised. No significant pleural effusion.     Suggestion of focal circumferential wall thickening of distal ascending colonic loop without significant pericolonic stranding. Consider nonemergent colonoscopy evaluation to exclude underlying malignancy. No evidence of small bowel obstruction.     Vague linear lucency identified at the level of the sacrococcygeal tip. Correlate with point tenderness to exclude underlying nondisplaced fracture.    Suggestion of left-sided sacral decubitus ulcer with punctate bubbles of air diffusely extending along symmetrically prominent left gluteal muscle and soft tissue folds. Partially imaged asymmetric density measuring 3.9 x 3.3 cm posteromedial to the left proximalfemur. Possibility of intramuscular hematoma considered in the differential.    Assessment :   78 y/o M with PMHx of HTN, BPH, spinal stenosis, dyslipidemia, anxiety, neuropathy, B12 deficiency resident of [facility] for evaluation of weakness and hypotension with mental status change. Tested COVID 19 positive early April. In ED he was hypotensive. WBC 19K No SOB cough n/v/d. Pt is a poor historian. Has unstagable sacral decub ulcer. Also with Acute anemia. Rule out GIB. ?infectious process vs reactive leukocytosis. Respiratory status stable    Plan :   On Zosyn  No cultures done on admission  Will get cultures  Seen by surgery and will need I&D of sacral decub  Procalcitonin in am  Frequent turning  COVID-19--critical period for decompensation  (7-14 days post symptom onset), avoid aerosolizing procedures, NSAIDs   -monitor respiratory status closely ( route of 02 and saturation) and titrate when able  -isolation precautions per protocol  -avoid excessive blood draws, blood cultures and frequent CXRs  -monitor biomarkers- CBC w diff  for NLRNLR <3 low vs >5 high) Ferritin (lower risk <450 vs >850) CRP (low risk <2 and higher risk >6) and LDH, D Dimer, procalcitonin  -therapies remains investigational-- including Hydroxychloroquine  -antibiotics only if there is concern for a bacterial process  -Steroids short course ( 5 days)  --for hypoxia/ARDS /shock      Continue with present regime .  Approptiate use of antibiotics and adverse effects reviewed.      I have discussed the above plan of care with patient/family in detail. They expressed understanding of the treatment plan . Risks, benefits and alternatives discussed in detail. I have asked if they have any questions or concerns and appropriately addressed them to the best of my ability .      > 45 minutes spent in direct patient care reviewing  the notes, lab data/ imaging , discussion with multidisciplinary team. All questions were addressed and answered to the best of my capacity .    Thank you for allowing me to participate in the care of your patient .      Naima Lopez MD  Infectious Disease  [phone]

## 2020-04-29 NOTE — PROGRESS NOTE ADULT - PROBLEM SELECTOR PLAN 4
In setting of GI bleed  - Continue midodrine TID, with hold parameters  - F/u CT abd/pelvis, U/S Abd, CT chest In setting of GI bleed  - Continue midodrine TID, with hold parameters

## 2020-04-29 NOTE — PROGRESS NOTE ADULT - PROBLEM SELECTOR PLAN 5
- Found to have Elevated LFTs likely 2/2 anemia  - Avoid hepatotoxic drugs  - Check RUQ sono  - Trend LFTs  - GI Dr. Le consulted, recs appreciated  - F/u CT abd/pelvis, U/S Abd, CT chest - Found to have Elevated LFTs likely 2/2 anemia  - Avoid hepatotoxic drug   - Trend LFTs  - GI Dr. Le consulted,

## 2020-04-29 NOTE — PROGRESS NOTE ADULT - PROBLEM SELECTOR PLAN 7
- Cr 1.61  resolved - prerenal cr stable   - Avoid nephrotoxic meds - Cr 1.61  resolved - prerenal -cr stable   - Avoid nephrotoxic meds

## 2020-04-29 NOTE — CONSULT NOTE ADULT - SUBJECTIVE AND OBJECTIVE BOX
Chief Complaint:  Patient is a 79y old  Male who presents with a chief complaint of Generalized weakness (29 Apr 2020 11:22)    H/O vitamin B deficiency  Spinal stenosis  HTN (hypertension)  History of tonsillectomy     HPI:  *pt is poor historian HPI obtained via chart and staff  80 y/o M with PMHx of HTN, BPH, spinal stenosis, dyslipidemia, anxiety, neuropathy, B12 deficiency who presents from [facility] rehab for evaluation of weakness and hypotension. As per transfer papers, pt with confusion after lunch today. Pt admits to generalized weakness. Lives with wife, Stephany and son. He came from [facility] rehab in [place] since Feb 14. Patient lives with wife and son at home and ambulates with cane. Contracted Covid-19 in [facility] rehab and first started having symptoms on Mar 25, reporting symptoms of cough and low grade fever, diarrhea. Denies any chest pain, shortness of breath, headache, dizziness. History obtained from wife, Stephany([phone]) as pt is confused and a poor historian.   GI consulted for anemia. The patient was noted to have  +) occult at [facility] and given PRBC prior to transfer. He is without overt rectal bleeding, has a large sacral ulcer which may be contributing to acute blood loss. He responded well to prbc here as well.  Of note, pt was noted to have sacral wounds today and reported low grade fever for two days and mental status changes today and loss of appetite.    The date the pt first felt unwell: March 25  Fever or chills: yes [ x ]   no [  ]   - Tmax:   Fatigue, malaise or generalized weakness: yes [ x ]   no [  ]  Shortness of breath/dyspnea: yes [ x ]   no [  ]  Cough: yes [ x ]   no [  ], sputum production: yes [  ]   no [x  ]  Blood in sputum: yes [  ]   no [ x ]  Anorexia/po intolerance: yes [  ]   no [ x ]  Chest pain or chest tightness: yes [  ]   no [ x ]  Edema in legs: yes [  ]   no [ x ]  Myalgias: yes [  ]   no [ x ]  Headache: yes [  ]   no [x  ]  Sore throat: yes [  ]   no [ x ]  Rhinorrhea: yes [  ]   no [  x]  Abd pain: yes [  ]   no [ x ]  Nausea: yes [  ]   no [x  ]  Vomiting: yes [  ]   no [  x]  Diarrhea: yes [  ]   no [  ]  Skin rashes: yes [  ]   no [  ]  Loss of sense of smell/anosmia: yes [  ]   no [ x ]  Conjunctivitis: yes [  ]   no [ x ]  Recent travel: yes [  ]   no [ x ] - Location:   Any sick contacts: yes [x  ]   no [  ]: Contracted from [facility]  Close contact with someone confirmed positive with COVID-19 / SARS-CoV2 in the last 14 days: yes [  ]   no [ x ]  Code status: DNR, but not DNI      In the ED  Vitals: T--, , BP 84/48, RR 22, O2 96 on RA  Labs(In [place]) significant for: wbc 18.45, hgb 6.8, FOBT+,   Coag studies: PT 14.9, INR 1.33, aPTT 43.7, d-dimer 561  Chem panel: Lactate 2.5, Na 133, AG 3, BUN/Cr 61/1.61, Alb 1.9, Alk phos 195, ,   UA neg  Covid-19 PCR Positive   EKG shows Accelerated Junctional rhythm with occasional PVCs  CXR in [place] ED shows: Mild R atelectasis  In [place] ED, s/p 2L NS bolus, 1 unit pRBC (28 Apr 2020 21:00)      No Known Allergies      collagenase Ointment 1 Application(s) Topical three times a day  cyanocobalamin 1000 MICROGram(s) Oral daily  metoprolol succinate ER 50 milliGRAM(s) Oral daily  midodrine. 10 milliGRAM(s) Oral three times a day  pantoprazole  Injectable 40 milliGRAM(s) IV Push every 12 hours  piperacillin/tazobactam IVPB.. 3.375 Gram(s) IV Intermittent every 8 hours  senna 2 Tablet(s) Oral at bedtime  sertraline 100 milliGRAM(s) Oral daily  sucralfate suspension 1 Gram(s) Oral four times a day        FAMILY HISTORY:  No pertinent family history in first degree relatives        Review of Systems: *pt is poor historian unable to obtain from pt, obtained per chart review    General:  No wt loss, fevers, chills, night sweats, fatigue  Eyes:  Good vision, no reported pain  ENT:  No sore throat, pain, runny nose, dysphagia  CV:  No pain, palpitations, no lightheadedness  Resp:  No dyspnea, cough, tachypnea, wheezing  GI: as above  :  No pain, bleeding, incontinence, nocturia  Muscle:  No pain, weakness  Neuro:  No weakness, tingling, memory problems  Psych:  No fatigue, insomnia, mood problems, depression  Endocrine:  No polyuria, polydypsia, cold/heat intolerance  Heme:  No petechiae, ecchymosis, easy bruisability  Skin:  No rash, tattoos, scars, edema    Relevant Family History:   n/c    Relevant Social History: n/c      Physical Exam:    Vital Signs:  Vital Signs Last 24 Hrs  T(C): 37.6 (29 Apr 2020 11:22), Max: 37.6 (29 Apr 2020 11:22)  T(F): 99.7 (29 Apr 2020 11:22), Max: 99.7 (29 Apr 2020 11:22)  HR: 96 (29 Apr 2020 11:22) (87 - 105)  BP: 116/61 (29 Apr 2020 11:22) (84/48 - 116/61)  BP(mean): --  RR: 21 (29 Apr 2020 11:22) (17 - 22)  SpO2: 97% (29 Apr 2020 11:22) (96% - 98%)  Daily     Daily     General:  Appears stated age, well-groomed, nad  HEENT:  NC/AT,  conjunctivae clear and pink, no thyromegaly, nodules, adenopathy, no JVD  Chest:  Full & symmetric excursion, no increased effort, breath sounds clear  Cardiovascular:  Regular rhythm, S1, S2, no murmur/rub/S3/S4, no abdominal bruit, no edema  Abdomen:  Soft, non-tender, non-distended, normoactive bowel sounds,  no masses ,no hepatosplenomeagaly, no signs of chronic liver disease  Extremities:  no cyanosis,clubbing or edema  Skin:  No rash/erythema/ecchymoses/petechiae/wounds/abscess/warm/dry  Neuro/Psych:  A&Ox2 , no asterixis, no tremor, no encephalopathy    Laboratory:                            8.4    19.86 )-----------( 248      ( 29 Apr 2020 07:23 )             26.0     04-29    137  |  105  |  52<H>  ----------------------------<  87  4.3   |  27  |  0.96    Ca    8.2<L>      29 Apr 2020 07:23  Phos  2.4     04-28  Mg     2.2     04-29    TPro  5.6<L>  /  Alb  1.9<L>  /  TBili  1.7<H>  /  DBili  1.10<H>  /  AST  167<H>  /  ALT  163<H>  /  AlkPhos  241<H>  04-29    LIVER FUNCTIONS - ( 29 Apr 2020 07:23 )  Alb: 1.9 g/dL / Pro: 5.6 g/dL / ALK PHOS: 241 U/L / ALT: 163 U/L / AST: 167 U/L / GGT: x                 Imaging:

## 2020-04-29 NOTE — CONSULT NOTE ADULT - SUBJECTIVE AND OBJECTIVE BOX
H&P HPI:  78 y/o M with PMHx of HTN, BPH, spinal stenosis, dyslipidemia, anxiety, neuropathy, B12 deficiency who presents from [facility] rehab for evaluation of weakness and hypotension. As per transfer papers, pt with confusion after lunch today. Pt admits to generalized weakness. Lives with wife, Stephany and son. He came from [facility] rehab in [place] since Feb 14. Patient lives with wife and son at home and ambulates with cane. Contracted Covid-19 in [facility] rehab and first started having symptoms on Mar 25, reporting symptoms of cough and low grade fever, diarrhea. Denies any chest pain, shortness of breath, headache, dizziness. History obtained from wife, Stephany([phone]) as pt is confused and a poor historian.   Of note, pt was noted to have sacral wounds today and reported low grade fever for two days and mental status changes today and loss of appetite.    In the ED  Vitals: T--, , BP 84/48, RR 22, O2 96 on RA  Labs(In [place]) significant for: wbc 18.45, hgb 6.8, FOBT+,   Coag studies: PT 14.9, INR 1.33, aPTT 43.7, d-dimer 561  Chem panel: Lactate 2.5, Na 133, AG 3, BUN/Cr 61/1.61, Alb 1.9, Alk phos 195, ,   UA neg  Covid-19 PCR Positive   EKG shows Accelerated Junctional rhythm with occasional PVCs  CXR in [place] ED shows: Mild R atelectasis  In [place] ED, s/p 2L NS bolus, 1 unit pRBC (28 Apr 2020 21:00)    Interval HPI: Patient seen this AM. Lethargic unresponsive to questioning.       PAST MEDICAL & SURGICAL HISTORY:  H/O vitamin B deficiency  Spinal stenosis  HTN (hypertension)  History of tonsillectomy    FAMILY HISTORY:  No pertinent family history in first degree relatives    SOCIAL - as per H&P  Denies smoking.  Denies ETOH use.    ALLERGIES:  No Known Allergies      Home Medications:  Yobany Sharif topical gel: Apply topically to affected area 2 times a day (28 Apr 2020 23:38)  gabapentin 300 mg oral capsule: 1 cap(s) orally 2 times a day (28 Apr 2020 23:38)  Imodium A-D 2 mg oral tablet: orally every 8 hours, As Needed (28 Apr 2020 23:38)  Metoprolol Tartrate 50 mg oral tablet: 1 tab(s) orally 2 times a day (28 Apr 2020 23:38)  sertraline 100 mg oral tablet: 1 tab(s) orally once a day (28 Apr 2020 23:38)  valsartan 40 mg oral tablet: 1 tab(s) orally once a day (28 Apr 2020 23:38)  Vitamin B12 1000 mcg/mL injectable solution: injectable every 4 weeks (28 Apr 2020 23:38)      MEDICATIONS  (STANDING):  collagenase Ointment 1 Application(s) Topical three times a day  cyanocobalamin 1000 MICROGram(s) Oral daily  metoprolol succinate ER 50 milliGRAM(s) Oral daily  midodrine. 10 milliGRAM(s) Oral three times a day  pantoprazole  Injectable 40 milliGRAM(s) IV Push every 12 hours  piperacillin/tazobactam IVPB.. 3.375 Gram(s) IV Intermittent every 8 hours  senna 2 Tablet(s) Oral at bedtime  sertraline 100 milliGRAM(s) Oral daily  sucralfate suspension 1 Gram(s) Oral four times a day    REVIEW OF SYSTEMS: unable to obtain due to patient condition.    Vital Signs Last 24 Hrs  T(C): 37.6 (29 Apr 2020 11:22), Max: 37.6 (29 Apr 2020 11:22)  T(F): 99.7 (29 Apr 2020 11:22), Max: 99.7 (29 Apr 2020 11:22)  HR: 96 (29 Apr 2020 11:22) (87 - 105)  BP: 116/61 (29 Apr 2020 11:22) (84/48 - 116/61)  BP(mean): --  RR: 21 (29 Apr 2020 11:22) (17 - 22)  SpO2: 97% (29 Apr 2020 11:22) (96% - 98%)    I&O's Detail      PHYSICAL EXAM: *incomplete*  GENERAL: NAD, lethargic, appears comfortable in bed  HEAD:  Atraumatic, Normocephalic  CHEST/LUNG: CTA B/L  HEART: RRR  ABDOMEN: Soft, Nontender, Nondistended.  PSYCH: A&Ox3  SKIN/BACK:      LABS:                        8.4    19.86 )-----------( 248      ( 29 Apr 2020 07:23 )             26.0       LIVER FUNCTIONS - ( 29 Apr 2020 07:23 )  Alb: 1.9 g/dL / Pro: 5.6 g/dL / ALK PHOS: 241 U/L / ALT: 163 U/L / AST: 167 U/L / GGT: x H&P HPI:  80 y/o M with PMHx of HTN, BPH, spinal stenosis, dyslipidemia, anxiety, neuropathy, B12 deficiency who presents from [facility] rehab for evaluation of weakness and hypotension. As per transfer papers, pt with confusion after lunch today. Pt admits to generalized weakness. Lives with wife, Stephany and son. He came from [facility] rehab in [place] since Feb 14. Patient lives with wife and son at home and ambulates with cane. Contracted Covid-19 in [facility] rehab and first started having symptoms on Mar 25, reporting symptoms of cough and low grade fever, diarrhea. Denies any chest pain, shortness of breath, headache, dizziness. History obtained from wife, Stephany([phone]) as pt is confused and a poor historian.   Of note, pt was noted to have sacral wounds today and reported low grade fever for two days and mental status changes today and loss of appetite.    In the ED  Vitals: T--, , BP 84/48, RR 22, O2 96 on RA  Labs(In [place]) significant for: wbc 18.45, hgb 6.8, FOBT+,   Coag studies: PT 14.9, INR 1.33, aPTT 43.7, d-dimer 561  Chem panel: Lactate 2.5, Na 133, AG 3, BUN/Cr 61/1.61, Alb 1.9, Alk phos 195, ,   UA neg  Covid-19 PCR Positive   EKG shows Accelerated Junctional rhythm with occasional PVCs  CXR in [place] ED shows: Mild R atelectasis  In [place] ED, s/p 2L NS bolus, 1 unit pRBC (28 Apr 2020 21:00)    Interval HPI: Patient seen this AM. Lethargic unresponsive to questioning.       PAST MEDICAL & SURGICAL HISTORY:  H/O vitamin B deficiency  Spinal stenosis  HTN (hypertension)  History of tonsillectomy    FAMILY HISTORY:  No pertinent family history in first degree relatives    SOCIAL - as per H&P  Denies smoking.  Denies ETOH use.    ALLERGIES:  No Known Allergies      Home Medications:  Yobany Sharif topical gel: Apply topically to affected area 2 times a day (28 Apr 2020 23:38)  gabapentin 300 mg oral capsule: 1 cap(s) orally 2 times a day (28 Apr 2020 23:38)  Imodium A-D 2 mg oral tablet: orally every 8 hours, As Needed (28 Apr 2020 23:38)  Metoprolol Tartrate 50 mg oral tablet: 1 tab(s) orally 2 times a day (28 Apr 2020 23:38)  sertraline 100 mg oral tablet: 1 tab(s) orally once a day (28 Apr 2020 23:38)  valsartan 40 mg oral tablet: 1 tab(s) orally once a day (28 Apr 2020 23:38)  Vitamin B12 1000 mcg/mL injectable solution: injectable every 4 weeks (28 Apr 2020 23:38)      MEDICATIONS  (STANDING):  collagenase Ointment 1 Application(s) Topical three times a day  cyanocobalamin 1000 MICROGram(s) Oral daily  metoprolol succinate ER 50 milliGRAM(s) Oral daily  midodrine. 10 milliGRAM(s) Oral three times a day  pantoprazole  Injectable 40 milliGRAM(s) IV Push every 12 hours  piperacillin/tazobactam IVPB.. 3.375 Gram(s) IV Intermittent every 8 hours  senna 2 Tablet(s) Oral at bedtime  sertraline 100 milliGRAM(s) Oral daily  sucralfate suspension 1 Gram(s) Oral four times a day    REVIEW OF SYSTEMS: unable to obtain due to patient condition.    Vital Signs Last 24 Hrs  T(C): 37.6 (29 Apr 2020 11:22), Max: 37.6 (29 Apr 2020 11:22)  T(F): 99.7 (29 Apr 2020 11:22), Max: 99.7 (29 Apr 2020 11:22)  HR: 96 (29 Apr 2020 11:22) (87 - 105)  BP: 116/61 (29 Apr 2020 11:22) (84/48 - 116/61)  BP(mean): --  RR: 21 (29 Apr 2020 11:22) (17 - 22)  SpO2: 97% (29 Apr 2020 11:22) (96% - 98%)    I&O's Detail      PHYSICAL EXAM:  GENERAL: NAD, lethargic, appears comfortable in bed  HEAD:  Atraumatic, Normocephalic  CHEST/LUNG: CTA B/L  HEART: RRR  ABDOMEN: Soft, Nontender, Nondistended.  SKIN/BACK: Approx 8x8.5cm L sided decub ulcer, surrounding erythema       LABS:                        8.4    19.86 )-----------( 248      ( 29 Apr 2020 07:23 )             26.0       LIVER FUNCTIONS - ( 29 Apr 2020 07:23 )  Alb: 1.9 g/dL / Pro: 5.6 g/dL / ALK PHOS: 241 U/L / ALT: 163 U/L / AST: 167 U/L / GGT: x

## 2020-04-30 LAB
ANION GAP SERPL CALC-SCNC: 9 MMOL/L — SIGNIFICANT CHANGE UP (ref 5–17)
BUN SERPL-MCNC: 46 MG/DL — HIGH (ref 7–23)
CALCIUM SERPL-MCNC: 8.9 MG/DL — SIGNIFICANT CHANGE UP (ref 8.5–10.1)
CHLORIDE SERPL-SCNC: 103 MMOL/L — SIGNIFICANT CHANGE UP (ref 96–108)
CO2 SERPL-SCNC: 27 MMOL/L — SIGNIFICANT CHANGE UP (ref 22–31)
CREAT SERPL-MCNC: 1 MG/DL — SIGNIFICANT CHANGE UP (ref 0.5–1.3)
CRP SERPL-MCNC: 24.68 MG/DL — HIGH (ref 0–0.4)
CULTURE RESULTS: SIGNIFICANT CHANGE UP
ERYTHROCYTE [SEDIMENTATION RATE] IN BLOOD: 74 MM/HR — HIGH (ref 0–20)
FERRITIN SERPL-MCNC: 3649 NG/ML — HIGH (ref 30–400)
FOLATE SERPL-MCNC: 4.5 NG/ML — LOW
GLUCOSE SERPL-MCNC: 85 MG/DL — SIGNIFICANT CHANGE UP (ref 70–99)
HCT VFR BLD CALC: 33.9 % — LOW (ref 39–50)
HGB BLD-MCNC: 11 G/DL — LOW (ref 13–17)
IRON SATN MFR SERPL: 17 UG/DL — LOW (ref 45–165)
MCHC RBC-ENTMCNC: 28 PG — SIGNIFICANT CHANGE UP (ref 27–34)
MCHC RBC-ENTMCNC: 32.4 GM/DL — SIGNIFICANT CHANGE UP (ref 32–36)
MCV RBC AUTO: 86.3 FL — SIGNIFICANT CHANGE UP (ref 80–100)
NRBC # BLD: 0 /100 WBCS — SIGNIFICANT CHANGE UP (ref 0–0)
PLATELET # BLD AUTO: 325 K/UL — SIGNIFICANT CHANGE UP (ref 150–400)
POTASSIUM SERPL-MCNC: 4 MMOL/L — SIGNIFICANT CHANGE UP (ref 3.5–5.3)
POTASSIUM SERPL-SCNC: 4 MMOL/L — SIGNIFICANT CHANGE UP (ref 3.5–5.3)
PROCALCITONIN SERPL-MCNC: 1.15 NG/ML — HIGH (ref 0–0.04)
RBC # BLD: 3.93 M/UL — LOW (ref 4.2–5.8)
RBC # FLD: 15.7 % — HIGH (ref 10.3–14.5)
SODIUM SERPL-SCNC: 139 MMOL/L — SIGNIFICANT CHANGE UP (ref 135–145)
SPECIMEN SOURCE: SIGNIFICANT CHANGE UP
VIT B12 SERPL-MCNC: 1418 PG/ML — HIGH (ref 232–1245)
WBC # BLD: 19.65 K/UL — HIGH (ref 3.8–10.5)
WBC # FLD AUTO: 19.65 K/UL — HIGH (ref 3.8–10.5)

## 2020-04-30 PROCEDURE — 99233 SBSQ HOSP IP/OBS HIGH 50: CPT | Mod: CS,GC

## 2020-04-30 PROCEDURE — 15936 EXC SAC PR ULC PREP MUS FLAP: CPT

## 2020-04-30 PROCEDURE — 71045 X-RAY EXAM CHEST 1 VIEW: CPT | Mod: 26

## 2020-04-30 RX ORDER — PANTOPRAZOLE SODIUM 20 MG/1
40 TABLET, DELAYED RELEASE ORAL
Refills: 0 | Status: DISCONTINUED | OUTPATIENT
Start: 2020-04-30 | End: 2020-05-23

## 2020-04-30 RX ORDER — MIDODRINE HYDROCHLORIDE 2.5 MG/1
5 TABLET ORAL THREE TIMES A DAY
Refills: 0 | Status: DISCONTINUED | OUTPATIENT
Start: 2020-04-30 | End: 2020-05-01

## 2020-04-30 RX ADMIN — Medication 1 APPLICATION(S): at 11:40

## 2020-04-30 RX ADMIN — Medication 1 GRAM(S): at 22:23

## 2020-04-30 RX ADMIN — Medication 1 APPLICATION(S): at 04:45

## 2020-04-30 RX ADMIN — PIPERACILLIN AND TAZOBACTAM 25 GRAM(S): 4; .5 INJECTION, POWDER, LYOPHILIZED, FOR SOLUTION INTRAVENOUS at 04:53

## 2020-04-30 RX ADMIN — Medication 1 SPRAY(S): at 04:45

## 2020-04-30 RX ADMIN — MIDODRINE HYDROCHLORIDE 5 MILLIGRAM(S): 2.5 TABLET ORAL at 11:38

## 2020-04-30 RX ADMIN — PREGABALIN 1000 MICROGRAM(S): 225 CAPSULE ORAL at 11:38

## 2020-04-30 RX ADMIN — Medication 1 GRAM(S): at 11:38

## 2020-04-30 RX ADMIN — Medication 1 SPRAY(S): at 17:30

## 2020-04-30 RX ADMIN — SERTRALINE 100 MILLIGRAM(S): 25 TABLET, FILM COATED ORAL at 11:38

## 2020-04-30 RX ADMIN — PANTOPRAZOLE SODIUM 40 MILLIGRAM(S): 20 TABLET, DELAYED RELEASE ORAL at 04:45

## 2020-04-30 RX ADMIN — SENNA PLUS 2 TABLET(S): 8.6 TABLET ORAL at 22:23

## 2020-04-30 RX ADMIN — Medication 1 GRAM(S): at 04:45

## 2020-04-30 RX ADMIN — MIDODRINE HYDROCHLORIDE 5 MILLIGRAM(S): 2.5 TABLET ORAL at 17:29

## 2020-04-30 RX ADMIN — Medication 1 GRAM(S): at 17:30

## 2020-04-30 RX ADMIN — PIPERACILLIN AND TAZOBACTAM 25 GRAM(S): 4; .5 INJECTION, POWDER, LYOPHILIZED, FOR SOLUTION INTRAVENOUS at 12:53

## 2020-04-30 RX ADMIN — MIDODRINE HYDROCHLORIDE 10 MILLIGRAM(S): 2.5 TABLET ORAL at 04:46

## 2020-04-30 RX ADMIN — PIPERACILLIN AND TAZOBACTAM 25 GRAM(S): 4; .5 INJECTION, POWDER, LYOPHILIZED, FOR SOLUTION INTRAVENOUS at 22:27

## 2020-04-30 RX ADMIN — Medication 1 APPLICATION(S): at 22:23

## 2020-04-30 NOTE — PROGRESS NOTE ADULT - SUBJECTIVE AND OBJECTIVE BOX
Chief Complaint: Weakness, AMS    Interval Events: No significant changes.    Review of Systems:  General: No fevers, chills, weight loss or gain  Skin: No rashes, color changes  Cardiovascular: No chest pain, orthopnea  Respiratory: No shortness of breath, cough  Gastrointestinal: No nausea, abdominal pain  Genitourinary: No incontinence, pain with urination  Musculoskeletal: No pain, swelling, decreased range of motion  Neurological: No headache, weakness  Psychiatric: No depression, anxiety  Endocrine: No weight loss or gain, increased thirst  All other systems are comprehensively negative.    Physical Exam:  Vitals:        Vital Signs Last 24 Hrs  T(C): 37.1 (29 Apr 2020 06:05), Max: 37.3 (29 Apr 2020 03:49)  T(F): 98.7 (29 Apr 2020 06:05), Max: 99.1 (29 Apr 2020 03:49)  HR: 90 (29 Apr 2020 07:56) (87 - 105)  BP: 110/61 (29 Apr 2020 07:56) (84/48 - 110/61)  BP(mean): --  RR: 17 (29 Apr 2020 07:56) (17 - 22)  SpO2: 96% (29 Apr 2020 07:56) (96% - 98%)  General: NAD  HEENT: MMM  Neck: No JVD, no carotid bruit  Extremities: No LE edema, no cyanosis  Neuro: Non-focal  Skin: No rash    Labs:             04-30    139  |  103  |  46<H>  ----------------------------<  85  4.0   |  27  |  1.00    Ca    8.9      30 Apr 2020 07:37  Phos  2.4     04-28  Mg     2.2     04-29    TPro  5.6<L>  /  Alb  1.9<L>  /  TBili  1.7<H>  /  DBili  1.10<H>  /  AST  167<H>  /  ALT  163<H>  /  AlkPhos  241<H>  04-29                        11.0   19.65 )-----------( 325      ( 30 Apr 2020 07:37 )             33.9       Telemetry: Sinus rhythm, PVCs

## 2020-04-30 NOTE — PROGRESS NOTE ADULT - ASSESSMENT
The patient is a 79 year old male with a history of HTN, HL, pre-DM, anxiety, spinal stenosis who presents with weakness and AMS in the setting of worsening anemia, dehydration, COVID-19.    Plan:  - ECG with above noted abnormalities; largely unchanged from prior  - Cardiac enzymes negative  - BNP normal at 47  - CXR with mild right atelectasis  - BP improved with IV fluids and transfusion  - Hold metoprolol for now  - Lower midodrine to 5 mg tid  - May need debridement of sacral decub  - COVID-19 positive  - Not hypoxic on RA

## 2020-04-30 NOTE — PROGRESS NOTE ADULT - SUBJECTIVE AND OBJECTIVE BOX
PULMONARY/CRITICAL CARE  Pt unchanged. No fever, sob. Confused.       Patient is a 79y old  Male who presents with a chief complaint of Generalized weakness (28 Apr 2020 22:20)    BRIEF HOSPITAL COURSE: ***80 y/o M with PMHx of HTN, BPH, spinal stenosis, dyslipidemia, anxiety, neuropathy, B12 deficiency who presents from [facility]hab for evaluation of weakness and hypotension. As per transfer papers, pt with confusion after lunch today. Pt admits to generalized weakness. Lives with wife, Stephany and son. He came from [facility] rehab in [place] since Feb 14. Patient lives with wife and son at home and ambulates with cane. [facility] and first started having symptoms on Mar 25, reporting symptoms of cough and low grade fever, diarrhea. Denies any chest pain, shortness of breath, headache, dizziness. History obtained from wife, Stephany([phone]) as pt is confused and a poor historian.   Of note, pt was noted to have sacral wounds today and reported low grade fever for two days and mental status changes today and loss of appetite.    Events last 24 hours: ***    PAST MEDICAL & SURGICAL HISTORY:  H/O vitamin B deficiency  Spinal stenosis  HTN (hypertension)  History of tonsillectomy    Allergies    No Known Allergies    Intolerances      FAMILY HISTORY:  No pertinent family history in first degree relatives      Review of Systems:  unobtainable    Medications:  piperacillin/tazobactam IVPB.. 3.375 Gram(s) IV Intermittent every 8 hours    metoprolol succinate ER 50 milliGRAM(s) Oral daily  midodrine. 10 milliGRAM(s) Oral three times a day      sertraline 100 milliGRAM(s) Oral daily        pantoprazole  Injectable 40 milliGRAM(s) IV Push every 12 hours        cyanocobalamin 1000 MICROGram(s) Oral daily      collagenase Ointment 1 Application(s) Topical three times a day            ICU Vital Signs Last 24 Hrs  T(C): 37.1 (29 Apr 2020 06:05), Max: 37.3 (29 Apr 2020 03:49)  T(F): 98.7 (29 Apr 2020 06:05), Max: 99.1 (29 Apr 2020 03:49)  HR: 102 (29 Apr 2020 06:05) (87 - 105)  BP: 100/57 (29 Apr 2020 06:05) (84/48 - 100/57)  BP(mean): --  ABP: --  ABP(mean): --  RR: 18 (29 Apr 2020 06:05) (17 - 22)  SpO2: 96% (29 Apr 2020 06:05) (96% - 98%)    Vital Signs Last 24 Hrs  T(C): 37.1 (29 Apr 2020 06:05), Max: 37.3 (29 Apr 2020 03:49)  T(F): 98.7 (29 Apr 2020 06:05), Max: 99.1 (29 Apr 2020 03:49)  HR: 102 (29 Apr 2020 06:05) (87 - 105)  BP: 100/57 (29 Apr 2020 06:05) (84/48 - 100/57)  BP(mean): --  RR: 18 (29 Apr 2020 06:05) (17 - 22)  SpO2: 96% (29 Apr 2020 06:05) (96% - 98%)        I&O's Detail        LABS:                        8.4    19.86 )-----------( 248      ( 29 Apr 2020 07:23 )             26.0     04-28    138  |  107  |  54<H>  ----------------------------<  92  4.6   |  26  |  1.10    Ca    7.6<L>      28 Apr 2020 22:41  Phos  2.4     04-28  Mg     2.0     04-28    TPro  5.1<L>  /  Alb  1.8<L>  /  TBili  1.8<H>  /  DBili  x   /  AST  230<H>  /  ALT  173<H>  /  AlkPhos  235<H>  04-28          CAPILLARY BLOOD GLUCOSE            CULTURES:      Physical Examination:    General: No acute distress.  Elderly male lying in bed  comfortably on ra.    HEENT: Pupils equal, reactive to light.  Symmetric.    PULM: Clear to auscultation bilaterally, no significant sputum production    CVS: Regular rate and rhythm, no murmurs, rubs, or gallops    ABD: Soft, nondistended, nontender, normoactive bowel sounds, no masses    EXT: No edema, nontender    SKIN: Warm and well perfused, no rashes noted.    NEURO: Alert, confused, interactive, nonfocal    RADIOLOGY: ***< from: CT Chest No Cont (04.29.20 @ 01:24) >  EXAM:  CT ABDOMEN AND PELVIS                          EXAM:  CT CHEST                            PROCEDURE DATE:  04/29/2020          INTERPRETATION:  CT CHEST, ABDOMEN AND PELVIS WITHOUT CONTRAST    INDICATION: Shortness of breath. Hypotension. Altered mental status.     TECHNIQUE: Noncontrast CT of the chest, abdomen and pelvis. Images are reformatted in the sagittal and coronal planes.Postprocessed MIP reformatted chest images were created and reviewed.    COMPARISON: None.    FINDINGS:    Absence of intravenous contrast limits evaluation for traumatic injury, focal lesions, neoplasm, and vascular pathology.    Thorax:  Lines and tubes: None.  Airways: Tracheobronchial tree is patent.  Lungs and Pleura: Right basilar opacity containing air bronchograms. Mild patchy left lower lobe opacities are also identified. No significant pleural effusion. No pneumothorax.  Mediastinum and lymph nodes: No bulky adenopathy.  Heart: Trace pericardial effusion and/or thickening without significant cardiomegaly. Coronary artery calcification and/or stenting. Valvular calcifications.  Vessels: Atherosclerotic disease of the aorta and its branches. Ascending thoracic aorta measures up to 4.3 cm in maximum AP diameter.   Chest Wall: Within normal limits.    Abdomen/Pelvis:  Liver: No suspicious lesions.  Biliary: No significant dilatation. No calcified gallstones within the gallbladder.  Spleen: No suspicious lesions.  Pancreas: No inflammatory changes or ductal dilatation.  Adrenals: Normal.  Kidneys: No hydronephrosis. Bilateral renal cysts as well as too small to characterize hypodensities. Sub-cm nonobstructive right renal calculus.  Vessels: Atherosclerotic disease of the aorta and its branches.     GI tract: There is suggestion of focal circumferential wall thickening of distal ascending colonic loop without significant pericolonic stranding as best seen on images 78 through 79 of series 2. Consider nonemergent colonoscopy evaluation to exclude underlying malignancy. No evidence ofsmall bowel obstruction. No CT findings of acute appendicitis.    Peritoneum/retroperitoneum and mesentery: No free air. No organized fluid collection. No adenopathy.    Pelvic organs/Bladder: Heterogeneous prominent prostate with nodular density near the apex, cause mass effect and protrusion at the bladder base. Correlate with PSA and consider nonemergent prostatic workup to exclude malignancy. No significant bladder wall thickening.    Abdominal wall: Suggestion of left-sided sacral decubitus ulcer with punctate bubbles of air diffusely extending along symmetrically prominent left gluteal muscle and soft tissue folds. Partially imaged asymmetric density measuring 3.9 x 3.3 cm posteromedial to the left proximal femur on image 168 of series 2. Possibility of intramuscular hematoma considered in the differential.    Bones and soft tissues: Multilevel degenerative changes of the spine noted. Advanced osteoarthritis of bilateral hip joint. Advanced osteoarthritis of left shoulder joint withassociated soft tissue calcifications. Vague linear lucency identified at the level of the sacrococcygeal tip as best seen on images 149 through 151 of series 2. Correlate with point tenderness to exclude underlying nondisplaced fracture.    IMPRESSION:    Right basilar opacity containing air bronchograms. Additional patchy left lower lobe opacities. Bacterial and viral pneumonias including atypical agent such as COVID-19 considered in the differential. Correlate with clinical parameters, laboratory values and follow-up chest radiograph advised. No significant pleural effusion.     Suggestion of focal circumferential wall thickening of distal ascending colonic loop without significant pericolonic stranding. Consider nonemergent colonoscopy evaluation to exclude underlying malignancy. No evidence of small bowel obstruction.     Vague linear lucency identified at the level of the sacrococcygeal tip. Correlate with point tenderness to exclude underlying nondisplaced fracture.    Suggestion of left-sided sacral decubitus ulcer with punctate bubbles of air diffusely extending along symmetrically prominent left gluteal muscle and soft tissue folds. Partially imaged asymmetric density measuring 3.9 x 3.3 cm posteromedial to the left proximalfemur. Possibility of intramuscular hematoma considered in the differential.    Additional findings as mentioned above.    These results were discussed via telephone at 4/29/2020 3:55 AM by Dr. Baugh of radiology with Dr. Bailey, institution read-back verification policy was followed.                TATE BAUGH M.D., ATTENDING RADIOLOGIST  This document has been electronically signed. Apr 29 2020  4:10AM              < end of copied text >      CRITICAL CARE TIME SPENT: ***

## 2020-04-30 NOTE — PROGRESS NOTE ADULT - SUBJECTIVE AND OBJECTIVE BOX
LISANDRA PADGETT is a 79yMale , patient examined and chart reviewed.    INTERVAL HPI/ OVERNIGHT EVENTS:   Seen by surgery and had bedside I&D of sacral decub  Afebrile. On RA    PAST MEDICAL & SURGICAL HISTORY:  H/O vitamin B deficiency  Spinal stenosis  HTN (hypertension)  History of tonsillectomy      For details regarding the patient's social history, family history, and other miscellaneous elements, please refer the initial infectious diseases consultation and/or the admitting history and physical examination for this admission.    ROS:  CONSTITUTIONAL:  Negative fever or chills  EYES:  Negative  blurry vision or double vision  CARDIOVASCULAR:  Negative for chest pain or palpitations  RESPIRATORY:  Negative for cough, wheezing, or SOB   GASTROINTESTINAL:  Negative for nausea, vomiting, diarrhea, constipation, or abdominal pain  GENITOURINARY:  Negative frequency, urgency or dysuria  NEUROLOGIC:  No headache, confusion, dizziness, lightheadedness  All other systems were reviewed and are negative         Current inpatient medications :    ANTIBIOTICS/RELEVANT:  piperacillin/tazobactam IVPB.. 3.375 Gram(s) IV Intermittent every 8 hours      collagenase Ointment 1 Application(s) Topical three times a day  cyanocobalamin 1000 MICROGram(s) Oral daily  midodrine. 5 milliGRAM(s) Oral three times a day  pantoprazole    Tablet 40 milliGRAM(s) Oral before breakfast  senna 2 Tablet(s) Oral at bedtime  sertraline 100 milliGRAM(s) Oral daily  sodium chloride 0.65% Nasal 1 Spray(s) Both Nostrils two times a day  sucralfate suspension 1 Gram(s) Oral four times a day      Objective:    04-29 @ 07:01  -  04-30 @ 07:00  --------------------------------------------------------  IN: 0 mL / OUT: 500 mL / NET: -500 mL      T(C): 36.6 (04-30-20 @ 16:00), Max: 36.7 (04-30-20 @ 04:21)  HR: 93 (04-30-20 @ 16:00) (75 - 93)  BP: 131/70 (04-30-20 @ 16:00) (122/74 - 131/70)  RR: 20 (04-30-20 @ 16:00) (19 - 21)  SpO2: 94% (04-30-20 @ 16:00) (94% - 98%      Physical Exam:  General:  no acute distress  Eyes: sclera anicteric, pupils equal and reactive to light  ENMT: buccal mucosa moist, pharynx not injected  Neck: supple, trachea midline  Lungs: clear, no wheeze/rhonchi  Cardiovascular: regular rate and rhythm, S1 S2  Abdomen: soft, nontender, no organomegaly present, bowel sounds normal  Neurological: alert and oriented x2 Cranial Nerves II-XII grossly intact  Skin: sacral decub stage 4 drsg c/d/i no malodor no drainage  Lymph Nodes: no palpable cervical/supraclavicular lymph nodes enlargements  Extremities: no cyanosis/clubbing/edema        LABS:                          11.0   19.65 )-----------( 325      ( 30 Apr 2020 07:37 )             33.9       04-30    139  |  103  |  46<H>  ----------------------------<  85  4.0   |  27  |  1.00    Ca    8.9      30 Apr 2020 07:37  Phos  2.4     04-28  Mg     2.2     04-29    TPro  5.6<L>  /  Alb  1.9<L>  /  TBili  1.7<H>  /  DBili  1.10<H>  /  AST  167<H>  /  ALT  163<H>  /  AlkPhos  241<H>  04-29    MICROBIOLOGY:  COVID-19 PCR . (04.28.20 @ 22:41)    COVID-19 PCR: Detected: This test has been validated by [facility] to be accurate;  though it has not been FDA cleared/approved by the usual pathway  As with all laboratory test, results should be correlated with clinical  findings.  https://www.fda.gov/media/326841/download  https://www.fda.gov/media/469674/download    RADIOLOGY & ADDITIONAL STUDIES:    EXAM:  CT ABDOMEN AND PELVIS                          EXAM:  CT CHEST                            PROCEDURE DATE:  04/29/2020          INTERPRETATION:  CT CHEST, ABDOMEN AND PELVIS WITHOUT CONTRAST    INDICATION: Shortness of breath. Hypotension. Altered mental status.     TECHNIQUE: Noncontrast CT of the chest, abdomen and pelvis. Images are reformatted in the sagittal and coronal planes.Postprocessed MIP reformatted chest images were created and reviewed.    COMPARISON: None.    FINDINGS:    Absence of intravenous contrast limits evaluation for traumatic injury, focal lesions, neoplasm, and vascular pathology.    Thorax:  Lines and tubes: None.  Airways: Tracheobronchial tree is patent.  Lungs and Pleura: Right basilar opacity containing air bronchograms. Mild patchy left lower lobe opacities are also identified. No significant pleural effusion. No pneumothorax.  Mediastinum and lymph nodes: No bulky adenopathy.  Heart: Trace pericardial effusion and/or thickening without significant cardiomegaly. Coronary artery calcification and/or stenting. Valvular calcifications.  Vessels: Atherosclerotic disease of the aorta and its branches. Ascending thoracic aorta measures up to 4.3 cm in maximum AP diameter.   Chest Wall: Within normal limits.    Abdomen/Pelvis:  Liver: No suspicious lesions.  Biliary: No significant dilatation. No calcified gallstones within the gallbladder.  Spleen: No suspicious lesions.  Pancreas: No inflammatory changes or ductal dilatation.  Adrenals: Normal.  Kidneys: No hydronephrosis. Bilateral renal cysts as well as too small to characterize hypodensities. Sub-cm nonobstructive right renal calculus.  Vessels: Atherosclerotic disease of the aorta and its branches.     GI tract: There is suggestion of focal circumferential wall thickening of distal ascending colonic loop without significant pericolonic stranding as best seen on images 78 through 79 of series 2. Consider nonemergent colonoscopy evaluation to exclude underlying malignancy. No evidence ofsmall bowel obstruction. No CT findings of acute appendicitis.    Peritoneum/retroperitoneum and mesentery: No free air. No organized fluid collection. No adenopathy.    Pelvic organs/Bladder: Heterogeneous prominent prostate with nodular density near the apex, cause mass effect and protrusion at the bladder base. Correlate with PSA and consider nonemergent prostatic workup to exclude malignancy. No significant bladder wall thickening.    Abdominal wall: Suggestion of left-sided sacral decubitus ulcer with punctate bubbles of air diffusely extending along symmetrically prominent left gluteal muscle and soft tissue folds. Partially imaged asymmetric density measuring 3.9 x 3.3 cm posteromedial to the left proximal femur on image 168 of series 2. Possibility of intramuscular hematoma considered in the differential.    Bones and soft tissues: Multilevel degenerative changes of the spine noted. Advanced osteoarthritis of bilateral hip joint. Advanced osteoarthritis of left shoulder joint withassociated soft tissue calcifications. Vague linear lucency identified at the level of the sacrococcygeal tip as best seen on images 149 through 151 of series 2. Correlate with point tenderness to exclude underlying nondisplaced fracture.    IMPRESSION:    Right basilar opacity containing air bronchograms. Additional patchy left lower lobe opacities. Bacterial and viral pneumonias including atypical agent such as COVID-19 considered in the differential. Correlate with clinical parameters, laboratory values and follow-up chest radiograph advised. No significant pleural effusion.     Suggestion of focal circumferential wall thickening of distal ascending colonic loop without significant pericolonic stranding. Consider nonemergent colonoscopy evaluation to exclude underlying malignancy. No evidence of small bowel obstruction.     Vague linear lucency identified at the level of the sacrococcygeal tip. Correlate with point tenderness to exclude underlying nondisplaced fracture.    Suggestion of left-sided sacral decubitus ulcer with punctate bubbles of air diffusely extending along symmetrically prominent left gluteal muscle and soft tissue folds. Partially imaged asymmetric density measuring 3.9 x 3.3 cm posteromedial to the left proximalfemur. Possibility of intramuscular hematoma considered in the differential.    Assessment :   78 y/o M with PMHx of HTN, BPH, spinal stenosis, dyslipidemia, anxiety, neuropathy, B12 deficiency resident of [facility] rehab for evaluation of weakness and hypotension with mental status change. Tested COVID 19 positive early April. In ED he was hypotensive. WBC 19K No SOB cough n/v/d. Pt is a poor historian. Has unstagable sacral decub ulcer. Also with Acute anemia. ?infectious process vs reactive leukocytosis. Leukocytosis poss chronic.  Respiratory status stable    Plan :   On Zosyn  Fu cultures  Seen by surgery and is sp bedside I&D of sacral decub  Frequent turning  COVID-19--critical period for decompensation  (7-14 days post symptom onset), avoid aerosolizing procedures, NSAIDs   -monitor respiratory status closely ( route of 02 and saturation) and titrate when able  -isolation precautions per protocol  -avoid excessive blood draws, blood cultures and frequent CXRs  -monitor biomarkers- CBC w diff  for NLRNLR <3 low vs >5 high) Ferritin (lower risk <450 vs >850) CRP (low risk <2 and higher risk >6) and LDH, D Dimer, procalcitonin  -therapies remains investigational-- including Hydroxychloroquine  -antibiotics only if there is concern for a bacterial process  -Steroids short course ( 5 days)  --for hypoxia/ARDS /shock      D/W Dr Hirsch    Continue with present regiment.  Appropriate use of antibiotics and adverse effects reviewed.      I have discussed the above plan of care with patient/ family in detail. They expressed understanding of the  treatment plan . Risks, benefits and alternatives discussed in detail. I have asked if they have any questions or concerns and appropriately addressed them to the best of my ability .    > 35 minutes were spent in direct patient care reviewing notes, medications ,labs data/ imaging , discussion with multidisciplinary team.    Thank you for allowing me to participate in care of your patient .    Naima Lopez MD  Infectious Disease  [phone]

## 2020-04-30 NOTE — PROGRESS NOTE ADULT - ATTENDING COMMENTS
Pt. was examined and he was noted to have stage 4 decubitus in presacral area measuring about 10x 10 cm.  Necrotic tissues were sharply debrided and the wound was packed in preparation for further chemical /mechanical debridement.

## 2020-04-30 NOTE — PROGRESS NOTE ADULT - PROBLEM SELECTOR PLAN 5
- Found to have Elevated LFTs likely 2/2 anemia  - Avoid hepatotoxic drug   - Trend LFTs  - GI Dr. Le consulted,

## 2020-04-30 NOTE — PROGRESS NOTE ADULT - SUBJECTIVE AND OBJECTIVE BOX
INTERVAL HPI/OVERNIGHT EVENTS:  no overt gib per nursing    MEDICATIONS  (STANDING):  collagenase Ointment 1 Application(s) Topical three times a day  cyanocobalamin 1000 MICROGram(s) Oral daily  midodrine. 5 milliGRAM(s) Oral three times a day  pantoprazole  Injectable 40 milliGRAM(s) IV Push every 12 hours  piperacillin/tazobactam IVPB.. 3.375 Gram(s) IV Intermittent every 8 hours  senna 2 Tablet(s) Oral at bedtime  sertraline 100 milliGRAM(s) Oral daily  sodium chloride 0.65% Nasal 1 Spray(s) Both Nostrils two times a day  sucralfate suspension 1 Gram(s) Oral four times a day    MEDICATIONS  (PRN):      Allergies    No Known Allergies    Intolerances        Review of Systems:    unable to obtain     Vital Signs Last 24 Hrs  T(C): 36.6 (30 Apr 2020 08:12), Max: 37.6 (29 Apr 2020 11:22)  T(F): 97.9 (30 Apr 2020 08:12), Max: 99.7 (29 Apr 2020 11:22)  HR: 83 (30 Apr 2020 08:12) (75 - 104)  BP: 125/68 (30 Apr 2020 08:12) (104/63 - 132/55)  BP(mean): --  RR: 21 (30 Apr 2020 08:12) (19 - 22)  SpO2: 96% (30 Apr 2020 08:12) (94% - 98%)    PHYSICAL EXAM:    deferred f/u done remotely as covid +     LABS:                        11.0   19.65 )-----------( 325      ( 30 Apr 2020 07:37 )             33.9     04-30    139  |  103  |  46<H>  ----------------------------<  85  4.0   |  27  |  1.00    Ca    8.9      30 Apr 2020 07:37  Phos  2.4     04-28  Mg     2.2     04-29    TPro  5.6<L>  /  Alb  1.9<L>  /  TBili  1.7<H>  /  DBili  1.10<H>  /  AST  167<H>  /  ALT  163<H>  /  AlkPhos  241<H>  04-29          RADIOLOGY & ADDITIONAL TESTS:

## 2020-04-30 NOTE — PROGRESS NOTE ADULT - ASSESSMENT
78 y/o M with PMHx of HTN, BPH, spinal stenosis, dyslipidemia, anxiety, neuropathy, B12 deficiency who presents from rehab for evaluation of weakness and hypotension    Anemia   (+) occult at [place] [facility] 4/28  no overt gi bleeding; ?contaminant from large oozing sacral wound   monitor cbc, transfuse prn  cont ppi and carafate  monitor stool color   diet as tolerated   defer endoscopic eval as +Covid and increased risk for anesthesia in these pts   op gi f/u for possible colonoscopy if none recent once covid crisis resolved given ct findings      FTT/Dysphagia   unclear etiology    Esophagram at [place] [facility] 2/2019 normal   cont ppi    consider swallow eval   consider appetite stimulant  defer endoscopic eval as +Covid and increased risk for anesthesia in these pts    COVID-19  monitor rep status   suspect inc lfts in setting of viral illness  care per primary team

## 2020-04-30 NOTE — CHART NOTE - NSCHARTNOTEFT_GEN_A_CORE
Upon Nutritional Assessment by the Registered Dietitian your patient was determined to meet criteria / has evidence of the following diagnosis/diagnoses:          [ ]  Mild Protein Calorie Malnutrition        [ ]  Moderate Protein Calorie Malnutrition        [X] Severe Protein Calorie Malnutrition        [ ] Unspecified Protein Calorie Malnutrition        [ ] Underweight / BMI <19        [ ] Morbid Obesity / BMI > 40      Findings as based on:  [X] Comprehensive nutrition assessment   [ ] Nutrition Focused Physical Exam  [X] Other:  Malnutrition (acute on chronic, severe) related to inadequate energy/protein intake in setting of multiple co-morbidities compounded by acute viral illness COVID 19 as evidenced by pt <75% of nutrition needs >1 month, >20% wt loss x1 year (150 to 119 pounds which is 31 pounds or ~21% of body weight).    Nutrition Plan/Recommendations:    1) Soft diet with thin liquids.   2) Add Ensure Enlive BID and ensure pudding BID. Encourage intake of supplements between meals.   3) Consider SLP evaluation to determine most appropriate diet texture and fluid consistency for patient.  4) Consider appetite stimulant if not medically contraindicated.  5) Vitamin and mineral supplementation - B12 (consider intramuscular injections for optimal bioavailability), Multivitamin, Vitamin C, ferrous sulfate.  6) Monitor po intake, weights, BMs, skin integrity, tolerance of diet consistency, and nutrition related labs.  7) RD to remain available.    MD Salazar notified of nutrition recommendations and pending verification placed for oral nutritional supplement.     PROVIDER Section:     By signing this assessment you are acknowledging and agree with the diagnosis/diagnoses assigned by the Registered Dietitian    Jazmine Major RD

## 2020-04-30 NOTE — PROGRESS NOTE ADULT - ASSESSMENT
78 y/o M with PMHx of HTN, BPH, spinal stenosis, dyslipidemia, anxiety, neuropathy, B12 deficiency who presents from [facility] rehab for evaluation of weakness and hypotension. Admitted for anemia and infection poss 2/2 GI bleed and stage 4 sacral decubitus .

## 2020-04-30 NOTE — PROGRESS NOTE ADULT - SUBJECTIVE AND OBJECTIVE BOX
Patient is a 79y old  Male who presents with a chief complaint of Generalized weakness (30 Apr 2020 09:50)      HPI:  78 y/o M with PMHx of HTN, BPH, spinal stenosis, dyslipidemia, anxiety, neuropathy, B12 deficiency who presents from [facility] rehab for evaluation of weakness and hypotension. As per transfer papers, pt with confusion after lunch today. Pt admits to generalized weakness. Lives with wife, Stephany and son. He came from [facility] rehab in [place] since Feb 14. Patient lives with wife and son at home and ambulates with cane. Contracted Covid-19 in [facility] rehab and first started having symptoms on Mar 25, reporting symptoms of cough and low grade fever, diarrhea. Denies any chest pain, shortness of breath, headache, dizziness. History obtained from wife, Stephany([phone]) as pt is confused and a poor historian.   Of note, pt was noted to have sacral wounds today and reported low grade fever for two days and mental status changes today and loss of appetite.    Admitted for acute blood loss  anemia and infection possible  2/2 GI bleed and  stage 4    sacral decub  +   , covid  +.  pt seen and examine today  -   alert  awake ,  no sob , no cp ,  no fever ,  no rectal bleed noticed  .            INTERVAL HPI/OVERNIGHT EVENTS:  T(C): 36.6 (04-30-20 @ 08:12), Max: 37.6 (04-29-20 @ 11:22)  HR: 83 (04-30-20 @ 08:12) (75 - 104)  BP: 125/68 (04-30-20 @ 08:12) (104/63 - 132/55)  RR: 21 (04-30-20 @ 08:12) (19 - 22)  SpO2: 96% (04-30-20 @ 08:12) (94% - 98%)  Wt(kg): --  I&O's Summary    29 Apr 2020 07:01  -  30 Apr 2020 07:00  --------------------------------------------------------  IN: 0 mL / OUT: 500 mL / NET: -500 mL        REVIEW OF SYSTEMS:  CONSTITUTIONAL: No fever, weight loss,  + fatigue  EYES: No eye pain, visual disturbances, or discharge  ENMT: ; No sinus or throat pain  NECK: No pain or stiffness  RESPIRATORY: No cough, wheezing, chills ; No shortness of breath  CARDIOVASCULAR: No chest pain, palpitations, dizziness, or leg swelling  GASTROINTESTINAL: No abdominal or epigastric pain. No nausea, vomiting,    No diarrhea or constipation.   GENITOURINARY: No dysuria, frequency, hematuria, or incontinence  NEUROLOGICAL: No headaches, memory loss, loss of strength, numbness,   SKIN: No itching, burning, or lesions with sacral  decubitus +  MUSCULOSKELETAL: No joint pain or swelling; No muscle,  +  back,  extremity pain    PHYSICAL EXAM:  GENERAL: NAD, well-groomed, weak +  HEAD:  Atraumatic, Normocephalic  EYES: EOMI, PERRLA, conjunctiva and sclera clear  ENMT: Moist mucous membranes,   NECK: Supple,   NERVOUS SYSTEM:  Alert & Oriented X2 , Motor Strength 5/5 B/L upper and lower extremities;   DTRs 2+ intact and symmetric  CHEST/LUNG:  percussion bilaterally; No rales, rhonchi, wheezing   HEART: Regular rate and rhythm; No murmurs,  no tachy   ABDOMEN: Soft, Nontender, Nondistended; Bowel sounds present  EXTREMITIES:  2+ Peripheral Pulses, No clubbing, cyanosis, or edema  SKIN: No rashes or lesions stage , 8 x 8.5 sacral region pressure injure     MEDICATIONS  (STANDING):  collagenase Ointment 1 Application(s) Topical three times a day  cyanocobalamin 1000 MICROGram(s) Oral daily  midodrine. 5 milliGRAM(s) Oral three times a day  pantoprazole  Injectable 40 milliGRAM(s) IV Push every 12 hours  piperacillin/tazobactam IVPB.. 3.375 Gram(s) IV Intermittent every 8 hours  senna 2 Tablet(s) Oral at bedtime  sertraline 100 milliGRAM(s) Oral daily  sodium chloride 0.65% Nasal 1 Spray(s) Both Nostrils two times a day  sucralfate suspension 1 Gram(s) Oral four times a day    MEDICATIONS  (PRN):      LABS:                        11.0   19.65 )-----------( 325      ( 30 Apr 2020 07:37 )             33.9     04-30    139  |  103  |  46<H>  ----------------------------<  85  4.0   |  27  |  1.00    Ca    8.9      30 Apr 2020 07:37  Phos  2.4     04-28  Mg     2.2     04-29    TPro  5.6<L>  /  Alb  1.9<L>  /  TBili  1.7<H>  /  DBili  1.10<H>  /  AST  167<H>  /  ALT  163<H>  /  AlkPhos  241<H>  04-29                RADIOLOGY & ADDITIONAL TESTS:    Imaging Personally Reviewed:   no new test     Advance Directives:  dnr   Palliative Care:  appropriate

## 2020-04-30 NOTE — PROGRESS NOTE ADULT - ASSESSMENT
Elderly pt with COVID pneumonia, likely resolving; Questionable secondary bacterial infection.  Clinically stable.   Will repeat cxr  Hypotension due to dehydration  Anemia improved--borderline iron.   Sacral decubitus

## 2020-04-30 NOTE — PROGRESS NOTE ADULT - PROBLEM SELECTOR PLAN 8
was  hypotensive on admission  - Will hold off on home Valsartan   and hold  off  Metoprolol - bp stable  off meds .

## 2020-04-30 NOTE — PROGRESS NOTE ADULT - PROBLEM SELECTOR PLAN 1
Anemia possible  2/2 lt hip hematoma surgery   - Hgb 6.8, Lactate 2.5 as per [facility] admission labs  - FOBT+  - s/p 2 unit prbc   - F/u CT abd/pelvis, U/S Abd-  left-sided sacral decubitus ulcer with punctate bubbles of air diffusely extending along symmetrically prominent left gluteal muscle and soft tissue folds. Partially imaged asymmetric density measuring 3.9 x 3.3 cm posteromedial to the left proximal femur.   Possibility of intramuscular hematoma considered in the differential.  - GI Dr. Le consulted,

## 2020-04-30 NOTE — PROGRESS NOTE ADULT - PROBLEM SELECTOR PLAN 6
COVID  +   - Maintain on airborne isolation.  - Not on O2, but provide supplemental O2 as needed  - Limit use of NSAIDs.  - Would avoid nebulized preparations to limit risk of aerosol formation.  - Labs Testing: Daily or As Needed: CBC w/ diff, CMP; Every 3 days: CRP, Ferritin, Procalcitonin.  - Goals of Care discussion had with patient/surrogate: (free text here)  - F/u AM CBC, bmp

## 2020-04-30 NOTE — PROGRESS NOTE ADULT - ATTENDING COMMENTS
pt seen and examine today -    acute metabolic encephalopathy likely due to hypovolemic shock   resolved  -   Hypotension likely due to hypovolemia from  acute blood loss  anemia   sec to  lt side hematoma possible  and  sec to possible lower gi bleed as colon thickening in ct scan but not candidate for colonoscopy as per gi dr giordano   s/p 2 liters of NS bolus bp stable   s/p 2 unit of PRBC  ,  fu up  hb/hct  remain  stable no further bleed noticed  , guiac positive / no fresh blood per rectum .  ct abd / pelvis  chr lt hematoma - stable hb /hct . sacral decubitus  stage 4 -   collagenase need debridement  by dr maguire surg bedside. moderate  protein malnutrition - ensure supplement.   COVID 19 Infection  +    no acute   pna  , ra pulse  ox above 92 , -monitor biomarkers- CBC w diff  for NLRNLR <3 low vs >5 high) Ferritin (lower risk <450 vs >850) CRP (low risk <2 and higher risk >6) and LDH, D Dimer, procalcitonin.

## 2020-04-30 NOTE — CHART NOTE - NSCHARTNOTEFT_GEN_A_CORE
Nutrition Initial Assessment    Nutrition Consult Received: Yes [   ]  No [ X ]    Reason for Initial Nutrition Assessment: screened in    Source of Information: Unable to conduct a fact to face interview due to limited contact restrictions related to pt's medical condition and isolation precautions. Information obtained from a phone call with the pt's spouse Stephany ([phone]) and from the EMR.    Admitting Diagnosis: 79y Male admitted for Gastrointestinal hemorrhage, unspecified gastrointestinal hemorrhage type    PAST MEDICAL & SURGICAL HISTORY:  H/O vitamin B deficiency  Spinal stenosis  HTN (hypertension)  History of tonsillectomy      Subjective Information: All subjective information obtained from wife Stephany via phone as per H&P pt is confused and a poor historian.     Wife states pt with poor po intake/appetite prior to admission due to prolonged hospital and rehab admissions. Attributes his lack of appetite to Vitamin B12 deficiency and general disinterest in eating. Was on a diabetic diet (for history of ?pre-DM) at rehab and received Glucerna shake regularly. States his weight most of his adult life was 190 pounds, dropped to 170 pounds intentionally around 3 years ago followed by 150 pounds 1.5 years ago. Over 8-12 months, patient has been progressively losing weight unintentionally from 150 pounds to 129 pounds last weight at [facility] re[facility] in end of March. Current [facility] weight is 119 pounds which indicates even more weight loss of 11 pounds which wife attributes to even poorer appetite/intake after pj COVID-19 infection.      No dysphagia (does endorse changes in "the way he speaks") however. NKFA. At baseline, was able to feed himself with minimal assistance per wife.     +BM 4/30. Received 2 L NS and 2 units PRBC this admission. Unclear how patient's appetite/intake has been thus far. Noted with 75% of clear liquid tray.     GI Issues: anemia, +occult blood     Current Nutrition Order:  PO Intake:   Good (%) [   ]    Fair (50-75%) [   ]    Poor (<50%) [ X  ]    Skin Integrity: sacrum unstageable, pressure injuries of R 2nd toe, heel DTIs     Labs:   04-30 Na139 mmol/L Glu 85 mg/dL K+ 4.0 mmol/L Cr  1.00 mg/dL BUN 46 mg/dL<H> Phos n/a   Alb n/a   PAB n/a         Medications:  MEDICATIONS  (STANDING):  collagenase Ointment 1 Application(s) Topical three times a day  cyanocobalamin 1000 MICROGram(s) Oral daily  midodrine. 5 milliGRAM(s) Oral three times a day  pantoprazole  Injectable 40 milliGRAM(s) IV Push every 12 hours  piperacillin/tazobactam IVPB.. 3.375 Gram(s) IV Intermittent every 8 hours  senna 2 Tablet(s) Oral at bedtime  sertraline 100 milliGRAM(s) Oral daily  sodium chloride 0.65% Nasal 1 Spray(s) Both Nostrils two times a day  sucralfate suspension 1 Gram(s) Oral four times a day    MEDICATIONS  (PRN):    Admitted Anthropometrics:    Height (cm): 86 (04-28-20 @ 20:58)  Weight (kg): 61.2 (04-28-20 @ 20:52)  BMI (kg/m2): 82.7 (04-28-20 @ 20:58)    Nutrition Focused Physical Exam: Unable to complete due to limited isolation contact precautions at this time.     Estimated Energy Needs 8723-2547 kcal (25-30 kcal/kg)  Estimated Protein Needs: 78-91 grams of protein (1.2-1.4 g/kg)  Based on weight of: 142 pounds (65 kg ) which is IBW   BMI: 19.2 kg/m2 (using wife's estimated ht of 66 inches and current weight of 119 pounds).    [ X ] Nutrition Diagnosis: Malnutrition (acute on chronic, severe) related to inadequate energy/protein intake in setting of multiple co-morbidities compounded by acute viral illness COVID 19 as evidenced by pt <75% of nutrition needs >1 month, >20% wt loss x1 year (150 to 119 pounds which is 31 pounds or ~21% of body weight).  [  ] No active nutrition diagnosis at this time  [  ] Current medical condition precludes nutrition intervention    Goal: Pt to consume >75% of meals/snacks/supplements to meet estimated nutrient needs.     Nutrition Interventions:     Recommendations:  1) Soft diet with thin liquids.   2) Add Ensure Enlive BID and ensure pudding BID. Encourage intake of supplements between meals.   3) Consider SLP evaluation to determine most appropriate diet texture and fluid consistency for patient.  4) Consider appetite stimulant if not medically contraindicated.  5) Monitor po intake, weights, BMs, skin integrity, tolerance of diet consistency, and nutrition related labs.  6) RD to remain available.    MD Salazar notified of nutrition recommendations and pending verification placed for oral nutritional supplement.     RD to follow-up per protocol. Nutrition Initial Assessment    Nutrition Consult Received: Yes [   ]  No [ X ]    Reason for Initial Nutrition Assessment: screened in    Source of Information: Unable to conduct a fact to face interview due to limited contact restrictions related to pt's medical condition and isolation precautions. Information obtained from a phone call with the pt's spouse Stephany ([phone]) and from the EMR.    Admitting Diagnosis: 79y Male admitted for Gastrointestinal hemorrhage, unspecified gastrointestinal hemorrhage type    PAST MEDICAL & SURGICAL HISTORY:  H/O vitamin B deficiency  Spinal stenosis  HTN (hypertension)  History of tonsillectomy      Subjective Information: All subjective information obtained from wife Stephany via phone as per H&P pt is confused and a poor historian.     Wife states pt with poor po intake/appetite prior to admission due to prolonged hospital and rehab admissions. Attributes his lack of appetite to Vitamin B12 deficiency and general disinterest in eating. Was on a diabetic diet (for history of ?pre-DM) at rehab and received Glucerna shake regularly. States his weight most of his adult life was 190 pounds, dropped to 170 pounds intentionally around 3 years ago followed by 150 pounds 1.5 years ago. Over 8-12 months, patient has been progressively losing weight unintentionally from 150 pounds to 129 pounds last weight at [place] re[facility] in end of March. Current [facility] weight is 119 pounds which indicates even more weight loss of 11 pounds which wife attributes to even poorer appetite/intake after pj COVID-19 infection.      No dysphagia (does endorse changes in "the way he speaks") however. NKFA. At baseline, was able to feed himself with minimal assistance per wife.     +BM 4/30. Received 2 L NS and 2 units PRBC this admission. Unclear how patient's appetite/intake has been thus far. Noted with 75% of clear liquid tray.     GI Issues: anemia, +occult blood     Current Nutrition Order:  PO Intake:   Good (%) [   ]    Fair (50-75%) [   ]    Poor (<50%) [ X  ]    Skin Integrity: sacrum unstageable, pressure injuries of R 2nd toe, heel DTIs     Labs:   04-30 Na139 mmol/L Glu 85 mg/dL K+ 4.0 mmol/L Cr  1.00 mg/dL BUN 46 mg/dL<H> Phos n/a   Alb n/a   PAB n/a         Medications:  MEDICATIONS  (STANDING):  collagenase Ointment 1 Application(s) Topical three times a day  cyanocobalamin 1000 MICROGram(s) Oral daily  midodrine. 5 milliGRAM(s) Oral three times a day  pantoprazole  Injectable 40 milliGRAM(s) IV Push every 12 hours  piperacillin/tazobactam IVPB.. 3.375 Gram(s) IV Intermittent every 8 hours  senna 2 Tablet(s) Oral at bedtime  sertraline 100 milliGRAM(s) Oral daily  sodium chloride 0.65% Nasal 1 Spray(s) Both Nostrils two times a day  sucralfate suspension 1 Gram(s) Oral four times a day    MEDICATIONS  (PRN):    Admitted Anthropometrics:    Height (cm): 86 (04-28-20 @ 20:58)  Weight (kg): 61.2 (04-28-20 @ 20:52)  BMI (kg/m2): 82.7 (04-28-20 @ 20:58)    Nutrition Focused Physical Exam: Unable to complete due to limited isolation contact precautions at this time.     Estimated Energy Needs 4615-7036 kcal (25-30 kcal/kg)  Estimated Protein Needs: 78-91 grams of protein (1.2-1.4 g/kg)  Based on weight of: 142 pounds (65 kg ) which is IBW   BMI: 19.2 kg/m2 (using wife's estimated ht of 66 inches and current weight of 119 pounds).    [ X ] Nutrition Diagnosis: Malnutrition (acute on chronic, severe) related to inadequate energy/protein intake in setting of multiple co-morbidities compounded by acute viral illness COVID 19 as evidenced by pt <75% of nutrition needs >1 month, >20% wt loss x1 year (150 to 119 pounds which is 31 pounds or ~21% of body weight).  [  ] No active nutrition diagnosis at this time  [  ] Current medical condition precludes nutrition intervention    Goal: Pt to consume >75% of meals/snacks/supplements to meet estimated nutrient needs.     Nutrition Interventions:     Recommendations:  1) Soft diet with thin liquids.   2) Add Ensure Enlive BID and ensure pudding BID. Encourage intake of supplements between meals.   3) Consider SLP evaluation to determine most appropriate diet texture and fluid consistency for patient.  4) Consider appetite stimulant if not medically contraindicated.  5) Vitamin and mineral supplementation - B12 (consider intramuscular injections for optimal bioavailability), Multivitamin, Vitamin C, ferrous sulfate.  6) Monitor po intake, weights, BMs, skin integrity, tolerance of diet consistency, and nutrition related labs.  7) RD to remain available.    MD Salazar notified of nutrition recommendations and pending verification placed for oral nutritional supplement.     RD to follow-up per protocol.

## 2020-05-01 LAB
-  STREPTOCOCCUS SP. (NOT GRP A, B OR S PNEUMONIAE): SIGNIFICANT CHANGE UP
ANION GAP SERPL CALC-SCNC: 9 MMOL/L — SIGNIFICANT CHANGE UP (ref 5–17)
BASOPHILS # BLD AUTO: 0.02 K/UL — SIGNIFICANT CHANGE UP (ref 0–0.2)
BASOPHILS NFR BLD AUTO: 0.1 % — SIGNIFICANT CHANGE UP (ref 0–2)
BUN SERPL-MCNC: 50 MG/DL — HIGH (ref 7–23)
CALCIUM SERPL-MCNC: 8.8 MG/DL — SIGNIFICANT CHANGE UP (ref 8.5–10.1)
CHLORIDE SERPL-SCNC: 103 MMOL/L — SIGNIFICANT CHANGE UP (ref 96–108)
CO2 SERPL-SCNC: 28 MMOL/L — SIGNIFICANT CHANGE UP (ref 22–31)
CREAT SERPL-MCNC: 1.1 MG/DL — SIGNIFICANT CHANGE UP (ref 0.5–1.3)
CRP SERPL-MCNC: 15.33 MG/DL — HIGH (ref 0–0.4)
D DIMER BLD IA.RAPID-MCNC: 631 NG/ML DDU — HIGH
EOSINOPHIL # BLD AUTO: 0.03 K/UL — SIGNIFICANT CHANGE UP (ref 0–0.5)
EOSINOPHIL NFR BLD AUTO: 0.2 % — SIGNIFICANT CHANGE UP (ref 0–6)
ERYTHROCYTE [SEDIMENTATION RATE] IN BLOOD: 67 MM/HR — HIGH (ref 0–20)
FERRITIN SERPL-MCNC: 2452 NG/ML — HIGH (ref 30–400)
GLUCOSE SERPL-MCNC: 107 MG/DL — HIGH (ref 70–99)
GRAM STN FLD: SIGNIFICANT CHANGE UP
HCT VFR BLD CALC: 31.6 % — LOW (ref 39–50)
HGB BLD-MCNC: 10.1 G/DL — LOW (ref 13–17)
IMM GRANULOCYTES NFR BLD AUTO: 0.8 % — SIGNIFICANT CHANGE UP (ref 0–1.5)
LYMPHOCYTES # BLD AUTO: 0.83 K/UL — LOW (ref 1–3.3)
LYMPHOCYTES # BLD AUTO: 4.5 % — LOW (ref 13–44)
MCHC RBC-ENTMCNC: 27.7 PG — SIGNIFICANT CHANGE UP (ref 27–34)
MCHC RBC-ENTMCNC: 32 GM/DL — SIGNIFICANT CHANGE UP (ref 32–36)
MCV RBC AUTO: 86.8 FL — SIGNIFICANT CHANGE UP (ref 80–100)
METHOD TYPE: SIGNIFICANT CHANGE UP
MONOCYTES # BLD AUTO: 0.51 K/UL — SIGNIFICANT CHANGE UP (ref 0–0.9)
MONOCYTES NFR BLD AUTO: 2.8 % — SIGNIFICANT CHANGE UP (ref 2–14)
NEUTROPHILS # BLD AUTO: 16.98 K/UL — HIGH (ref 1.8–7.4)
NEUTROPHILS NFR BLD AUTO: 91.6 % — HIGH (ref 43–77)
NRBC # BLD: 0 /100 WBCS — SIGNIFICANT CHANGE UP (ref 0–0)
PLATELET # BLD AUTO: 366 K/UL — SIGNIFICANT CHANGE UP (ref 150–400)
POTASSIUM SERPL-MCNC: 3.6 MMOL/L — SIGNIFICANT CHANGE UP (ref 3.5–5.3)
POTASSIUM SERPL-SCNC: 3.6 MMOL/L — SIGNIFICANT CHANGE UP (ref 3.5–5.3)
RBC # BLD: 3.64 M/UL — LOW (ref 4.2–5.8)
RBC # FLD: 15.3 % — HIGH (ref 10.3–14.5)
SODIUM SERPL-SCNC: 140 MMOL/L — SIGNIFICANT CHANGE UP (ref 135–145)
WBC # BLD: 18.52 K/UL — HIGH (ref 3.8–10.5)
WBC # FLD AUTO: 18.52 K/UL — HIGH (ref 3.8–10.5)

## 2020-05-01 PROCEDURE — 99233 SBSQ HOSP IP/OBS HIGH 50: CPT | Mod: CS,GC

## 2020-05-01 RX ORDER — VANCOMYCIN HCL 1 G
1000 VIAL (EA) INTRAVENOUS ONCE
Refills: 0 | Status: COMPLETED | OUTPATIENT
Start: 2020-05-01 | End: 2020-05-01

## 2020-05-01 RX ORDER — VANCOMYCIN HCL 1 G
1000 VIAL (EA) INTRAVENOUS EVERY 24 HOURS
Refills: 0 | Status: DISCONTINUED | OUTPATIENT
Start: 2020-05-02 | End: 2020-05-02

## 2020-05-01 RX ORDER — VANCOMYCIN HCL 1 G
VIAL (EA) INTRAVENOUS
Refills: 0 | Status: DISCONTINUED | OUTPATIENT
Start: 2020-05-01 | End: 2020-05-02

## 2020-05-01 RX ADMIN — SERTRALINE 100 MILLIGRAM(S): 25 TABLET, FILM COATED ORAL at 11:56

## 2020-05-01 RX ADMIN — PIPERACILLIN AND TAZOBACTAM 25 GRAM(S): 4; .5 INJECTION, POWDER, LYOPHILIZED, FOR SOLUTION INTRAVENOUS at 14:06

## 2020-05-01 RX ADMIN — Medication 1 SPRAY(S): at 18:34

## 2020-05-01 RX ADMIN — Medication 1 APPLICATION(S): at 05:55

## 2020-05-01 RX ADMIN — PANTOPRAZOLE SODIUM 40 MILLIGRAM(S): 20 TABLET, DELAYED RELEASE ORAL at 05:54

## 2020-05-01 RX ADMIN — MIDODRINE HYDROCHLORIDE 5 MILLIGRAM(S): 2.5 TABLET ORAL at 05:54

## 2020-05-01 RX ADMIN — Medication 250 MILLIGRAM(S): at 22:58

## 2020-05-01 RX ADMIN — Medication 1 GRAM(S): at 05:54

## 2020-05-01 RX ADMIN — Medication 1 GRAM(S): at 18:33

## 2020-05-01 RX ADMIN — Medication 1 APPLICATION(S): at 23:03

## 2020-05-01 RX ADMIN — Medication 1 GRAM(S): at 11:56

## 2020-05-01 RX ADMIN — PIPERACILLIN AND TAZOBACTAM 25 GRAM(S): 4; .5 INJECTION, POWDER, LYOPHILIZED, FOR SOLUTION INTRAVENOUS at 22:59

## 2020-05-01 RX ADMIN — PIPERACILLIN AND TAZOBACTAM 25 GRAM(S): 4; .5 INJECTION, POWDER, LYOPHILIZED, FOR SOLUTION INTRAVENOUS at 05:54

## 2020-05-01 RX ADMIN — Medication 1 GRAM(S): at 22:59

## 2020-05-01 RX ADMIN — Medication 1 APPLICATION(S): at 18:33

## 2020-05-01 NOTE — CHART NOTE - NSCHARTNOTEFT_GEN_A_CORE
Informed that blood culture was positive  1/2 GPC and the other anaerobes.   Culture reports not in [facility]. it was sent to an outside source  ID already on case. will given 1 gm vanco and repeat cultures.

## 2020-05-01 NOTE — PROGRESS NOTE ADULT - SUBJECTIVE AND OBJECTIVE BOX
Chief Complaint: Weakness, AMS    Interval Events: No events overnight.    Review of Systems:  General: No fevers, chills, weight loss or gain  Skin: No rashes, color changes  Cardiovascular: No chest pain, orthopnea  Respiratory: No shortness of breath, cough  Gastrointestinal: No nausea, abdominal pain  Genitourinary: No incontinence, pain with urination  Musculoskeletal: No pain, swelling, decreased range of motion  Neurological: No headache, weakness  Psychiatric: No depression, anxiety  Endocrine: No weight loss or gain, increased thirst  All other systems are comprehensively negative.    Physical Exam:  Vital Signs Last 24 Hrs  T(C): 36.6 (01 May 2020 07:49), Max: 36.8 (01 May 2020 00:22)  T(F): 97.8 (01 May 2020 07:49), Max: 98.2 (01 May 2020 00:22)  HR: 75 (01 May 2020 07:49) (74 - 93)  BP: 150/66 (01 May 2020 07:49) (131/70 - 154/73)  BP(mean): --  RR: 18 (01 May 2020 07:49) (18 - 20)  SpO2: 97% (01 May 2020 07:49) (94% - 98%)  General: NAD  HEENT: MMM  Neck: No JVD, no carotid bruit  Extremities: No LE edema, no cyanosis  Neuro: Non-focal  Skin: No rash    Labs:             05-01    140  |  103  |  50<H>  ----------------------------<  107<H>  3.6   |  28  |  1.10    Ca    8.8      01 May 2020 06:36                          10.1   18.52 )-----------( 366      ( 01 May 2020 06:36 )             31.6       Telemetry: Sinus rhythm, PVCs

## 2020-05-01 NOTE — PROGRESS NOTE ADULT - ASSESSMENT
78 y/o M with PMHx of HTN, BPH, spinal stenosis, dyslipidemia, anxiety, neuropathy, B12 deficiency who presents from [facility] rehab for evaluation of weakness and hypotension. Admitted for anemia and infection poss 2/2 GI bleed and stage 4 sacral decubitus .

## 2020-05-01 NOTE — PROGRESS NOTE ADULT - PROBLEM SELECTOR PLAN 2
stage  4  Possible  source of infection  - Leukocytosis resolving   - Continue IV Zosyn 3.375 gm q8hr   - Collagenase ointment application  - surg dr maguire  s/p debridement

## 2020-05-01 NOTE — PROGRESS NOTE ADULT - PROBLEM SELECTOR PLAN 5
- Found to have Elevated LFTs likely 2/2 anemia  - Avoid hepatotoxic drug   - stable lft  - GI Dr. Le consulted,

## 2020-05-01 NOTE — PROGRESS NOTE ADULT - SUBJECTIVE AND OBJECTIVE BOX
LISANDRA PADGETT  MRN-[id number] 79y    COVID POSITIVE     GENERAL SURGERY/ DR. CAMPOS      MEDICATIONS  (STANDING):    collagenase Ointment 1 Application(s) Topical three times a day  pantoprazole    Tablet 40 milliGRAM(s) Oral before breakfast  piperacillin/tazobactam IVPB.. 3.375 Gram(s) IV Intermittent every 8 hours  senna 2 Tablet(s) Oral at bedtime  sertraline 100 milliGRAM(s) Oral daily  sodium chloride 0.65% Nasal 1 Spray(s) Both Nostrils two times a day  sucralfate suspension 1 Gram(s) Oral four times a day    MEDICATIONS  (PRN):    Vital Signs Last 24 Hrs  T(C): 36.6 (01 May 2020 07:49), Max: 36.8 (01 May 2020 00:22)  T(F): 97.8 (01 May 2020 07:49), Max: 98.2 (01 May 2020 00:22)  HR: 75 (01 May 2020 07:49) (74 - 93)  BP: 150/66 (01 May 2020 07:49) (131/70 - 154/73)  BP(mean): --  RR: 18 (01 May 2020 07:49) (18 - 20)  SpO2: 97% (01 May 2020 07:49) (94% - 98%)      SACRAL DECUBITI WOUND    APPROXIMATELY 8 X 8 CM OPEN SACRAL WOUND WITH SOME FIBRINOUS TISSUE ON THE WOUND BED, NOT ODOROUS SEROSANGUINOUS DISCHARGE, NO PUS/ BLEEDING.  BLANCHABLE ERYTHEMATOUS SKIN AROUND THE WOUND.                              10.1   18.52 )-----------( 366      ( 01 May 2020 06:36 )             31.6      05-01    140  |  103  |  50<H>  ----------------------------<  107<H>  3.6   |  28  |  1.10    Ca    8.8      01 May 2020 06:36                             ASSESSMENT &  PLAN:      S/P DEBRIDEMENT SACRAL WOUND ULCER  COVID +     CONTINUE ZOSYN   DRESSING WET TO DRY APPLIED  LEFT A MESSAGE FOR WOUND CARE NURSE MONA TO REEVALUATE THE WOUND FOR POSSIBLE VAC THERAPY/ OTHER RECOMMENDATIONS   MEDICAL MANAGEMENT AS PER PRIMARY TEAM

## 2020-05-01 NOTE — PROVIDER CONTACT NOTE (OTHER) - SITUATION
Received a critical blood culture result from Diane Warren, an outside facility person, and called critical result to Dr Lo who is the Hospitalist on shift tonight.

## 2020-05-01 NOTE — PROGRESS NOTE ADULT - ASSESSMENT
Elderly pt with COVID pneumonia, likely resolving;  Doing well.   Questionable secondary bacterial infection.      Hypotension due to dehydration improved.   Anemia improved--borderline iron.   Sacral decubitus

## 2020-05-01 NOTE — PROGRESS NOTE ADULT - SUBJECTIVE AND OBJECTIVE BOX
Patient is a 79y old  Male who presents with a chief complaint of Generalized weakness (01 May 2020 09:11)      HPI:  80 y/o M with PMHx of HTN, BPH, spinal stenosis, dyslipidemia, anxiety, neuropathy, B12 deficiency who presents from [facility] rehab for evaluation of weakness and hypotension. As per transfer papers, pt with confusion after lunch today. Pt admits to generalized weakness. Lives with wife, Stephany and son. He came from [facility] rehab in [place] since Feb 14. Patient lives with wife and son at home and ambulates with cane. Contracted Covid-19 in [facility] rehab and first started having symptoms on Mar 25, reporting symptoms of cough and low grade fever, diarrhea. Denies any chest pain, shortness of breath, headache, dizziness. History obtained from wife, Stephany([phone]) as pt is confused and a poor historian.   Of note, pt was noted to have sacral wounds today and reported low grade fever for two days and mental status changes today and loss of appetite.  Admitted for acute blood loss  anemia and infection possible  2/2 GI bleed and  stage 4    sacral decub  +   , covid  +.  pt seen and examine today  -   alert  awake ,  no sob ,  no hypoxia  ,  no cp ,  no fever .           INTERVAL HPI/OVERNIGHT EVENTS:  T(C): 36.6 (05-01-20 @ 07:49), Max: 36.8 (05-01-20 @ 00:22)  HR: 75 (05-01-20 @ 07:49) (74 - 93)  BP: 150/66 (05-01-20 @ 07:49) (131/70 - 154/73)  RR: 18 (05-01-20 @ 07:49) (18 - 20)  SpO2: 97% (05-01-20 @ 07:49) (94% - 98%)  Wt(kg): --  I&O's Summary      REVIEW OF SYSTEMS:  CONSTITUTIONAL: No fever, weight loss, + fatigue  EYES: No eye pain, visual disturbances, or discharge  ENMT:  No sinus or throat pain  NECK: No pain or stiffness   RESPIRATORY: No cough, wheezing, chills  No shortness of breath  CARDIOVASCULAR: No chest pain, palpitations, dizziness, or leg swelling  GASTROINTESTINAL: No abdominal or epigastric pain. No nausea, vomiting,   No diarrhea or constipation. No melena or hematochezia.  GENITOURINARY: No dysuria, frequency, hematuria, or incontinence  NEUROLOGICAL: No headaches, memory loss, loss of strength,   or tremor.  SKIN: No itching, burning, rashes, or lesions  sacral wound+  MUSCULOSKELETAL: No joint pain or swelling; No muscle, back, or extremity pain      PHYSICAL EXAM:  GENERAL: NAD, well-groomed, weak +  HEAD:  Atraumatic, Normocephalic  EYES: EOMI, PERRLA, conjunctiva and sclera clear  ENMT: Moist mucous membranes,  NECK: Supple,   NERVOUS SYSTEM:  Alert & Oriented X2, Motor Strength 5/5 B/L upper and lower extremities; DTRs 2+ intact and symmetric  CHEST/LUNG:  percussion bilaterally; No rales, rhonchi, wheezing,   HEART: Regular rate and rhythm; No murmurs,  no tachy   ABDOMEN: Soft, Nontender, Nondistended; Bowel sounds present  EXTREMITIES:  2+ Peripheral Pulses, No clubbing, cyanosis, or edema  SKIN: No rashes or lesions stage 4 decubitus 10/10 cm +    MEDICATIONS  (STANDING):  collagenase Ointment 1 Application(s) Topical three times a day  cyanocobalamin 1000 MICROGram(s) Oral daily  pantoprazole    Tablet 40 milliGRAM(s) Oral before breakfast  piperacillin/tazobactam IVPB.. 3.375 Gram(s) IV Intermittent every 8 hours  senna 2 Tablet(s) Oral at bedtime  sertraline 100 milliGRAM(s) Oral daily  sodium chloride 0.65% Nasal 1 Spray(s) Both Nostrils two times a day  sucralfate suspension 1 Gram(s) Oral four times a day    MEDICATIONS  (PRN):      LABS:                        10.1   18.52 )-----------( 366      ( 01 May 2020 06:36 )             31.6     05-01    140  |  103  |  50<H>  ----------------------------<  107<H>  3.6   |  28  |  1.10    Ca    8.8      01 May 2020 06:36                      RADIOLOGY & ADDITIONAL TESTS:    Imaging Personally Reviewed:     no new test   Advance Directives:    dnr   Palliative Care:    appropriate

## 2020-05-01 NOTE — PROGRESS NOTE ADULT - SUBJECTIVE AND OBJECTIVE BOX
INTERVAL HPI/OVERNIGHT EVENTS:  chart reviewed  sp debridement yesterday    MEDICATIONS  (STANDING):  collagenase Ointment 1 Application(s) Topical three times a day  cyanocobalamin 1000 MICROGram(s) Oral daily  midodrine. 5 milliGRAM(s) Oral three times a day  pantoprazole    Tablet 40 milliGRAM(s) Oral before breakfast  piperacillin/tazobactam IVPB.. 3.375 Gram(s) IV Intermittent every 8 hours  senna 2 Tablet(s) Oral at bedtime  sertraline 100 milliGRAM(s) Oral daily  sodium chloride 0.65% Nasal 1 Spray(s) Both Nostrils two times a day  sucralfate suspension 1 Gram(s) Oral four times a day    MEDICATIONS  (PRN):      Allergies    No Known Allergies    Intolerances        Review of Systems:    unable to obtain     Vital Signs Last 24 Hrs  T(C): 36.6 (01 May 2020 07:49), Max: 36.8 (01 May 2020 00:22)  T(F): 97.8 (01 May 2020 07:49), Max: 98.2 (01 May 2020 00:22)  HR: 75 (01 May 2020 07:49) (74 - 93)  BP: 150/66 (01 May 2020 07:49) (131/70 - 154/73)  BP(mean): --  RR: 18 (01 May 2020 07:49) (18 - 20)  SpO2: 97% (01 May 2020 07:49) (94% - 98%)    PHYSICAL EXAM:    deferred f/u done remotely as covid +       LABS:                        10.1   18.52 )-----------( 366      ( 01 May 2020 06:36 )             31.6     05-01    140  |  103  |  50<H>  ----------------------------<  107<H>  3.6   |  28  |  1.10    Ca    8.8      01 May 2020 06:36            RADIOLOGY & ADDITIONAL TESTS:

## 2020-05-01 NOTE — PROGRESS NOTE ADULT - PROBLEM SELECTOR PLAN 1
Anemia possible  2/2 lt hip hematoma surgery   - Hgb 6.8, Lactate 2.5 as per [facility] admission labs  - FOBT+ no active bleed noticed   - s/p 2 unit prbc   - F/u CT abd/pelvis, U/S Abd-  left-sided sacral decubitus ulcer with punctate bubbles of air diffusely extending along symmetrically prominent left gluteal muscle and soft tissue folds. Partially imaged asymmetric density measuring 3.9 x 3.3 cm posteromedial to the left proximal femur.   Possibility of intramuscular hematoma considered in the differential.  - GI Dr. giordano no procedure endo / colonoscopy   this time  - fu out pt for procedure .

## 2020-05-01 NOTE — PROVIDER CONTACT NOTE (CRITICAL VALUE NOTIFICATION) - TEST AND RESULT REPORTED:
Blood Cultures on 4/28/2020  Preliminary with growth in anaerobic gram cocci + pairs and gram - rods
covid +
lacted acid - 2.4

## 2020-05-01 NOTE — PROGRESS NOTE ADULT - ASSESSMENT
The patient is a 79 year old male with a history of HTN, HL, pre-DM, anxiety, spinal stenosis who presents with weakness and AMS in the setting of worsening anemia, dehydration, COVID-19.    Plan:  - ECG with above noted abnormalities; largely unchanged from prior  - Cardiac enzymes negative  - BNP normal at 47  - CXR with mild right atelectasis  - BP improved with IV fluids and transfusion  - Discontinue midodrine  - Can resume metoprolol tomorrow if BPs ok  - Underwent debridement of sacral decub  - COVID-19 positive  - Not hypoxic on RA

## 2020-05-01 NOTE — PROGRESS NOTE ADULT - ASSESSMENT
80 y/o M with PMHx of HTN, BPH, spinal stenosis, dyslipidemia, anxiety, neuropathy, B12 deficiency who presents from rehab for evaluation of weakness and hypotension    Anemia   (+) occult at [facility] 4/28  no overt gi bleeding of note; ?contaminant from large oozing sacral wound   monitor cbc, transfuse prn  cont ppi and carafate  monitor stool color   diet as tolerated   defer endoscopic eval as +Covid and increased risk for anesthesia in these pts   op gi f/u for possible colonoscopy if none recent once covid crisis resolved given ct findings      FTT/Dysphagia   unclear etiology    Esophagram at [place] 2/2019 normal   cont ppi    defer endoscopic eval as +Covid and increased risk for anesthesia in these pts    COVID-19  monitor rep status   suspect inc lfts in setting of viral illness  care per primary team 80 y/o M with PMHx of HTN, BPH, spinal stenosis, dyslipidemia, anxiety, neuropathy, B12 deficiency who presents from rehab for evaluation of weakness and hypotension    Anemia   (+) occult at [place] 4/28  no overt gi bleeding of note; ?contaminant from large oozing sacral wound   monitor cbc, transfuse prn  cont ppi and carafate  monitor stool color   diet as tolerated   defer endoscopic eval as +Covid and increased risk for anesthesia in these pts   op gi f/u for possible colonoscopy if none recent once covid crisis resolved given ct findings      FTT/Dysphagia   unclear etiology    Esophagram at [place] 2/2019 normal   cont ppi    on supplements per nutrition   defer endoscopic eval as +Covid and increased risk for anesthesia in these pts    COVID-19  monitor rep status   suspect inc lfts in setting of viral illness  care per primary team

## 2020-05-01 NOTE — PROGRESS NOTE ADULT - ATTENDING COMMENTS
pt seen and examine today -    acute metabolic encephalopathy likely due to hypovolemic shock   resolved  -   Hypotension likely due to hypovolemia from  acute blood loss  anemia   sec to  lt side hematoma possible  and  sec to possible lower gi bleed as colon thickening in ct scan but not candidate for colonoscopy as per gi dr giordano   s/p 2 liters of NS bolus bp stable  off midodrine.   s/p 2 unit of PRBC  ,  fu up  hb/hct  remain  stable no further bleed noticed  ,   guiac positive / no fresh blood per rectum .  ct abd / pelvis  chr lt hematoma - stable hb /hct . sacral decubitus  stage 4 -   10x 10 cm.  Necrotic tissues were sharply debrided and the wound was packed in preparation for further   chemical /mechanical debridement  by dr maguire surg bedside. moderate  protein malnutrition - ensure supplement.   COVID 19 Infection  +    no acute   pna  , ra pulse  ox above 92 .

## 2020-05-02 DIAGNOSIS — D72.829 ELEVATED WHITE BLOOD CELL COUNT, UNSPECIFIED: ICD-10-CM

## 2020-05-02 LAB
ANION GAP SERPL CALC-SCNC: 7 MMOL/L — SIGNIFICANT CHANGE UP (ref 5–17)
BASOPHILS # BLD AUTO: 0.01 K/UL — SIGNIFICANT CHANGE UP (ref 0–0.2)
BASOPHILS NFR BLD AUTO: 0.1 % — SIGNIFICANT CHANGE UP (ref 0–2)
BUN SERPL-MCNC: 40 MG/DL — HIGH (ref 7–23)
CALCIUM SERPL-MCNC: 8.3 MG/DL — LOW (ref 8.5–10.1)
CHLORIDE SERPL-SCNC: 105 MMOL/L — SIGNIFICANT CHANGE UP (ref 96–108)
CO2 SERPL-SCNC: 29 MMOL/L — SIGNIFICANT CHANGE UP (ref 22–31)
CREAT SERPL-MCNC: 1 MG/DL — SIGNIFICANT CHANGE UP (ref 0.5–1.3)
EOSINOPHIL # BLD AUTO: 0.02 K/UL — SIGNIFICANT CHANGE UP (ref 0–0.5)
EOSINOPHIL NFR BLD AUTO: 0.2 % — SIGNIFICANT CHANGE UP (ref 0–6)
GLUCOSE SERPL-MCNC: 96 MG/DL — SIGNIFICANT CHANGE UP (ref 70–99)
HCT VFR BLD CALC: 28.8 % — LOW (ref 39–50)
HCT VFR BLD CALC: 30.1 % — LOW (ref 39–50)
HGB BLD-MCNC: 9.2 G/DL — LOW (ref 13–17)
HGB BLD-MCNC: 9.6 G/DL — LOW (ref 13–17)
IMM GRANULOCYTES NFR BLD AUTO: 0.9 % — SIGNIFICANT CHANGE UP (ref 0–1.5)
LYMPHOCYTES # BLD AUTO: 0.68 K/UL — LOW (ref 1–3.3)
LYMPHOCYTES # BLD AUTO: 6.3 % — LOW (ref 13–44)
MCHC RBC-ENTMCNC: 28 PG — SIGNIFICANT CHANGE UP (ref 27–34)
MCHC RBC-ENTMCNC: 31.9 GM/DL — LOW (ref 32–36)
MCV RBC AUTO: 87.8 FL — SIGNIFICANT CHANGE UP (ref 80–100)
MONOCYTES # BLD AUTO: 0.4 K/UL — SIGNIFICANT CHANGE UP (ref 0–0.9)
MONOCYTES NFR BLD AUTO: 3.7 % — SIGNIFICANT CHANGE UP (ref 2–14)
NEUTROPHILS # BLD AUTO: 9.66 K/UL — HIGH (ref 1.8–7.4)
NEUTROPHILS NFR BLD AUTO: 88.8 % — HIGH (ref 43–77)
NRBC # BLD: 0 /100 WBCS — SIGNIFICANT CHANGE UP (ref 0–0)
PLATELET # BLD AUTO: 283 K/UL — SIGNIFICANT CHANGE UP (ref 150–400)
POTASSIUM SERPL-MCNC: 3.6 MMOL/L — SIGNIFICANT CHANGE UP (ref 3.5–5.3)
POTASSIUM SERPL-SCNC: 3.6 MMOL/L — SIGNIFICANT CHANGE UP (ref 3.5–5.3)
RBC # BLD: 3.43 M/UL — LOW (ref 4.2–5.8)
RBC # FLD: 15.2 % — HIGH (ref 10.3–14.5)
SODIUM SERPL-SCNC: 141 MMOL/L — SIGNIFICANT CHANGE UP (ref 135–145)
WBC # BLD: 10.87 K/UL — HIGH (ref 3.8–10.5)
WBC # FLD AUTO: 10.87 K/UL — HIGH (ref 3.8–10.5)

## 2020-05-02 PROCEDURE — 99233 SBSQ HOSP IP/OBS HIGH 50: CPT | Mod: CS,GC

## 2020-05-02 PROCEDURE — 93010 ELECTROCARDIOGRAM REPORT: CPT | Mod: 76

## 2020-05-02 RX ORDER — METOPROLOL TARTRATE 50 MG
25 TABLET ORAL ONCE
Refills: 0 | Status: DISCONTINUED | OUTPATIENT
Start: 2020-05-02 | End: 2020-05-02

## 2020-05-02 RX ORDER — SODIUM CHLORIDE 9 MG/ML
500 INJECTION INTRAMUSCULAR; INTRAVENOUS; SUBCUTANEOUS ONCE
Refills: 0 | Status: COMPLETED | OUTPATIENT
Start: 2020-05-02 | End: 2020-05-02

## 2020-05-02 RX ADMIN — SODIUM CHLORIDE 500 MILLILITER(S): 9 INJECTION INTRAMUSCULAR; INTRAVENOUS; SUBCUTANEOUS at 18:25

## 2020-05-02 RX ADMIN — Medication 1 GRAM(S): at 05:31

## 2020-05-02 RX ADMIN — Medication 1 APPLICATION(S): at 05:47

## 2020-05-02 RX ADMIN — Medication 1 GRAM(S): at 11:39

## 2020-05-02 RX ADMIN — Medication 1 APPLICATION(S): at 22:00

## 2020-05-02 RX ADMIN — Medication 1 APPLICATION(S): at 14:28

## 2020-05-02 RX ADMIN — PIPERACILLIN AND TAZOBACTAM 25 GRAM(S): 4; .5 INJECTION, POWDER, LYOPHILIZED, FOR SOLUTION INTRAVENOUS at 05:49

## 2020-05-02 RX ADMIN — SODIUM CHLORIDE 1000 MILLILITER(S): 9 INJECTION INTRAMUSCULAR; INTRAVENOUS; SUBCUTANEOUS at 20:00

## 2020-05-02 RX ADMIN — Medication 1 SPRAY(S): at 06:10

## 2020-05-02 RX ADMIN — PIPERACILLIN AND TAZOBACTAM 25 GRAM(S): 4; .5 INJECTION, POWDER, LYOPHILIZED, FOR SOLUTION INTRAVENOUS at 14:24

## 2020-05-02 RX ADMIN — Medication 1 SPRAY(S): at 17:53

## 2020-05-02 RX ADMIN — SERTRALINE 100 MILLIGRAM(S): 25 TABLET, FILM COATED ORAL at 11:39

## 2020-05-02 RX ADMIN — PANTOPRAZOLE SODIUM 40 MILLIGRAM(S): 20 TABLET, DELAYED RELEASE ORAL at 06:10

## 2020-05-02 RX ADMIN — Medication 1 GRAM(S): at 17:53

## 2020-05-02 NOTE — PROGRESS NOTE ADULT - SUBJECTIVE AND OBJECTIVE BOX
Patient is a 79y old  Male who presents with a chief complaint of Generalized weakness (02 May 2020 10:39)      HPI:  80 y/o M with PMHx of HTN, BPH, spinal stenosis, dyslipidemia, anxiety, neuropathy, B12 deficiency who presents from [facility] rehab for evaluation of weakness and hypotension. As per transfer papers, pt with confusion after lunch today. Pt admits to generalized weakness. Lives with wife, Stephany and son. He came from [facility] rehab in [place] since Feb 14. Patient lives with wife and son at home and ambulates with cane. Contracted Covid-19 in [facility] rehab and first started having symptoms on Mar 25, reporting symptoms of cough and low grade fever, diarrhea. Denies any chest pain, shortness of breath, headache, dizziness. History obtained from wife, Stephany([phone]) as pt is confused and a poor historian.   Of note, pt was noted to have sacral wounds today and reported low grade fever for two days and mental status changes today and loss of appetite.  Admitted for acute blood loss  anemia and infection possible  2/2 GI bleed and  stage 4    sacral decub  +   , covid  +.  pt seen and examine today  -   alert  awake ,  no sob ,  no hypoxia  ,  no cp ,  no fever .       INTERVAL HPI/OVERNIGHT EVENTS:  T(C): 36.4 (05-02-20 @ 08:34), Max: 36.5 (05-02-20 @ 04:59)  HR: 78 (05-02-20 @ 08:34) (74 - 78)  BP: 121/61 (05-02-20 @ 08:34) (119/66 - 121/61)  RR: 18 (05-02-20 @ 08:34) (16 - 18)  SpO2: 96% (05-02-20 @ 08:34) (95% - 98%)  Wt(kg): --  I&O's Summary    01 May 2020 07:01  -  02 May 2020 07:00  --------------------------------------------------------  IN: 100 mL / OUT: 1100 mL / NET: -1000 mL      REVIEW OF SYSTEMS:  CONSTITUTIONAL: No fever, weight loss, + fatigue  EYES: No eye pain, visual disturbances, or discharge  ENMT:  No sinus or throat pain  NECK: No pain or stiffness   RESPIRATORY: No cough, wheezing, chills  No shortness of breath  CARDIOVASCULAR: No chest pain, palpitations, dizziness, or leg swelling  GASTROINTESTINAL: No abdominal or epigastric pain. No nausea, vomiting,   No diarrhea or constipation,  No melena .  GENITOURINARY: No dysuria, frequency, hematuria, or incontinence  NEUROLOGICAL: No headaches, memory loss, loss of strength,   or tremor.  SKIN: No itching, burning, rashes, or lesions  sacral wound+  MUSCULOSKELETAL: No joint pain or swelling; No muscle, back, or extremity pain      PHYSICAL EXAM:  GENERAL: NAD, well-groomed, weak +  HEAD:  Atraumatic, Normocephalic  EYES: EOMI, PERRLA, conjunctiva and sclera clear  ENMT: Moist mucous membranes,  NECK: Supple,   NERVOUS SYSTEM:  Alert & Oriented X2, Motor Strength 5/5 B/L upper and lower extremities;   DTRs 2+ intact and symmetric  CHEST/LUNG:  percussion bilaterally; No rales, rhonchi, wheezing,   HEART: Regular rate and rhythm; No murmurs,  no tachy   ABDOMEN: Soft, Nontender, Nondistended; Bowel sounds present  EXTREMITIES:  2+ Peripheral Pulses, No clubbing, cyanosis, or edema  SKIN: No rashes or lesions stage 4 decubitus 10/10 cm +  gu ext cath    MEDICATIONS  (STANDING):  collagenase Ointment 1 Application(s) Topical three times a day  pantoprazole    Tablet 40 milliGRAM(s) Oral before breakfast  piperacillin/tazobactam IVPB.. 3.375 Gram(s) IV Intermittent every 8 hours  senna 2 Tablet(s) Oral at bedtime  sertraline 100 milliGRAM(s) Oral daily  sodium chloride 0.65% Nasal 1 Spray(s) Both Nostrils two times a day  sucralfate suspension 1 Gram(s) Oral four times a day    MEDICATIONS  (PRN):      LABS:                        9.6    10.87 )-----------( 283      ( 02 May 2020 07:36 )             30.1     05-02    141  |  105  |  40<H>  ----------------------------<  96  3.6   |  29  |  1.00    Ca    8.3<L>      02 May 2020 07:36                04-30 @ 11:12   No growth to date.  --  --          RADIOLOGY & ADDITIONAL TESTS:    Imaging Personally Reviewed:   no new test     Advance Directives:  dnr    Palliative Care:    appropriate

## 2020-05-02 NOTE — PROGRESS NOTE ADULT - SUBJECTIVE AND OBJECTIVE BOX
LISANDRA PADGETT  MRN-[id number] 79y  COVID POSITIVE    GENERAL SURGERY/ DR. CAMPOS    MEDICATIONS  (STANDING):    collagenase Ointment 1 Application(s) Topical three times a day  pantoprazole    Tablet 40 milliGRAM(s) Oral before breakfast  piperacillin/tazobactam IVPB.. 3.375 Gram(s) IV Intermittent every 8 hours  senna 2 Tablet(s) Oral at bedtime  sertraline 100 milliGRAM(s) Oral daily  sodium chloride 0.65% Nasal 1 Spray(s) Both Nostrils two times a day  sucralfate suspension 1 Gram(s) Oral four times a day  vancomycin  IVPB 1000 milliGRAM(s) IV Intermittent every 24 hours  vancomycin  IVPB        Vital Signs Last 24 Hrs  T(C): 36.4 (02 May 2020 08:34), Max: 36.5 (02 May 2020 04:59)  T(F): 97.5 (02 May 2020 08:34), Max: 97.7 (02 May 2020 04:59)  HR: 78 (02 May 2020 08:34) (74 - 78)  BP: 121/61 (02 May 2020 08:34) (119/66 - 121/61)  BP(mean): --  RR: 18 (02 May 2020 08:34) (16 - 18)  SpO2: 96% (02 May 2020 08:34) (95% - 98%)    SACRAL DECUBITI WOUND    APPROXIMATELY 8 X 8 CM OPEN SACRAL WOUND. SOME FIBRINOUS TISSUE ON THE WOUND BED, NO PUS/ BLEEDING.  BLANCHABLE ERYTHEMATOUS SKIN AROUND THE WOUND.                          9.6    10.87 )-----------( 283      ( 02 May 2020 07:36 )             30.1      05-02    141  |  105  |  40<H>  ----------------------------<  96  3.6   |  29  |  1.00    Ca    8.3<L>      02 May 2020 07:36               ASSESSMENT &  PLAN:      S/P DEBRIDEMENT SACRAL WOUND ULCER  COVID +     DRESSING WET TO DRY APPLIED   WOUND CARE CONSULT (  MON[facility] ) TO REEVALUATE THE WOUND FOR POSSIBLE VAC THERAPY/ OTHER RECOMMENDATIONS   ANTIBIOTICS AS PER ID   MEDICAL MANAGEMENT AS PER PRIMARY TEAM

## 2020-05-02 NOTE — PROGRESS NOTE ADULT - PROBLEM SELECTOR PLAN 6
COVID  +   - Maintain on airborne isolation.  - Not on O2, but provide supplemental O2 as needed  - Limit use of NSAIDs.  - Would avoid nebulized preparations to limit risk of aerosol formation.  - Labs Testing: Daily or As Needed: CBC w/ diff, CMP; Every 3 days: CRP, Ferritin, Procalcitonin.  - Goals of Care discussion had with patient/surrogate: (free text here}

## 2020-05-02 NOTE — PROGRESS NOTE ADULT - ASSESSMENT
80 y/o M with PMHx of HTN, BPH, spinal stenosis, dyslipidemia, anxiety, neuropathy, B12 deficiency resident of [facility] for evaluation of weakness and hypotension with mental status change. Tested COVID 19 positive early April. In ED he was hypotensive. WBC 19K No SOB cough n/v/d. Pt is a poor historian. Has unstageable sacral decub ulcer. Also with Acute anemia. ?infectious process vs reactive leukocytosis. Leukocytosis poss chronic.  Respiratory status stable    5/2-on Vanco/Zosyn dropping wbc, stopped Vanco with neg MRSA screen plan for 5/3 as last day of Zosyn

## 2020-05-02 NOTE — PROGRESS NOTE ADULT - SUBJECTIVE AND OBJECTIVE BOX
Chief Complaint: Weakness, AMS    Interval Events: No events overnight.    Review of Systems:  General: No fevers, chills, weight loss or gain  Skin: No rashes, color changes  Cardiovascular: No chest pain, orthopnea  Respiratory: No shortness of breath, cough  Gastrointestinal: No nausea, abdominal pain  Genitourinary: No incontinence, pain with urination  Musculoskeletal: No pain, swelling, decreased range of motion  Neurological: No headache, weakness  Psychiatric: No depression, anxiety  Endocrine: No weight loss or gain, increased thirst  All other systems are comprehensively negative.    Physical Exam:  Vital Signs Last 24 Hrs  T(C): 36.4 (02 May 2020 08:34), Max: 36.5 (02 May 2020 04:59)  T(F): 97.5 (02 May 2020 08:34), Max: 97.7 (02 May 2020 04:59)  HR: 78 (02 May 2020 08:34) (74 - 78)  BP: 121/61 (02 May 2020 08:34) (119/66 - 121/61)  BP(mean): --  RR: 18 (02 May 2020 08:34) (16 - 18)  SpO2: 96% (02 May 2020 08:34) (95% - 98%)  General: NAD  HEENT: MMM  Neck: No JVD, no carotid bruit  Extremities: No LE edema, no cyanosis  Neuro: Non-focal  Skin: No rash    Labs:             05-02    141  |  105  |  40<H>  ----------------------------<  96  3.6   |  29  |  1.00    Ca    8.3<L>      02 May 2020 07:36                          9.6    10.87 )-----------( 283      ( 02 May 2020 07:36 )             30.1     Telemetry: Sinus rhythm, PVCs

## 2020-05-02 NOTE — CHART NOTE - NSCHARTNOTEFT_GEN_A_CORE
Called by RN for tachycardia with noted hypotension on manual bp reading. Chart/recent vitals/labs reviewed. Noted for downtrending h&h, pt is s/p 1 unit prbc. Tachycardia is likely reactive to hypotension, +/- home regimen beta blocker being held. Will give 500 cc ns bolus now, recheck vitals post. No evidence of further active bleeding per chart review but will recheck h/h and transfuse prn. RN to call if further changes.

## 2020-05-02 NOTE — PROGRESS NOTE ADULT - PROBLEM SELECTOR PLAN 1
Anemia possible  2/2 lt hip hematoma surgery   - Hgb 6.8, Lactate 2.5 as per [facility] admission labs  - FOBT+ no active bleed noticed   - s/p 2 unit prbc   - F/u CT abd/pelvis, U/S Abd-  left-sided sacral decubitus ulcer with punctate bubbles of air diffusely extending along symmetrically prominent left gluteal muscle and soft tissue folds. Partially imaged asymmetric density measuring 3.9 x 3.3 cm posteromedial to the left proximal femur.   Possibility of intramuscular hematoma considered in the differential.  - GI Dr. giordano no procedure endo / colonoscopy   this time  -   fu out pt for procedure .

## 2020-05-02 NOTE — PROGRESS NOTE ADULT - ASSESSMENT
The patient is a 79 year old male with a history of HTN, HL, pre-DM, anxiety, spinal stenosis who presents with weakness and AMS in the setting of worsening anemia, dehydration, COVID-19.    Plan:  - ECG with above noted abnormalities; largely unchanged from prior  - Cardiac enzymes negative  - BNP normal at 47  - CXR with mild right atelectasis  - BP improved with IV fluids and transfusion  - Discontinue midodrine  - If BPs trend up, resume metoprolol  - Underwent debridement of sacral decub  - COVID-19 positive  - Not hypoxic on RA

## 2020-05-02 NOTE — PROGRESS NOTE ADULT - SUBJECTIVE AND OBJECTIVE BOX
PULMONARY/CRITICAL CARE  Unchanged. Blood cultures pos.   Pt denies sob. Alert, confused.   Pt unchanged. No fever.      Patient is a 79y old  Male who presents with a chief complaint of Generalized weakness (28 Apr 2020 22:20)    BRIEF HOSPITAL COURSE: ***78 y/o M with PMHx of HTN, BPH, spinal stenosis, dyslipidemia, anxiety, neuropathy, B12 deficiency who presents from [facility] for evaluation of weakness and hypotension. As per transfer papers, pt with confusion after lunch today. Pt admits to generalized weakness. Lives with wife, Stephany and son. He came from [facility] in [place] since Feb 14. Patient lives with wife and son at home and ambulates with cane. [facility] and first started having symptoms on Mar 25, reporting symptoms of cough and low grade fever, diarrhea. Denies any chest pain, shortness of breath, headache, dizziness. History obtained from wife, Stephany([phone]) as pt is confused and a poor historian.   Of note, pt was noted to have sacral wounds today and reported low grade fever for two days and mental status changes today and loss of appetite.    Events last 24 hours: ***    PAST MEDICAL & SURGICAL HISTORY:  H/O vitamin B deficiency  Spinal stenosis  HTN (hypertension)  History of tonsillectomy    Allergies    No Known Allergies    Intolerances      FAMILY HISTORY:  No pertinent family history in first degree relatives      Review of Systems:  unobtainable    Medications:  piperacillin/tazobactam IVPB.. 3.375 Gram(s) IV Intermittent every 8 hours    metoprolol succinate ER 50 milliGRAM(s) Oral daily  midodrine. 10 milliGRAM(s) Oral three times a day      sertraline 100 milliGRAM(s) Oral daily        pantoprazole  Injectable 40 milliGRAM(s) IV Push every 12 hours        cyanocobalamin 1000 MICROGram(s) Oral daily      collagenase Ointment 1 Application(s) Topical three times a day            ICU Vital Signs Last 24 Hrs  T(C): 37.1 (29 Apr 2020 06:05), Max: 37.3 (29 Apr 2020 03:49)  T(F): 98.7 (29 Apr 2020 06:05), Max: 99.1 (29 Apr 2020 03:49)  HR: 102 (29 Apr 2020 06:05) (87 - 105)  BP: 100/57 (29 Apr 2020 06:05) (84/48 - 100/57)  BP(mean): --  ABP: --  ABP(mean): --  RR: 18 (29 Apr 2020 06:05) (17 - 22)  SpO2: 96% (29 Apr 2020 06:05) (96% - 98%)    Vital Signs Last 24 Hrs  T(C): 37.1 (29 Apr 2020 06:05), Max: 37.3 (29 Apr 2020 03:49)  T(F): 98.7 (29 Apr 2020 06:05), Max: 99.1 (29 Apr 2020 03:49)  HR: 102 (29 Apr 2020 06:05) (87 - 105)  BP: 100/57 (29 Apr 2020 06:05) (84/48 - 100/57)  BP(mean): --  RR: 18 (29 Apr 2020 06:05) (17 - 22)  SpO2: 96% (29 Apr 2020 06:05) (96% - 98%)        I&O's Detail        LABS:                        8.4    19.86 )-----------( 248      ( 29 Apr 2020 07:23 )             26.0     04-28    138  |  107  |  54<H>  ----------------------------<  92  4.6   |  26  |  1.10    Ca    7.6<L>      28 Apr 2020 22:41  Phos  2.4     04-28  Mg     2.0     04-28    TPro  5.1<L>  /  Alb  1.8<L>  /  TBili  1.8<H>  /  DBili  x   /  AST  230<H>  /  ALT  173<H>  /  AlkPhos  235<H>  04-28          CAPILLARY BLOOD GLUCOSE            CULTURES:          RADIOLOGY: ***< from: CT Chest No Cont (04.29.20 @ 01:24) >  EXAM:  CT ABDOMEN AND PELVIS                          EXAM:  CT CHEST                            PROCEDURE DATE:  04/29/2020          INTERPRETATION:  CT CHEST, ABDOMEN AND PELVIS WITHOUT CONTRAST    INDICATION: Shortness of breath. Hypotension. Altered mental status.     TECHNIQUE: Noncontrast CT of the chest, abdomen and pelvis. Images are reformatted in the sagittal and coronal planes.Postprocessed MIP reformatted chest images were created and reviewed.    COMPARISON: None.    FINDINGS:    Absence of intravenous contrast limits evaluation for traumatic injury, focal lesions, neoplasm, and vascular pathology.    Thorax:  Lines and tubes: None.  Airways: Tracheobronchial tree is patent.  Lungs and Pleura: Right basilar opacity containing air bronchograms. Mild patchy left lower lobe opacities are also identified. No significant pleural effusion. No pneumothorax.  Mediastinum and lymph nodes: No bulky adenopathy.  Heart: Trace pericardial effusion and/or thickening without significant cardiomegaly. Coronary artery calcification and/or stenting. Valvular calcifications.  Vessels: Atherosclerotic disease of the aorta and its branches. Ascending thoracic aorta measures up to 4.3 cm in maximum AP diameter.   Chest Wall: Within normal limits.    Abdomen/Pelvis:  Liver: No suspicious lesions.  Biliary: No significant dilatation. No calcified gallstones within the gallbladder.  Spleen: No suspicious lesions.  Pancreas: No inflammatory changes or ductal dilatation.  Adrenals: Normal.  Kidneys: No hydronephrosis. Bilateral renal cysts as well as too small to characterize hypodensities. Sub-cm nonobstructive right renal calculus.  Vessels: Atherosclerotic disease of the aorta and its branches.     GI tract: There is suggestion of focal circumferential wall thickening of distal ascending colonic loop without significant pericolonic stranding as best seen on images 78 through 79 of series 2. Consider nonemergent colonoscopy evaluation to exclude underlying malignancy. No evidence ofsmall bowel obstruction. No CT findings of acute appendicitis.    Peritoneum/retroperitoneum and mesentery: No free air. No organized fluid collection. No adenopathy.    Pelvic organs/Bladder: Heterogeneous prominent prostate with nodular density near the apex, cause mass effect and protrusion at the bladder base. Correlate with PSA and consider nonemergent prostatic workup to exclude malignancy. No significant bladder wall thickening.    Abdominal wall: Suggestion of left-sided sacral decubitus ulcer with punctate bubbles of air diffusely extending along symmetrically prominent left gluteal muscle and soft tissue folds. Partially imaged asymmetric density measuring 3.9 x 3.3 cm posteromedial to the left proximal femur on image 168 of series 2. Possibility of intramuscular hematoma considered in the differential.    Bones and soft tissues: Multilevel degenerative changes of the spine noted. Advanced osteoarthritis of bilateral hip joint. Advanced osteoarthritis of left shoulder joint withassociated soft tissue calcifications. Vague linear lucency identified at the level of the sacrococcygeal tip as best seen on images 149 through 151 of series 2. Correlate with point tenderness to exclude underlying nondisplaced fracture.    IMPRESSION:    Right basilar opacity containing air bronchograms. Additional patchy left lower lobe opacities. Bacterial and viral pneumonias including atypical agent such as COVID-19 considered in the differential. Correlate with clinical parameters, laboratory values and follow-up chest radiograph advised. No significant pleural effusion.     Suggestion of focal circumferential wall thickening of distal ascending colonic loop without significant pericolonic stranding. Consider nonemergent colonoscopy evaluation to exclude underlying malignancy. No evidence of small bowel obstruction.     Vague linear lucency identified at the level of the sacrococcygeal tip. Correlate with point tenderness to exclude underlying nondisplaced fracture.    Suggestion of left-sided sacral decubitus ulcer with punctate bubbles of air diffusely extending along symmetrically prominent left gluteal muscle and soft tissue folds. Partially imaged asymmetric density measuring 3.9 x 3.3 cm posteromedial to the left proximalfemur. Possibility of intramuscular hematoma considered in the differential.    Additional findings as mentioned above.    These results were discussed via telephone at 4/29/2020 3:55 AM by Dr. Baugh of radiology with Dr. Bailey, institution read-back verification policy was followed.                TATE BAUGH M.D., ATTENDING RADIOLOGIST  This document has been electronically signed. Apr 29 2020  4:10AM              < end of copied text >    < from: Xray Chest 1 View- PORTABLE-Routine (04.30.20 @ 09:11) >  EXAM:  XR CHEST PORTABLE ROUTINE 1V                            PROCEDURE DATE:  04/30/2020          INTERPRETATION:    Examination: XR CHEST    History: ADMDIAG1: K92.2 GASTROINTESTINAL HEMORRHAGE, UNSPECIFIED/, pn pneumonia    TECHNIQUE:  Portable AP view of the chest was obtained.    COMPARISON: A CT chest 1/29/2020 available for review.    FINDINGS:   Lungs clear. RIGHT hemidiaphragm mildly elevated. Posterior lung bases cannot be assessed due to elevated diaphragms. Lateral radiograph recommended if clinically warranted.  . The heart and mediastinum are within normal limits.    Visualized osseous structures are intact.        IMPRESSION: Visualized Lungs clear..   Elevated LEFT hemidiaphragm. Posterior lung bases cannot be assessed dueto elevated diaphragms. Lateral radiograph recommended if clinically warranted.      < end of copied text >

## 2020-05-02 NOTE — PROGRESS NOTE ADULT - SUBJECTIVE AND OBJECTIVE BOX
INTERVAL HPI/OVERNIGHT EVENTS:    chart reviewed   no acute gi events  no rectal bleeding or c/o abd pain   h/h stable    MEDICATIONS  (STANDING):  collagenase Ointment 1 Application(s) Topical three times a day  pantoprazole    Tablet 40 milliGRAM(s) Oral before breakfast  piperacillin/tazobactam IVPB.. 3.375 Gram(s) IV Intermittent every 8 hours  senna 2 Tablet(s) Oral at bedtime  sertraline 100 milliGRAM(s) Oral daily  sodium chloride 0.65% Nasal 1 Spray(s) Both Nostrils two times a day  sucralfate suspension 1 Gram(s) Oral four times a day    MEDICATIONS  (PRN):      Allergies    No Known Allergies    Intolerances        Review of Systems: Per current hospital emergency protocol, in an effort to reduce COVID exposures and also conserve PPE for necessary encounters, all data within this chart were reviewed and recommendations are based upon information in the chart and by verbal communication with covering team and/or nurses. Please refer to the ROS and PE per covering primary team note          Vital Signs Last 24 Hrs  T(C): 36.4 (02 May 2020 08:34), Max: 36.5 (02 May 2020 04:59)  T(F): 97.5 (02 May 2020 08:34), Max: 97.7 (02 May 2020 04:59)  HR: 78 (02 May 2020 08:34) (74 - 78)  BP: 121/61 (02 May 2020 08:34) (119/66 - 121/61)  BP(mean): --  RR: 18 (02 May 2020 08:34) (16 - 18)  SpO2: 96% (02 May 2020 08:34) (95% - 98%)    PHYSICAL EXAM: Per current hospital emergency protocol, in an effort to reduce COVID exposures and also conserve PPE for necessary encounters, all data within this chart were reviewed and recommendations are based upon information in the chart and by verbal communication with covering team and/or nurses. Please refer to the ROS and PE per covering primary team note          LABS:                        9.6    10.87 )-----------( 283      ( 02 May 2020 07:36 )             30.1     05-02    141  |  105  |  40<H>  ----------------------------<  96  3.6   |  29  |  1.00    Ca    8.3<L>      02 May 2020 07:36            RADIOLOGY & ADDITIONAL TESTS:

## 2020-05-02 NOTE — CHART NOTE - NSCHARTNOTEFT_GEN_A_CORE
Called by RN for tachycardia. Patient seen and evaluated at bedside by PGY-1. Per telemetry, patient w/ HRs in 140s-160s since ~1747. Resident called earlier for hypotension, given 500cc NS bolus x1. Patient asymptomatic at this time. Denies headache, blurry vision, SOB, chest pain, palpitations, n/v/d, or abdominal pain. Denies any blood in stool or urine.    T(C): 36.3 (05-02-20 @ 19:30), Max: 36.7 (05-02-20 @ 16:14)  HR: 147 (05-02-20 @ 19:30) (74 - 147)  BP: 100/51 (05-02-20 @ 19:30) (87/49 - 134/71)  RR: 21 (05-02-20 @ 19:30) (16 - 21)  SpO2: 98% (05-02-20 @ 19:30) (96% - 99%)  Wt(kg): --    Physical :  Gen- NAD, elderly appearing male   Cardio - regular rhythm, tachycardic rate  Lung - cta b/l, no wheeze, no rhonchi, no rales   Abdomen- +BS, NT/ND  Ext- no edema, 2+ pulses b/l  Neuro- awake, alert, moving all extremities     LABS:                        9.2    x     )-----------( x        ( 02 May 2020 19:02 )             28.8     05-02    141  |  105  |  40<H>  ----------------------------<  96  3.6   |  29  |  1.00    Ca    8.3<L>      02 May 2020 07:36    Assessment/Plan  80y/o Male with PMH of HTN, BPH, spinal stenosis, dyslipidemia, anxiety, neuropathy, B12 deficiency who presents from [facility] rehab for evaluation of weakness and hypotension. Admitted for anemia and infection poss 2/2 GI bleed and stage 4 sacral decubitus. Now with persistent tachycardia and hypotension; tachycardia due to SVT.  - S/p 500cc NS bolus x1; Repeat H+H done at 7pm stable  - Stat EKG: SVT at 140bpm   - Additional 500cc NS bolus given, repeat BP following bolus is 95/56  - Patient converted to NSR while PGY-1 in room, HR now in high 90sbpm, f/u repeat EKG  - Repeat H+H ordered for midnight   - If patient converts back to SVT, will consider adenosine if patient hemodynamically unstable   - Discussed plan with senior resident and Dr. Ham  - Will continue to follow; RN to call with changes    Basilia Huang MD, PGY-1  [phone] Called by RN for tachycardia. Patient seen and evaluated at bedside by PGY-1. Per telemetry, patient w/ HRs in 140s-160s since ~1747. Resident called earlier for hypotension, given 500cc NS bolus x1. Patient asymptomatic at this time. Denies headache, blurry vision, SOB, chest pain, palpitations, n/v/d, or abdominal pain. Denies any blood in stool or urine.    T(C): 36.3 (05-02-20 @ 19:30), Max: 36.7 (05-02-20 @ 16:14)  HR: 147 (05-02-20 @ 19:30) (74 - 147)  BP: 100/51 (05-02-20 @ 19:30) (87/49 - 134/71)  RR: 21 (05-02-20 @ 19:30) (16 - 21)  SpO2: 98% (05-02-20 @ 19:30) (96% - 99%)  Wt(kg): --    Physical :  Gen- NAD, elderly appearing male   Cardio - regular rhythm, tachycardic rate  Lung - cta b/l, no wheeze, no rhonchi, no rales   Abdomen- +BS, NT/ND  Ext- no edema, 2+ pulses b/l  Neuro- awake, alert, moving all extremities     LABS:                        9.2    x     )-----------( x        ( 02 May 2020 19:02 )             28.8     05-02    141  |  105  |  40<H>  ----------------------------<  96  3.6   |  29  |  1.00    Ca    8.3<L>      02 May 2020 07:36    Assessment/Plan  80y/o Male with PMH of HTN, BPH, spinal stenosis, dyslipidemia, anxiety, neuropathy, B12 deficiency who presents from [facility] rehab for evaluation of weakness and hypotension. Admitted for anemia and infection poss 2/2 GI bleed and stage 4 sacral decubitus. Now with persistent tachycardia and hypotension; tachycardia due to SVT.  - S/p 500cc NS bolus x1; Repeat H+H done at 7pm stable  - Stat EKG: SVT at 140bpm   - Additional 500cc NS bolus given, repeat BP following bolus is 95/56  - Patient converted to NSR while PGY-1 in room, HR now in high 90sbpm, repeat EKG shows NSR at 99bpm  - Repeat H+H ordered for midnight   - If patient converts back to SVT, will consider adenosine if patient hemodynamically unstable   - Discussed plan with senior resident and Dr. Ham  - Will continue to follow; RN to call with changes    Basilia Huang MD, PGY-1  [phone]

## 2020-05-02 NOTE — PROGRESS NOTE ADULT - ASSESSMENT
80 y/o M with PMHx of HTN, BPH, spinal stenosis, dyslipidemia, anxiety, neuropathy, B12 deficiency who presents from [facility] rehab for evaluation of weakness and hypotension. Admitted for anemia and infection poss 2/2 GI bleed and stage 4 sacral decubitus .

## 2020-05-02 NOTE — PROGRESS NOTE ADULT - PROBLEM SELECTOR PLAN 2
stage  4  Possible  source of infection  - Leukocytosis resolving   - Continue IV Zosyn 3.375 gm q8hr   - Collagenase ointment application  - surg dr maguire  s/p debridement stage  4  Possible  source of infection  - Leukocytosis resolving  , blood cult neg todate.   - Continue IV Zosyn 3.375 gm q8hr   - Collagenase ointment application  - surg dr maguire  s/p debridement

## 2020-05-02 NOTE — PROGRESS NOTE ADULT - SUBJECTIVE AND OBJECTIVE BOX
infectious diseases progress note:    LISANDRA PADGETT is a 79y y. o. Male patient    COVID Patient    Allergies    No Known Allergies    Intolerances        ANTIBIOTICS/RELEVANT:  antimicrobials  piperacillin/tazobactam IVPB.. 3.375 Gram(s) IV Intermittent every 8 hours  vancomycin  IVPB 1000 milliGRAM(s) IV Intermittent every 24 hours  vancomycin  IVPB        immunologic:    OTHER:  collagenase Ointment 1 Application(s) Topical three times a day  pantoprazole    Tablet 40 milliGRAM(s) Oral before breakfast  senna 2 Tablet(s) Oral at bedtime  sertraline 100 milliGRAM(s) Oral daily  sodium chloride 0.65% Nasal 1 Spray(s) Both Nostrils two times a day  sucralfate suspension 1 Gram(s) Oral four times a day      Objective:  Vital Signs Last 24 Hrs  T(C): 36.4 (02 May 2020 08:34), Max: 36.5 (02 May 2020 04:59)  T(F): 97.5 (02 May 2020 08:34), Max: 97.7 (02 May 2020 04:59)  HR: 78 (02 May 2020 08:34) (74 - 78)  BP: 121/61 (02 May 2020 08:34) (119/66 - 121/61)  BP(mean): --  RR: 18 (02 May 2020 08:34) (16 - 18)  SpO2: 96% (02 May 2020 08:34) (95% - 98%)    T(C): 36.4 (05-02-20 @ 08:34), Max: 36.8 (05-01-20 @ 00:22)  T(C): 36.4 (05-02-20 @ 08:34), Max: 37.6 (04-29-20 @ 11:22)  T(C): 36.4 (05-02-20 @ 08:34), Max: 37.6 (04-29-20 @ 11:22)    PHYSICAL EXAM:  HEENT: NC atraumatic  Neck: supple  Respiratory: no accessory muscle use, breathing comfortably  Cardiovascular: distant  Gastrointestinal: normal appearing, nondistended  Extremities: no clubbing, no cyanosis,      LABS:                          9.6    10.87 )-----------( 283      ( 02 May 2020 07:36 )             30.1       10.87 05-02 @ 07:36  18.52 05-01 @ 06:36  19.65 04-30 @ 07:37  19.86 04-29 @ 07:23  19.63 04-28 @ 22:41      05-02    141  |  105  |  40<H>  ----------------------------<  96  3.6   |  29  |  1.00    Ca    8.3<L>      02 May 2020 07:36        Creatinine, Serum: 1.00 mg/dL (05-02-20 @ 07:36)  Creatinine, Serum: 1.10 mg/dL (05-01-20 @ 06:36)  Creatinine, Serum: 1.00 mg/dL (04-30-20 @ 07:37)  Creatinine, Serum: 0.96 mg/dL (04-29-20 @ 07:23)  Creatinine, Serum: 1.10 mg/dL (04-28-20 @ 22:41)                COVID RISK SCORE  Auto Neutrophil #: 9.66 K/uL (05-02-20 @ 07:36)  Auto Lymphocyte #: 0.68 K/uL (05-02-20 @ 07:36)  Auto Neutrophil #: 16.98 K/uL (05-01-20 @ 06:36)  Auto Lymphocyte #: 0.83 K/uL (05-01-20 @ 06:36)  Auto Neutrophil #: 17.96 K/uL (04-29-20 @ 07:23)  Auto Lymphocyte #: 0.75 K/uL (04-29-20 @ 07:23)  Auto Neutrophil #: 17.73 K/uL (04-28-20 @ 22:41)  Auto Lymphocyte #: 0.69 K/uL (04-28-20 @ 22:41)      Auto Eosinophil #: 0.02 K/uL (05-02-20 @ 07:36)  Auto Eosinophil #: 0.03 K/uL (05-01-20 @ 06:36)  Auto Eosinophil #: 0.01 K/uL (04-29-20 @ 07:23)  Auto Eosinophil #: 0.01 K/uL (04-28-20 @ 22:41)    Lactate Dehydrogenase, Serum: 216 U/L (04-29-20 @ 08:28)    Sedimentation Rate, Erythrocyte: 67 mm/hr (05-01-20 @ 06:36)  Sedimentation Rate, Erythrocyte: 74 mm/hr (04-30-20 @ 07:37)    Procalcitonin, Serum: 1.15 ng/mL (04-30-20 @ 07:37)            Ferritin, Serum: 2452 ng/mL (05-01-20 @ 08:43)  Ferritin, Serum: 3649 ng/mL (04-30-20 @ 10:43)  Ferritin, Serum: 2627 ng/mL (04-29-20 @ 08:29)          D-Dimer Assay, Quantitative: 631 ng/mL DDU (05-01-20 @ 06:36)  D-Dimer Assay, Quantitative: 736 ng/mL DDU (04-29-20 @ 06:59)        MICROBIOLOGY:              RADIOLOGY & ADDITIONAL STUDIES:

## 2020-05-02 NOTE — PROGRESS NOTE ADULT - ATTENDING COMMENTS
pt seen and examine today -    acute metabolic encephalopathy likely due to hypovolemic shock   resolved  -   Hypotension likely due to hypovolemia from  acute blood loss  anemia   sec to  lt side hematoma possible  and  sec to possible lower gi bleed as colon thickening in ct scan but not candidate for colonoscopy as per gi dr giordano   s/p 2 liters of NS bolus bp stable  dc  midodrine    - bp stable .   s/p 2 unit of PRBC  ,  fu up  hb/hct  remain  stable no further bleed noticed  ,   guiac positive / no fresh blood per rectum .  ct abd / pelvis  chr lt hematoma - stable hb /hct .   sacral decubitus  stage 4 -   10x 10 cm.  Necrotic tissues were sharply debrided and the wound was packed in preparation for further   chemical /mechanical debridement  by dr maguire surg bedside.  moderate  protein malnutrition - ensure supplement.   COVID 19 Infection  +    no acute   pna  , ra pulse  ox above 92 . pt seen and examine today -    acute metabolic encephalopathy likely due to hypovolemic shock   resolved  -   Hypotension likely due to hypovolemia from  acute blood loss  anemia   sec to  lt side hematoma possible  and  sec to possible lower gi bleed as colon thickening in ct scan but not candidate for colonoscopy as per gi dr giordano   s/p 2 liters of NS bolus bp stable  dc  midodrine    - bp stable .   s/p 2 unit of PRBC  ,  fu up  hb/hct  remain  stable no further bleed noticed  ,   guiac positive / no fresh blood per rectum .  ct abd / pelvis  chr lt hematoma - stable hb /hct .   sacral decubitus  stage 4 -   10x 10 cm ,  Necrotic tissues were sharply debrided and the wound was packed in preparation for further   chemical /mechanical debridement  by dr maguire surg bedside    continue  iv abx Zosyn 3.375 gm q8hr .  moderate  protein malnutrition - ensure supplement.   COVID 19 Infection  +    no acute   pna  , ra pulse  ox above 92 .

## 2020-05-02 NOTE — PROGRESS NOTE ADULT - ASSESSMENT
80 y/o M with PMHx of HTN, BPH, spinal stenosis, dyslipidemia, anxiety, neuropathy, B12 deficiency who presents from rehab for evaluation of weakness and hypotension    Anemia   (+) occult at [facility] 4/28  no overt gi bleeding of note; ?contaminant from large oozing sacral wound   sp sacral decub debridement; care per surgery   monitor cbc, transfuse prn  cont ppi and carafate  monitor stool color   diet as tolerated   defer endoscopic eval as +Covid and increased risk for anesthesia in these pts   op gi f/u for possible colonoscopy if none recent once covid crisis resolved given ct findings      FTT/Dysphagia   unclear etiology; esophagram at [facility] 2/2019 normal   cont ppi    on supplements per nutrition   defer endoscopic eval as +Covid and increased risk for anesthesia in these pts    COVID-19  monitor rep status   inc lfts in setting of viral illness  care per primary team

## 2020-05-02 NOTE — PROGRESS NOTE ADULT - ASSESSMENT
Elderly pt with COVID pneumonia, likely resolving;  Doing well.   Blood culture pos.  n.      Hypotension due to dehydration improved.   Anemia improved--borderline iron.   Sacral decubitus

## 2020-05-03 LAB
ANION GAP SERPL CALC-SCNC: 7 MMOL/L — SIGNIFICANT CHANGE UP (ref 5–17)
BASOPHILS # BLD AUTO: 0.01 K/UL — SIGNIFICANT CHANGE UP (ref 0–0.2)
BASOPHILS NFR BLD AUTO: 0.1 % — SIGNIFICANT CHANGE UP (ref 0–2)
BUN SERPL-MCNC: 36 MG/DL — HIGH (ref 7–23)
CALCIUM SERPL-MCNC: 8.2 MG/DL — LOW (ref 8.5–10.1)
CHLORIDE SERPL-SCNC: 109 MMOL/L — HIGH (ref 96–108)
CO2 SERPL-SCNC: 29 MMOL/L — SIGNIFICANT CHANGE UP (ref 22–31)
CREAT SERPL-MCNC: 0.93 MG/DL — SIGNIFICANT CHANGE UP (ref 0.5–1.3)
CULTURE RESULTS: SIGNIFICANT CHANGE UP
EOSINOPHIL # BLD AUTO: 0.01 K/UL — SIGNIFICANT CHANGE UP (ref 0–0.5)
EOSINOPHIL NFR BLD AUTO: 0.1 % — SIGNIFICANT CHANGE UP (ref 0–6)
GLUCOSE SERPL-MCNC: 91 MG/DL — SIGNIFICANT CHANGE UP (ref 70–99)
HCT VFR BLD CALC: 25.8 % — LOW (ref 39–50)
HCT VFR BLD CALC: 27.3 % — LOW (ref 39–50)
HCT VFR BLD CALC: 28.4 % — LOW (ref 39–50)
HGB BLD-MCNC: 8.2 G/DL — LOW (ref 13–17)
HGB BLD-MCNC: 8.6 G/DL — LOW (ref 13–17)
HGB BLD-MCNC: 8.9 G/DL — LOW (ref 13–17)
IMM GRANULOCYTES NFR BLD AUTO: 1.3 % — SIGNIFICANT CHANGE UP (ref 0–1.5)
LYMPHOCYTES # BLD AUTO: 0.69 K/UL — LOW (ref 1–3.3)
LYMPHOCYTES # BLD AUTO: 6.3 % — LOW (ref 13–44)
MAGNESIUM SERPL-MCNC: 2.4 MG/DL — SIGNIFICANT CHANGE UP (ref 1.6–2.6)
MCHC RBC-ENTMCNC: 27.8 PG — SIGNIFICANT CHANGE UP (ref 27–34)
MCHC RBC-ENTMCNC: 31.5 GM/DL — LOW (ref 32–36)
MCV RBC AUTO: 88.3 FL — SIGNIFICANT CHANGE UP (ref 80–100)
MONOCYTES # BLD AUTO: 0.4 K/UL — SIGNIFICANT CHANGE UP (ref 0–0.9)
MONOCYTES NFR BLD AUTO: 3.6 % — SIGNIFICANT CHANGE UP (ref 2–14)
NEUTROPHILS # BLD AUTO: 9.79 K/UL — HIGH (ref 1.8–7.4)
NEUTROPHILS NFR BLD AUTO: 88.6 % — HIGH (ref 43–77)
NRBC # BLD: 0 /100 WBCS — SIGNIFICANT CHANGE UP (ref 0–0)
PHOSPHATE SERPL-MCNC: 2.5 MG/DL — SIGNIFICANT CHANGE UP (ref 2.5–4.5)
PLATELET # BLD AUTO: 265 K/UL — SIGNIFICANT CHANGE UP (ref 150–400)
POTASSIUM SERPL-MCNC: 3.6 MMOL/L — SIGNIFICANT CHANGE UP (ref 3.5–5.3)
POTASSIUM SERPL-SCNC: 3.6 MMOL/L — SIGNIFICANT CHANGE UP (ref 3.5–5.3)
RBC # BLD: 3.09 M/UL — LOW (ref 4.2–5.8)
RBC # FLD: 15 % — HIGH (ref 10.3–14.5)
SODIUM SERPL-SCNC: 145 MMOL/L — SIGNIFICANT CHANGE UP (ref 135–145)
SPECIMEN SOURCE: SIGNIFICANT CHANGE UP
WBC # BLD: 11.04 K/UL — HIGH (ref 3.8–10.5)
WBC # FLD AUTO: 11.04 K/UL — HIGH (ref 3.8–10.5)

## 2020-05-03 PROCEDURE — 99222 1ST HOSP IP/OBS MODERATE 55: CPT

## 2020-05-03 PROCEDURE — 99233 SBSQ HOSP IP/OBS HIGH 50: CPT | Mod: CS,GC

## 2020-05-03 RX ORDER — METOPROLOL TARTRATE 50 MG
12.5 TABLET ORAL
Refills: 0 | Status: DISCONTINUED | OUTPATIENT
Start: 2020-05-03 | End: 2020-05-05

## 2020-05-03 RX ADMIN — Medication 12.5 MILLIGRAM(S): at 17:24

## 2020-05-03 RX ADMIN — Medication 1 GRAM(S): at 00:00

## 2020-05-03 RX ADMIN — PANTOPRAZOLE SODIUM 40 MILLIGRAM(S): 20 TABLET, DELAYED RELEASE ORAL at 05:56

## 2020-05-03 RX ADMIN — Medication 1 GRAM(S): at 05:56

## 2020-05-03 RX ADMIN — PIPERACILLIN AND TAZOBACTAM 25 GRAM(S): 4; .5 INJECTION, POWDER, LYOPHILIZED, FOR SOLUTION INTRAVENOUS at 05:56

## 2020-05-03 RX ADMIN — SERTRALINE 100 MILLIGRAM(S): 25 TABLET, FILM COATED ORAL at 11:55

## 2020-05-03 RX ADMIN — Medication 1 GRAM(S): at 17:26

## 2020-05-03 RX ADMIN — Medication 1 GRAM(S): at 11:55

## 2020-05-03 RX ADMIN — SENNA PLUS 2 TABLET(S): 8.6 TABLET ORAL at 02:14

## 2020-05-03 RX ADMIN — Medication 1 SPRAY(S): at 05:56

## 2020-05-03 RX ADMIN — Medication 1 APPLICATION(S): at 14:31

## 2020-05-03 RX ADMIN — PIPERACILLIN AND TAZOBACTAM 25 GRAM(S): 4; .5 INJECTION, POWDER, LYOPHILIZED, FOR SOLUTION INTRAVENOUS at 22:00

## 2020-05-03 RX ADMIN — Medication 1 GRAM(S): at 22:01

## 2020-05-03 RX ADMIN — Medication 1 APPLICATION(S): at 22:00

## 2020-05-03 RX ADMIN — SENNA PLUS 2 TABLET(S): 8.6 TABLET ORAL at 22:00

## 2020-05-03 RX ADMIN — PIPERACILLIN AND TAZOBACTAM 25 GRAM(S): 4; .5 INJECTION, POWDER, LYOPHILIZED, FOR SOLUTION INTRAVENOUS at 00:00

## 2020-05-03 RX ADMIN — Medication 1 APPLICATION(S): at 05:56

## 2020-05-03 RX ADMIN — Medication 1 SPRAY(S): at 22:00

## 2020-05-03 RX ADMIN — PIPERACILLIN AND TAZOBACTAM 25 GRAM(S): 4; .5 INJECTION, POWDER, LYOPHILIZED, FOR SOLUTION INTRAVENOUS at 15:03

## 2020-05-03 NOTE — CONSULT NOTE ADULT - SUBJECTIVE AND OBJECTIVE BOX
Patient is a 79y old  Male who presents with a chief complaint of Generalized weakness (03 May 2020 12:43)      HISTORY OF PRESENT ILLNESS:  80 y/o male admitted for sacral decubitus, straight cath'd last night for urinary retention.  Nursing staff unsuccessfully tried to straight cath earlier today.  Pt in obvious discomfort and has a suprapubic mass (bladder).    PAST MEDICAL & SURGICAL HISTORY:  H/O vitamin B deficiency  Spinal stenosis  HTN (hypertension)  History of tonsillectomy      REVIEW OF SYSTEMS:    CONSTITUTIONAL: No weakness, fevers or chills  SKIN: No itching, burning, rashes, or lesions   EYES/ENT: No visual changes;  No vertigo or throat pain   NECK: No pain or stiffness  RESPIRATORY: No cough, wheezing, hemoptysis; No shortness of breath  CARDIOVASCULAR: No chest pain or palpitations  GASTROINTESTINAL: No abdominal or epigastric pain. No nausea, vomiting, diarrhea  NEUROLOGICAL: No numbness or weakness  GENITOURINARY:     No hematuria, dysuria, incontinence    All other review of systems is negative unless indicated above.    MEDICATIONS  (STANDING):  collagenase Ointment 1 Application(s) Topical three times a day  metoprolol tartrate 12.5 milliGRAM(s) Oral two times a day  pantoprazole    Tablet 40 milliGRAM(s) Oral before breakfast  piperacillin/tazobactam IVPB.. 3.375 Gram(s) IV Intermittent every 8 hours  senna 2 Tablet(s) Oral at bedtime  sertraline 100 milliGRAM(s) Oral daily  sodium chloride 0.65% Nasal 1 Spray(s) Both Nostrils two times a day  sucralfate suspension 1 Gram(s) Oral four times a day    MEDICATIONS  (PRN):      Allergies    No Known Allergies    Intolerances        FAMILY HISTORY:  No pertinent family history in first degree relatives      Vital Signs Last 24 Hrs  T(C): 37 (03 May 2020 07:55), Max: 37 (03 May 2020 07:55)  T(F): 98.6 (03 May 2020 07:55), Max: 98.6 (03 May 2020 07:55)  HR: 55 (03 May 2020 07:55) (55 - 147)  BP: 115/63 (03 May 2020 07:55) (87/49 - 134/71)  BP(mean): --  RR: 18 (03 May 2020 07:55) (17 - 21)  SpO2: 97% (03 May 2020 07:55) (97% - 99%)    PHYSICAL EXAM:    Constitutional: NAD, well-developed  HEENT: PERRLA, EOMI  Neck: Supple, full ROM  Back: No CVA tenderness  Respiratory: Normal repiratory effort  Cardiovascular: RRR  Abd: Soft, (+) suprapubic distention up to umbilicus  : Normal phallus,open meatus,bilateral descended testes, non-tender      LABS:                        8.6    11.04 )-----------( 265      ( 03 May 2020 06:28 )             27.3     05-03    145  |  109<H>  |  36<H>  ----------------------------<  91  3.6   |  29  |  0.93    Ca    8.2<L>      03 May 2020 06:28  Phos  2.5     05-03  Mg     2.4     05-03        EXAM:  CT ABDOMEN AND PELVIS                          EXAM:  CT CHEST                            PROCEDURE DATE:  04/29/2020          INTERPRETATION:  CT CHEST, ABDOMEN AND PELVIS WITHOUT CONTRAST    INDICATION: Shortness of breath. Hypotension. Altered mental status.     TECHNIQUE: Noncontrast CT of the chest, abdomen and pelvis. Images are reformatted in the sagittal and coronal planes.Postprocessed MIP reformatted chest images were created and reviewed.    COMPARISON: None.    FINDINGS:    Absence of intravenous contrast limits evaluation for traumatic injury, focal lesions, neoplasm, and vascular pathology.    Thorax:  Lines and tubes: None.  Airways: Tracheobronchial tree is patent.  Lungs and Pleura: Right basilar opacity containing air bronchograms. Mild patchy left lower lobe opacities are also identified. No significant pleural effusion. No pneumothorax.  Mediastinum and lymph nodes: No bulky adenopathy.  Heart: Trace pericardial effusion and/or thickening without significant cardiomegaly. Coronary artery calcification and/or stenting. Valvular calcifications.  Vessels: Atherosclerotic disease of the aorta and its branches. Ascending thoracic aorta measures up to 4.3 cm in maximum AP diameter.   Chest Wall: Within normal limits.    Abdomen/Pelvis:  Liver: No suspicious lesions.  Biliary: No significant dilatation. No calcified gallstones within the gallbladder.  Spleen: No suspicious lesions.  Pancreas: No inflammatory changes or ductal dilatation.  Adrenals: Normal.  Kidneys: No hydronephrosis. Bilateral renal cysts as well as too small to characterize hypodensities. Sub-cm nonobstructive right renal calculus.  Vessels: Atherosclerotic disease of the aorta and its branches.     GI tract: There is suggestion of focal circumferential wall thickening of distal ascending colonic loop without significant pericolonic stranding as best seen on images 78 through 79 of series 2. Consider nonemergent colonoscopy evaluation to exclude underlying malignancy. No evidence ofsmall bowel obstruction. No CT findings of acute appendicitis.    Peritoneum/retroperitoneum and mesentery: No free air. No organized fluid collection. No adenopathy.    Pelvic organs/Bladder: Heterogeneous prominent prostate with nodular density near the apex, cause mass effect and protrusion at the bladder base. Correlate with PSA and consider nonemergent prostatic workup to exclude malignancy. No significant bladder wall thickening.    Abdominal wall: Suggestion of left-sided sacral decubitus ulcer with punctate bubbles of air diffusely extending along symmetrically prominent left gluteal muscle and soft tissue folds. Partially imaged asymmetric density measuring 3.9 x 3.3 cm posteromedial to the left proximal femur on image 168 of series 2. Possibility of intramuscular hematoma considered in the differential.    Bones and soft tissues: Multilevel degenerative changes of the spine noted. Advanced osteoarthritis of bilateral hip joint. Advanced osteoarthritis of left shoulder joint withassociated soft tissue calcifications. Vague linear lucency identified at the level of the sacrococcygeal tip as best seen on images 149 through 151 of series 2. Correlate with point tenderness to exclude underlying nondisplaced fracture.    IMPRESSION:    Right basilar opacity containing air bronchograms. Additional patchy left lower lobe opacities. Bacterial and viral pneumonias including atypical agent such as COVID-19 considered in the differential. Correlate with clinical parameters, laboratory values and follow-up chest radiograph advised. No significant pleural effusion.     Suggestion of focal circumferential wall thickening of distal ascending colonic loop without significant pericolonic stranding. Consider nonemergent colonoscopy evaluation to exclude underlying malignancy. No evidence of small bowel obstruction.     Vague linear lucency identified at the level of the sacrococcygeal tip. Correlate with point tenderness to exclude underlying nondisplaced fracture.    Suggestion of left-sided sacral decubitus ulcer with punctate bubbles of air diffusely extending along symmetrically prominent left gluteal muscle and soft tissue folds. Partially imaged asymmetric density measuring 3.9 x 3.3 cm posteromedial to the left proximalfemur. Possibility of intramuscular hematoma considered in the differential.    Additional findings as mentioned above.    These results were discussed via telephone at 4/29/2020 3:55 AM by Dr. Baugh of radiology with Dr. Bailey, [facility] read-back verification policy was followed.                TATE BAUGH M.D., ATTENDING RADIOLOGIST  This document has been electronically signed. Apr 29 2020  4:10AM

## 2020-05-03 NOTE — PROGRESS NOTE ADULT - ASSESSMENT
78 y/o M with PMHx of HTN, BPH, spinal stenosis, dyslipidemia, anxiety, neuropathy, B12 deficiency who presents from [facility] rehab for evaluation of weakness and hypotension. Admitted for anemia and infection  poss 2/2  acute  GI bleed and stage 4 sacral decubitus .

## 2020-05-03 NOTE — PROGRESS NOTE ADULT - PROBLEM SELECTOR PLAN 1
acute  blood Anemia possible  2/2 lt hip chr  hematoma .  - Hgb 6.8, Lactate 2.5 as per [place] admission labs  - FOBT+   still no active bleed noticed and  pt again drop hb possible dilutional post iv fluid.   - s/p 2 unit prbc   - F/u CT abd/pelvis, U/S Abd-  left-sided sacral decubitus ulcer with punctate bubbles of air diffusely extending along symmetrically prominent left gluteal muscle and soft tissue folds. Partially imaged asymmetric density measuring 3.9 x 3.3 cm posteromedial to the left proximal femur.   Possibility of intramuscular hematoma considered in the differential.  - GI Dr. giordano no procedure endo / colonoscopy   this time  -   fu out pt for procedure .

## 2020-05-03 NOTE — PROGRESS NOTE ADULT - ASSESSMENT
78 y/o M with PMHx of HTN, BPH, spinal stenosis, dyslipidemia, anxiety, neuropathy, B12 deficiency resident of [facility] rehab for evaluation of weakness and hypotension with mental status change. Tested COVID 19 positive early April. In ED he was hypotensive. WBC 19K No SOB cough n/v/d. Pt is a poor historian. Has unstageable sacral decub ulcer. Also with Acute anemia. ?infectious process vs reactive leukocytosis. Leukocytosis poss chronic.  Respiratory status stable    5/2-on Vanco/Zosyn dropping wbc, stopped Vanco with neg MRSA screen plan for 5/3 as last day of Zosyn  5/3-today as last day of Zosyn then observation off abx-pt on room air.

## 2020-05-03 NOTE — CONSULT NOTE ADULT - ATTENDING COMMENTS
Above reviewed and discussed with surgical team. Scan reviewed. Discussed plan with Wound care team.
The patient was personally seen and examined, in addition to being examined and evaluated by NP.  All elements of the note were edited where appropriate.    asked by hospitalist team to see for sustained svt  variable rate in svt, treated with ivf  sustained for hours  bb had been held, with additional stressor of infection.  converted to sr  does not appear to be af or flutter  bb resumed at a low dose  further care can be as per dr fernandez who has followed her for several days already. d/w dr sterling
DORITA Figueroa  Prognosis guarded  DNR

## 2020-05-03 NOTE — PROGRESS NOTE ADULT - SUBJECTIVE AND OBJECTIVE BOX
infectious diseases progress note:    LISANDRA PADGETT is a 79y y. o. Male patient    COVID Patient    Allergies    No Known Allergies    Intolerances        ANTIBIOTICS/RELEVANT:  antimicrobials  piperacillin/tazobactam IVPB.. 3.375 Gram(s) IV Intermittent every 8 hours    immunologic:    OTHER:  collagenase Ointment 1 Application(s) Topical three times a day  metoprolol tartrate 12.5 milliGRAM(s) Oral two times a day  pantoprazole    Tablet 40 milliGRAM(s) Oral before breakfast  senna 2 Tablet(s) Oral at bedtime  sertraline 100 milliGRAM(s) Oral daily  sodium chloride 0.65% Nasal 1 Spray(s) Both Nostrils two times a day  sucralfate suspension 1 Gram(s) Oral four times a day      Objective:  Vital Signs Last 24 Hrs  T(C): 37 (03 May 2020 07:55), Max: 37 (03 May 2020 07:55)  T(F): 98.6 (03 May 2020 07:55), Max: 98.6 (03 May 2020 07:55)  HR: 55 (03 May 2020 07:55) (55 - 147)  BP: 115/63 (03 May 2020 07:55) (87/49 - 134/71)  BP(mean): --  RR: 18 (03 May 2020 07:55) (17 - 21)  SpO2: 97% (03 May 2020 07:55) (97% - 99%)    T(C): 37 (05-03-20 @ 07:55), Max: 37 (05-03-20 @ 07:55)  T(C): 37 (05-03-20 @ 07:55), Max: 37 (05-03-20 @ 07:55)  T(C): 37 (05-03-20 @ 07:55), Max: 37.6 (04-29-20 @ 11:22)    PHYSICAL EXAM:  HEENT: NC atraumatic  Neck: supple  Respiratory: no accessory muscle use, breathing comfortably  Cardiovascular: distant  Gastrointestinal: normal appearing, nondistended  Extremities: no clubbing, no cyanosis,      LABS:                          8.6    11.04 )-----------( 265      ( 03 May 2020 06:28 )             27.3       11.04 05-03 @ 06:28  10.87 05-02 @ 07:36  18.52 05-01 @ 06:36  19.65 04-30 @ 07:37  19.86 04-29 @ 07:23  19.63 04-28 @ 22:41      05-03    145  |  109<H>  |  36<H>  ----------------------------<  91  3.6   |  29  |  0.93    Ca    8.2<L>      03 May 2020 06:28  Phos  2.5     05-03  Mg     2.4     05-03        Creatinine, Serum: 0.93 mg/dL (05-03-20 @ 06:28)  Creatinine, Serum: 1.00 mg/dL (05-02-20 @ 07:36)  Creatinine, Serum: 1.10 mg/dL (05-01-20 @ 06:36)  Creatinine, Serum: 1.00 mg/dL (04-30-20 @ 07:37)  Creatinine, Serum: 0.96 mg/dL (04-29-20 @ 07:23)  Creatinine, Serum: 1.10 mg/dL (04-28-20 @ 22:41)                COVID RISK SCORE  Auto Neutrophil #: 9.79 K/uL (05-03-20 @ 06:28)  Auto Lymphocyte #: 0.69 K/uL (05-03-20 @ 06:28)  Auto Neutrophil #: 9.66 K/uL (05-02-20 @ 07:36)  Auto Lymphocyte #: 0.68 K/uL (05-02-20 @ 07:36)  Auto Neutrophil #: 16.98 K/uL (05-01-20 @ 06:36)  Auto Lymphocyte #: 0.83 K/uL (05-01-20 @ 06:36)  Auto Neutrophil #: 17.96 K/uL (04-29-20 @ 07:23)  Auto Lymphocyte #: 0.75 K/uL (04-29-20 @ 07:23)  Auto Neutrophil #: 17.73 K/uL (04-28-20 @ 22:41)  Auto Lymphocyte #: 0.69 K/uL (04-28-20 @ 22:41)      Auto Eosinophil #: 0.01 K/uL (05-03-20 @ 06:28)  Auto Eosinophil #: 0.02 K/uL (05-02-20 @ 07:36)  Auto Eosinophil #: 0.03 K/uL (05-01-20 @ 06:36)  Auto Eosinophil #: 0.01 K/uL (04-29-20 @ 07:23)  Auto Eosinophil #: 0.01 K/uL (04-28-20 @ 22:41)    Lactate Dehydrogenase, Serum: 216 U/L (04-29-20 @ 08:28)    Sedimentation Rate, Erythrocyte: 67 mm/hr (05-01-20 @ 06:36)  Sedimentation Rate, Erythrocyte: 74 mm/hr (04-30-20 @ 07:37)    Procalcitonin, Serum: 1.15 ng/mL (04-30-20 @ 07:37)            Ferritin, Serum: 2452 ng/mL (05-01-20 @ 08:43)  Ferritin, Serum: 3649 ng/mL (04-30-20 @ 10:43)  Ferritin, Serum: 2627 ng/mL (04-29-20 @ 08:29)          D-Dimer Assay, Quantitative: 631 ng/mL DDU (05-01-20 @ 06:36)  D-Dimer Assay, Quantitative: 736 ng/mL DDU (04-29-20 @ 06:59)        MICROBIOLOGY:              RADIOLOGY & ADDITIONAL STUDIES:

## 2020-05-03 NOTE — CONSULT NOTE ADULT - SUBJECTIVE AND OBJECTIVE BOX
[facility] - Jeremy Arreola, Roxanne Emerson, [place], [place], Bianca Renee  Office Number: [phone]    Initial Consult Note:  This is a 80 y/o M with HTN, HLD, BPH, anxiety, and B12 deficiency, a Rehab resident presented with hypotension with H/H 7/22.1 found to have muscular hematoma (left posterior femur) s/p PRBC's i unit and IV boluses.   Course complicated with Pna consistent with COVID infection.  We were called for tachycardia that occurred overnight.  Resolved towards the morning    CHIEF COMPLAINT: Patient is a 79y old  Male who presents with a chief complaint of Generalized weakness (03 May 2020 11:19)    HPI:  80 y/o M with PMHx of HTN, BPH, spinal stenosis, dyslipidemia, anxiety, neuropathy, B12 deficiency who presents from [facility] rehab for evaluation of weakness and hypotension. As per transfer papers, pt with confusion after lunch today. Pt admits to generalized weakness. Lives with wife, Stephany and son. He came from [facility] rehab in [place] since Feb 14. Patient lives with wife and son at home and ambulates with cane. Contracted Covid-19 in [facility] rehab and first started having symptoms on Mar 25, reporting symptoms of cough and low grade fever, diarrhea. Denies any chest pain, shortness of breath, headache, dizziness. History obtained from wife, Stephany([phone]) as pt is confused and a poor historian.   Of note, pt was noted to have sacral wounds today and reported low grade fever for two days and mental status changes today and loss of appetite.    The date the pt first felt unwell: March 25  Fever or chills: yes [ x ]   no [  ]   - Tmax:   Fatigue, malaise or generalized weakness: yes [ x ]   no [  ]  Shortness of breath/dyspnea: yes [ x ]   no [  ]  Cough: yes [ x ]   no [  ], sputum production: yes [  ]   no [x  ]  Blood in sputum: yes [  ]   no [ x ]  Anorexia/po intolerance: yes [  ]   no [ x ]  Chest pain or chest tightness: yes [  ]   no [ x ]  Edema in legs: yes [  ]   no [ x ]  Myalgias: yes [  ]   no [ x ]  Headache: yes [  ]   no [x  ]  Sore throat: yes [  ]   no [ x ]  Rhinorrhea: yes [  ]   no [  x]  Abd pain: yes [  ]   no [ x ]  Nausea: yes [  ]   no [x  ]  Vomiting: yes [  ]   no [  x]  Diarrhea: yes [  ]   no [  ]  Skin rashes: yes [  ]   no [  ]  Loss of sense of smell/anosmia: yes [  ]   no [ x ]  Conjunctivitis: yes [  ]   no [ x ]  Recent travel: yes [  ]   no [ x ] - Location:   Any sick contacts: yes [x  ]   no [  ]: Contracted from [facility] rehab  Close contact with someone confirmed positive with COVID-19 / SARS-CoV2 in the last 14 days: yes [  ]   no [ x ]  Code status: DNR, but not DNI      In the ED  Vitals: T--, , BP 84/48, RR 22, O2 96 on RA  Labs(In [place]) significant for: wbc 18.45, hgb 6.8, FOBT+,   Coag studies: PT 14.9, INR 1.33, aPTT 43.7, d-dimer 561  Chem panel: Lactate 2.5, Na 133, AG 3, BUN/Cr 61/1.61, Alb 1.9, Alk phos 195, ,   UA neg  Covid-19 PCR Positive   EKG shows Accelerated Junctional rhythm with occasional PVCs  CXR in [place] ED shows: Mild R atelectasis  In [place] ED, s/p 2L NS bolus, 1 unit pRBC (28 Apr 2020 21:00)    PAST MEDICAL & SURGICAL HISTORY:  H/O vitamin B deficiency  Spinal stenosis  HTN (hypertension)  History of tonsillectomy    SOCIAL HISTORY:  No tobacco, ethanol, or drug abuse.  FAMILY HISTORY:  No pertinent family history in first degree relatives    No family history of acute MI or sudden cardiac death.  MEDICATIONS  (STANDING):  collagenase Ointment 1 Application(s) Topical three times a day  metoprolol tartrate 12.5 milliGRAM(s) Oral two times a day  pantoprazole    Tablet 40 milliGRAM(s) Oral before breakfast  piperacillin/tazobactam IVPB.. 3.375 Gram(s) IV Intermittent every 8 hours  senna 2 Tablet(s) Oral at bedtime  sertraline 100 milliGRAM(s) Oral daily  sodium chloride 0.65% Nasal 1 Spray(s) Both Nostrils two times a day  sucralfate suspension 1 Gram(s) Oral four times a day    MEDICATIONS  (PRN):    Allergies    No Known Allergies    Intolerances      REVIEW OF SYSTEMS:  CONSTITUTIONAL: No weakness, fevers or chills  EYES/ENT: No visual changes;  No vertigo or throat pain   NECK: No pain or stiffness  RESPIRATORY: No cough, wheezing, hemoptysis; No shortness of breath  CARDIOVASCULAR: No chest pain or palpitations  GASTROINTESTINAL: No abdominal pain. No nausea, vomiting, or hematemesis; No diarrhea or constipation. No melena or hematochezia.  GENITOURINARY: No dysuria, frequency or hematuria  NEUROLOGICAL: No numbness or weakness  SKIN: No itching or rash  All other review of systems is negative unless indicated above  VITAL SIGNS:   Vital Signs Last 24 Hrs  T(C): 37 (03 May 2020 07:55), Max: 37 (03 May 2020 07:55)  T(F): 98.6 (03 May 2020 07:55), Max: 98.6 (03 May 2020 07:55)  HR: 55 (03 May 2020 07:55) (55 - 147)  BP: 115/63 (03 May 2020 07:55) (87/49 - 134/71)  BP(mean): --  RR: 18 (03 May 2020 07:55) (17 - 21)  SpO2: 97% (03 May 2020 07:55) (97% - 99%)  I&O's Summary    02 May 2020 07:01  -  03 May 2020 07:00  --------------------------------------------------------  IN: 470 mL / OUT: 775 mL / NET: -305 mL      On Exam:  Constitutional: NAD, alert and oriented x 3  Lungs:  Non-labored, breath sounds are clear bilaterally, No wheezing, rales or rhonchi  Cardiovascular: RRR.  S1 and S2 positive.  No murmurs, rubs, gallops or clicks  Gastrointestinal: Bowel Sounds present, soft, nontender.   Lymph: No peripheral edema. No cervical lymphadenopathy.  Neurological: Alert, no focal deficits  Skin: No rashes or ulcers   Psych:  Mood & affect appropriate.    LABS: All Labs Reviewed:                        8.6    11.04 )-----------( 265      ( 03 May 2020 06:28 )             27.3                         8.2    x     )-----------( x        ( 03 May 2020 01:08 )             25.8                         9.2    x     )-----------( x        ( 02 May 2020 19:02 )             28.8     03 May 2020 06:28    145    |  109    |  36     ----------------------------<  91     3.6     |  29     |  0.93   02 May 2020 07:36    141    |  105    |  40     ----------------------------<  96     3.6     |  29     |  1.00   01 May 2020 06:36    140    |  103    |  50     ----------------------------<  107    3.6     |  28     |  1.10     Ca    8.2        03 May 2020 06:28  Ca    8.3        02 May 2020 07:36  Ca    8.8        01 May 2020 06:36  Phos  2.5       03 May 2020 06:28  Mg     2.4       03 May 2020 06:28    Blood Culture: Organism --  Gram Stain Blood -- Gram Stain --  Specimen Source .Blood Blood  Culture-Blood --    Organism --  Gram Stain Blood -- Gram Stain --  Specimen Source .Blood Blood-Peripheral  Culture-Blood --    RADIOLOGY:  < from: CT Chest No Cont (04.29.20 @ 01:24) >    EXAM:  CT ABDOMEN AND PELVIS                          EXAM:  CT CHEST                            PROCEDURE DATE:  04/29/2020          INTERPRETATION:  CT CHEST, ABDOMEN AND PELVIS WITHOUT CONTRAST    INDICATION: Shortness of breath. Hypotension. Altered mental status.     TECHNIQUE: Noncontrast CT of the chest, abdomen and pelvis. Images are reformatted in the sagittal and coronal planes.Postprocessed MIP reformatted chest images were created and reviewed.    COMPARISON: None.    FINDINGS:    Absence of intravenous contrast limits evaluation for traumatic injury, focal lesions, neoplasm, and vascular pathology.    Thorax:  Lines and tubes: None.  Airways: Tracheobronchial tree is patent.  Lungs and Pleura: Right basilar opacity containing air bronchograms. Mild patchy left lower lobe opacities are also identified. No significant pleural effusion. No pneumothorax.  Mediastinum and lymph nodes: No bulky adenopathy.  Heart: Trace pericardial effusion and/or thickening without significant cardiomegaly. Coronary artery calcification and/or stenting. Valvular calcifications.  Vessels: Atherosclerotic disease of the aorta and its branches. Ascending thoracic aorta measures up to 4.3 cm in maximum AP diameter.   Chest Wall: Within normal limits.    Abdomen/Pelvis:  Liver: No suspicious lesions.  Biliary: No significant dilatation. No calcified gallstones within the gallbladder.  Spleen: No suspicious lesions.  Pancreas: No inflammatory changes or ductal dilatation.  Adrenals: Normal.  Kidneys: No hydronephrosis. Bilateral renal cysts as well as too small to characterize hypodensities. Sub-cm nonobstructive right renal calculus.  Vessels: Atherosclerotic disease of the aorta and its branches.     GI tract: There is suggestion of focal circumferential wall thickening of distal ascending colonic loop without significant pericolonic stranding as best seen on images 78 through 79 of series 2. Consider nonemergent colonoscopy evaluation to exclude underlying malignancy. No evidence ofsmall bowel obstruction. No CT findings of acute appendicitis.    Peritoneum/retroperitoneum and mesentery: No free air. No organized fluid collection. No adenopathy.    Pelvic organs/Bladder: Heterogeneous prominent prostate with nodular density near the apex, cause mass effect and protrusion at the bladder base. Correlate with PSA and consider nonemergent prostatic workup to exclude malignancy. No significant bladder wall thickening.    Abdominal wall: Suggestion of left-sided sacral decubitus ulcer with punctate bubbles of air diffusely extending along symmetrically prominent left gluteal muscle and soft tissue folds. Partially imaged asymmetric density measuring 3.9 x 3.3 cm posteromedial to the left proximal femur on image 168 of series 2. Possibility of intramuscular hematoma considered in the differential.    Bones and soft tissues: Multilevel degenerative changes of the spine noted. Advanced osteoarthritis of bilateral hip joint. Advanced osteoarthritis of left shoulder joint withassociated soft tissue calcifications. Vague linear lucency identified at the level of the sacrococcygeal tip as best seen on images 149 through 151 of series 2. Correlate with point tenderness to exclude underlying nondisplaced fracture.    IMPRESSION:    Right basilar opacity containing air bronchograms. Additional patchy left lower lobe opacities. Bacterial and viral pneumonias including atypical agent such as COVID-19 considered in the differential. Correlate with clinical parameters, laboratory values and follow-up chest radiograph advised. No significant pleural effusion.     Suggestion of focal circumferential wall thickening of distal ascending colonic loop without significant pericolonic stranding. Consider nonemergent colonoscopy evaluation to exclude underlying malignancy. No evidence of small bowel obstruction.     Vague linear lucency identified at the level of the sacrococcygeal tip. Correlate with point tenderness to exclude underlying nondisplaced fracture.    Suggestion of left-sided sacral decubitus ulcer with punctate bubbles of air diffusely extending along symmetrically prominent left gluteal muscle and soft tissue folds. Partially imaged asymmetric density measuring 3.9 x 3.3 cm posteromedial to the left proximalfemur. Possibility of intramuscular hematoma considered in the differential.    Additional findings as mentioned above.    These results were discussed via telephone at 4/29/2020 3:55 AM by Dr. Baugh of radiology with Dr. Bailey, institution read-back verification policy was followed.    TATE BAUGH M.D., ATTENDING RADIOLOGIST  This document has been electronically signed. Apr 29 2020  4:10AM      < end of copied text >    EKG:  < from: 12 Lead ECG (05.02.20 @ 21:27) >    Ventricular Rate 99 BPM    Atrial Rate 99 BPM    P-R Interval 170 ms    QRS Duration 100 ms    Q-T Interval 416 ms    QTC Calculation(Bezet) 533 ms    P Axis 41 degrees    R Axis -54 degrees    T Axis 35 degrees    Diagnosis Line Poor data quality  svt with pvc  Left anterior fascicular block  Cannot rule out Septal infarct , age undetermined  Abnormal ECG  When compared with ECG of 29-APR-2020 10:11,    QT has lengthened  Confirmed by Roxanne ONEAL, Justin ([id number]) on 5/3/2020 11:10:52 AM    < end of copied text > [facility] - Jeremy Arreola, Roxanne Emerson, Edmar Alonzo, Bianca Renee  Office Number: [phone]    Initial Consult Note:  This is a 80 y/o M with HTN, HLD, BPH, anxiety, and B12 deficiency, a Rehab resident presented with hypotension with H/H 7/22.1 found to have muscular hematoma (left posterior femur) s/p PRBC's i unit and IV boluses.   Course complicated with bacteremia and Pna consistent with COVID infection.  We were called for tachycardia that occurred overnight.  Resolved after 1 hour.    CHIEF COMPLAINT: Patient is a 79y old  Male who presents with a chief complaint of Generalized weakness (03 May 2020 11:19)    HPI:  80 y/o M with PMHx of HTN, BPH, spinal stenosis, dyslipidemia, anxiety, neuropathy, B12 deficiency who presents from [facility] rehab for evaluation of weakness and hypotension. As per transfer papers, pt with confusion after lunch today. Pt admits to generalized weakness. Lives with wife, Stephany and son. He came from [facility] rehab in [place] since Feb 14. Patient lives with wife and son at home and ambulates with cane. Contracted Covid-19 in [facility] rehab and first started having symptoms on Mar 25, reporting symptoms of cough and low grade fever, diarrhea. Denies any chest pain, shortness of breath, headache, dizziness. History obtained from wife, Stephany([phone]) as pt is confused and a poor historian.   Of note, pt was noted to have sacral wounds today and reported low grade fever for two days and mental status changes today and loss of appetite.    In the ED  Vitals: T--, , BP 84/48, RR 22, O2 96 on RA  Labs(In [place]) significant for: wbc 18.45, hgb 6.8, FOBT+,   Coag studies: PT 14.9, INR 1.33, aPTT 43.7, d-dimer 561  Chem panel: Lactate 2.5, Na 133, AG 3, BUN/Cr 61/1.61, Alb 1.9, Alk phos 195, ,   UA neg  Covid-19 PCR Positive   EKG shows Accelerated Junctional rhythm with occasional PVCs  CXR in [place] ED shows: Mild R atelectasis  In [place] ED, s/p 2L NS bolus, 1 unit pRBC (28 Apr 2020 21:00)    PAST MEDICAL & SURGICAL HISTORY:  H/O vitamin B deficiency  Spinal stenosis  HTN (hypertension)  History of tonsillectomy    SOCIAL HISTORY:  No tobacco, ethanol, or drug abuse.  FAMILY HISTORY:  No pertinent family history in first degree relatives    No family history of acute MI or sudden cardiac death.  MEDICATIONS  (STANDING):  collagenase Ointment 1 Application(s) Topical three times a day  metoprolol tartrate 12.5 milliGRAM(s) Oral two times a day  pantoprazole    Tablet 40 milliGRAM(s) Oral before breakfast  piperacillin/tazobactam IVPB.. 3.375 Gram(s) IV Intermittent every 8 hours  senna 2 Tablet(s) Oral at bedtime  sertraline 100 milliGRAM(s) Oral daily  sodium chloride 0.65% Nasal 1 Spray(s) Both Nostrils two times a day  sucralfate suspension 1 Gram(s) Oral four times a day    MEDICATIONS  (PRN):    Allergies    No Known Allergies    Intolerances    REVIEW OF SYSTEMS:  CONSTITUTIONAL: No weakness, fevers or chills  EYES/ENT: No visual changes;  No vertigo or throat pain   NECK: No pain or stiffness  RESPIRATORY: No cough, wheezing, hemoptysis; No shortness of breath  CARDIOVASCULAR: No chest pain or palpitations  GASTROINTESTINAL: No abdominal pain. No nausea, vomiting, or hematemesis; No diarrhea or constipation. No melena or hematochezia.  GENITOURINARY: No dysuria, frequency or hematuria  MSK: + Left hip pain  : + Bladder tenderness  NEUROLOGICAL: No numbness or weakness  SKIN: No itching or rash  All other review of systems is negative unless indicated above  VITAL SIGNS:   Vital Signs Last 24 Hrs  T(C): 37 (03 May 2020 07:55), Max: 37 (03 May 2020 07:55)  T(F): 98.6 (03 May 2020 07:55), Max: 98.6 (03 May 2020 07:55)  HR: 55 (03 May 2020 07:55) (55 - 147)  BP: 115/63 (03 May 2020 07:55) (87/49 - 134/71)  BP(mean): --  RR: 18 (03 May 2020 07:55) (17 - 21)  SpO2: 97% (03 May 2020 07:55) (97% - 99%)  I&O's Summary    02 May 2020 07:01  -  03 May 2020 07:00  --------------------------------------------------------  IN: 470 mL / OUT: 775 mL / NET: -305 mL    On Exam:  Constitutional: NAD, alert and oriented x 3.  Cachectic  Lungs:  Non-labored, breath sounds are equal bilaterally, No wheezing, rales.  + Fine rhonchi at bases  Cardiovascular: RRR.  S1 and S2 positive.  No murmurs, rubs, gallops or clicks  Gastrointestinal: Bowel Sounds present, soft, nontender.   Lymph: No peripheral edema. No cervical lymphadenopathy.  Neurological: Alert, no focal deficits  Skin: No rashes or ulcers.  Dry skin  : + Bladder distention  Psych:  Mood & affect appropriate.    LABS: All Labs Reviewed:                        8.6    11.04 )-----------( 265      ( 03 May 2020 06:28 )             27.3                         8.2    x     )-----------( x        ( 03 May 2020 01:08 )             25.8                         9.2    x     )-----------( x        ( 02 May 2020 19:02 )             28.8     03 May 2020 06:28    145    |  109    |  36     ----------------------------<  91     3.6     |  29     |  0.93   02 May 2020 07:36    141    |  105    |  40     ----------------------------<  96     3.6     |  29     |  1.00   01 May 2020 06:36    140    |  103    |  50     ----------------------------<  107    3.6     |  28     |  1.10     Ca    8.2        03 May 2020 06:28  Ca    8.3        02 May 2020 07:36  Ca    8.8        01 May 2020 06:36  Phos  2.5       03 May 2020 06:28  Mg     2.4       03 May 2020 06:28    Blood Culture: Organism --  Gram Stain Blood -- Gram Stain --  Specimen Source .Blood Blood  Culture-Blood --    Organism --  Gram Stain Blood -- Gram Stain --  Specimen Source .Blood Blood-Peripheral  Culture-Blood --    RADIOLOGY:  < from: CT Chest No Cont (04.29.20 @ 01:24) >    EXAM:  CT ABDOMEN AND PELVIS                          EXAM:  CT CHEST                          PROCEDURE DATE:  04/29/2020      INTERPRETATION:  CT CHEST, ABDOMEN AND PELVIS WITHOUT CONTRAST    INDICATION: Shortness of breath. Hypotension. Altered mental status.     TECHNIQUE: Noncontrast CT of the chest, abdomen and pelvis. Images are reformatted in the sagittal and coronal planes.Postprocessed MIP reformatted chest images were created and reviewed.    COMPARISON: None.    FINDINGS:    Absence of intravenous contrast limits evaluation for traumatic injury, focal lesions, neoplasm, and vascular pathology.    Thorax:  Lines and tubes: None.  Airways: Tracheobronchial tree is patent.  Lungs and Pleura: Right basilar opacity containing air bronchograms. Mild patchy left lower lobe opacities are also identified. No significant pleural effusion. No pneumothorax.  Mediastinum and lymph nodes: No bulky adenopathy.  Heart: Trace pericardial effusion and/or thickening without significant cardiomegaly. Coronary artery calcification and/or stenting. Valvular calcifications.  Vessels: Atherosclerotic disease of the aorta and its branches. Ascending thoracic aorta measures up to 4.3 cm in maximum AP diameter.   Chest Wall: Within normal limits.    Abdomen/Pelvis:  Liver: No suspicious lesions.  Biliary: No significant dilatation. No calcified gallstones within the gallbladder.  Spleen: No suspicious lesions.  Pancreas: No inflammatory changes or ductal dilatation.  Adrenals: Normal.  Kidneys: No hydronephrosis. Bilateral renal cysts as well as too small to characterize hypodensities. Sub-cm nonobstructive right renal calculus.  Vessels: Atherosclerotic disease of the aorta and its branches.     GI tract: There is suggestion of focal circumferential wall thickening of distal ascending colonic loop without significant pericolonic stranding as best seen on images 78 through 79 of series 2. Consider nonemergent colonoscopy evaluation to exclude underlying malignancy. No evidence ofsmall bowel obstruction. No CT findings of acute appendicitis.    Peritoneum/retroperitoneum and mesentery: No free air. No organized fluid collection. No adenopathy.    Pelvic organs/Bladder: Heterogeneous prominent prostate with nodular density near the apex, cause mass effect and protrusion at the bladder base. Correlate with PSA and consider nonemergent prostatic workup to exclude malignancy. No significant bladder wall thickening.    Abdominal wall: Suggestion of left-sided sacral decubitus ulcer with punctate bubbles of air diffusely extending along symmetrically prominent left gluteal muscle and soft tissue folds. Partially imaged asymmetric density measuring 3.9 x 3.3 cm posteromedial to the left proximal femur on image 168 of series 2. Possibility of intramuscular hematoma considered in the differential.    Bones and soft tissues: Multilevel degenerative changes of the spine noted. Advanced osteoarthritis of bilateral hip joint. Advanced osteoarthritis of left shoulder joint withassociated soft tissue calcifications. Vague linear lucency identified at the level of the sacrococcygeal tip as best seen on images 149 through 151 of series 2. Correlate with point tenderness to exclude underlying nondisplaced fracture.    IMPRESSION:    Right basilar opacity containing air bronchograms. Additional patchy left lower lobe opacities. Bacterial and viral pneumonias including atypical agent such as COVID-19 considered in the differential. Correlate with clinical parameters, laboratory values and follow-up chest radiograph advised. No significant pleural effusion.     Suggestion of focal circumferential wall thickening of distal ascending colonic loop without significant pericolonic stranding. Consider nonemergent colonoscopy evaluation to exclude underlying malignancy. No evidence of small bowel obstruction.     Vague linear lucency identified at the level of the sacrococcygeal tip. Correlate with point tenderness to exclude underlying nondisplaced fracture.    Suggestion of left-sided sacral decubitus ulcer with punctate bubbles of air diffusely extending along symmetrically prominent left gluteal muscle and soft tissue folds. Partially imaged asymmetric density measuring 3.9 x 3.3 cm posteromedial to the left proximalfemur. Possibility of intramuscular hematoma considered in the differential.    Additional findings as mentioned above.    These results were discussed via telephone at 4/29/2020 3:55 AM by Dr. Baugh of radiology with Dr. Bailey, institution read-back verification policy was followed.    TATE BAUGH M.D., ATTENDING RADIOLOGIST  This document has been electronically signed. Apr 29 2020  4:10AM      < end of copied text >    EKG:  < from: 12 Lead ECG (05.02.20 @ 21:27) >    Ventricular Rate 99 BPM    Atrial Rate 99 BPM    P-R Interval 170 ms    QRS Duration 100 ms    Q-T Interval 416 ms    QTC Calculation(Bezet) 533 ms    P Axis 41 degrees    R Axis -54 degrees    T Axis 35 degrees    Diagnosis Line Poor data quality  svt with pvc  Left anterior fascicular block  Cannot rule out Septal infarct , age undetermined  Abnormal ECG  When compared with ECG of 29-APR-2020 10:11,    QT has lengthened  Confirmed by Justin Oliveira MD ([id number]) on 5/3/2020 11:10:52 AM    < end of copied text >

## 2020-05-03 NOTE — CONSULT NOTE ADULT - REASON FOR ADMISSION
Generalized weakness

## 2020-05-03 NOTE — CHART NOTE - NSCHARTNOTEFT_GEN_A_CORE
Assessment:   Pt seen for malnutrition follow-up. Information obtained from chart review and discussion with RN. Per RN, pt with very low intake and "not eating." Currently receiving soft, low-fat diet + Ensure Enlive/ Ensure pudding two times per day. While on the phone, RN asked pt what he desired to eat and offered oatmeal, mac n'cheese, farina, etc. however pt declined all. Noted pt consumed some fluids/a few spoonfuls of Ensure pudding, however contents of meals consistently being discarded. Persistent diarrhea reported, episodes today and yesterday. Noted today pt distended and uncomfortable, pt straight catheterized per MD order. No evidence of GIB per GI. Attempted to contact Dr. Salazar regarding pt's nutritional plan of care, ?dysphagia, and addition of appetite stimulant, unavailable at this time.    Factors impacting intake: [ ] none [ ] nausea  [ ] vomiting [ x ] diarrhea [ ] constipation  [ ]chewing problems [ ] swallowing issues  [ x ] other: persistent lack of appetite; FTT    Diet Presciption: Diet, Soft:   Low Fat (LOWFAT)  Supplement Feeding Modality:  Oral  Ensure Enlive Cans or Servings Per Day:  1       Frequency:  Two Times a day (04-30-20 @ 11:13)    Intake: Low (25-75% documented during admission, however intake currently poor)    Current Weight: (5/3) 117.2 lb    Pertinent Medications: MEDICATIONS  (STANDING):  collagenase Ointment 1 Application(s) Topical three times a day  metoprolol tartrate 12.5 milliGRAM(s) Oral two times a day  pantoprazole    Tablet 40 milliGRAM(s) Oral before breakfast  piperacillin/tazobactam IVPB.. 3.375 Gram(s) IV Intermittent every 8 hours  senna 2 Tablet(s) Oral at bedtime  sertraline 100 milliGRAM(s) Oral daily  sodium chloride 0.65% Nasal 1 Spray(s) Both Nostrils two times a day  sucralfate suspension 1 Gram(s) Oral four times a day    MEDICATIONS  (PRN):    Pertinent Labs: 05-03 Na145 mmol/L Glu 91 mg/dL K+ 3.6 mmol/L Cr  0.93 mg/dL BUN 36 mg/dL<H> 05-03 Phos 2.5 mg/dL 04-29 Alb 1.9 g/dL<L>     CAPILLARY BLOOD GLUCOSE        Skin: No edema. Stage I, R toe. Unstageable, sacrum. Multuple DTIs documented.    Estimated Needs:   [ x ] no change since previous assessment  [ ] recalculated:     Previous Nutrition Diagnosis:   [ ] Inadequate Energy Intake [ ]Inadequate Oral Intake [ ] Excessive Energy Intake   [ ] Underweight [ ] Increased Nutrient Needs [ ] Overweight/Obesity   [ ] Altered GI Function [ ] Unintended Weight Loss [ ] Food & Nutrition Related Knowledge Deficit [ x ] Malnutrition     Nutrition Diagnosis is [ x ] ongoing, being addressed with PO diet + supplements     New Nutrition Diagnosis: [ x ] not applicable       Interventions:   Recommend  [ x ] Change Diet To: Consider d/c low-fat dietary restriction. Recommend SLP evaluation to assess swallowing mechanism and determine safest tolerated diet texture/consistency.   [ x ] Nutrition Supplement: Continue Ensure Enlive/Ensure Pudding two times per day.  [ ] Nutrition Support  [ x ] Other: Encourage intake of meals/snacks and liquids. Provide 2x water/juices with all meals for hydration. Consider appetite stimulant to encourage pt's oral intake. Monitor diarrhea/GI distress.    Monitoring and Evaluation:   [ x ] PO intake [ x ] Tolerance to diet prescription [ x ] weights [ x ] labs[ x ] follow up per protocol  [ ] other:

## 2020-05-03 NOTE — PROGRESS NOTE ADULT - SUBJECTIVE AND OBJECTIVE BOX
INTERVAL HPI/OVERNIGHT EVENTS:    chart reviewed  low po intake   yesterday with some loose Brown stools   no rectal bleeding per d/w staff    MEDICATIONS  (STANDING):  collagenase Ointment 1 Application(s) Topical three times a day  metoprolol tartrate 12.5 milliGRAM(s) Oral two times a day  pantoprazole    Tablet 40 milliGRAM(s) Oral before breakfast  piperacillin/tazobactam IVPB.. 3.375 Gram(s) IV Intermittent every 8 hours  senna 2 Tablet(s) Oral at bedtime  sertraline 100 milliGRAM(s) Oral daily  sodium chloride 0.65% Nasal 1 Spray(s) Both Nostrils two times a day  sucralfate suspension 1 Gram(s) Oral four times a day    MEDICATIONS  (PRN):      Allergies    No Known Allergies    Intolerances        Review of Systems: Per current hospital emergency protocol, in an effort to reduce COVID exposures and also conserve PPE for necessary encounters, all data within this chart were reviewed and recommendations are based upon information in the chart and by verbal communication with covering team and/or nurses. Please refer to the ROS and PE per covering primary team note          Vital Signs Last 24 Hrs  T(C): 37 (03 May 2020 07:55), Max: 37 (03 May 2020 07:55)  T(F): 98.6 (03 May 2020 07:55), Max: 98.6 (03 May 2020 07:55)  HR: 55 (03 May 2020 07:55) (55 - 147)  BP: 115/63 (03 May 2020 07:55) (87/49 - 134/71)  BP(mean): --  RR: 18 (03 May 2020 07:55) (17 - 21)  SpO2: 97% (03 May 2020 07:55) (97% - 99%)    PHYSICAL EXAM: Per current hospital emergency protocol, in an effort to reduce COVID exposures and also conserve PPE for necessary encounters, all data within this chart were reviewed and recommendations are based upon information in the chart and by verbal communication with covering team and/or nurses. Please refer to the ROS and PE per covering primary team note          LABS:                        8.6    11.04 )-----------( 265      ( 03 May 2020 06:28 )             27.3     05-03    145  |  109<H>  |  36<H>  ----------------------------<  91  3.6   |  29  |  0.93    Ca    8.2<L>      03 May 2020 06:28  Phos  2.5     05-03  Mg     2.4     05-03            RADIOLOGY & ADDITIONAL TESTS:

## 2020-05-03 NOTE — PROGRESS NOTE ADULT - PROBLEM SELECTOR PLAN 2
stage  4  Possible  source of infection  - Leukocytosis resolving  , blood cult neg todate.   - Continue IV Zosyn 3.375 gm q8hr   - Collagenase ointment application  - surg dr maguire  s/p debridement

## 2020-05-03 NOTE — PROGRESS NOTE ADULT - ASSESSMENT
78 y/o M with PMHx of HTN, BPH, spinal stenosis, dyslipidemia, anxiety, neuropathy, B12 deficiency who presents from rehab for evaluation of weakness and hypotension    Anemia   no overt gi bleeding of note; suspect r/t sacral wound   sp sacral decub debridement; care per surgery   monitor cbc, transfuse prn  cont ppi and carafate  monitor stool color   defer endoscopic eval as +Covid and increased risk for anesthesia in these pts   op gi f/u for possible colonoscopy if none recent once covid crisis resolved given ct findings    FTT/Dysphagia   unclear etiology; esophagram at [facility] 2/2019 normal   cont ppi    on supplements per nutrition   rec appetite stimulant   defer endoscopic eval as +Covid and increased risk for anesthesia in these pts    COVID-19  monitor rep status   inc lfts in setting of viral illness  care per primary team

## 2020-05-03 NOTE — PROGRESS NOTE ADULT - PROBLEM SELECTOR PLAN 8
was  hypotensive on admission resolved ,  for new svt  episode resume bb small dose titrate up   - Will hold off on home Valsartan

## 2020-05-03 NOTE — PROGRESS NOTE ADULT - ASSESSMENT
The patient is a 79 year old male with a history of HTN, HL, pre-DM, anxiety, spinal stenosis who presents with weakness and AMS in the setting of worsening anemia, dehydration, COVID-19.    Plan:  - ECG with above noted abnormalities; largely unchanged from prior  - Cardiac enzymes negative  - BNP normal at 47  - CXR with mild right atelectasis  - SVT overnight - resume metoprolol tartrate at 12.5 mg bid. Will titrate up to home dose of 50 mg bid if BP allows.  - Underwent debridement of sacral decub  - COVID-19 positive  - Not hypoxic on RA

## 2020-05-03 NOTE — PROGRESS NOTE ADULT - ATTENDING COMMENTS
pt seen and examine today -    acute metabolic encephalopathy likely due to hypovolemic shock   resolved  -   Hypotension likely due to hypovolemia from  acute blood loss  anemia   sec to  lt side hematoma possible  and  sec to possible lower gi bleed as colon thickening in ct scan but not candidate for colonoscopy as per gi dr giordano   s/p 2 liters of NS bolus , dc  midodrine    - bp stable    resumed bb low dose .   s/p 2 unit of PRBC  ,  fu up  hb/hct  remain  stable no further bleed noticed  ,   guiac positive / no fresh blood per rectum .  ct abd / pelvis  chr lt hematoma - stable hb /hct .   sacral decubitus  stage 4 -   10x 10 cm ,  Necrotic tissues were sharply debrided and the wound was packed s/p  chemical /mechanical debridement  by dr maguire surg bedside    continue  iv abx Zosyn 3.375 gm q8hr   last dose   today   , blood cult neg todate .  moderate  protein malnutrition - ensure supplement. - Cardiac enzymes negative. pt with episode of s/p   - SVT overnight - resume metoprolol tartrate at 12.5 mg bid. Will titrate up to home dose of 50 mg bid if BP allows   cardio dr ortega gp .  COVID 19 Infection  +    no acute   pna  , ra pulse  ox above 92 .   pt having recurrent urinary ret ation on / off   sec   possible  constipation   will put rodriguez this time will need out pt urologist evaluation   pt wife aware tt plan    , dc planning rehab 24 to 48 hr  .

## 2020-05-03 NOTE — PROGRESS NOTE ADULT - SUBJECTIVE AND OBJECTIVE BOX
Patient is a 79y old  Male who presents with a chief complaint of Generalized weakness (03 May 2020 11:19)      HPI:  78 y/o M with PMHx of HTN, BPH, spinal stenosis, dyslipidemia, anxiety, neuropathy, B12 deficiency who presents from [facility] rehab for evaluation of weakness and hypotension. As per transfer papers, pt with confusion after lunch today. Pt admits to generalized weakness. Lives with wife, Stephany and son. He came from [facility] rehab in [place] since Feb 14. Patient lives with wife and son at home and ambulates with cane. Contracted Covid-19 in [facility] rehab and first started having symptoms on Mar 25, reporting symptoms of cough and low grade fever, diarrhea. Denies any chest pain, shortness of breath, headache, dizziness. History obtained from wife, Stephany([phone]) as pt is confused and a poor historian.   Of note, pt was noted to have sacral wounds today and reported low grade fever for two days and mental status changes today and loss of appetite.    Admitted for acute blood loss  anemia and infection possible  2/2 GI bleed and  stage 4    sacral decub  +   , covid  +.  pt seen and examine today  -     alert  awake ,  no sob ,  no hypoxia  ,  pt had urinary retention recurrent need Menendez this time .         INTERVAL HPI/OVERNIGHT EVENTS:  T(C): 37 (05-03-20 @ 07:55), Max: 37 (05-03-20 @ 07:55)  HR: 55 (05-03-20 @ 07:55) (55 - 147)  BP: 115/63 (05-03-20 @ 07:55) (87/49 - 134/71)  RR: 18 (05-03-20 @ 07:55) (17 - 21)  SpO2: 97% (05-03-20 @ 07:55) (97% - 99%)  Wt(kg): --  I&O's Summary    02 May 2020 07:01  -  03 May 2020 07:00  --------------------------------------------------------  IN: 470 mL / OUT: 775 mL / NET: -305 mL      REVIEW OF SYSTEMS:  CONSTITUTIONAL: No fever, weight loss, + fatigue  EYES: No eye pain, visual disturbances, or discharge  ENMT:  No sinus or throat pain  NECK: No pain or stiffness   RESPIRATORY: No cough, wheezing, chills  No shortness of breath  CARDIOVASCULAR: No chest pain, palpitations, dizziness, or leg swelling  GASTROINTESTINAL: No abdominal or epigastric pain. No nausea, vomiting,   No diarrhea or constipation,  No melena .  GENITOURINARY: No dysuria, frequency, hematuria, or incontinence ,   urinary  retention +   NEUROLOGICAL: No headaches, memory loss, loss of strength,   or tremor.  SKIN: No itching, burning, rashes, or lesions  sacral wound+  MUSCULOSKELETAL: No joint pain or swelling; No muscle, back, or extremity pain    PHYSICAL EXAM:  GENERAL: NAD, well-groomed, weak +  HEAD:  Atraumatic, Normocephalic  EYES: EOMI, PERRLA, conjunctiva and sclera clear  ENMT: Moist mucous membranes,  NECK: Supple,   NERVOUS SYSTEM:  Alert & Oriented X2, Motor Strength 5/5 B/L upper and lower extremities;   DTRs 2+ intact and symmetric  CHEST/LUNG:  percussion bilaterally; No rales, rhonchi, wheezing,   HEART: Regular rate and rhythm; No murmurs,  no tachy   ABDOMEN: Soft, Nontender, Nondistended; Bowel sounds present  EXTREMITIES:  2+ Peripheral Pulses, No clubbing, cyanosis, or edema  SKIN: No rashes or lesions stage 4 decubitus 10/10 cm +  gu ext cath urine +        MEDICATIONS  (STANDING):  collagenase Ointment 1 Application(s) Topical three times a day  metoprolol tartrate 12.5 milliGRAM(s) Oral two times a day  pantoprazole    Tablet 40 milliGRAM(s) Oral before breakfast  piperacillin/tazobactam IVPB.. 3.375 Gram(s) IV Intermittent every 8 hours  senna 2 Tablet(s) Oral at bedtime  sertraline 100 milliGRAM(s) Oral daily  sodium chloride 0.65% Nasal 1 Spray(s) Both Nostrils two times a day  sucralfate suspension 1 Gram(s) Oral four times a day    MEDICATIONS  (PRN):      LABS:                        8.6    11.04 )-----------( 265      ( 03 May 2020 06:28 )             27.3     05-03    145  |  109<H>  |  36<H>  ----------------------------<  91  3.6   |  29  |  0.93    Ca    8.2<L>      03 May 2020 06:28  Phos  2.5     05-03  Mg     2.4     05-03 05-02 @ 00:30   No growth to date.  --  --  04-30 @ 11:12   No growth to date.  --  --          RADIOLOGY & ADDITIONAL TESTS:    Imaging Personally Reviewed:     no new test   Advance Directives:  dnr     Palliative Care: appropriate

## 2020-05-03 NOTE — CONSULT NOTE ADULT - ASSESSMENT
Assessment: This is a 80 y/o M with HTN, HLD, BPH, anxiety, and B12 deficiency, a Rehab resident presented with hypotension with H/H 7/22.1 found to have muscular hematoma (left posterior femur) s/p PRBC's i unit and IV boluses.   Course complicated with Pna consistent with COVID infection.  We were called for tachycardia that occurred overnight.  Resolved towards the morning    Sinus Tachycardia      Jennifer Hickman DNP, NP-C  Cardiology  Spectra #[phone] Assessment: This is a 78 y/o M with HTN, HLD, BPH, anxiety, and B12 deficiency, a Rehab resident presented with hypotension with H/H 7/22.1 found to have muscular hematoma (left posterior femur) s/p PRBC's i unit and IV boluses.   Course complicated with Pna consistent with COVID infection.  We were called for tachycardia that occurred overnight.  Resolved towards the morning    SVT        Jennifer Hickman DNP, NP-C  Cardiology  Spectra #[phone] Assessment: This is a 80 y/o M with HTN, HLD, BPH, anxiety, and B12 deficiency, a Rehab resident presented with hypotension with H/H 7/22.1 found to have muscular hematoma (left posterior femur) s/p PRBC's i unit and IV boluses.   Course complicated with bacteremia and Pna consistent with COVID infection.  We were called for tachycardia that occurred overnight.  Resolved towards the morning    SVT  - He has no Hx of arrhythmias   - He went into SVT, rate of 140's x 1 hour yesterday and broke spontaneously to NSR without any chemical intervention  - His tachycardia was likely multifactorial: anemia, dehydration, and pain from bladder distention secondary to urinary retention   - He is s/p IV boluses 500 cc x 2.  He broke to NSR and remained in NSR on tele  - His EKG showed tachycardia at 140's but no evidence of Afib or any ischemic changes  - Monitor electrolytes, replete to keep K>4 and Mag>2  - Can continue low dose BB with parameter    COVID+  - He is comfortable on RA  - Supportive care  - NLR stable  - O2 supplement as needed    Anemia  - Presented with Hgb of 7 from hematoma, s/p 1 unit PRBC's  - Continue to monitor and transfuse as needed    Bacteremia  - Abx per ID    DVT ppx  - Per Primary    Will continue to kobe Hickman DNP, NP-C  Cardiology  Spectra #[phone]

## 2020-05-03 NOTE — PROGRESS NOTE ADULT - SUBJECTIVE AND OBJECTIVE BOX
Chief Complaint: Weakness, AMS    Interval Events: Noted to be hypotensive with SVT overnight.    Review of Systems:  General: No fevers, chills, weight loss or gain  Skin: No rashes, color changes  Cardiovascular: No chest pain, orthopnea  Respiratory: No shortness of breath, cough  Gastrointestinal: No nausea, abdominal pain  Genitourinary: No incontinence, pain with urination  Musculoskeletal: No pain, swelling, decreased range of motion  Neurological: No headache, weakness  Psychiatric: No depression, anxiety  Endocrine: No weight loss or gain, increased thirst  All other systems are comprehensively negative.    Physical Exam:  Vital Signs Last 24 Hrs  T(C): 37 (03 May 2020 07:55), Max: 37 (03 May 2020 07:55)  T(F): 98.6 (03 May 2020 07:55), Max: 98.6 (03 May 2020 07:55)  HR: 55 (03 May 2020 07:55) (55 - 147)  BP: 115/63 (03 May 2020 07:55) (87/49 - 134/71)  BP(mean): --  RR: 18 (03 May 2020 07:55) (17 - 21)  SpO2: 97% (03 May 2020 07:55) (97% - 99%)  General: NAD  HEENT: MMM  Neck: No JVD, no carotid bruit  Extremities: No LE edema, no cyanosis  Neuro: Non-focal  Skin: No rash    Labs:             05-03    145  |  109<H>  |  36<H>  ----------------------------<  91  3.6   |  29  |  0.93    Ca    8.2<L>      03 May 2020 06:28  Phos  2.5     05-03  Mg     2.4     05-03                          8.6    11.04 )-----------( 265      ( 03 May 2020 06:28 )             27.3       Telemetry: Sinus rhythm, PVCs, SVT

## 2020-05-03 NOTE — CONSULT NOTE ADULT - ASSESSMENT
Unable to place a 16 Fr Coude cath.  I was able to to place a 16 Fr councill cath over guidewire after accessing the bladder with a guidewire and serially dilating the urethra up to 18 Fr.  Drained clear, yellow urine.    Pt to be transferred to SNF tomorrow with rodriguez.

## 2020-05-04 LAB
ANION GAP SERPL CALC-SCNC: 8 MMOL/L — SIGNIFICANT CHANGE UP (ref 5–17)
BASOPHILS # BLD AUTO: 0.02 K/UL — SIGNIFICANT CHANGE UP (ref 0–0.2)
BASOPHILS NFR BLD AUTO: 0.2 % — SIGNIFICANT CHANGE UP (ref 0–2)
BUN SERPL-MCNC: 30 MG/DL — HIGH (ref 7–23)
CALCIUM SERPL-MCNC: 8.1 MG/DL — LOW (ref 8.5–10.1)
CHLORIDE SERPL-SCNC: 109 MMOL/L — HIGH (ref 96–108)
CO2 SERPL-SCNC: 27 MMOL/L — SIGNIFICANT CHANGE UP (ref 22–31)
CREAT SERPL-MCNC: 0.8 MG/DL — SIGNIFICANT CHANGE UP (ref 0.5–1.3)
EOSINOPHIL # BLD AUTO: 0.01 K/UL — SIGNIFICANT CHANGE UP (ref 0–0.5)
EOSINOPHIL NFR BLD AUTO: 0.1 % — SIGNIFICANT CHANGE UP (ref 0–6)
GLUCOSE SERPL-MCNC: 80 MG/DL — SIGNIFICANT CHANGE UP (ref 70–99)
HCT VFR BLD CALC: 27.3 % — LOW (ref 39–50)
HGB BLD-MCNC: 8.6 G/DL — LOW (ref 13–17)
IMM GRANULOCYTES NFR BLD AUTO: 1 % — SIGNIFICANT CHANGE UP (ref 0–1.5)
LYMPHOCYTES # BLD AUTO: 1.09 K/UL — SIGNIFICANT CHANGE UP (ref 1–3.3)
LYMPHOCYTES # BLD AUTO: 8.8 % — LOW (ref 13–44)
MCHC RBC-ENTMCNC: 27.3 PG — SIGNIFICANT CHANGE UP (ref 27–34)
MCHC RBC-ENTMCNC: 31.5 GM/DL — LOW (ref 32–36)
MCV RBC AUTO: 86.7 FL — SIGNIFICANT CHANGE UP (ref 80–100)
MONOCYTES # BLD AUTO: 0.4 K/UL — SIGNIFICANT CHANGE UP (ref 0–0.9)
MONOCYTES NFR BLD AUTO: 3.2 % — SIGNIFICANT CHANGE UP (ref 2–14)
NEUTROPHILS # BLD AUTO: 10.76 K/UL — HIGH (ref 1.8–7.4)
NEUTROPHILS NFR BLD AUTO: 86.7 % — HIGH (ref 43–77)
NRBC # BLD: 0 /100 WBCS — SIGNIFICANT CHANGE UP (ref 0–0)
PLATELET # BLD AUTO: 291 K/UL — SIGNIFICANT CHANGE UP (ref 150–400)
POTASSIUM SERPL-MCNC: 2.8 MMOL/L — CRITICAL LOW (ref 3.5–5.3)
POTASSIUM SERPL-MCNC: 3.5 MMOL/L — SIGNIFICANT CHANGE UP (ref 3.5–5.3)
POTASSIUM SERPL-SCNC: 2.8 MMOL/L — CRITICAL LOW (ref 3.5–5.3)
POTASSIUM SERPL-SCNC: 3.5 MMOL/L — SIGNIFICANT CHANGE UP (ref 3.5–5.3)
RBC # BLD: 3.15 M/UL — LOW (ref 4.2–5.8)
RBC # FLD: 15.2 % — HIGH (ref 10.3–14.5)
SODIUM SERPL-SCNC: 144 MMOL/L — SIGNIFICANT CHANGE UP (ref 135–145)
WBC # BLD: 12.4 K/UL — HIGH (ref 3.8–10.5)
WBC # FLD AUTO: 12.4 K/UL — HIGH (ref 3.8–10.5)

## 2020-05-04 PROCEDURE — 99233 SBSQ HOSP IP/OBS HIGH 50: CPT | Mod: CS,GC

## 2020-05-04 RX ORDER — POTASSIUM CHLORIDE 20 MEQ
10 PACKET (EA) ORAL ONCE
Refills: 0 | Status: COMPLETED | OUTPATIENT
Start: 2020-05-04 | End: 2020-05-04

## 2020-05-04 RX ORDER — POTASSIUM CHLORIDE 20 MEQ
40 PACKET (EA) ORAL ONCE
Refills: 0 | Status: COMPLETED | OUTPATIENT
Start: 2020-05-04 | End: 2020-05-04

## 2020-05-04 RX ORDER — SENNA PLUS 8.6 MG/1
1 TABLET ORAL
Qty: 0 | Refills: 0 | DISCHARGE
Start: 2020-05-04

## 2020-05-04 RX ORDER — COLLAGENASE CLOSTRIDIUM HIST. 250 UNIT/G
1 OINTMENT (GRAM) TOPICAL
Qty: 0 | Refills: 0 | DISCHARGE
Start: 2020-05-04

## 2020-05-04 RX ORDER — SERTRALINE 25 MG/1
1 TABLET, FILM COATED ORAL
Qty: 0 | Refills: 0 | DISCHARGE
Start: 2020-05-04

## 2020-05-04 RX ORDER — SENNA PLUS 8.6 MG/1
2 TABLET ORAL
Qty: 0 | Refills: 0 | DISCHARGE
Start: 2020-05-04

## 2020-05-04 RX ORDER — METOPROLOL TARTRATE 50 MG
12.5 TABLET ORAL
Qty: 0 | Refills: 0 | DISCHARGE
Start: 2020-05-04

## 2020-05-04 RX ORDER — PANTOPRAZOLE SODIUM 20 MG/1
1 TABLET, DELAYED RELEASE ORAL
Qty: 0 | Refills: 0 | DISCHARGE
Start: 2020-05-04

## 2020-05-04 RX ORDER — SODIUM CHLORIDE 0.65 %
1 AEROSOL, SPRAY (ML) NASAL
Qty: 0 | Refills: 0 | DISCHARGE
Start: 2020-05-04

## 2020-05-04 RX ORDER — SUCRALFATE 1 G
10 TABLET ORAL
Qty: 0 | Refills: 0 | DISCHARGE
Start: 2020-05-04

## 2020-05-04 RX ADMIN — Medication 1 APPLICATION(S): at 06:07

## 2020-05-04 RX ADMIN — Medication 100 MILLIEQUIVALENT(S): at 10:29

## 2020-05-04 RX ADMIN — Medication 1 GRAM(S): at 06:05

## 2020-05-04 RX ADMIN — Medication 1 SPRAY(S): at 06:07

## 2020-05-04 RX ADMIN — Medication 1 GRAM(S): at 17:36

## 2020-05-04 RX ADMIN — Medication 1 SPRAY(S): at 17:38

## 2020-05-04 RX ADMIN — Medication 1 APPLICATION(S): at 14:03

## 2020-05-04 RX ADMIN — SERTRALINE 100 MILLIGRAM(S): 25 TABLET, FILM COATED ORAL at 12:16

## 2020-05-04 RX ADMIN — Medication 40 MILLIEQUIVALENT(S): at 12:15

## 2020-05-04 RX ADMIN — Medication 1 GRAM(S): at 12:15

## 2020-05-04 RX ADMIN — Medication 12.5 MILLIGRAM(S): at 17:36

## 2020-05-04 RX ADMIN — Medication 12.5 MILLIGRAM(S): at 06:06

## 2020-05-04 RX ADMIN — Medication 1 GRAM(S): at 22:34

## 2020-05-04 RX ADMIN — PANTOPRAZOLE SODIUM 40 MILLIGRAM(S): 20 TABLET, DELAYED RELEASE ORAL at 06:06

## 2020-05-04 RX ADMIN — Medication 100 MILLIEQUIVALENT(S): at 12:16

## 2020-05-04 NOTE — DISCHARGE NOTE PROVIDER - INSTRUCTIONS
low fat ensure 1 cane tid. low fat ensure 1 can three times a day in addition to your low salt low cholesterol diet Low fat, low salt, low cholesterol diet, with Ensure 1 can three times a day

## 2020-05-04 NOTE — PROGRESS NOTE ADULT - SUBJECTIVE AND OBJECTIVE BOX
Patient is a 79y old  Male who presents with a chief complaint of Generalized weakness (04 May 2020 09:47)      HPI:  78 y/o M with PMHx of HTN, BPH, spinal stenosis, dyslipidemia, anxiety, neuropathy, B12 deficiency who presents from [facility] rehab for evaluation of weakness and hypotension. As per transfer papers, pt with confusion after lunch today. Pt admits to generalized weakness. Lives with wife, Stephany and son. He came from [facility] rehab in [place] since Feb 14. Patient lives with wife and son at home and ambulates with cane. Contracted Covid-19 in [facility] rehab and first started having symptoms on Mar 25, reporting symptoms of cough and low grade fever, diarrhea. Denies any chest pain, shortness of breath, headache, dizziness. History obtained from wife, Stephany([phone]) as pt is confused and a poor historian.   Of note, pt was noted to have sacral wounds today and reported low grade fever for two days and mental status changes today and loss of appetite.              INTERVAL HPI/OVERNIGHT EVENTS:  T(C): 37.2 (05-04-20 @ 07:22), Max: 37.2 (05-04-20 @ 07:22)  HR: 77 (05-04-20 @ 07:22) (77 - 84)  BP: 155/70 (05-04-20 @ 07:22) (132/67 - 155/74)  RR: 19 (05-04-20 @ 07:22) (18 - 19)  SpO2: 95% (05-04-20 @ 07:22) (95% - 97%)  Wt(kg): --  I&O's Summary    03 May 2020 07:01  -  04 May 2020 07:00  --------------------------------------------------------  IN: 435 mL / OUT: 1400 mL / NET: -965 mL        REVIEW OF SYSTEMS:  CONSTITUTIONAL: No fever, weight loss, or fatigue  EYES: No eye pain, visual disturbances, or discharge  ENMT:  No difficulty hearing, tinnitus, vertigo; No sinus or throat pain  NECK: No pain or stiffness  BREASTS: No pain, no masses,   RESPIRATORY: No cough, wheezing,No shortness of breath  CARDIOVASCULAR: No chest pain, palpitations, dizziness, or leg swelling  GASTROINTESTINAL: No abdominal or epigastric pain. No nausea, vomiting, or hematemesis; No diarrhea or constipation. No melena or hematochezia.  GENITOURINARY: No dysuria, frequency, hematuria,   NEUROLOGICAL: No headaches, memory loss, loss of strength, numbness,   SKIN: No itching, burning, rashes, or lesions   LYMPH NODES: No enlarged glands  MUSCULOSKELETAL: No joint pain or swelling;   No muscle, back, or extremity pain    PHYSICAL EXAM:  GENERAL: NAD, well-groomed, well-developed  HEAD:  Atraumatic, Normocephalic  EYES: EOMI, PERRLA, conjunctiva and sclera clear  ENMT: Moist mucous membranes,   NECK: Supple, No JVD, Normal thyroid  NERVOUS SYSTEM:  Alert & Oriented X3, Good concentration;   Motor Strength 5/5 B/L upper and lower extremities; DTRs 2+ intact and symmetric  CHEST/LUNG:  percussion bilaterally; No rales, rhonchi, wheezing,  HEART: Regular rate and rhythm; No murmurs, rubs, or gallops  ABDOMEN: Soft, Nontender, Nondistended; Bowel sounds present  EXTREMITIES:  2+ Peripheral Pulses, No clubbing, cyanosis, or edema  SKIN: No rashes or lesions    MEDICATIONS  (STANDING):  collagenase Ointment 1 Application(s) Topical three times a day  metoprolol tartrate 12.5 milliGRAM(s) Oral two times a day  pantoprazole    Tablet 40 milliGRAM(s) Oral before breakfast  potassium chloride    Tablet ER 40 milliEquivalent(s) Oral once  potassium chloride  10 mEq/100 mL IVPB 10 milliEquivalent(s) IV Intermittent once  senna 2 Tablet(s) Oral at bedtime  sertraline 100 milliGRAM(s) Oral daily  sodium chloride 0.65% Nasal 1 Spray(s) Both Nostrils two times a day  sucralfate suspension 1 Gram(s) Oral four times a day    MEDICATIONS  (PRN):      LABS:                        8.6    12.40 )-----------( 291      ( 04 May 2020 07:34 )             27.3     05-04    144  |  109<H>  |  30<H>  ----------------------------<  80  2.8<LL>   |  27  |  0.80    Ca    8.1<L>      04 May 2020 07:34  Phos  2.5     05-03  Mg     2.4     05-03          CAPILLARY BLOOD GLUCOSE          05-02 @ 00:30   No growth to date.  --  --  04-30 @ 11:12   No growth to date.  --  --          RADIOLOGY & ADDITIONAL TESTS:    Imaging Personally Reviewed:       Advance Directives:      Palliative Care: Patient is a 79y old  Male who presents with a chief complaint of Generalized weakness (04 May 2020 09:47)      HPI:  80 y/o M with PMHx of HTN, BPH, spinal stenosis, dyslipidemia, anxiety, neuropathy, B12 deficiency who presents from [facility] rehab for evaluation of weakness and hypotension. As per transfer papers, pt with confusion after lunch today. Pt admits to generalized weakness. Lives with wife, Stephany and son. He came from [facility] rehab in [place] since Feb 14. Patient lives with wife and son at home and ambulates with cane. Contracted Covid-19 in [facility] rehab and first started having symptoms on Mar 25, reporting symptoms of cough and low grade fever, diarrhea. Denies any chest pain, shortness of breath, headache, dizziness. History obtained from wife, [phone]) as pt is confused and a poor historian.   Of note, pt was noted to have sacral wounds today and reported low grade fever for two days and mental status changes today and loss of appetite.    Admitted for acute blood loss  anemia and infection possible  2/2 GI bleed and  stage 4    sacral decub  +  sepsis poa   , covid  +.  pt seen and examine today  -     alert  awake ,  no sob ,  no hypoxia  ,  pt had urinary retention  with rodriguez  voiding   no hematuria . ,           INTERVAL HPI/OVERNIGHT EVENTS:  T(C): 37.2 (05-04-20 @ 07:22), Max: 37.2 (05-04-20 @ 07:22)  HR: 77 (05-04-20 @ 07:22) (77 - 84)  BP: 155/70 (05-04-20 @ 07:22) (132/67 - 155/74)  RR: 19 (05-04-20 @ 07:22) (18 - 19)  SpO2: 95% (05-04-20 @ 07:22) (95% - 97%)  Wt(kg): --  I&O's Summary    03 May 2020 07:01  -  04 May 2020 07:00  --------------------------------------------------------  IN: 435 mL / OUT: 1400 mL / NET: -965 mL          REVIEW OF SYSTEMS:  CONSTITUTIONAL: No fever, weight loss, + fatigue  EYES: No eye pain, visual disturbances, or discharge  ENMT:  No sinus or throat pain  NECK: No pain or stiffness   RESPIRATORY: No cough, wheezing, chills  No shortness of breath  CARDIOVASCULAR: No chest pain, palpitations, dizziness, or leg swelling  GASTROINTESTINAL: No abdominal or epigastric pain. No nausea, vomiting,   No diarrhea or constipation,  No melena .  GENITOURINARY: No dysuria, frequency, hematuria, or incontinence ,   urinary  retention +   NEUROLOGICAL: No headaches, memory loss, loss of strength,   or tremor.  SKIN: No itching, burning, rashes, or lesions  sacral wound+  MUSCULOSKELETAL: No joint pain or swelling; No muscle, back, or extremity pain    PHYSICAL EXAM:  GENERAL: NAD, well-groomed, weak +  HEAD:  Atraumatic, Normocephalic  EYES: EOMI, PERRLA, conjunctiva and sclera clear  ENMT: Moist mucous membranes,  NECK: Supple,   NERVOUS SYSTEM:  Alert & Oriented X2, Motor Strength 5/5 B/L upper and lower extremities;   DTRs 2+ intact and symmetric  CHEST/LUNG:  percussion bilaterally; No rales, rhonchi, wheezing,   HEART: Regular rate and rhythm; No murmurs,  no tachy   ABDOMEN: Soft, Nontender, Nondistended; Bowel sounds present  EXTREMITIES:  2+ Peripheral Pulses, No clubbing, cyanosis, or edema  SKIN: No rashes or lesions stage 4 decubitus 10/10 cm +  gu   rodriguez  urine +clean         MEDICATIONS  (STANDING):  collagenase Ointment 1 Application(s) Topical three times a day  metoprolol tartrate 12.5 milliGRAM(s) Oral two times a day  pantoprazole    Tablet 40 milliGRAM(s) Oral before breakfast  potassium chloride    Tablet ER 40 milliEquivalent(s) Oral once  potassium chloride  10 mEq/100 mL IVPB 10 milliEquivalent(s) IV Intermittent once  senna 2 Tablet(s) Oral at bedtime  sertraline 100 milliGRAM(s) Oral daily  sodium chloride 0.65% Nasal 1 Spray(s) Both Nostrils two times a day  sucralfate suspension 1 Gram(s) Oral four times a day    MEDICATIONS  (PRN):      LABS:                        8.6    12.40 )-----------( 291      ( 04 May 2020 07:34 )             27.3     05-04    144  |  109<H>  |  30<H>  ----------------------------<  80  2.8<LL>   |  27  |  0.80    Ca    8.1<L>      04 May 2020 07:34  Phos  2.5     05-03  Mg     2.4     05-03 05-02 @ 00:30   No growth to date.  --  --  04-30 @ 11:12   No growth to date.  --  --          RADIOLOGY & ADDITIONAL TESTS:    Imaging Personally Reviewed:     no new test   Advance Directives:  full code Patient is a 79y old  Male who presents with a chief complaint of Generalized weakness (04 May 2020 09:47)      HPI:  80 y/o M with PMHx of HTN, BPH, spinal stenosis, dyslipidemia, anxiety, neuropathy, B12 deficiency who presents from [facility] rehab for evaluation of weakness and hypotension. As per transfer papers, pt with confusion after lunch today. Pt admits to generalized weakness. Lives with wife, Stephany and son. He came from [facility] rehab in [place] since Feb 14. Patient lives with wife and son at home and ambulates with cane. Contracted Covid-19 in [facility] rehab and first started having symptoms on Mar 25, reporting symptoms of cough and low grade fever, diarrhea. Denies any chest pain, shortness of breath, headache, dizziness. History obtained from wife, Stephany([phone]) as pt is confused and a poor historian.   Of note, pt was noted to have sacral wounds today and reported low grade fever for two days and mental status changes today and loss of appetite.    Admitted for acute blood loss  anemia and infection possible  2/2 GI bleed and  stage 4    sacral decub  +  sepsis poa   , covid  +.  pt seen and examine today  -     alert  awake ,  no sob ,  no hypoxia  ,  pt had urinary retention  with rodriguez  voiding   no hematuria . ,           INTERVAL HPI/OVERNIGHT EVENTS:  T(C): 37.2 (05-04-20 @ 07:22), Max: 37.2 (05-04-20 @ 07:22)  HR: 77 (05-04-20 @ 07:22) (77 - 84)  BP: 155/70 (05-04-20 @ 07:22) (132/67 - 155/74)  RR: 19 (05-04-20 @ 07:22) (18 - 19)  SpO2: 95% (05-04-20 @ 07:22) (95% - 97%)  Wt(kg): --  I&O's Summary    03 May 2020 07:01  -  04 May 2020 07:00  --------------------------------------------------------  IN: 435 mL / OUT: 1400 mL / NET: -965 mL          REVIEW OF SYSTEMS:  CONSTITUTIONAL: No fever, weight loss, + fatigue  EYES: No eye pain, visual disturbances, or discharge  ENMT:  No sinus or throat pain  NECK: No pain or stiffness   RESPIRATORY: No cough, wheezing, chills  No shortness of breath  CARDIOVASCULAR: No chest pain, palpitations, dizziness, or leg swelling  GASTROINTESTINAL: No abdominal or epigastric pain. No nausea, vomiting,   No diarrhea or constipation,  No melena .  GENITOURINARY: No dysuria, frequency, hematuria, or incontinence ,   urinary  retention +   NEUROLOGICAL: No headaches, memory loss, loss of strength,   or tremor.  SKIN: No itching, burning, rashes, or lesions  sacral wound+  MUSCULOSKELETAL: No joint pain or swelling; No muscle, back, or extremity pain    PHYSICAL EXAM:  GENERAL: NAD, well-groomed, weak +  HEAD:  Atraumatic, Normocephalic  EYES: EOMI, PERRLA, conjunctiva and sclera clear  ENMT: Moist mucous membranes,  NECK: Supple,   NERVOUS SYSTEM:  Alert & Oriented X2, Motor Strength 5/5 B/L upper and lower extremities;   DTRs 2+ intact and symmetric  CHEST/LUNG:  percussion bilaterally; No rales, rhonchi, wheezing,   HEART: Regular rate and rhythm; No murmurs,  no tachy   ABDOMEN: Soft, Nontender, Nondistended; Bowel sounds present  EXTREMITIES:  2+ Peripheral Pulses, No clubbing, cyanosis, or edema  SKIN: No rashes or lesions stage 4 decubitus 10/10 cm +  gu   rodriguez  urine +clean         MEDICATIONS  (STANDING):  collagenase Ointment 1 Application(s) Topical three times a day  metoprolol tartrate 12.5 milliGRAM(s) Oral two times a day  pantoprazole    Tablet 40 milliGRAM(s) Oral before breakfast  potassium chloride    Tablet ER 40 milliEquivalent(s) Oral once  potassium chloride  10 mEq/100 mL IVPB 10 milliEquivalent(s) IV Intermittent once  senna 2 Tablet(s) Oral at bedtime  sertraline 100 milliGRAM(s) Oral daily  sodium chloride 0.65% Nasal 1 Spray(s) Both Nostrils two times a day  sucralfate suspension 1 Gram(s) Oral four times a day    MEDICATIONS  (PRN):      LABS:                        8.6    12.40 )-----------( 291      ( 04 May 2020 07:34 )             27.3     05-04    144  |  109<H>  |  30<H>  ----------------------------<  80  2.8<LL>   |  27  |  0.80    Ca    8.1<L>      04 May 2020 07:34  Phos  2.5     05-03  Mg     2.4     05-03 05-02 @ 00:30   No growth to date.  --  --  04-30 @ 11:12   No growth to date.  --  --          RADIOLOGY & ADDITIONAL TESTS:    Imaging Personally Reviewed:     no new test   Advance Directives:  dnr

## 2020-05-04 NOTE — PROGRESS NOTE ADULT - PROBLEM SELECTOR PLAN 6
COVID  +   - Maintain on airborne isolation.  - Not on O2, but provide supplemental O2 as needed  - Limit use of NSAIDs.  - Would avoid nebulized preparations to limit risk of aerosol formation.  - Labs Testing: Daily or As Needed: CBC w/ diff, CMP; Every 3 days: CRP, Ferritin, Procalcitonin.  - Goals of Care discussion had with patient/surrogate: (free text here} COVID  +  remain room air above 92 .   - Maintain on airborne isolation.  - Not on O2, but provide supplemental O2 as needed  - Limit use of NSAIDs.  - Would avoid nebulized preparations to limit risk of aerosol formation.

## 2020-05-04 NOTE — PROGRESS NOTE ADULT - ASSESSMENT
The patient is a 79 year old male with a history of HTN, HL, pre-DM, anxiety, spinal stenosis who presents with weakness and AMS in the setting of worsening anemia, dehydration, COVID-19.    Plan:  - ECG with above noted abnormalities; largely unchanged from prior  - Cardiac enzymes negative  - BNP normal at 47  - CXR with mild right atelectasis  - SVT - Continue metoprolol tartrate 12.5 mg bid. BP improved. Can increase to 50 mg bid as outpatient.  - Underwent debridement of sacral decub  - COVID-19 positive  - Not hypoxic on RA  - Discharge planning

## 2020-05-04 NOTE — PROGRESS NOTE ADULT - ASSESSMENT
Elderly pt with COVID pneumonia, likely resolving;  Doing well.  Clinically stable   Blood culture pos. .      Hypotension due to dehydration improved.   Anemia improved--borderline iron.   Sacral decubitus Statement Selected

## 2020-05-04 NOTE — DISCHARGE NOTE PROVIDER - NSDCFUADDAPPT_GEN_ALL_CORE_FT
fu up wound care for stage 4 decubitus - continue collagenase . fu up out pt urologist  with now  new rodriguez sec to urinary retention - voiding trial in 1  wk once pt more ambulating  .  fu out  cardio dr fernandez for bp  as you were hold on bp meds  previously sec to low bp  but later   started on  metoprolol    back , resume further meds as per close fu up bp . fu up wound care for stage 4 decubitus - continue  collagenase  once day [ consider  wound vac as per further  evaluation by wound care] . fu up out pt urologist  with now  new rodriguez sec to urinary retention - voiding trial in 1  wk once pt more ambulating  .  fu out  cardio dr fernandez for bp  as you were hold on bp meds  previously sec to low bp  but later   started on  metoprolol    back  tolerated well  , resume further meds as per close fu up bp . follow up with wound care for your stage 4 decubitus ulcer on your sacrum - continue  collagenase  once day  follow up with your urologist for your new rodriguez you have due to urinary retention  have a voiding trial in 1 week once your are walking more   -follow up with your cardiologist Dr. Ordonez for your blood pressure -Please follow up with your PMD within one week.  -Please follow up with outpatient (information below).  -Continue taking your medications as directed above.  -If symptoms persist/worsen, please call your PMD or return to the ED.

## 2020-05-04 NOTE — PROGRESS NOTE ADULT - SUBJECTIVE AND OBJECTIVE BOX
Chief Complaint: Weakness, AMS    Interval Events: No events overnight.    Review of Systems:  General: No fevers, chills, weight loss or gain  Skin: No rashes, color changes  Cardiovascular: No chest pain, orthopnea  Respiratory: No shortness of breath, cough  Gastrointestinal: No nausea, abdominal pain  Genitourinary: No incontinence, pain with urination  Musculoskeletal: No pain, swelling, decreased range of motion  Neurological: No headache, weakness  Psychiatric: No depression, anxiety  Endocrine: No weight loss or gain, increased thirst  All other systems are comprehensively negative.    Physical Exam:  Vital Signs Last 24 Hrs  T(C): 37.2 (04 May 2020 07:22), Max: 37.2 (04 May 2020 07:22)  T(F): 98.9 (04 May 2020 07:22), Max: 98.9 (04 May 2020 07:22)  HR: 77 (04 May 2020 07:22) (77 - 84)  BP: 155/70 (04 May 2020 07:22) (132/67 - 155/74)  BP(mean): --  RR: 19 (04 May 2020 07:22) (18 - 19)  SpO2: 95% (04 May 2020 07:22) (95% - 97%)  General: NAD  HEENT: MMM  Neck: No JVD, no carotid bruit  Extremities: No LE edema, no cyanosis  Neuro: Non-focal  Skin: No rash    Labs:             05-04    144  |  109<H>  |  30<H>  ----------------------------<  80  2.8<LL>   |  27  |  0.80    Ca    8.1<L>      04 May 2020 07:34  Phos  2.5     05-03  Mg     2.4     05-03                          8.6    12.40 )-----------( 291      ( 04 May 2020 07:34 )             27.3       Telemetry: Sinus rhythm

## 2020-05-04 NOTE — PROGRESS NOTE ADULT - SUBJECTIVE AND OBJECTIVE BOX
LISANDRA PADGETT is a 79yMale , patient examined and chart reviewed.    INTERVAL HPI/ OVERNIGHT EVENTS:   NAD.  Afebrile. On RA.  Menendez placed by urology yesterday sec AUR.    PAST MEDICAL & SURGICAL HISTORY:  H/O vitamin B deficiency  Spinal stenosis  HTN (hypertension)  History of tonsillectomy      For details regarding the patient's social history, family history, and other miscellaneous elements, please refer the initial infectious diseases consultation and/or the admitting history and physical examination for this admission.    ROS:  CONSTITUTIONAL:  Negative fever or chills  EYES:  Negative  blurry vision or double vision  CARDIOVASCULAR:  Negative for chest pain or palpitations  RESPIRATORY:  Negative for cough, wheezing, or SOB   GASTROINTESTINAL:  Negative for nausea, vomiting, diarrhea, constipation, or abdominal pain  GENITOURINARY:  Negative frequency, urgency or dysuria  NEUROLOGIC:  No headache, confusion, dizziness, lightheadedness  All other systems were reviewed and are negative         Current inpatient medications :  MEDICATIONS  (STANDING):  collagenase Ointment 1 Application(s) Topical three times a day  metoprolol tartrate 12.5 milliGRAM(s) Oral two times a day  pantoprazole    Tablet 40 milliGRAM(s) Oral before breakfast  sertraline 100 milliGRAM(s) Oral daily  sodium chloride 0.65% Nasal 1 Spray(s) Both Nostrils two times a day  sucralfate suspension 1 Gram(s) Oral four times a day      Objective:  Vital Signs Last 24 Hrs  T(C): 36.7 (04 May 2020 16:40), Max: 37.2 (04 May 2020 07:22)  T(F): 98 (04 May 2020 16:40), Max: 98.9 (04 May 2020 07:22)  HR: 87 (04 May 2020 17:42) (77 - 93)  BP: 129/71 (04 May 2020 17:42) (129/71 - 155/74)  RR: 18 (04 May 2020 16:40) (18 - 19)  SpO2: 93% (04 May 2020 16:40) (93% - 96%)    Physical Exam:  General:  no acute distress  Eyes: sclera anicteric, pupils equal and reactive to light  ENMT: buccal mucosa moist, pharynx not injected  Neck: supple, trachea midline  Lungs: clear, no wheeze/rhonchi  Cardiovascular: regular rate and rhythm, S1 S2  Abdomen: soft, nontender, no organomegaly present, bowel sounds normal  Neurological: alert and oriented x2 Cranial Nerves II-XII grossly intact  Skin: sacral decub drsg c/d/i   Lymph Nodes: no palpable cervical/supraclavicular lymph nodes enlargements  Extremities: no cyanosis/clubbing/edema        LABS:                        8.6    12.40 )-----------( 291      ( 04 May 2020 07:34 )             27.3   05-04    144  |  109<H>  |  30<H>  ----------------------------<  80  2.8<LL>   |  27  |  0.80    Ca    8.1<L>      04 May 2020 07:34  Phos  2.5     05-03  Mg     2.4     05-03        MICROBIOLOGY:    Culture - Blood (collected 02 May 2020 00:30)  Source: .Blood Blood  Preliminary Report (03 May 2020 01:03):    No growth to date.    Culture - Blood (collected 02 May 2020 00:30)  Source: .Blood Blood  Preliminary Report (03 May 2020 01:03):    No growth to date.    COVID-19 PCR . (04.28.20 @ 22:41)    COVID-19 PCR: Detected: This test has been validated by [employer] to be accurate;  though it has not been FDA cleared/approved by the usual pathway  As with all laboratory test, results should be correlated with clinical  findings.  https://www.fda.gov/media/157838/download  https://www.fda.gov/media/817284/download    RADIOLOGY & ADDITIONAL STUDIES:    EXAM:  CT ABDOMEN AND PELVIS                          EXAM:  CT CHEST                            PROCEDURE DATE:  04/29/2020          INTERPRETATION:  CT CHEST, ABDOMEN AND PELVIS WITHOUT CONTRAST    INDICATION: Shortness of breath. Hypotension. Altered mental status.     TECHNIQUE: Noncontrast CT of the chest, abdomen and pelvis. Images are reformatted in the sagittal and coronal planes.Postprocessed MIP reformatted chest images were created and reviewed.    COMPARISON: None.    FINDINGS:    Absence of intravenous contrast limits evaluation for traumatic injury, focal lesions, neoplasm, and vascular pathology.    Thorax:  Lines and tubes: None.  Airways: Tracheobronchial tree is patent.  Lungs and Pleura: Right basilar opacity containing air bronchograms. Mild patchy left lower lobe opacities are also identified. No significant pleural effusion. No pneumothorax.  Mediastinum and lymph nodes: No bulky adenopathy.  Heart: Trace pericardial effusion and/or thickening without significant cardiomegaly. Coronary artery calcification and/or stenting. Valvular calcifications.  Vessels: Atherosclerotic disease of the aorta and its branches. Ascending thoracic aorta measures up to 4.3 cm in maximum AP diameter.   Chest Wall: Within normal limits.    Abdomen/Pelvis:  Liver: No suspicious lesions.  Biliary: No significant dilatation. No calcified gallstones within the gallbladder.  Spleen: No suspicious lesions.  Pancreas: No inflammatory changes or ductal dilatation.  Adrenals: Normal.  Kidneys: No hydronephrosis. Bilateral renal cysts as well as too small to characterize hypodensities. Sub-cm nonobstructive right renal calculus.  Vessels: Atherosclerotic disease of the aorta and its branches.     GI tract: There is suggestion of focal circumferential wall thickening of distal ascending colonic loop without significant pericolonic stranding as best seen on images 78 through 79 of series 2. Consider nonemergent colonoscopy evaluation to exclude underlying malignancy. No evidence ofsmall bowel obstruction. No CT findings of acute appendicitis.    Peritoneum/retroperitoneum and mesentery: No free air. No organized fluid collection. No adenopathy.    Pelvic organs/Bladder: Heterogeneous prominent prostate with nodular density near the apex, cause mass effect and protrusion at the bladder base. Correlate with PSA and consider nonemergent prostatic workup to exclude malignancy. No significant bladder wall thickening.    Abdominal wall: Suggestion of left-sided sacral decubitus ulcer with punctate bubbles of air diffusely extending along symmetrically prominent left gluteal muscle and soft tissue folds. Partially imaged asymmetric density measuring 3.9 x 3.3 cm posteromedial to the left proximal femur on image 168 of series 2. Possibility of intramuscular hematoma considered in the differential.    Bones and soft tissues: Multilevel degenerative changes of the spine noted. Advanced osteoarthritis of bilateral hip joint. Advanced osteoarthritis of left shoulder joint withassociated soft tissue calcifications. Vague linear lucency identified at the level of the sacrococcygeal tip as best seen on images 149 through 151 of series 2. Correlate with point tenderness to exclude underlying nondisplaced fracture.    IMPRESSION:    Right basilar opacity containing air bronchograms. Additional patchy left lower lobe opacities. Bacterial and viral pneumonias including atypical agent such as COVID-19 considered in the differential. Correlate with clinical parameters, laboratory values and follow-up chest radiograph advised. No significant pleural effusion.     Suggestion of focal circumferential wall thickening of distal ascending colonic loop without significant pericolonic stranding. Consider nonemergent colonoscopy evaluation to exclude underlying malignancy. No evidence of small bowel obstruction.     Vague linear lucency identified at the level of the sacrococcygeal tip. Correlate with point tenderness to exclude underlying nondisplaced fracture.    Suggestion of left-sided sacral decubitus ulcer with punctate bubbles of air diffusely extending along symmetrically prominent left gluteal muscle and soft tissue folds. Partially imaged asymmetric density measuring 3.9 x 3.3 cm posteromedial to the left proximalfemur. Possibility of intramuscular hematoma considered in the differential.    Assessment :   80 y/o M with PMHx of HTN, BPH, spinal stenosis, dyslipidemia, anxiety, neuropathy, B12 deficiency resident of [facility] rehab for evaluation of weakness and hypotension with mental status change. Tested COVID 19 positive early April. In ED he was hypotensive. WBC 19K No SOB cough n/v/d. Pt is a poor historian. Has unstagable sacral decub ulcer. Also with Acute anemia. ?infectious process vs reactive leukocytosis. Leukocytosis poss chronic.  Respiratory status stable. Menendez placed sec AUR 5/3/2020    Plan :   Monitor off antibiotics  Completed Zosyn   Cultures ngtd  Seen by surgery and is sp bedside I&D of sacral decub  Menendez per urology  Frequent turning  Local wound care per surgery  COVID-19--critical period for decompensation  (7-14 days post symptom onset), avoid aerosolizing procedures, NSAIDs   -monitor respiratory status closely ( route of 02 and saturation) and titrate when able  -isolation precautions per protocol  -avoid excessive blood draws, blood cultures and frequent CXRs  -monitor biomarkers- CBC w diff  for NLRNLR <3 low vs >5 high) Ferritin (lower risk <450 vs >850) CRP (low risk <2 and higher risk >6) and LDH, D Dimer, procalcitonin  -therapies remains investigational-- including Hydroxychloroquine  -antibiotics only if there is concern for a bacterial process  -Steroids short course ( 5 days)  --for hypoxia/ARDS /shock      D/W Dr Hirsch    Continue with present regiment.  Appropriate use of antibiotics and adverse effects reviewed.      I have discussed the above plan of care with patient/ family in detail. They expressed understanding of the  treatment plan . Risks, benefits and alternatives discussed in detail. I have asked if they have any questions or concerns and appropriately addressed them to the best of my ability .    > 35 minutes were spent in direct patient care reviewing notes, medications ,labs data/ imaging , discussion with multidisciplinary team.    Thank you for allowing me to participate in care of your patient .    Naiam Lopez MD  Infectious Disease  [phone]

## 2020-05-04 NOTE — PROGRESS NOTE ADULT - ASSESSMENT
80 y/o M with PMHx of HTN, BPH, spinal stenosis, dyslipidemia, anxiety, neuropathy, B12 deficiency who presents from rehab for evaluation of weakness and hypotension    Anemia   no overt gi bleeding  sp sacral decub debridement; care per surgery   monitor cbc, transfuse prn  cont ppi and carafate  monitor stool color   defer endoscopic eval as +Covid and increased risk for anesthesia in these pts   op gi f/u for possible colonoscopy if none recent once covid crisis resolved given ct findings    FTT/Dysphagia   unclear etiology; esophagram at [facility] 2/2019 normal   correct elytes prn  cont ppi    rec appetite stimulant   on supplements per nutrition   defer endoscopic eval as +Covid and increased risk for anesthesia in these pts    COVID-19  monitor rep status   inc lfts in setting of viral illness  care per primary team

## 2020-05-04 NOTE — PHYSICAL THERAPY INITIAL EVALUATION ADULT - PERTINENT HX OF CURRENT PROBLEM, REHAB EVAL
Pt admitted 4/28 from [facility] rehab for evaluation of weakness and hypotension. Pt has a sacral wound

## 2020-05-04 NOTE — PROGRESS NOTE ADULT - PROBLEM SELECTOR PLAN 2
stage  4  Possible  source of infection  - Leukocytosis resolving  , blood cult neg todate.   - Continue IV Zosyn 3.375 gm q8hr   - Collagenase ointment application  - surg dr maguire  s/p debridement stage  4  Possible  source of infection  - Leukocytosis resolving  , blood cult neg todate.   - s/p IV Zosyn 3.375 gm q8hr - finished 5 days course.   - Collagenase ointment application  - surg dr maguire  s/p debridement

## 2020-05-04 NOTE — PROGRESS NOTE ADULT - PROBLEM SELECTOR PLAN 1
acute  blood Anemia possible  2/2 lt hip chr  hematoma .  - Hgb 6.8, Lactate 2.5 as per [facility] admission labs  - FOBT+   still no active bleed noticed and  pt again drop hb possible dilutional post iv fluid.   - s/p 2 unit prbc   - F/u CT abd/pelvis, U/S Abd-  left-sided sacral decubitus ulcer with punctate bubbles of air diffusely extending along symmetrically prominent left gluteal muscle and soft tissue folds. Partially imaged asymmetric density measuring 3.9 x 3.3 cm posteromedial to the left proximal femur.   Possibility of intramuscular hematoma considered in the differential.  - GI Dr. giordano no procedure endo / colonoscopy   this time  -   fu out pt for procedure .

## 2020-05-04 NOTE — DISCHARGE NOTE PROVIDER - HOSPITAL COURSE
acute metabolic encephalopathy likely due to hypovolemic shock   resolved  -     Hypotension likely due to hypovolemia from  acute blood loss  anemia   sec to  lt side hematoma possible      and  sec to possible lower gi bleed as colon thickening in ct scan but not candidate for colonoscopy as per gi dr giordano     s/p 2 liters of NS bolus    - bp stable    resumed bb low dose  tolerated well .  mild marleny resolved sec to volume depletion cr stable  .     s/p 2 unit of PRBC  ,  fu up  hb/hct  remain  stable no further bleed noticed  ,     guiac positive / no fresh blood per rectum .    ct abd / pelvis  chr lt hematoma - stable hb /hct . sepsis poa     sacral decubitus  stage 4 -   10x 10 cm      Necrotic tissues were sharply debrided and the wound was packed s/p    chemical /mechanical debridement  by dr maguire surg bedside    continue  iv abx Zosyn 3.375 gm q8hr   last dose   today   , blood cult neg todate .    moderate  protein malnutrition - ensure supplement. - Cardiac enzymes negative. pt with episode of s/p     - SVT overnight - resume metoprolol tartrate at 12.5 mg bid. titrate up to home dose of 50 mg bid if BP allows   cardio dr ricks gp .    COVID 19 Infection  +    no acute   pna  , ra pulse  ox above 92 .     pt having recurrent urinary ret ation on / off   sec   possible  constipation .      rodriguez placed  by  urologist evaluation dr tomas . hypokalemia replaced -  fu up k in am .       pt wife aware tt plan    . 80 y/o M with PMHx of HTN, BPH, spinal stenosis, dyslipidemia, anxiety, neuropathy, B12 deficiency who presents from [facility] rehab for evaluation of weakness and hypotension. Admitted for  acute  blood  loss   anemia  possible   2/2  acute   possible lower GI bleed  with chr hematoma lt hip as per ct scan  and  poa infection old  stage 4 sacral decubitus  cause of sepsis poa , had positive covid since rehab   no hypoxia  or no other  sign  symptom noticed time of admission .acute metabolic encephalopathy likely due to hypovolemic shock  . later     Hypotension shock was  likely due to hypovolemia from  acute blood loss  anemia   sec to  lt side hematoma possible      and  sec to possible lower gi bleed as colon thickening in ct scan but not candidate for colonoscopy as per gi dr giordano     s/p 2 liters of NS bolus    - bp stablize   resumed bb low dose  tolerated well .  mild marleny resolved sec to volume depletion cr  remain stable  .     s/p 2 unit of PRBC  ,  fu up  hb/hct  remain  stable no further bleed noticed  ,     guiac positive but  no fresh blood per rectum   notice hospital course  hb  remain stable range .    .   pt had  sepsis poa sec to     sacral decubitus  stage 4  -   pt noticed to have   10x 10 cm  sacral decubitus , wound care nurse shantanu and surg dr maguire saw pt     Necrotic tissues were sharply debrided and the wound was packed s/p    chemical /mechanical debridement  by dr maguire surg bedside done ,     tt with   iv abx Zosyn 3.375 gm q8hr   total 5 days as per id dr hernandez  no need po abx   at dc as  pt remain fever free , leucocytosis resolved   , blood cult  remain  neg to date .    moderate  protein malnutrition - ensure supplement. pt seen by cardio dr fernandez   for svt and intially low bp   , Cardiac enzymes negative   , ekg no new change . pt had only one  episode of     - SVT   which resolved   after  - resuming  metoprolol tartrate ,  titrate up to home dose of 50 mg bid by  cardio dr ricks gp .    COVID 19 Infection  +    no acute   pna  symptom or sign noticed  so no  pna this time [  had hx  covid  pna in rehab so old  at time  of hospitalization , pt was  asymptomatic  no new  pna  noticed  ]  , ra pulse  ox  remain above 92  , no hypoxia noticed  .     pt having recurrent urinary ret ation on / off   sec   possible  constipation   and  nonambulatory  .      rodriguez placed  by  urologist evaluation dr tomas  -  recommanded  voiding trial in rehab once pt more ambulatory . hypokalemia replaced -  fu up k wnl .       pt wife aware dc  tt plan    .    medically stable day of dc , going rehab . total time spend 50 min . physical exam 5-5-20   day of dc    Vital Signs Last 24 Hrs    T(C): 36.7 (05 May 2020 07:16), Max: 36.8 (04 May 2020 23:58)    T(F): 98 (05 May 2020 07:16), Max: 98.2 (04 May 2020 23:58)    HR: 80 (05 May 2020 07:16) (80 - 93)    BP: 143/75 (05 May 2020 07:16) (129/71 - 148/78)    BP(mean): --    RR: 18 (05 May 2020 07:16) (18 - 18)    SpO2: 99% (05 May 2020 07:16) (93% - 99%)  GEN no distress , HEENT nt/nc , perrla , CVS s1s2 no tachy , CHEST bl air entery  present  , no wheez , no rale  , CNS aao/3  , motor intact  5/5 all 4ext , GI soft , bs present ,    rodriguez  clean urine  , EXT no edema, pedal pulse present , SKIN no rash stage 4 decubitus 10/10 cm 78 y/o M with PMHx of HTN, BPH, spinal stenosis, dyslipidemia, anxiety, neuropathy, B12 deficiency who presents from [facility] rehab for evaluation of weakness and hypotension. Admitted for acute blood loss anemia likely 2/2  acute possible lower GI bleed with chronic hematoma lt hip as per ct scan and poa infection old  stage 4 sacral decubitus  cause of sepsis poa , had positive covid since rehab   no hypoxia  or no other  sign  symptom noticed time of admission .acute metabolic encephalopathy likely due to hypovolemic shock  . later     Hypotension shock was  likely due to hypovolemia from  acute blood loss  anemia   sec to  lt side hematoma possible      and  sec to possible lower gi bleed as colon thickening in ct scan but not candidate for colonoscopy as per gi dr giordano     s/p 2 liters of NS bolus    - bp stablize   resumed bb low dose  tolerated well .  mild marleny resolved sec to volume depletion cr  remain stable  .     s/p 2 unit of PRBC  ,  fu up  hb/hct  remain  stable no further bleed noticed  ,     guiac positive but  no fresh blood per rectum   notice hospital course  hb  remain stable range .    .   pt had  sepsis poa sec to     sacral decubitus  stage 4  -   pt noticed to have   10x 10 cm  sacral decubitus , wound care nurse shantanu and surg dr maguire saw pt     Necrotic tissues were sharply debrided and the wound was packed s/p    chemical /mechanical debridement  by dr maguire surg bedside done ,     tt with   iv abx Zosyn 3.375 gm q8hr   total 5 days as per id dr hernandez  no need po abx   at dc as  pt remain fever free , leucocytosis resolved   , blood cult  remain  neg to date .    moderate  protein malnutrition - ensure supplement. pt seen by cardio dr fernandez   for svt and intially low bp   , Cardiac enzymes negative   , ekg no new change . pt had only one  episode of     - SVT   which resolved   after  - resuming  metoprolol tartrate ,  titrate up to home dose of 50 mg bid by  cardio dr ricks gp .    COVID 19 Infection  +    no acute   pna  symptom or sign noticed  so no  pna this time [  had hx  covid  pna in rehab so old  at time  of hospitalization , pt was  asymptomatic  no new  pna  noticed  ]  , ra pulse  ox  remain above 92  , no hypoxia noticed  .     pt having recurrent urinary ret ation on / off   sec   possible  constipation   and  nonambulatory  .      rodriguez placed  by  urologist evaluation dr tomas  -  recommanded  voiding trial in rehab once pt more ambulatory . hypokalemia replaced -  fu up k wnl .       pt wife aware dc  tt plan     medically stable day of dc , going rehab . total time spend 50 min . 78 y/o M with PMHx of HTN, BPH, spinal stenosis, dyslipidemia, anxiety, neuropathy, B12 deficiency who presents from [facility] rehab for evaluation of weakness and hypotension.         Admitted to telemetry floor for acute blood loss anemia likely 2/2 acute possible lower GI bleed with chronic hematoma of the left hip as per CT scan. After receiving 2 unit of PRBCs, pt's hb/hct stabilized with no further bleed noticed. Pt's anemia was less likely 2/2  GIB , as per Dr. Horne who was consulted. Dr. Horne recommended endoscopy and colonoscopy outpatient.          Pt also had sespsis likely 2/2 an infected old stage 4 sacral decubitus. Wound care nurse Benita and surgeon Dr Betancourt were consulted. Necrotic tissues were debrided and the wound was packed by Dr. Betancourt at bedside, wound vac placed. Pt then completed a course of IV abx Zosyn x 5 days, as per ID Dr. Lopez. Pt then remained fever free, leucocytosis resolved, blood culture remained negative, therefore ID did not recommend PO abx on discharge. Pt had rodriguez placed for urinary retention by urology, rodriguez was kept in for wound healing given his sacral ulcer. An attempt at TOV should be completed at [facility] once more ambulatory.         Pt was also noted to have AMS, likely acute metabolic encephalopathy 2/2 hypovolemics shock in the setting of blood loss anemia. After fluids and prbcs, AMS resolved.        Pt was also having recurrent urinary retention and off 2/2 being nonambulatory vs chronic constipation. Pt therfore had a rodriguez placed , was evaluated by Urologist Dr. Pardo  - who recommended TOV at [facility] once pt became more ambulatory. Pt found to have transaminitis likely 2/2 covid + status, hepatotoxic drugs were avoided and transaminitis resolved.      Pt also had mild DARIUS that resolved, it was likely 2/2 prerenal volume depletion. Pt was also treated for her moderate  protein malnutrition with  ensure supplementation.        Cardiology Dr. Ordonez was consulted for SVT and initially low bp, Alexus resulted negative, EKG showed no new change, no further episodes of SVT. Cardiology recommended titrating up to home dose of 50 mg bid metropolol tartrate. Cardiology had also started pt on midodrine for hypotension, hypotension resolved once pt received, abx for sacral wound infection pt was placed back on beta blocker and midodrine d/jonathan. As per cardio, pt should be discharged on Metoprolol 50 BID.         Pt was + for COVID since rehab, however had no hypoxia or no other sign or symptom noticed on time of admission, pt was kept under proper airborne isolation chery the duration of her stay. Pt maintained her O2 sats >90 on room air, NSAID use was limited, and supportive care given. Pt was then tested prior to discharge on 5/11 which resulted _______.         PT evaluated pt, recommended pt be discharged to [facility]. Pt seen and examined on day of discharge.. Medically optimized to go to [facility]. 78 y/o M with PMHx of HTN, BPH, spinal stenosis, dyslipidemia, anxiety, neuropathy, B12 deficiency who presents from [facility] rehab for evaluation of weakness and hypotension.         Admitted to telemetry floor for acute blood loss anemia likely 2/2 acute possible lower GI bleed with chronic hematoma of the left hip as per CT scan. After receiving 2 unit of PRBCs, pt's hb/hct stabilized with no further bleed noticed. Pt's anemia was less likely 2/2  GIB , as per Dr. Horne who was consulted. Dr. Horne recommended endoscopy and colonoscopy outpatient.          Pt also had sespsis likely 2/2 an infected old stage 4 sacral decubitus. Wound care nurse Benita and surgeon Dr Betancourt were consulted. Necrotic tissues were debrided and the wound was packed by Dr. Betancourt at bedside, wound vac placed. Pt then completed a course of IV abx Zosyn x 5 days, as per ID Dr. Lopez. Pt then remained fever free, leucocytosis resolved, blood culture remained negative, therefore ID did not recommend PO abx on discharge. Pt had rodriguez placed for urinary retention by urology, rodriguez was kept in for wound healing given his sacral ulcer. An attempt at TOV should be completed at [facility] once more ambulatory.         Pt was also noted to have AMS, likely acute metabolic encephalopathy 2/2 hypovolemics shock in the setting of blood loss anemia. After fluids and prbcs, AMS resolved.        Pt was also having recurrent urinary retention and off 2/2 being nonambulatory vs chronic constipation. Pt therfore had a rodriguez placed , was evaluated by Urologist Dr. Pardo  - who recommended TOV at [facility] once pt became more ambulatory. Pt found to have transaminitis likely 2/2 covid + status, hepatotoxic drugs were avoided and transaminitis resolved.      Pt also had mild DARISU that resolved, it was likely 2/2 prerenal volume depletion. Pt was also treated for her moderate  protein malnutrition with  ensure supplementation.        Cardiology Dr. Ordonez was consulted for SVT and initially low bp, Alexus resulted negative, EKG showed no new change, no further episodes of SVT. Cardiology recommended titrating up to home dose of 50 mg bid metropolol tartrate. Cardiology had also started pt on midodrine for hypotension, hypotension resolved once pt received, abx for sacral wound infection pt was placed back on beta blocker and midodrine d/jonathan. As per cardio, pt should be discharged on Metoprolol 50 BID.         Pt was + for COVID since rehab, however had no hypoxia or no other sign or symptom noticed on time of admission, pt was kept under proper airborne isolation chery the duration of her stay. Pt maintained her O2 sats >90 on room air, NSAID use was limited, and supportive care given. Pt was then tested prior to discharge on 5/11 which resulted negative and 5/12 which resulted negative.         PT evaluated pt, recommended pt be discharged to [facility]. Pt seen and examined on day of discharge.. Medically optimized to go to [facility]. 78 y/o M with PMHx of HTN, BPH, spinal stenosis, dyslipidemia, anxiety, neuropathy, B12 deficiency who presents from [facility] rehab for evaluation of weakness and hypotension.         Admitted to telemetry floor for acute blood loss anemia likely 2/2 acute possible lower GI bleed with chronic hematoma of the left hip as per CT scan. After receiving 2 unit of PRBCs, pt's hb/hct stabilized with no further bleed noticed. Pt's anemia was less likely 2/2  GIB , as per Dr. Horne who was consulted. Dr. Horne recommended endoscopy and colonoscopy outpatient.          Pt also had sespsis likely 2/2 an infected old stage 4 sacral decubitus. Wound care nurse Benita and surgeon Dr Betancourt were consulted. Necrotic tissues were debrided and the wound was packed by Dr. Betancourt at bedside, wound vac placed. Pt then completed a course of IV abx Zosyn x 5 days, as per ID Dr. Lopez. Pt then remained fever free, leucocytosis resolved, blood culture remained negative, therefore ID did not recommend PO abx on discharge. Pt had rodriguez placed for urinary retention by urology, rodriguez was kept in for wound healing given his sacral ulcer. An attempt at TOV should be completed at [facility] once more ambulatory.         Pt was also noted to have AMS, likely acute metabolic encephalopathy 2/2 hypovolemics shock in the setting of blood loss anemia. After fluids and prbcs, AMS resolved.        Pt was also having recurrent urinary retention and off 2/2 being nonambulatory vs chronic constipation. Pt therfore had a rodriguez placed , was evaluated by Urologist Dr. Pardo  - who recommended TOV at [facility] once pt became more ambulatory. Pt found to have transaminitis likely 2/2 covid + status, hepatotoxic drugs were avoided and transaminitis resolved.      Pt also had mild DARIUS that resolved, it was likely 2/2 prerenal volume depletion. Pt was also treated for her moderate  protein malnutrition with  ensure supplementation.        Cardiology Dr. Ordonez was consulted for SVT and initially low bp, Alexus resulted negative, EKG showed no new change, no further episodes of SVT. Cardiology recommended titrating up to home dose of 50 mg bid metropolol tartrate. Cardiology had also started pt on midodrine for hypotension, hypotension resolved once pt received, abx for sacral wound infection pt was placed back on beta blocker and midodrine d/jonathan. As per cardio, pt should be discharged on Metoprolol 50 BID.         Pt was + for COVID since rehab, however had no hypoxia or no other sign or symptom noticed on time of admission, pt was kept under proper airborne isolation chery the duration of her stay. Pt maintained her O2 sats >90 on room air, NSAID use was limited, and supportive care given. Pt was then tested prior to discharge on 5/11 which resulted negative and 5/12 which resulted negative.         PT evaluated pt, recommended pt be discharged to [facility]. Pt seen and examined on day of discharge.. Medically optimized to go to [facility]. 78 y/o M with PMHx of HTN, BPH, spinal stenosis, dyslipidemia, anxiety, neuropathy, B12 deficiency presented from [facility] rehab for evaluation of weakness and hypotension.         Admitted to telemetry floor for acute blood loss anemia likely 2/2 possible acute lower GI bleed with chronic hematoma of the left hip as per CT scan. After receiving 2 unit of PRBCs, pt's hb/hct stabilized with no further bleed noticed. Pt's anemia was less likely 2/2 GIB, as per Dr. Horne who was consulted. Dr. Horne recommended endoscopy and colonoscopy outpatient.         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 Wound care nurse Benita and surgeon Dr Betancourt were consulted. Necrotic tissues were debrided and the wound was packed by Dr. Betancourt at bedside, wound vac placed. Pt then completed a course of IV abx Zosyn x 5 days, as per ID Dr. Lopez. Pt then remained fever free, leucocytosis resolved, blood culture remained negative, therefore ID did not recommend PO abx on discharge. Pt had Rodriguez placed for urinary retention by urology Dr. Pardo, Rodriguez was kept in for wound healing given his sacral ulcer. An attempt at TOV should be completed at [facility] once more ambulatory.         Pt was also noted to have AMS, likely acute metabolic encephalopathy 2/2 hypovolemic shock in the setting of blood loss anemia. After fluids and prbcs, AMS resolved.        Pt was also having recurrent urinary retention and off 2/2 being nonambulatory vs chronic constipation. Pt therfore had a rodriguez placed , was evaluated by Urologist Dr. Pardo  - who recommended TOV at [facility] once pt became more ambulatory. Pt found to have transaminitis likely 2/2 covid + status, hepatotoxic drugs were avoided and transaminitis resolved.      Pt also had mild DARIUS that resolved, it was likely 2/2 prerenal volume depletion. Pt was also treated for her moderate  protein malnutrition with  ensure supplementation.        Cardiology Dr. Ordonez was consulted for SVT and initially low bp, Alexus resulted negative, EKG showed no new change, no further episodes of SVT. Cardiology recommended titrating up to home dose of 50 mg bid metropolol tartrate. Cardiology had also started pt on midodrine for hypotension, hypotension resolved once pt received, abx for sacral wound infection pt was placed back on beta blocker and midodrine d/jonathan. As per cardio, pt should be discharged on Metoprolol 50 BID.         Pt was + for COVID since rehab, however had no hypoxia or no other sign or symptom noticed on time of admission, pt was kept under proper airborne isolation chery the duration of her stay. Pt maintained her O2 sats >90 on room air, NSAID use was limited, and supportive care given. Pt was then tested prior to discharge on 5/11 which resulted negative and 5/12 which resulted negative.         PT evaluated pt, recommended pt be discharged to [facility]. Pt seen and examined on day of discharge.. Medically optimized to go to [facility]. 80 y/o M with PMHx of HTN, BPH, spinal stenosis, dyslipidemia, anxiety, neuropathy, B12 deficiency presented from [facility] rehab for evaluation of weakness and hypotension.         Admitted to telemetry floor for acute blood loss anemia likely 2/2 possible acute lower GI bleed with chronic hematoma of the left hip as per CT scan. After receiving 2 unit of PRBCs, pt's Hb/Hct stabilized with no further bleed noticed. Pt's anemia was less likely 2/2 GIB, as per Dr. Horne who was consulted. Dr. Horne recommended endoscopy and colonoscopy outpatient.         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 ID Dr Lopez, Wound care nurse Benita and surgeon Dr Betancourt were consulted. Necrotic tissues were debrided and the wound was packed by Dr. Betancourt at bedside, wound vac placed. Pt then completed a course of IV abx Zosyn x 5 days, as per ID Dr. Lopez. Pt then remained fever free, leucocytosis resolved, blood culture remained negative, therefore ID did not recommend PO abx on discharge. Pt was also having recurrent urinary retention and off 2/2 being nonambulatory vs chronic constipation. Pt therefore was evaluated by Urologist Dr. Pardo and had Menendez placed 5/3. Menendez was kept in for wound healing given his sacral ulcer. Dr. Renteria recommended that an attempt at TOV should be completed at [facility] once more ambulatory.        Pt was also noted to have AMS, likely acute metabolic encephalopathy 2/2 hypovolemic shock in the setting of blood loss anemia. After fluids and prbcs, AMS resolved. Pt found to have transaminitis likely 2/2 COVID+ status, hepatotoxic drugs were avoided and transaminitis resolved. Pt also had mild DARIUS that resolved, it was likely 2/2 prerenal volume depletion. Pt was also treated for her moderate protein malnutrition with Ensure supplementation.        Cardiology Dr. Ordonez was consulted for SVT and initially low bp, Alexus resulted negative, EKG showed no new change, no further episodes of SVT. Cardiology recommended titrating up to home dose of 50 mg bid metropolol tartrate. Cardiology had also started pt on midodrine for hypotension, hypotension resolved once pt received, abx for sacral wound infection pt was placed back on beta blocker and midodrine d/jontahan. As per cardio, pt should be discharged on Metoprolol 50 BID.         Pt was + for COVID since rehab, however had no hypoxia or no other sign or symptom noticed on time of admission, pt was kept under proper airborne isolation for the duration of her stay. Pt maintained her O2 sats >90 on room air, NSAID use was limited, and supportive care given. Pt was then tested prior to discharge on 5/11 which resulted negative, 5/12 which resulted positive.         PT evaluated pt, recommended pt be discharged to [facility]. Pt seen and examined on day of discharge. Medically optimized to go to [facility]. 78 y/o M with PMHx of HTN, BPH, spinal stenosis, dyslipidemia, anxiety, neuropathy, B12 deficiency presented from [facility] rehab for evaluation of weakness and hypotension.         Admitted to telemetry floor for acute blood loss anemia likely 2/2 possible acute lower GI bleed with chronic hematoma of the left hip as per CT scan. After receiving 2 unit of PRBCs, pt's Hb/Hct stabilized with no further bleed noticed. Pt's anemia was less likely 2/2 GIB, as per Dr. Horne who was consulted. Dr. Horne recommended endoscopy and colonoscopy outpatient.         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 ID Dr Lopez, Wound care nurse Benita and surgeon Dr Betancourt were consulted. Necrotic tissues were debrided and the wound was packed by Dr. Betancourt at bedside, wound vac placed. Pt then completed a course of IV abx Zosyn x 5 days, as per ID Dr. Lopez. Pt then remained fever free, leucocytosis resolved, blood culture remained negative, therefore ID did not recommend PO abx on discharge. Pt was also having recurrent urinary retention and off 2/2 being nonambulatory vs chronic constipation. Pt therefore was evaluated by Urologist Dr. Pardo and had Menendez placed 5/3. Menendez was kept in for wound healing given his sacral ulcer. Dr. Renteria recommended that an attempt at TOV should be completed at [facility] once more ambulatory.        Pt was also noted to have AMS, likely acute metabolic encephalopathy 2/2 hypovolemic shock in the setting of blood loss anemia. After fluids and prbcs, AMS resolved. Pt found to have transaminitis likely 2/2 COVID+ status, hepatotoxic drugs were avoided and transaminitis resolved. Pt also had mild DARIUS that resolved, it was likely 2/2 prerenal volume depletion. Pt was also treated for her moderate protein malnutrition with Ensure supplementation.        Cardiology Dr. Ordonez was consulted for SVT and initially low bp, Alexus resulted negative, EKG showed no new change, no further episodes of SVT. Cardiology recommended titrating up to home dose of 50 mg bid metropolol tartrate. Cardiology had also started pt on midodrine for hypotension, hypotension resolved once pt received, abx for sacral wound infection pt was placed back on beta blocker and midodrine d/jonathan. As per cardio, pt should be discharged on Metoprolol 50 BID.         Pt was + for COVID since rehab, however had no hypoxia or no other sign or symptom noticed on time of admission, pt was kept under proper airborne isolation for the duration of her stay. Pt maintained her O2 sats >90 on room air, NSAID use was limited, and supportive care given. Pt was then tested prior to discharge on 5/11 which resulted negative, 5/12 which resulted positive.         PT evaluated pt, recommended pt be discharged to [facility]. Pt seen and examined on day of discharge. Medically optimized to go to [facility]. 78 y/o M with PMHx of HTN, BPH, spinal stenosis, dyslipidemia, anxiety, neuropathy, B12 deficiency presented from [facility] rehab for evaluation of weakness and hypotension.         Admitted to telemetry floor for acute blood loss anemia likely 2/2 possible acute lower GI bleed with chronic hematoma of the left hip as per CT scan. After receiving 2 unit of PRBCs, pt's Hb/Hct stabilized with no further bleed noticed. Pt's anemia was less likely 2/2 GIB, as per Dr. Horne who was consulted. Dr. Horne recommended endoscopy and colonoscopy outpatient.         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 ID Dr Lopez, Wound care nurse Benita and surgeon Dr Betancourt were consulted. Necrotic tissues were debrided and the wound was packed by Dr. Betancourt at bedside, wound vac placed. Pt then completed a course of IV abx Zosyn x 5 days, as per ID Dr. Lopez. Pt then remained fever free, leucocytosis resolved, blood culture remained negative, therefore ID did not recommend PO abx on discharge. Pt was also having recurrent urinary retention 2/2 being nonambulatory vs chronic constipation. Pt therefore was evaluated by Urologist Dr. Pardo and had Menendez placed 5/3. Menendez was kept in for wound healing given his sacral ulcer. Dr. Pardo recommended that an attempt at TOV should be completed at [facility] once more ambulatory.        Pt was also noted to have AMS, likely acute metabolic encephalopathy 2/2 hypovolemic shock in the setting of blood loss anemia. After fluids and prbcs, AMS resolved. Pt found to have transaminitis likely 2/2 COVID+ status, hepatotoxic drugs were avoided and transaminitis resolved. Pt also had mild DARIUS that resolved, it was likely 2/2 prerenal volume depletion. Pt was also treated for her moderate protein malnutrition with Ensure supplementation.        Cardiology Dr. Ordonez was consulted for SVT and initially low BP, Alexus resulted negative, EKG showed no new change, no further episodes of SVT. Cardiology recommended titrating up to home dose of 50 mg bid metropolol tartrate. Patient required midodrine for hypotension, which was discontinued as BP improved. As per cardio, pt should be discharged on Metoprolol 50 BID.         Pt tested positive for COVID-19, however had no hypoxia and did not require supplemental O2 through hospital course. PT evaluated pt, recommended discharge to [facility]. Pt was retested for COVID-19 prior to discharge: 5/11 resulted negative, 5/12 and 5/13 resulted positive, 5/15 resulted ____.         Patient improved with medical treatment throughout hospitalization. Patient was seen and examined on the day of discharge and is medically optimized for discharge to [facility], with close outpatient follow up. 80 y/o M with PMHx of HTN, BPH, spinal stenosis, dyslipidemia, anxiety, neuropathy, B12 deficiency presented from [facility] rehab for evaluation of weakness and hypotension.         Admitted to telemetry floor for acute blood loss anemia likely 2/2 possible acute lower GI bleed with chronic hematoma of the left hip as per CT scan. After receiving 2 unit of PRBCs, pt's Hb/Hct stabilized with no further bleed noticed. Pt's anemia was less likely 2/2 GIB, as per Dr. Horne who was consulted. Dr. Horne recommended endoscopy and colonoscopy outpatient.         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 ID Dr Lopez, Wound care nurse Benita and surgeon Dr Betancourt were consulted. Necrotic tissues were debrided and the wound was packed by Dr. Betancourt at bedside, wound vac placed. Pt then completed a course of IV abx Zosyn x 5 days, as per ID Dr. Lopez. Pt then remained fever free, leukocytosis resolved, blood culture remained negative, therefore ID did not recommend PO abx on discharge. Pt was also having recurrent urinary retention 2/2 being nonambulatory vs chronic constipation. Pt therefore was evaluated by Urologist Dr. Pardo and had Menendez placed 5/3. Menendez was kept in for wound healing given his sacral ulcer. Dr. Pardo recommended that an attempt at TOV should be completed at [facility] [place] once more ambulatory.        Pt was also noted to have AMS, likely acute metabolic encephalopathy 2/2 hypovolemic shock in the setting of blood loss anemia. After fluids and prbcs, AMS resolved. Pt found to have transaminitis likely 2/2 COVID+ status, hepatotoxic drugs were avoided and transaminitis resolved. Pt also had mild DARIUS that resolved, it was likely 2/2 prerenal volume depletion. Pt was also treated for her moderate protein malnutrition with Ensure supplementation.        Cardiology Dr. Ordonez was consulted for SVT and initially low BP, Alexus resulted negative, EKG showed no new change, no further episodes of SVT. Cardiology recommended titrating up to home dose of 50 mg bid metropolol tartrate. Patient required midodrine for hypotension, which was discontinued as BP improved. As per cardio, pt should be discharged on Metoprolol 50 BID.         Pt tested positive for COVID-19, however had no hypoxia and did not require supplemental O2 through hospital course. PT evaluated pt, recommended discharge to [facility] [place]. Pt was retested for COVID-19 prior to discharge: tested negative x2 on ___        Patient improved with medical treatment throughout hospitalization. Patient was seen and examined on the day of discharge and is medically optimized for discharge to [facility] [place], with close outpatient follow up. 80 y/o M with PMHx of HTN, BPH, spinal stenosis, dyslipidemia, anxiety, neuropathy, B12 deficiency presented from [facility] rehab for evaluation of weakness and hypotension.         Admitted to telemetry floor for acute blood loss anemia likely 2/2 possible acute lower GI bleed with chronic hematoma of the left hip as per CT scan. After receiving 2 unit of PRBCs, pt's Hb/Hct stabilized with no further bleed noticed. Pt's anemia was less likely 2/2 GIB, as per Dr. Horne who was consulted. Dr. Horne recommended endoscopy and colonoscopy outpatient.         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 ID Dr Lopez, Wound care nurse Benita and surgeon Dr Betancourt were consulted. Necrotic tissues were debrided and the wound was packed by Dr. Betancourt at bedside, wound vac placed. Pt then completed a course of IV abx Zosyn x 5 days, as per ID Dr. Lopez. Pt then remained fever free, leukocytosis resolved, blood culture remained negative, therefore ID did not recommend PO abx on discharge. Pt was also having recurrent urinary retention 2/2 being nonambulatory vs chronic constipation. Pt therefore was evaluated by Urologist Dr. Pardo and had Menendez placed 5/3. Menendez was kept in for wound healing given his sacral ulcer. Dr. Pardo recommended that an attempt at TOV should be completed at [facility] once more ambulatory.        Pt was also noted to have AMS, likely acute metabolic encephalopathy 2/2 hypovolemic shock in the setting of blood loss anemia. After fluids and prbcs, AMS resolved. Pt found to have transaminitis likely 2/2 COVID+ status, hepatotoxic drugs were avoided and transaminitis resolved. Pt also had mild DARIUS that resolved, it was likely 2/2 prerenal volume depletion. Pt was also treated for her moderate protein malnutrition with Ensure supplementation.        Cardiology Dr. Ordonez was consulted for SVT and initially low BP, Alexus resulted negative, EKG showed no new change, no further episodes of SVT. Cardiology recommended titrating up to home dose of 50 mg bid metropolol tartrate. Patient required midodrine for hypotension, which was discontinued as BP improved. As per cardio, pt should be discharged on Metoprolol 50 BID.         Pt tested positive for COVID-19, however had no hypoxia and did not require supplemental O2 through hospital course. PT evaluated pt, recommended discharge to [facility]. Pt was retested for COVID-19 prior to discharge: tested negative x2 on ___        Pt had worsening depression during hospitalization as well as poor appetite. Pt was seen by psychiatry (Dr. Russell) and nutritional services for evaluation, and was started on remeron in addition to zoloft. Pt appetite and mood improved thereafter.         Patient improved with medical treatment throughout hospitalization. Patient was seen and examined on the day of discharge and is medically optimized for discharge to [facility], with close outpatient follow up. 80 y/o M with PMHx of HTN, BPH, spinal stenosis, dyslipidemia, anxiety, neuropathy, B12 deficiency presented from [facility] rehab for evaluation of weakness and hypotension.         Admitted to telemetry floor for acute blood loss anemia likely 2/2 possible acute lower GI bleed with chronic hematoma of the left hip as per CT scan. After receiving 2 unit of PRBCs, pt's Hb/Hct stabilized with no further bleed noticed. Pt's anemia was less likely 2/2 GIB, as per Dr. Horne who was consulted. Dr. Horne recommended endoscopy and colonoscopy outpatient.         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 ID Dr Lopez, Wound care nurse Benita and surgeon Dr Betancourt were consulted. Necrotic tissues were debrided and the wound was packed by Dr. Betancourt at bedside, wound vac placed. Pt then completed a course of IV abx Zosyn x 5 days, as per ID Dr. Lopez. Pt then remained fever free, leukocytosis resolved, blood culture remained negative, therefore ID did not recommend PO abx on discharge. Pt was also having recurrent urinary retention 2/2 being nonambulatory vs chronic constipation. Pt therefore was evaluated by Urologist Dr. Pardo and had Menendez placed 5/3. Menendez was kept in for wound healing given his sacral ulcer. Dr. Pardo recommended that an attempt at TOV should be completed at [facility] once more ambulatory.        Pt was also noted to have AMS, likely acute metabolic encephalopathy 2/2 hypovolemic shock in the setting of blood loss anemia. After fluids and prbcs, AMS resolved. Pt found to have transaminitis likely 2/2 COVID+ status, hepatotoxic drugs were avoided and transaminitis resolved. Pt also had mild DARIUS that resolved, it was likely 2/2 prerenal volume depletion. Pt was also treated for her moderate protein malnutrition with Ensure supplementation.        Cardiology Dr. Ordonez was consulted for SVT and initially low BP, Alexus resulted negative, EKG showed no new change, no further episodes of SVT. Cardiology recommended titrating up to home dose of 50 mg bid metropolol tartrate. Patient required midodrine for hypotension, which was discontinued as BP improved. As per cardio, pt should be discharged on Metoprolol 50 BID.         Pt tested positive for COVID-19, however had no hypoxia and did not require supplemental O2 through hospital course. PT evaluated pt, recommended discharge to [facility]. Pt was retested for COVID-19 prior to discharge: tested negative x2 on 5/25 and         Pt had worsening depression during hospitalization as well as poor appetite. Pt was seen by psychiatry (Dr. Russell) and nutritional services for evaluation, and was started on remeron in addition to zoloft. Pt appetite and mood improved thereafter.         Patient improved with medical treatment throughout hospitalization. Patient was seen and examined on the day of discharge and is medically optimized for discharge to [facility], with close outpatient follow up. 78 y/o M with PMHx of HTN, BPH, spinal stenosis, dyslipidemia, anxiety, neuropathy, B12 deficiency presented from [facility] rehab for evaluation of weakness and hypotension.         Admitted to telemetry floor for acute blood loss anemia likely 2/2 possible acute lower GI bleed with chronic hematoma of the left hip as per CT scan. After receiving 2 unit of PRBCs, pt's Hb/Hct stabilized with no further bleed noticed. Pt's anemia was less likely 2/2 GIB, as per Dr. Horne who was consulted. Dr. Horne recommended endoscopy and colonoscopy outpatient.         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 ID Dr Lopez, Wound care nurse Benita and surgeon Dr Betancourt were consulted. Necrotic tissues were debrided and the wound was packed by Dr. Betancourt at bedside, wound vac placed. Pt then completed a course of IV abx Zosyn x 5 days, as per ID Dr. Lopez. Pt then remained fever free, leukocytosis resolved, blood culture remained negative, therefore ID did not recommend PO abx on discharge. Pt was also having recurrent urinary retention 2/2 being nonambulatory vs chronic constipation. Pt therefore was evaluated by Urologist Dr. Pardo and had Menendez placed 5/3. Menendez was kept in for wound healing given his sacral ulcer. Dr. Pardo recommended that an attempt at TOV should be completed at [facility] once more ambulatory.        Pt was also noted to have AMS, likely acute metabolic encephalopathy 2/2 hypovolemic shock in the setting of blood loss anemia. After fluids and prbcs, AMS resolved. Pt found to have transaminitis likely 2/2 COVID+ status, hepatotoxic drugs were avoided and transaminitis resolved. Pt also had mild DARIUS that resolved, it was likely 2/2 prerenal volume depletion. Pt was also treated for her moderate protein malnutrition with Ensure supplementation.        Cardiology Dr. Ordonez was consulted for SVT and initially low BP, Alexus resulted negative, EKG showed no new change, no further episodes of SVT. Cardiology recommended titrating up to home dose of 50 mg bid metropolol tartrate. Patient required midodrine for hypotension, which was discontinued as BP improved. As per cardio, pt should be discharged on Metoprolol 50 BID.         Pt tested positive for COVID-19, however had no hypoxia and did not require supplemental O2 through hospital course. PT evaluated pt, recommended discharge to [facility]. Pt was retested for COVID-19 prior to discharge: tested negative x2 on 5/25 and         Pt had worsening depression during hospitalization as well as poor appetite. Pt was seen by psychiatry (Dr. Russell) and nutritional services for evaluation, and was started on remeron in addition to zoloft. Pt appetite and mood improved thereafter.         On 5/27- pt started having hallucinations, most likely secondary to hospital induced delirium         Patient improved with medical treatment throughout hospitalization. Patient was seen and examined on the day of discharge and is medically optimized for discharge to [facility], with close outpatient follow up. 78 y/o M with PMHx of HTN, BPH, spinal stenosis, dyslipidemia, anxiety, neuropathy, B12 deficiency presented from [facility] rehab for evaluation of weakness and hypotension.         Admitted to telemetry floor for acute blood loss anemia likely 2/2 possible acute lower GI bleed with chronic hematoma of the left hip as per CT scan. After receiving 2 unit of PRBCs, pt's Hb/Hct stabilized with no further bleed noticed. Pt's anemia was less likely 2/2 GIB, as per Dr. Horne who was consulted. Dr. Horne recommended endoscopy and colonoscopy outpatient.         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 ID Dr Lopez, Wound care nurse Benita and surgeon Dr Betancourt were consulted. Necrotic tissues were debrided and the wound was packed by Dr. Betancourt at bedside, wound vac placed. Pt then completed a course of IV abx Zosyn x 5 days, as per ID Dr. Lopez. Pt then remained fever free, leukocytosis resolved, blood culture remained negative, therefore ID did not recommend PO abx on discharge. Pt was also having recurrent urinary retention 2/2 being nonambulatory vs chronic constipation. Pt therefore was evaluated by Urologist Dr. Pardo and had Menendez placed 5/3. Menendez was kept in for wound healing given his sacral ulcer. Dr. Pardo recommended that an attempt at TOV should be completed at [facility] once more ambulatory.        Pt was also noted to have AMS, likely acute metabolic encephalopathy 2/2 hypovolemic shock in the setting of blood loss anemia. After fluids and prbcs, AMS resolved. Pt found to have transaminitis likely 2/2 COVID+ status, hepatotoxic drugs were avoided and transaminitis resolved. Pt also had mild DARIUS that resolved, it was likely 2/2 prerenal volume depletion. Pt was also treated for her moderate protein malnutrition with Ensure supplementation.        Cardiology Dr. Ordonez was consulted for SVT and initially low BP, Alexus resulted negative, EKG showed no new change, no further episodes of SVT. Cardiology recommended titrating up to home dose of 50 mg bid metropolol tartrate. Patient required midodrine for hypotension, which was discontinued as BP improved. As per cardio, pt should be discharged on Metoprolol 50 BID.         Pt tested positive for COVID-19, however had no hypoxia and did not require supplemental O2 through hospital course. PT evaluated pt, recommended discharge to [facility]. Pt was retested for COVID-19 prior to discharge: tested negative x2 on _____        Pt had worsening depression during hospitalization as well as poor appetite. Pt was seen by psychiatry (Dr. Russell) and nutritional services for evaluation, and was started on remeron in addition to zoloft. Pt appetite and mood improved thereafter.         On 5/27- pt started having hallucinations, most likely secondary to hospital induced delirium. Pt was seen by Dr. Russell, who discontinued remeron. Pt hallucinations resolved thereafter        Patient improved with medical treatment throughout hospitalization. Patient was seen and examined on the day of discharge and is medically optimized for discharge to [facility], with close outpatient follow up. 78 y/o M with PMHx of HTN, BPH, spinal stenosis, dyslipidemia, anxiety, neuropathy, B12 deficiency presented from [facility] rehab for evaluation of weakness and hypotension.         Hospital Course:     Admitted to telemetry floor for acute blood loss anemia likely 2/2 possible acute lower GI bleed with chronic hematoma of the left hip as per CT scan. After receiving 2 unit of PRBCs, pt's Hb/Hct stabilized with no further bleed noticed. Pt's anemia was less likely 2/2 GIB, as per Dr. Horne who was consulted. Dr. Horne recommended endoscopy and colonoscopy outpatient. Pt also had sepsis likely 2/2 chronic stage 4 sacral decubitus. ID Dr Lopez, Wound care nurse Benita and surgeon Dr Betancourt were consulted. Necrotic tissues were debrided and the wound was packed by Dr. Betancourt at bedside, wound vac placed. Pt then completed a course of IV abx Zosyn x 5 days, as per ID Dr. Lopez. Pt then remained fever free, leukocytosis resolved, blood culture remained negative, therefore no further abx recommended. Pt was also having recurrent urinary retention 2/2 being nonambulatory vs chronic constipation. Pt therefore was evaluated by Urologist Dr. Pardo and had Menendez placed 5/3. Menendez was kept in for wound healing given his sacral ulcer. Dr. Pardo recommended that an attempt at TOV should be completed at [facility] once more ambulatory. Pt was also noted to have AMS, likely acute metabolic encephalopathy 2/2 hypovolemic shock in the setting of blood loss anemia. After fluids and prbcs, AMS resolved. Pt found to have transaminitis likely 2/2 COVID+ status, hepatotoxic drugs were avoided and transaminitis resolved. Pt also had mild DARIUS that resolved, it was likely 2/2 prerenal volume depletion. Pt was also treated for her moderate protein malnutrition with Ensure supplementation. Cardiology Dr. Ordonez was consulted for SVT and initially low BP, Alexus resulted negative, EKG showed no new change, no further episodes of SVT. Cardiology recommended titrating up to home dose of 50 mg bid metropolol tartrate. Patient required midodrine for hypotension, which was discontinued as BP improved. Pt tested positive for COVID-19, however had no hypoxia and did not require supplemental O2 through hospital course. PT evaluated pt, recommended discharge to [facility]. Pt was retested for COVID-19 prior to discharge: tested negative x2 on 5/28 and 5/29. Pt had worsening depression during hospitalization as well as poor appetite. Pt was seen by psychiatry (Dr. Russell) and nutritional services for evaluation, and was started on remeron in addition to zoloft. Pt appetite and mood improved thereafter. The patient, however, developed hallucinations about 1 week after starting remeron so it was discontinued and no further episodes occurred. 78 y/o M with PMHx of HTN, BPH, spinal stenosis, dyslipidemia, anxiety, neuropathy, B12 deficiency presented from [facility] rehab for evaluation of weakness and hypotension.         Hospital Course:     Admitted to telemetry floor for acute blood loss anemia likely 2/2 possible acute lower GI bleed with chronic hematoma of the left hip as per CT scan. After receiving 2 unit of PRBCs, pt's Hb/Hct stabilized with no further bleed noticed. Pt's anemia was less likely 2/2 GIB, as per Dr. Horne who was consulted. Dr. Horne recommended endoscopy and colonoscopy outpatient. Pt also had sepsis likely 2/2 chronic stage 4 sacral decubitus. ID Dr Lopez, Wound care nurse Benita and surgeon Dr Betancourt were consulted. Necrotic tissues were debrided and the wound was packed by Dr. Betancourt at bedside, wound vac placed. Pt then completed a course of IV abx Zosyn x 5 days, as per ID Dr. Lopez. Pt then remained fever free, leukocytosis resolved, blood culture remained negative, therefore no further abx recommended. Pt was also having recurrent urinary retention 2/2 being nonambulatory vs chronic constipation. Pt therefore was evaluated by Urologist Dr. Pardo and had Menendez placed 5/3. Menendez was kept in for wound healing given his sacral ulcer. Dr. Pardo recommended that an attempt at TOV should be completed at [facility] [place] once more ambulatory. Pt was also noted to have AMS, likely acute metabolic encephalopathy 2/2 hypovolemic shock in the setting of blood loss anemia. After fluids and prbcs, AMS resolved. Pt found to have transaminitis likely 2/2 COVID+ status, hepatotoxic drugs were avoided and transaminitis resolved. Pt also had mild DARIUS that resolved, it was likely 2/2 prerenal volume depletion. Pt was also treated for her moderate protein malnutrition with Ensure supplementation. Cardiology Dr. Ordonez was consulted for SVT and initially low BP, Alexus resulted negative, EKG showed no new change, no further episodes of SVT. Cardiology recommended titrating up to home dose of 50 mg bid metropolol tartrate. Patient required midodrine for hypotension, which was discontinued as BP improved. Pt tested positive for COVID-19, however had no hypoxia and did not require supplemental O2 through hospital course. PT evaluated pt, recommended discharge to [facility]. Pt was retested for COVID-19 prior to discharge: tested negative x2 on 5/28 and 5/29. Pt had worsening depression during hospitalization as well as poor appetite. Pt was seen by psychiatry (Dr. Russell) and nutritional services for evaluation, and was started on remeron in addition to zoloft. Pt appetite and mood improved thereafter. The patient, however, developed hallucinations about 1 week after starting remeron so it was discontinued and no further episodes occurred.         ---    CONSULTANTS:         ---    TIME SPENT:    I, the attending physician, was physically present for the key portions of the evaluation and management (E/M) service provided. The total amount of time spent reviewing the hospital notes, laboratory values, imaging findings, assessing/counseling the patient, discussing with consultant physicians, social work, nursing staff was -- minutes        ---    Primary care provider was made aware of plan for discharge:      [  ] NO     [  ] YES        ---    T(C): 36.8 (06-01-20 @ 04:58), Max: 37.2 (05-31-20 @ 20:20)    HR: 92 (06-01-20 @ 04:58) (75 - 92)    BP: 150/77 (06-01-20 @ 04:58) (116/62 - 150/77)    RR: 17 (06-01-20 @ 04:58) (17 - 18)    SpO2: 96% (06-01-20 @ 04:58) (96% - 98%)        PE:    GENERAL: patient appears chronically ill and weak, cachetic      ENMT: obvious temporal wasting, dry mucous membranes     LUNGS:  reduced air entry b/l, clear to auscultation,  no rales, wheezing or rhonchi appreciated    HEART: S1/S2, regular rate and rhythm, no murmurs noted, no lower extremity edema    GASTROINTESTINAL: abdomen is thin, soft, nontender, nondistended, hypoactive bs     MUSCULOSKELETAL: no clubbing or cyanosis, no obvious deformity    NEUROLOGIC: awake, alert, remains aao x3 (person, place, situation) 78 y/o M with PMHx of HTN, BPH, spinal stenosis, dyslipidemia, anxiety, neuropathy, B12 deficiency presented from [facility] rehab for evaluation of weakness and hypotension.         Hospital Course:     Admitted to telemetry floor for acute blood loss anemia likely 2/2 possible acute lower GI bleed with chronic hematoma of the left hip as per CT scan. After receiving 2 unit of PRBCs, pt's Hb/Hct stabilized with no further bleed noticed. Pt's anemia was less likely 2/2 GIB, as per Dr. Horne who was consulted. Dr. Horne recommended endoscopy and colonoscopy outpatient. Pt also had sepsis likely 2/2 chronic stage 4 sacral decubitus. ID Dr Lopez, Wound care nurse Benita and surgeon Dr Betancourt were consulted. Necrotic tissues were debrided and the wound was packed by Dr. Betancourt at bedside, wound vac placed. Pt then completed a course of IV abx Zosyn x 5 days, as per ID Dr. Lopez. Pt then remained fever free, leukocytosis resolved, blood culture remained negative, therefore no further abx recommended. Pt was also having recurrent urinary retention 2/2 being nonambulatory vs chronic constipation. Pt therefore was evaluated by Urologist Dr. Pardo and had Menendez placed 5/3. Menendez was kept in for wound healing given his sacral ulcer. Dr. Pardo recommended that an attempt at TOV should be completed at [facility] once more ambulatory. Pt was also noted to have AMS, likely acute metabolic encephalopathy 2/2 hypovolemic shock in the setting of blood loss anemia. After fluids and prbcs, AMS resolved. Pt found to have transaminitis likely 2/2 COVID+ status, hepatotoxic drugs were avoided and transaminitis resolved. Pt also had mild DARIUS that resolved, it was likely 2/2 prerenal volume depletion. Pt was also treated for her moderate protein malnutrition with Ensure supplementation. Cardiology Dr. Ordonez was consulted for SVT and initially low BP, Alexus resulted negative, EKG showed no new change, no further episodes of SVT. Cardiology recommended titrating up to home dose of 50 mg bid metropolol tartrate. Patient required midodrine for hypotension, which was discontinued as BP improved. Pt tested positive for COVID-19, however had no hypoxia and did not require supplemental O2 through hospital course. PT evaluated pt, recommended discharge to [facility]. Pt was retested for COVID-19 prior to discharge: tested negative x2 on 5/28 and 5/29. Pt had worsening depression during hospitalization as well as poor appetite. Pt was seen by psychiatry (Dr. Russell) and nutritional services for evaluation, and was started on remeron in addition to zoloft. Pt appetite and mood improved thereafter. The patient, however, developed hallucinations about 1 week after starting remeron so it was discontinued and no further episodes occurred.         ---    CONSULTANTS:     Cardiology Dr. José Luis Daily        ---    TIME SPENT:    I, the attending physician, was physically present for the key portions of the evaluation and management (E/M) service provided. The total amount of time spent reviewing the hospital notes, laboratory values, imaging findings, assessing/counseling the patient, discussing with consultant physicians, social work, nursing staff was -- minutes        ---    Primary care provider was made aware of plan for discharge:      [  ] NO     [  ] YES        ---    T(C): 36.8 (06-01-20 @ 04:58), Max: 37.2 (05-31-20 @ 20:20)    HR: 92 (06-01-20 @ 04:58) (75 - 92)    BP: 150/77 (06-01-20 @ 04:58) (116/62 - 150/77)    RR: 17 (06-01-20 @ 04:58) (17 - 18)    SpO2: 96% (06-01-20 @ 04:58) (96% - 98%)        PE:    GENERAL: patient appear chronically ill and weak, cachetic    EYES: sclera clear, no exudates    ENMT: obvious temporal wasting, dry mucous membranes     NECK: supple, soft    LUNGS:  decreased BS b/l, clear to auscultation,  no rales, wheezing or rhonchi appreciated    HEART: S1/S2, regular rate and rhythm, no murmurs noted, no lower extremity edema    GASTROINTESTINAL: abdomen is thin, soft, nontender, nondistended, hypoactive bs     MUSCULOSKELETAL: no clubbing or cyanosis, no obvious deformity    NEUROLOGIC: awake, alert, remains aao x3 (person, place, situation)

## 2020-05-04 NOTE — DISCHARGE NOTE PROVIDER - CARE PROVIDER_API CALL
thom,   Phone: (   )    -  Fax: (   )    -  Follow Up Time:     Don Ordonez (MD)  Cardiovascular Disease; Internal Medicine  [address], [address]  [place] [address]  Phone: [phone]  Fax: [phone]  Follow Up Time: Don Ordonez)  Cardiovascular Disease; Internal Medicine  [address] [facility], [address]  [place] [address]  Phone: [phone]  Fax: [phone]  Follow Up Time: 1 week    Isac Escalera  INTERNAL MEDICINE  [address] [place] [address]  Phone: [phone]  Fax: [phone]  Follow Up Time: 1 week    Carlos Pardo  Urology  [address] [place] [address] [address]  [place] [address]  Phone: [phone]  Fax: [phone]  Follow Up Time: 1 week Isac Escalera  INTERNAL MEDICINE  [address] [place] [address]  Phone: [phone]  Fax: [phone]  Established Patient  Follow Up Time: 1 week    Carlos Pardo  Urology  [address] [facility] [address]  [place] [address]  Phone: [phone]  Fax: [phone]  Follow Up Time: 1 week    Luis Ferrera  Cardiology  [address] [facility] [place] [address]  Phone: [phone]  Fax: [phone]  Established Patient  Follow Up Time: 1 week    [facility] [place],   [address] [place] [address]  Phone: [phone]  Fax: (   )    -  Follow Up Time: 1 week    Don Ordonez)  Cardiovascular Disease; Internal Medicine  [address] [facility], [address]  [place] [address]  Phone: [phone]  Fax: [phone]  Follow Up Time:

## 2020-05-04 NOTE — PROGRESS NOTE ADULT - SUBJECTIVE AND OBJECTIVE BOX
PULMONARY/CRITICAL CARE  No sob.   Unchanged. Blood cultures pos.    Alert, confused.   Pt unchanged. No fever.      Patient is a 79y old  Male who presents with a chief complaint of Generalized weakness (28 Apr 2020 22:20)    BRIEF HOSPITAL COURSE: ***80 y/o M with PMHx of HTN, BPH, spinal stenosis, dyslipidemia, anxiety, neuropathy, B12 deficiency who presents from [facility] for evaluation of weakness and hypotension. As per transfer papers, pt with confusion after lunch today. Pt admits to generalized weakness. Lives with wife, Stephany and son. He came from [facility] in [place] since Feb 14. Patient lives with wife and son at home and ambulates with cane. Contracted Covid-19 in [facility] rehab and first started having symptoms on Mar 25, reporting symptoms of cough and low grade fever, diarrhea. Denies any chest pain, shortness of breath, headache, dizziness. History obtained from wife, Stephany([phone]) as pt is confused and a poor historian.   Of note, pt was noted to have sacral wounds today and reported low grade fever for two days and mental status changes today and loss of appetite.    Events last 24 hours: ***    PAST MEDICAL & SURGICAL HISTORY:  H/O vitamin B deficiency  Spinal stenosis  HTN (hypertension)  History of tonsillectomy    Allergies    No Known Allergies    Intolerances      FAMILY HISTORY:  No pertinent family history in first degree relatives      Review of Systems:  unobtainable    Medications:  piperacillin/tazobactam IVPB.. 3.375 Gram(s) IV Intermittent every 8 hours    metoprolol succinate ER 50 milliGRAM(s) Oral daily  midodrine. 10 milliGRAM(s) Oral three times a day      sertraline 100 milliGRAM(s) Oral daily        pantoprazole  Injectable 40 milliGRAM(s) IV Push every 12 hours        cyanocobalamin 1000 MICROGram(s) Oral daily      collagenase Ointment 1 Application(s) Topical three times a day            ICU Vital Signs Last 24 Hrs  T(C): 37.1 (29 Apr 2020 06:05), Max: 37.3 (29 Apr 2020 03:49)  T(F): 98.7 (29 Apr 2020 06:05), Max: 99.1 (29 Apr 2020 03:49)  HR: 102 (29 Apr 2020 06:05) (87 - 105)  BP: 100/57 (29 Apr 2020 06:05) (84/48 - 100/57)  BP(mean): --  ABP: --  ABP(mean): --  RR: 18 (29 Apr 2020 06:05) (17 - 22)  SpO2: 96% (29 Apr 2020 06:05) (96% - 98%)    Vital Signs Last 24 Hrs  T(C): 37.1 (29 Apr 2020 06:05), Max: 37.3 (29 Apr 2020 03:49)  T(F): 98.7 (29 Apr 2020 06:05), Max: 99.1 (29 Apr 2020 03:49)  HR: 102 (29 Apr 2020 06:05) (87 - 105)  BP: 100/57 (29 Apr 2020 06:05) (84/48 - 100/57)  BP(mean): --  RR: 18 (29 Apr 2020 06:05) (17 - 22)  SpO2: 96% (29 Apr 2020 06:05) (96% - 98%)        I&O's Detail        LABS:                        8.4    19.86 )-----------( 248      ( 29 Apr 2020 07:23 )             26.0     04-28    138  |  107  |  54<H>  ----------------------------<  92  4.6   |  26  |  1.10    Ca    7.6<L>      28 Apr 2020 22:41  Phos  2.4     04-28  Mg     2.0     04-28    TPro  5.1<L>  /  Alb  1.8<L>  /  TBili  1.8<H>  /  DBili  x   /  AST  230<H>  /  ALT  173<H>  /  AlkPhos  235<H>  04-28          CAPILLARY BLOOD GLUCOSE            CULTURES:          RADIOLOGY: ***< from: CT Chest No Cont (04.29.20 @ 01:24) >  EXAM:  CT ABDOMEN AND PELVIS                          EXAM:  CT CHEST                            PROCEDURE DATE:  04/29/2020          INTERPRETATION:  CT CHEST, ABDOMEN AND PELVIS WITHOUT CONTRAST    INDICATION: Shortness of breath. Hypotension. Altered mental status.     TECHNIQUE: Noncontrast CT of the chest, abdomen and pelvis. Images are reformatted in the sagittal and coronal planes.Postprocessed MIP reformatted chest images were created and reviewed.    COMPARISON: None.    FINDINGS:    Absence of intravenous contrast limits evaluation for traumatic injury, focal lesions, neoplasm, and vascular pathology.    Thorax:  Lines and tubes: None.  Airways: Tracheobronchial tree is patent.  Lungs and Pleura: Right basilar opacity containing air bronchograms. Mild patchy left lower lobe opacities are also identified. No significant pleural effusion. No pneumothorax.  Mediastinum and lymph nodes: No bulky adenopathy.  Heart: Trace pericardial effusion and/or thickening without significant cardiomegaly. Coronary artery calcification and/or stenting. Valvular calcifications.  Vessels: Atherosclerotic disease of the aorta and its branches. Ascending thoracic aorta measures up to 4.3 cm in maximum AP diameter.   Chest Wall: Within normal limits.    Abdomen/Pelvis:  Liver: No suspicious lesions.  Biliary: No significant dilatation. No calcified gallstones within the gallbladder.  Spleen: No suspicious lesions.  Pancreas: No inflammatory changes or ductal dilatation.  Adrenals: Normal.  Kidneys: No hydronephrosis. Bilateral renal cysts as well as too small to characterize hypodensities. Sub-cm nonobstructive right renal calculus.  Vessels: Atherosclerotic disease of the aorta and its branches.     GI tract: There is suggestion of focal circumferential wall thickening of distal ascending colonic loop without significant pericolonic stranding as best seen on images 78 through 79 of series 2. Consider nonemergent colonoscopy evaluation to exclude underlying malignancy. No evidence ofsmall bowel obstruction. No CT findings of acute appendicitis.    Peritoneum/retroperitoneum and mesentery: No free air. No organized fluid collection. No adenopathy.    Pelvic organs/Bladder: Heterogeneous prominent prostate with nodular density near the apex, cause mass effect and protrusion at the bladder base. Correlate with PSA and consider nonemergent prostatic workup to exclude malignancy. No significant bladder wall thickening.    Abdominal wall: Suggestion of left-sided sacral decubitus ulcer with punctate bubbles of air diffusely extending along symmetrically prominent left gluteal muscle and soft tissue folds. Partially imaged asymmetric density measuring 3.9 x 3.3 cm posteromedial to the left proximal femur on image 168 of series 2. Possibility of intramuscular hematoma considered in the differential.    Bones and soft tissues: Multilevel degenerative changes of the spine noted. Advanced osteoarthritis of bilateral hip joint. Advanced osteoarthritis of left shoulder joint withassociated soft tissue calcifications. Vague linear lucency identified at the level of the sacrococcygeal tip as best seen on images 149 through 151 of series 2. Correlate with point tenderness to exclude underlying nondisplaced fracture.    IMPRESSION:    Right basilar opacity containing air bronchograms. Additional patchy left lower lobe opacities. Bacterial and viral pneumonias including atypical agent such as COVID-19 considered in the differential. Correlate with clinical parameters, laboratory values and follow-up chest radiograph advised. No significant pleural effusion.     Suggestion of focal circumferential wall thickening of distal ascending colonic loop without significant pericolonic stranding. Consider nonemergent colonoscopy evaluation to exclude underlying malignancy. No evidence of small bowel obstruction.     Vague linear lucency identified at the level of the sacrococcygeal tip. Correlate with point tenderness to exclude underlying nondisplaced fracture.    Suggestion of left-sided sacral decubitus ulcer with punctate bubbles of air diffusely extending along symmetrically prominent left gluteal muscle and soft tissue folds. Partially imaged asymmetric density measuring 3.9 x 3.3 cm posteromedial to the left proximalfemur. Possibility of intramuscular hematoma considered in the differential.    Additional findings as mentioned above.    These results were discussed via telephone at 4/29/2020 3:55 AM by Dr. Baugh of radiology with Dr. Bailey, institution read-back verification policy was followed.                TATE BAUGH M.D., ATTENDING RADIOLOGIST  This document has been electronically signed. Apr 29 2020  4:10AM              < end of copied text >    < from: Xray Chest 1 View- PORTABLE-Routine (04.30.20 @ 09:11) >  EXAM:  XR CHEST PORTABLE ROUTINE 1V                            PROCEDURE DATE:  04/30/2020          INTERPRETATION:    Examination: XR CHEST    History: ADMDIAG1: K92.2 GASTROINTESTINAL HEMORRHAGE, UNSPECIFIED/, pn pneumonia    TECHNIQUE:  Portable AP view of the chest was obtained.    COMPARISON: A CT chest 1/29/2020 available for review.    FINDINGS:   Lungs clear. RIGHT hemidiaphragm mildly elevated. Posterior lung bases cannot be assessed due to elevated diaphragms. Lateral radiograph recommended if clinically warranted.  . The heart and mediastinum are within normal limits.    Visualized osseous structures are intact.        IMPRESSION: Visualized Lungs clear..   Elevated LEFT hemidiaphragm. Posterior lung bases cannot be assessed dueto elevated diaphragms. Lateral radiograph recommended if clinically warranted.      < end of copied text >

## 2020-05-04 NOTE — PROGRESS NOTE ADULT - ASSESSMENT
80 y/o M with PMHx of HTN, BPH, spinal stenosis, dyslipidemia, anxiety, neuropathy, B12 deficiency who presents from [facility] rehab for evaluation of weakness and hypotension. Admitted for anemia and infection  poss 2/2  acute  GI bleed and stage 4 sacral decubitus . 78 y/o M with PMHx of HTN, BPH, spinal stenosis, dyslipidemia, anxiety, neuropathy, B12 deficiency who presents from [facility] rehab for evaluation of weakness and hypotension. Admitted for  acute  blood  loss   anemia and infection  poss 2/2  acute   possible lower GI bleed and stage 4 sacral decubitus .

## 2020-05-04 NOTE — PROGRESS NOTE ADULT - SUBJECTIVE AND OBJECTIVE BOX
INTERVAL HPI/OVERNIGHT EVENTS:  no overt gib per nursing  taking po meds    MEDICATIONS  (STANDING):  collagenase Ointment 1 Application(s) Topical three times a day  metoprolol tartrate 12.5 milliGRAM(s) Oral two times a day  pantoprazole    Tablet 40 milliGRAM(s) Oral before breakfast  potassium chloride  10 mEq/100 mL IVPB 10 milliEquivalent(s) IV Intermittent once  potassium chloride  10 mEq/100 mL IVPB 10 milliEquivalent(s) IV Intermittent once  senna 2 Tablet(s) Oral at bedtime  sertraline 100 milliGRAM(s) Oral daily  sodium chloride 0.65% Nasal 1 Spray(s) Both Nostrils two times a day  sucralfate suspension 1 Gram(s) Oral four times a day    MEDICATIONS  (PRN):      Allergies    No Known Allergies    Intolerances        Review of Systems:    unable to obtain       Vital Signs Last 24 Hrs  T(C): 37.2 (04 May 2020 07:22), Max: 37.2 (04 May 2020 07:22)  T(F): 98.9 (04 May 2020 07:22), Max: 98.9 (04 May 2020 07:22)  HR: 77 (04 May 2020 07:22) (77 - 84)  BP: 155/70 (04 May 2020 07:22) (132/67 - 155/74)  BP(mean): --  RR: 19 (04 May 2020 07:22) (18 - 19)  SpO2: 95% (04 May 2020 07:22) (95% - 97%)    PHYSICAL EXAM:    deferred f/u done remotely as covid +     LABS:                        8.6    12.40 )-----------( 291      ( 04 May 2020 07:34 )             27.3     05-04    144  |  109<H>  |  30<H>  ----------------------------<  80  2.8<LL>   |  27  |  0.80    Ca    8.1<L>      04 May 2020 07:34  Phos  2.5     05-03  Mg     2.4     05-03            RADIOLOGY & ADDITIONAL TESTS:

## 2020-05-04 NOTE — DISCHARGE NOTE PROVIDER - CARE PROVIDERS DIRECT ADDRESSES
,DirectAddress_Unknown,DirectAddress_Unknown ,DirectAddress_Unknown,[email or web address],DirectAddress_Unknown ,[email or web address],DirectAddress_Unknown,DirectAddress_Unknown,DirectAddress_Unknown,DirectAddress_Unknown

## 2020-05-04 NOTE — PROGRESS NOTE ADULT - PROBLEM SELECTOR PLAN 3
acute metabolic encephalopathy- resolved    - CT Head - neg to acute stroke acute metabolic encephalopathy- resolved  .   - CT Head - neg to acute stroke

## 2020-05-04 NOTE — DISCHARGE NOTE PROVIDER - NSDCMRMEDTOKEN_GEN_ALL_CORE_FT
collagenase 250 units/g topical ointment: 1 application topically 3 times a day  metoprolol: 12.5 milligram(s) orally 2 times a day  pantoprazole 40 mg oral delayed release tablet: 1 tab(s) orally once a day (before a meal)  senna oral tablet: 2 tab(s) orally once a day (at bedtime)  sertraline 100 mg oral tablet: 1 tab(s) orally once a day  sodium chloride 0.65% nasal spray: 1  nasal 2 times a day  sucralfate 1 g/10 mL oral suspension: 10 milliliter(s) orally 4 times a day collagenase 250 units/g topical ointment: Apply topically to affected area once a day  metoprolol tartrate 50 mg oral tablet: 1 tab(s) orally 2 times a day  pantoprazole 40 mg oral delayed release tablet: 1 tab(s) orally once a day (before a meal)  senna oral tablet: 1 tab(s) orally once a day (at bedtime), As Needed  sertraline 100 mg oral tablet: 1 tab(s) orally once a day  sodium chloride 0.65% nasal spray: 1  nasal 2 times a day  sucralfate 1 g/10 mL oral suspension: 10 milliliter(s) orally 4 times a day collagenase 250 units/g topical ointment: Apply topically to affected area once a day  Imodium A-D 2 mg oral tablet: orally every 8 hours, As Needed for diarrhea   metoprolol tartrate 50 mg oral tablet: 1 tab(s) orally 2 times a day  pantoprazole 40 mg oral delayed release tablet: 1 tab(s) orally once a day (before a meal)  senna oral tablet: 1 tab(s) orally once a day (at bedtime), As Needed  sertraline 100 mg oral tablet: 1 tab(s) orally once a day  sodium chloride 0.65% nasal spray: 1  nasal 2 times a day  sucralfate 1 g/10 mL oral suspension: 10 milliliter(s) orally 4 times a day  Vitamin B-12 1000 mcg/mL injectable solution: 1 dose(s) injectable once a month metoprolol tartrate 50 mg oral tablet: 1 tab(s) orally 2 times a day  pantoprazole 40 mg oral delayed release tablet: 1 tab(s) orally once a day (before a meal)  senna oral tablet: 1 tab(s) orally once a day (at bedtime), As Needed  sertraline 100 mg oral tablet: 1 tab(s) orally once a day  sodium chloride 0.65% nasal spray: 1  nasal 2 times a day  sucralfate 1 g/10 mL oral suspension: 10 milliliter(s) orally 4 times a day  Vitamin B-12 1000 mcg/mL injectable solution: 1 dose(s) injectable once a month ascorbic acid 500 mg oral tablet: 1 tab(s) orally once a day  cyanocobalamin 1000 mcg oral tablet: 1 tab(s) orally once a day  metoprolol tartrate 50 mg oral tablet: 1 tab(s) orally 2 times a day  Multiple Vitamins oral tablet: 1 tab(s) orally once a day  pantoprazole 40 mg oral delayed release tablet: 1 tab(s) orally once a day (before a meal)  polyethylene glycol 3350 oral powder for reconstitution: 17 gram(s) orally once a day, As Needed constipation  senna oral tablet: 2 tab(s) orally once a day (at bedtime)  sertraline 100 mg oral tablet: 1 tab(s) orally once a day  sodium chloride 0.65% nasal spray: 1  nasal 2 times a day  sucralfate 1 g/10 mL oral suspension: 10 milliliter(s) orally 4 times a day  Vitamin B-12 1000 mcg/mL injectable solution: 1 dose(s) injectable once a month

## 2020-05-04 NOTE — PROGRESS NOTE ADULT - ATTENDING COMMENTS
pt seen and examine today -    acute metabolic encephalopathy likely due to hypovolemic shock   resolved  -   Hypotension likely due to hypovolemia from  acute blood loss  anemia   sec to  lt side hematoma possible    and  sec to possible lower gi bleed as colon thickening in ct scan but not candidate for colonoscopy as per gi dr giordano   s/p 2 liters of NS bolus    - bp stable    resumed bb low dose .   s/p 2 unit of PRBC  ,  fu up  hb/hct  remain  stable no further bleed noticed  ,   guiac positive / no fresh blood per rectum .  ct abd / pelvis  chr lt hematoma - stable hb /hct .   sacral decubitus  stage 4 -   10x 10 cm ,  Necrotic tissues were sharply debrided and the wound was packed s/p  chemical /mechanical debridement  by dr maguire surg bedside    continue  iv abx Zosyn 3.375 gm q8hr   last dose   today   , blood cult neg todate .  moderate  protein malnutrition - ensure supplement. - Cardiac enzymes negative. pt with episode of s/p   - SVT overnight - resume metoprolol tartrate at 12.5 mg bid. Will titrate up to home dose of 50 mg bid if BP allows   cardio dr ortega gp .  COVID 19 Infection  +    no acute   pna  , ra pulse  ox above 92 .   pt having recurrent urinary ret ation on / off   sec   possible  constipation .    rodriguez placed  by  urologist evaluation dr tomas . hypokalemia replaced -  fu up k in am .     pt wife aware tt plan    . pt seen and examine today - acute metabolic encephalopathy likely due to hypovolemic shock   resolved  -   Hypotension likely due to hypovolemia from  acute blood loss  anemia   sec to  lt side hematoma possible    and  sec to possible lower gi bleed as colon thickening in ct scan but not candidate for colonoscopy as per gi dr giordano   s/p 2 liters of NS bolus    - bp stable    resumed bb low dose  tolerated well .  mild marleny resolved sec to volume depletion cr stable  .   s/p 2 unit of PRBC  ,  fu up  hb/hct  remain  stable no further bleed noticed  ,   guiac positive / no fresh blood per rectum .  ct abd / pelvis  chr lt hematoma - stable hb /hct . sepsis poa   sacral decubitus  stage 4 -   10x 10 cm    Necrotic tissues were sharply debrided and the wound was packed s/p  chemical /mechanical debridement  by dr maguire surg bedside    continue  iv abx Zosyn 3.375 gm q8hr   last dose   today   , blood cult neg todate .  moderate  protein malnutrition - ensure supplement. - Cardiac enzymes negative. pt with episode of s/p   - SVT overnight - resume metoprolol tartrate at 12.5 mg bid. titrate up to home dose of 50 mg bid if BP allows   cardio dr ricks gp .  COVID 19 Infection  +    no acute   pna  , ra pulse  ox above 92 .   pt having recurrent urinary ret ation on / off   sec   possible  constipation .    rodriguez placed  by  urologist evaluation dr tomas . hypokalemia replaced -  fu up k in am .     pt wife aware tt plan    .

## 2020-05-04 NOTE — PHYSICAL THERAPY INITIAL EVALUATION ADULT - ADDITIONAL COMMENTS
According to H&P pt lives with his wife and son and was independent ambulating with a cane prior to [facility] admission. Pt states he has 3 steps to enter home and a flight of stairs to bedroom.

## 2020-05-04 NOTE — DISCHARGE NOTE PROVIDER - NSDCCPCAREPLAN_GEN_ALL_CORE_FT
PRINCIPAL DISCHARGE DIAGNOSIS  Diagnosis: Gastrointestinal hemorrhage, unspecified gastrointestinal hemorrhage type  Assessment and Plan of Treatment: resolved   hb stable   after prbc   - colon thicking as per ct abd pelvis - fu up out pt for colnoscopy.  fu in rehab hb/ hct in 2 to 3 days.      SECONDARY DISCHARGE DIAGNOSES  Diagnosis: COVID-19 virus detected  Assessment and Plan of Treatment: you remain asymptomatic , room air pulse ox remain above 92. PRINCIPAL DISCHARGE DIAGNOSIS  Diagnosis: Gastrointestinal hemorrhage, unspecified gastrointestinal hemorrhage type  Assessment and Plan of Treatment: Resolved - Hb stable after packed red blood cell transfusion.   - Continue Protonix 40 mg daily, Carafate 1 g four times daily.  - Repeat labwork (CBC) at rehab in 2-3 days after discharge.  - CT abdomen pelvis showed colon thickening. Follow up with GI Dr Horne as outpatient for endoscopy/colonoscopy and further management.  - Follow up with your primary care physician Dr. Escalera in 1 week after rehab.      SECONDARY DISCHARGE DIAGNOSES  Diagnosis: COVID-19 virus detected  Assessment and Plan of Treatment: POSITIVE Novel Coronavirus (COVID-19)  - Continue DVT prophylaxis Lovenox 40 mg daily for 6 weeks.  - Must remain on isolation while in rehab for quarantine period of 14 days. Avoid contact with house members, family, and friends for the duration of this quarantine.   - Take Acetaminophen (Tylenol) as needed for fever or pain. AVOID all NSAIDs including Ibuprofen (generic, Advil, Motrin), Naproxen (generic, Aleve), Diclofenac (generic, Voltaren, Arthrotec), Celecoxib (Celebrex), Meloxicam (Mobicox) and others.  - If you experience worsening or recurrence of your symptoms, particularly worsening or high fever, shortness of breath, extreme fatigue, or bloody cough, call 9-1-1 immediately or report to the nearest Emergency Department.    Diagnosis: SVT (supraventricular tachycardia)  Assessment and Plan of Treatment: You were evaluated by cardiologist Dr Don Ordonez while hospitalized for SVT, which resolved.  - Continue Metoprolol tartrate 50 mg twice a day.  - Follow up with cardiologist Dr Ferrera in 1 week after discharge from rehab.    Diagnosis: Urinary retention  Assessment and Plan of Treatment: Menendez catheter was kept in for wound healing due to sacral ulcer.   - Trial of voiding at rehab when you are more ambulatory.  - Follow up with urologist Dr Shelton in 1 week after discharge.    Diagnosis: Sacral decubitus ulcer  Assessment and Plan of Treatment: Sacral decubitus ulcer - stage 4, underwent surgical debridment   - Continue wound vac therapy.  - [facility] in 1 week after discharge: [phone]. PRINCIPAL DISCHARGE DIAGNOSIS  Diagnosis: COVID-19 virus detected  Assessment and Plan of Treatment: POSITIVE Novel Coronavirus (COVID-19)  - Continue DVT prophylaxis Lovenox 40 mg daily for 6 weeks.  - Must remain on isolation while in rehab for quarantine period of 14 days. Avoid contact with house members, family, and friends for the duration of this quarantine.   - Take Acetaminophen (Tylenol) as needed for fever or pain. AVOID all NSAIDs including Ibuprofen (generic, Advil, Motrin), Naproxen (generic, Aleve), Diclofenac (generic, Voltaren, Arthrotec), Celecoxib (Celebrex), Meloxicam (Mobicox) and others.  - If you experience worsening or recurrence of your symptoms, particularly worsening or high fever, shortness of breath, extreme fatigue, or bloody cough, call 9-1-1 immediately or report to the nearest Emergency Department.      SECONDARY DISCHARGE DIAGNOSES  Diagnosis: Anemia  Assessment and Plan of Treatment: Resolved - Hb stable after packed red blood cell transfusion.  - Continue Protonix 40 mg daily, Carafate 1 g four times daily.  - Repeat labwork (CBC) at rehab in 2-3 days after discharge.  - CT abdomen pelvis showed colon thickening. Follow up with GI Dr Horne as outpatient for endoscopy/colonoscopy and further management.  - Follow up with your primary care physician Dr. Escalera in 1 week after rehab.    Diagnosis: SVT (supraventricular tachycardia)  Assessment and Plan of Treatment: You were evaluated by cardiologist Dr Don Ordonez while hospitalized for SVT, which resolved.  - Continue Metoprolol tartrate 50 mg twice a day.  - Follow up with cardiologist Dr Ferrera in 1 week after discharge from rehab.    Diagnosis: Sacral decubitus ulcer  Assessment and Plan of Treatment: Sacral decubitus ulcer - stage 4, underwent surgical debridment   - Continue wound vac therapy.  - [facility] in 1 week after discharge: [phone].    Diagnosis: Malnutrition  Assessment and Plan of Treatment: You were seen by the nutritionists in hospital due to poor oral intake. Please continue to drink ensure and follow their recommendations upon discharge.    Diagnosis: Urinary retention  Assessment and Plan of Treatment: Menendez catheter was kept in for wound healing due to sacral ulcer.   - Trial of voiding at rehab when you are more ambulatory.  - Follow up with urologist Dr Shelton in 1 week after discharge. PRINCIPAL DISCHARGE DIAGNOSIS  Diagnosis: COVID-19 virus detected  Assessment and Plan of Treatment: POSITIVE Novel Coronavirus (COVID-19)  - Continue DVT prophylaxis Lovenox 40 mg daily for 6 weeks.  - Must remain on isolation while in rehab for quarantine period of 14 days. Avoid contact with house members, family, and friends for the duration of this quarantine.   - Take Acetaminophen (Tylenol) as needed for fever or pain. AVOID all NSAIDs including Ibuprofen (generic, Advil, Motrin), Naproxen (generic, Aleve), Diclofenac (generic, Voltaren, Arthrotec), Celecoxib (Celebrex), Meloxicam (Mobicox) and others.  - If you experience worsening or recurrence of your symptoms, particularly worsening or high fever, shortness of breath, extreme fatigue, or bloody cough, call 9-1-1 immediately or report to the nearest Emergency Department.      SECONDARY DISCHARGE DIAGNOSES  Diagnosis: Anemia  Assessment and Plan of Treatment: Resolved - Hb stable after packed red blood cell transfusion.  - Continue Protonix 40 mg daily, Carafate 1 g four times daily.  - Repeat labwork (CBC) at rehab in 2-3 days after discharge.  - CT abdomen pelvis showed colon thickening. Follow up with GI Dr Horne as outpatient for endoscopy/colonoscopy and further management.  - Follow up with your primary care physician Dr. Escalera in 1 week after rehab.    Diagnosis: SVT (supraventricular tachycardia)  Assessment and Plan of Treatment: You were evaluated by cardiologist Dr Don Ordonez while hospitalized for SVT, which resolved.  - Continue Metoprolol tartrate 50 mg twice a day.  - Follow up with cardiologist Dr Ferrera in 1 week after discharge from rehab.    Diagnosis: Sacral decubitus ulcer  Assessment and Plan of Treatment: Sacral decubitus ulcer - stage 4, underwent surgical debridment   - Continue wound vac therapy.  - [facility] up [facility] in 1 week after discharge: [phone].    Diagnosis: Malnutrition  Assessment and Plan of Treatment: You were seen by the nutritionists in hospital due to poor oral intake. Please continue to drink ensure and follow their recommendations upon discharge.    Diagnosis: Urinary retention  Assessment and Plan of Treatment: Menendez catheter was kept in for wound healing due to sacral ulcer.   - Trial of voiding at rehab when you are more ambulatory.  - Follow up with urologist Dr Shelton in 1 week after discharge.    Diagnosis: Hallucinations  Assessment and Plan of Treatment: You developed hallucinations in hospital, likely secondary to remeron usage. The drug was discontinued and your hallucinations resolved thereafter. PRINCIPAL DISCHARGE DIAGNOSIS  Diagnosis: COVID-19 virus detected  Assessment and Plan of Treatment: POSITIVE Novel Coronavirus (COVID-19)  - You were treated with supportive therapy  - You have now tested negative for COVID x2.   - Take Acetaminophen (Tylenol) as needed for fever or pain. AVOID all NSAIDs including Ibuprofen (generic, Advil, Motrin), Naproxen (generic, Aleve), Diclofenac (generic, Voltaren, Arthrotec), Celecoxib (Celebrex), Meloxicam (Mobicox) and others.      SECONDARY DISCHARGE DIAGNOSES  Diagnosis: Hallucinations  Assessment and Plan of Treatment: You developed hallucinations in hospital, likely secondary to remeron usage. The drug was discontinued and your hallucinations resolved thereafter.    Diagnosis: Malnutrition  Assessment and Plan of Treatment: You were seen by the nutritionists in hospital due to poor oral intake. Please continue to drink ensure and follow their recommendations upon discharge.    Diagnosis: Anemia  Assessment and Plan of Treatment: Resolved - Hb stable after packed red blood cell transfusion.  - Continue Protonix 40 mg daily, Carafate 1 g four times daily.  - Repeat labwork (CBC) at rehab in 2-3 days after discharge.  - CT abdomen pelvis showed colon thickening. Follow up with GI Dr Horne as outpatient for endoscopy/colonoscopy and further management.  - Follow up with your primary care physician Dr. Escalera in 1 week after rehab.    Diagnosis: SVT (supraventricular tachycardia)  Assessment and Plan of Treatment: You were evaluated by cardiologist Dr Don Ordonez while hospitalized for SVT, which resolved.  - Continue Metoprolol tartrate 50 mg twice a day.  - Follow up with cardiologist Dr Ferrera in 1 week after discharge from rehab.    Diagnosis: Sacral decubitus ulcer  Assessment and Plan of Treatment: Sacral decubitus ulcer - stage 4, underwent surgical debridment   - Continue wound vac therapy.  - [facility] up [facility] in 1 week after discharge: [phone].    Diagnosis: Urinary retention  Assessment and Plan of Treatment: Menendez catheter was kept in for wound healing due to sacral ulcer.   - Trial of voiding at rehab when you are more ambulatory.  - Follow up with urologist Dr Shelton in 1 week after discharge.

## 2020-05-04 NOTE — DISCHARGE NOTE PROVIDER - PROVIDER TOKENS
FREE:[LAST:[thom],PHONE:[(   )    -],FAX:[(   )    -]],PROVIDER:[TOKEN:[[id number]]] PROVIDER:[TOKEN:[[id number]],FOLLOWUP:[1 week]],PROVIDER:[TOKEN:[[id number]],FOLLOWUP:[1 week]],PROVIDER:[TOKEN:[[id number]],FOLLOWUP:[1 week]] PROVIDER:[TOKEN:[[id number]],FOLLOWUP:[1 week],ESTABLISHEDPATIENT:[T]],PROVIDER:[TOKEN:[[id number]],FOLLOWUP:[1 week]],PROVIDER:[TOKEN:[[id number]],FOLLOWUP:[1 week],ESTABLISHEDPATIENT:[T]],FREE:[LAST:[[facility]],PHONE:[phone]],FAX:[(   )    -],ADDRESS:[[address] [place] [address]],FOLLOWUP:[1 week]],PROVIDER:[TOKEN:[[id number]]]

## 2020-05-05 LAB
ANION GAP SERPL CALC-SCNC: 10 MMOL/L — SIGNIFICANT CHANGE UP (ref 5–17)
BASOPHILS # BLD AUTO: 0.01 K/UL — SIGNIFICANT CHANGE UP (ref 0–0.2)
BASOPHILS NFR BLD AUTO: 0.1 % — SIGNIFICANT CHANGE UP (ref 0–2)
BUN SERPL-MCNC: 30 MG/DL — HIGH (ref 7–23)
CALCIUM SERPL-MCNC: 8.1 MG/DL — LOW (ref 8.5–10.1)
CHLORIDE SERPL-SCNC: 105 MMOL/L — SIGNIFICANT CHANGE UP (ref 96–108)
CO2 SERPL-SCNC: 28 MMOL/L — SIGNIFICANT CHANGE UP (ref 22–31)
CREAT SERPL-MCNC: 0.67 MG/DL — SIGNIFICANT CHANGE UP (ref 0.5–1.3)
CULTURE RESULTS: SIGNIFICANT CHANGE UP
CULTURE RESULTS: SIGNIFICANT CHANGE UP
EOSINOPHIL # BLD AUTO: 0.04 K/UL — SIGNIFICANT CHANGE UP (ref 0–0.5)
EOSINOPHIL NFR BLD AUTO: 0.3 % — SIGNIFICANT CHANGE UP (ref 0–6)
GLUCOSE SERPL-MCNC: 94 MG/DL — SIGNIFICANT CHANGE UP (ref 70–99)
HCT VFR BLD CALC: 27.7 % — LOW (ref 39–50)
HGB BLD-MCNC: 8.7 G/DL — LOW (ref 13–17)
IMM GRANULOCYTES NFR BLD AUTO: 1 % — SIGNIFICANT CHANGE UP (ref 0–1.5)
LYMPHOCYTES # BLD AUTO: 1.09 K/UL — SIGNIFICANT CHANGE UP (ref 1–3.3)
LYMPHOCYTES # BLD AUTO: 9.3 % — LOW (ref 13–44)
MCHC RBC-ENTMCNC: 28 PG — SIGNIFICANT CHANGE UP (ref 27–34)
MCHC RBC-ENTMCNC: 31.4 GM/DL — LOW (ref 32–36)
MCV RBC AUTO: 89.1 FL — SIGNIFICANT CHANGE UP (ref 80–100)
MONOCYTES # BLD AUTO: 0.42 K/UL — SIGNIFICANT CHANGE UP (ref 0–0.9)
MONOCYTES NFR BLD AUTO: 3.6 % — SIGNIFICANT CHANGE UP (ref 2–14)
NEUTROPHILS # BLD AUTO: 10.1 K/UL — HIGH (ref 1.8–7.4)
NEUTROPHILS NFR BLD AUTO: 85.7 % — HIGH (ref 43–77)
NRBC # BLD: 0 /100 WBCS — SIGNIFICANT CHANGE UP (ref 0–0)
ORGANISM # SPEC MICROSCOPIC CNT: SIGNIFICANT CHANGE UP
ORGANISM # SPEC MICROSCOPIC CNT: SIGNIFICANT CHANGE UP
PLATELET # BLD AUTO: 285 K/UL — SIGNIFICANT CHANGE UP (ref 150–400)
POTASSIUM SERPL-MCNC: 3.6 MMOL/L — SIGNIFICANT CHANGE UP (ref 3.5–5.3)
POTASSIUM SERPL-SCNC: 3.6 MMOL/L — SIGNIFICANT CHANGE UP (ref 3.5–5.3)
RBC # BLD: 3.11 M/UL — LOW (ref 4.2–5.8)
RBC # FLD: 15 % — HIGH (ref 10.3–14.5)
SODIUM SERPL-SCNC: 143 MMOL/L — SIGNIFICANT CHANGE UP (ref 135–145)
SPECIMEN SOURCE: SIGNIFICANT CHANGE UP
SPECIMEN SOURCE: SIGNIFICANT CHANGE UP
WBC # BLD: 11.78 K/UL — HIGH (ref 3.8–10.5)
WBC # FLD AUTO: 11.78 K/UL — HIGH (ref 3.8–10.5)

## 2020-05-05 PROCEDURE — 99239 HOSP IP/OBS DSCHRG MGMT >30: CPT | Mod: CS

## 2020-05-05 RX ORDER — METOPROLOL TARTRATE 50 MG
50 TABLET ORAL
Refills: 0 | Status: DISCONTINUED | OUTPATIENT
Start: 2020-05-05 | End: 2020-06-01

## 2020-05-05 RX ORDER — METOPROLOL TARTRATE 50 MG
1 TABLET ORAL
Qty: 0 | Refills: 0 | DISCHARGE
Start: 2020-05-05

## 2020-05-05 RX ADMIN — Medication 1 APPLICATION(S): at 14:40

## 2020-05-05 RX ADMIN — Medication 1 APPLICATION(S): at 00:30

## 2020-05-05 RX ADMIN — Medication 12.5 MILLIGRAM(S): at 06:07

## 2020-05-05 RX ADMIN — Medication 50 MILLIGRAM(S): at 17:13

## 2020-05-05 RX ADMIN — Medication 1 GRAM(S): at 06:07

## 2020-05-05 RX ADMIN — SERTRALINE 100 MILLIGRAM(S): 25 TABLET, FILM COATED ORAL at 11:22

## 2020-05-05 RX ADMIN — Medication 1 GRAM(S): at 11:22

## 2020-05-05 RX ADMIN — Medication 1 APPLICATION(S): at 06:06

## 2020-05-05 RX ADMIN — PANTOPRAZOLE SODIUM 40 MILLIGRAM(S): 20 TABLET, DELAYED RELEASE ORAL at 06:07

## 2020-05-05 NOTE — PROGRESS NOTE ADULT - PROBLEM SELECTOR PLAN 6
COVID  +  remain room air above 92 .   - Maintain on airborne isolation.  - Not on O2 no hypoxia  other  symptom noticed  .  - Limit use of NSAIDs.

## 2020-05-05 NOTE — PROGRESS NOTE ADULT - ASSESSMENT
80 y/o M with PMHx of HTN, BPH, spinal stenosis, dyslipidemia, anxiety, neuropathy, B12 deficiency who presents from [facility] rehab for evaluation of weakness and hypotension. Admitted for  acute  blood  loss   anemia and infection  poss 2/2  acute   possible lower GI bleed and stage 4 sacral decubitus .

## 2020-05-05 NOTE — PROGRESS NOTE ADULT - SUBJECTIVE AND OBJECTIVE BOX
LISANDRA PADGETT is a 79yMale , patient examined and chart reviewed.    INTERVAL HPI/ OVERNIGHT EVENTS:   NAD.  Afebrile. On RA.  No events.    PAST MEDICAL & SURGICAL HISTORY:  H/O vitamin B deficiency  Spinal stenosis  HTN (hypertension)  History of tonsillectomy      For details regarding the patient's social history, family history, and other miscellaneous elements, please refer the initial infectious diseases consultation and/or the admitting history and physical examination for this admission.    ROS:  CONSTITUTIONAL:  Negative fever or chills  EYES:  Negative  blurry vision or double vision  CARDIOVASCULAR:  Negative for chest pain or palpitations  RESPIRATORY:  Negative for cough, wheezing, or SOB   GASTROINTESTINAL:  Negative for nausea, vomiting, diarrhea, constipation, or abdominal pain  GENITOURINARY:  Negative frequency, urgency or dysuria  NEUROLOGIC:  No headache, confusion, dizziness, lightheadedness  All other systems were reviewed and are negative         Current inpatient medications :  MEDICATIONS  (STANDING):  collagenase Ointment 1 Application(s) Topical three times a day  metoprolol tartrate 50 milliGRAM(s) Oral two times a day  pantoprazole    Tablet 40 milliGRAM(s) Oral before breakfast  sertraline 100 milliGRAM(s) Oral daily  sodium chloride 0.65% Nasal 1 Spray(s) Both Nostrils two times a day  sucralfate suspension 1 Gram(s) Oral four times a day    Objective:  Vital Signs Last 24 Hrs  T(C): 36.9 (05 May 2020 15:50), Max: 37.1 (05 May 2020 12:11)  T(F): 98.4 (05 May 2020 15:50), Max: 98.7 (05 May 2020 12:11)  HR: 78 (05 May 2020 15:50) (76 - 93)  BP: 126/72 (05 May 2020 15:50) (106/62 - 148/78)  RR: 19 (05 May 2020 15:50) (18 - 19)  SpO2: 93% (05 May 2020 15:50) (93% - 99%)    Physical Exam:  General:  no acute distress  Eyes: sclera anicteric, pupils equal and reactive to light  ENMT: buccal mucosa moist, pharynx not injected  Neck: supple, trachea midline  Lungs: clear, no wheeze/rhonchi  Cardiovascular: regular rate and rhythm, S1 S2  Abdomen: soft, nontender, no organomegaly present, bowel sounds normal  Neurological: alert and oriented x2 Cranial Nerves II-XII grossly intact  Skin: sacral decub drsg c/d/i   Lymph Nodes: no palpable cervical/supraclavicular lymph nodes enlargements  Extremities: no cyanosis/clubbing/edema        LABS:                        8.7    11.78 )-----------( 285      ( 05 May 2020 07:00 )             27.7   05-05    143  |  105  |  30<H>  ----------------------------<  94  3.6   |  28  |  0.67    Ca    8.1<L>      05 May 2020 07:00      MICROBIOLOGY:    Culture - Blood (collected 02 May 2020 00:30)  Source: .Blood Blood  Preliminary Report (03 May 2020 01:03):    No growth to date.    Culture - Blood (collected 02 May 2020 00:30)  Source: .Blood Blood  Preliminary Report (03 May 2020 01:03):    No growth to date.    COVID-19 PCR . (04.28.20 @ 22:41)    COVID-19 PCR: Detected: This test has been validated by [employer] to be accurate;  though it has not been FDA cleared/approved by the usual pathway  As with all laboratory test, results should be correlated with clinical  findings.  https://www.fda.gov/media/263323/download  https://www.fda.gov/media/470854/download    RADIOLOGY & ADDITIONAL STUDIES:    EXAM:  CT ABDOMEN AND PELVIS                          EXAM:  CT CHEST                            PROCEDURE DATE:  04/29/2020          INTERPRETATION:  CT CHEST, ABDOMEN AND PELVIS WITHOUT CONTRAST    INDICATION: Shortness of breath. Hypotension. Altered mental status.     TECHNIQUE: Noncontrast CT of the chest, abdomen and pelvis. Images are reformatted in the sagittal and coronal planes.Postprocessed MIP reformatted chest images were created and reviewed.    COMPARISON: None.    FINDINGS:    Absence of intravenous contrast limits evaluation for traumatic injury, focal lesions, neoplasm, and vascular pathology.    Thorax:  Lines and tubes: None.  Airways: Tracheobronchial tree is patent.  Lungs and Pleura: Right basilar opacity containing air bronchograms. Mild patchy left lower lobe opacities are also identified. No significant pleural effusion. No pneumothorax.  Mediastinum and lymph nodes: No bulky adenopathy.  Heart: Trace pericardial effusion and/or thickening without significant cardiomegaly. Coronary artery calcification and/or stenting. Valvular calcifications.  Vessels: Atherosclerotic disease of the aorta and its branches. Ascending thoracic aorta measures up to 4.3 cm in maximum AP diameter.   Chest Wall: Within normal limits.    Abdomen/Pelvis:  Liver: No suspicious lesions.  Biliary: No significant dilatation. No calcified gallstones within the gallbladder.  Spleen: No suspicious lesions.  Pancreas: No inflammatory changes or ductal dilatation.  Adrenals: Normal.  Kidneys: No hydronephrosis. Bilateral renal cysts as well as too small to characterize hypodensities. Sub-cm nonobstructive right renal calculus.  Vessels: Atherosclerotic disease of the aorta and its branches.     GI tract: There is suggestion of focal circumferential wall thickening of distal ascending colonic loop without significant pericolonic stranding as best seen on images 78 through 79 of series 2. Consider nonemergent colonoscopy evaluation to exclude underlying malignancy. No evidence ofsmall bowel obstruction. No CT findings of acute appendicitis.    Peritoneum/retroperitoneum and mesentery: No free air. No organized fluid collection. No adenopathy.    Pelvic organs/Bladder: Heterogeneous prominent prostate with nodular density near the apex, cause mass effect and protrusion at the bladder base. Correlate with PSA and consider nonemergent prostatic workup to exclude malignancy. No significant bladder wall thickening.    Abdominal wall: Suggestion of left-sided sacral decubitus ulcer with punctate bubbles of air diffusely extending along symmetrically prominent left gluteal muscle and soft tissue folds. Partially imaged asymmetric density measuring 3.9 x 3.3 cm posteromedial to the left proximal femur on image 168 of series 2. Possibility of intramuscular hematoma considered in the differential.    Bones and soft tissues: Multilevel degenerative changes of the spine noted. Advanced osteoarthritis of bilateral hip joint. Advanced osteoarthritis of left shoulder joint withassociated soft tissue calcifications. Vague linear lucency identified at the level of the sacrococcygeal tip as best seen on images 149 through 151 of series 2. Correlate with point tenderness to exclude underlying nondisplaced fracture.    IMPRESSION:    Right basilar opacity containing air bronchograms. Additional patchy left lower lobe opacities. Bacterial and viral pneumonias including atypical agent such as COVID-19 considered in the differential. Correlate with clinical parameters, laboratory values and follow-up chest radiograph advised. No significant pleural effusion.     Suggestion of focal circumferential wall thickening of distal ascending colonic loop without significant pericolonic stranding. Consider nonemergent colonoscopy evaluation to exclude underlying malignancy. No evidence of small bowel obstruction.     Vague linear lucency identified at the level of the sacrococcygeal tip. Correlate with point tenderness to exclude underlying nondisplaced fracture.    Suggestion of left-sided sacral decubitus ulcer with punctate bubbles of air diffusely extending along symmetrically prominent left gluteal muscle and soft tissue folds. Partially imaged asymmetric density measuring 3.9 x 3.3 cm posteromedial to the left proximalfemur. Possibility of intramuscular hematoma considered in the differential.    Assessment :   78 y/o M with PMHx of HTN, BPH, spinal stenosis, dyslipidemia, anxiety, neuropathy, B12 deficiency resident of [facility] for evaluation of weakness and hypotension with mental status change. Tested COVID 19 positive early April. In ED he was hypotensive. WBC 19K No SOB cough n/v/d. Pt is a poor historian. Has unstagable sacral decub ulcer. Also with Acute anemia. ?infectious process vs reactive leukocytosis. Leukocytosis poss chronic.  Respiratory status stable. Menendez placed sec AUR 5/3/2020. Clinically stable.    Plan :   Monitor off antibiotics  Completed Zosyn   Cultures ngtd  Seen by surgery and is sp bedside I&D of sacral decub  Menendez per urology  Frequent turning  Local wound care per surgery  COVID-19--critical period for decompensation  (7-14 days post symptom onset), avoid aerosolizing procedures, NSAIDs   -monitor respiratory status closely ( route of 02 and saturation) and titrate when able  -isolation precautions per protocol  -avoid excessive blood draws, blood cultures and frequent CXRs  -monitor biomarkers- CBC w diff  for NLRNLR <3 low vs >5 high) Ferritin (lower risk <450 vs >850) CRP (low risk <2 and higher risk >6) and LDH, D Dimer, procalcitonin  -therapies remains investigational-- including Hydroxychloroquine  -antibiotics only if there is concern for a bacterial process  -Steroids short course ( 5 days)  --for hypoxia/ARDS /shock      D/W Dr Hirsch    Continue with present regiment.  Appropriate use of antibiotics and adverse effects reviewed.      I have discussed the above plan of care with patient/ family in detail. They expressed understanding of the  treatment plan . Risks, benefits and alternatives discussed in detail. I have asked if they have any questions or concerns and appropriately addressed them to the best of my ability .    > 35 minutes were spent in direct patient care reviewing notes, medications ,labs data/ imaging , discussion with multidisciplinary team.    Thank you for allowing me to participate in care of your patient .    Naima Lopez MD  Infectious Disease  [phone]

## 2020-05-05 NOTE — PROGRESS NOTE ADULT - SUBJECTIVE AND OBJECTIVE BOX
Patient is a 79y old  Male who presents with a chief complaint of Generalized weakness (05 May 2020 17:11)      HPI:  80 y/o M with PMHx of HTN, BPH, spinal stenosis, dyslipidemia, anxiety, neuropathy, B12 deficiency who presents from [facility] rehab for evaluation of weakness and hypotension. As per transfer papers, pt with confusion after lunch today. Pt admits to generalized weakness. Lives with wife, Stephany and son. He came from [facility] rehab in [place] since Feb 14. Patient lives with wife and son at home and ambulates with cane. Contracted Covid-19 in [facility] rehab and first started having symptoms on Mar 25, reporting symptoms of cough and low grade fever, diarrhea. Denies any chest pain, shortness of breath, headache, dizziness. History obtained from wife, Stephany([phone]) as pt is confused and a poor historian.   Of note, pt was noted to have sacral wounds today and reported low grade fever for two days and mental status changes today .  Admitted for  acute  blood  loss   anemia and infection  poss 2/2  acute   possible lower GI bleed and stage 4 sacral decubitus .  pt seen and examine today more awake , alert  , drinking more water , no fever , no cough , no sob , rodriguez voiding .    INTERVAL HPI/OVERNIGHT EVENTS:  T(C): 36.9 (05-05-20 @ 15:50), Max: 37.1 (05-05-20 @ 12:11)  HR: 78 (05-05-20 @ 15:50) (76 - 93)  BP: 126/72 (05-05-20 @ 15:50) (106/62 - 148/78)  RR: 19 (05-05-20 @ 15:50) (18 - 19)  SpO2: 93% (05-05-20 @ 15:50) (93% - 99%)  Wt(kg): --  I&O's Summary    04 May 2020 07:01  -  05 May 2020 07:00  --------------------------------------------------------  IN: 0 mL / OUT: 500 mL / NET: -500 mL    05 May 2020 07:01  -  05 May 2020 18:50  --------------------------------------------------------  IN: 0 mL / OUT: 400 mL / NET: -400 mL        REVIEW OF SYSTEMS:  CONSTITUTIONAL: No fever, weight loss, or fatigue  EYES: No eye pain, visual disturbances, or discharge  ENMT:  No sinus or throat pain  NECK: No pain or stiffness  RESPIRATORY: No cough, wheezing, chills  No shortness of breath  CARDIOVASCULAR: No chest pain, palpitations, dizziness, or leg swelling  GASTROINTESTINAL: No abdominal or epigastric pain. No nausea, vomiting,  No diarrhea or constipation.   No melena   GENITOURINARY: No dysuria, frequency, hematuria, or incontinence  NEUROLOGICAL: No headaches, memory loss, loss of strength, numbness, or tremors  SKIN: No itching, burning, rashes,   sacral   decubitus +  MUSCULOSKELETAL: No joint pain or swelling; No muscle, back, or extremity pain    PHYSICAL EXAM:  GENERAL: NAD, well-groomed, weak +  HEAD:  Atraumatic, Normocephalic  EYES: EOMI, PERRLA, conjunctiva and sclera clear  ENMT: Moist mucous membranes No lesions  NECK: Supple,   NERVOUS SYSTEM:  Alert & Oriented X3, Good concentration; Motor Strength 5/5 B/L upper and lower extremities; DTRs 2+ intact and symmetric  CHEST/LUNG:  percussion bilaterally; No rales, rhonchi, wheezing,   HEART: Regular rate and rhythm; No murmurs,   ABDOMEN: Soft, Nontender, Nondistended; Bowel sounds present  EXTREMITIES:  2+ Peripheral Pulses, No clubbing, cyanosis, or edema  SKIN: No rashes or lesions , stage 4 10/ 10 cm  gu rodriguez clean urine  MEDICATIONS  (STANDING):  collagenase Ointment 1 Application(s) Topical three times a day  metoprolol tartrate 50 milliGRAM(s) Oral two times a day  pantoprazole    Tablet 40 milliGRAM(s) Oral before breakfast  sertraline 100 milliGRAM(s) Oral daily  sodium chloride 0.65% Nasal 1 Spray(s) Both Nostrils two times a day  sucralfate suspension 1 Gram(s) Oral four times a day    MEDICATIONS  (PRN):      LABS:                        8.7    11.78 )-----------( 285      ( 05 May 2020 07:00 )             27.7     05-05    143  |  105  |  30<H>  ----------------------------<  94  3.6   |  28  |  0.67    Ca    8.1<L>      05 May 2020 07:00          CAPILLARY BLOOD GLUCOSE          05-02 @ 00:30   No growth to date.  --  --          RADIOLOGY & ADDITIONAL TESTS:    Imaging Personally Reviewed:   no new test    Advance Directives:dnr      Palliative Care:    appropriate

## 2020-05-05 NOTE — PROGRESS NOTE ADULT - SUBJECTIVE AND OBJECTIVE BOX
PULMONARY/CRITICAL CARE  No sob. Now off antibiotics  Unchanged. Blood cultures pos.    Alert, confused.   Pt unchanged. No fever.      Patient is a 79y old  Male who presents with a chief complaint of Generalized weakness (28 Apr 2020 22:20)    BRIEF HOSPITAL COURSE: ***78 y/o M with PMHx of HTN, BPH, spinal stenosis, dyslipidemia, anxiety, neuropathy, B12 deficiency who presents from [facility] for evaluation of weakness and hypotension. As per transfer papers, pt with confusion after lunch today. Pt admits to generalized weakness. Lives with wife, Stephany and son. He came from [facility] in [place] since Feb 14. Patient lives with wife and son at home and ambulates with cane. Contracted Covid-19 in [facility] and first started having symptoms on Mar 25, reporting symptoms of cough and low grade fever, diarrhea. Denies any chest pain, shortness of breath, headache, dizziness. History obtained from wife, Stephany([phone]) as pt is confused and a poor historian.   Of note, pt was noted to have sacral wounds today and reported low grade fever for two days and mental status changes today and loss of appetite.    Events last 24 hours: ***    PAST MEDICAL & SURGICAL HISTORY:  H/O vitamin B deficiency  Spinal stenosis  HTN (hypertension)  History of tonsillectomy    Allergies    No Known Allergies    Intolerances      FAMILY HISTORY:  No pertinent family history in first degree relatives      Review of Systems:  unobtainable    Medications:  piperacillin/tazobactam IVPB.. 3.375 Gram(s) IV Intermittent every 8 hours    metoprolol succinate ER 50 milliGRAM(s) Oral daily  midodrine. 10 milliGRAM(s) Oral three times a day      sertraline 100 milliGRAM(s) Oral daily        pantoprazole  Injectable 40 milliGRAM(s) IV Push every 12 hours        cyanocobalamin 1000 MICROGram(s) Oral daily      collagenase Ointment 1 Application(s) Topical three times a day            ICU Vital Signs Last 24 Hrs  T(C): 37.1 (29 Apr 2020 06:05), Max: 37.3 (29 Apr 2020 03:49)  T(F): 98.7 (29 Apr 2020 06:05), Max: 99.1 (29 Apr 2020 03:49)  HR: 102 (29 Apr 2020 06:05) (87 - 105)  BP: 100/57 (29 Apr 2020 06:05) (84/48 - 100/57)  BP(mean): --  ABP: --  ABP(mean): --  RR: 18 (29 Apr 2020 06:05) (17 - 22)  SpO2: 96% (29 Apr 2020 06:05) (96% - 98%)    Vital Signs Last 24 Hrs  T(C): 37.1 (29 Apr 2020 06:05), Max: 37.3 (29 Apr 2020 03:49)  T(F): 98.7 (29 Apr 2020 06:05), Max: 99.1 (29 Apr 2020 03:49)  HR: 102 (29 Apr 2020 06:05) (87 - 105)  BP: 100/57 (29 Apr 2020 06:05) (84/48 - 100/57)  BP(mean): --  RR: 18 (29 Apr 2020 06:05) (17 - 22)  SpO2: 96% (29 Apr 2020 06:05) (96% - 98%)        I&O's Detail        LABS:                        8.4    19.86 )-----------( 248      ( 29 Apr 2020 07:23 )             26.0     04-28    138  |  107  |  54<H>  ----------------------------<  92  4.6   |  26  |  1.10    Ca    7.6<L>      28 Apr 2020 22:41  Phos  2.4     04-28  Mg     2.0     04-28    TPro  5.1<L>  /  Alb  1.8<L>  /  TBili  1.8<H>  /  DBili  x   /  AST  230<H>  /  ALT  173<H>  /  AlkPhos  235<H>  04-28          CAPILLARY BLOOD GLUCOSE            CULTURES:          RADIOLOGY: ***< from: CT Chest No Cont (04.29.20 @ 01:24) >  EXAM:  CT ABDOMEN AND PELVIS                          EXAM:  CT CHEST                            PROCEDURE DATE:  04/29/2020          INTERPRETATION:  CT CHEST, ABDOMEN AND PELVIS WITHOUT CONTRAST    INDICATION: Shortness of breath. Hypotension. Altered mental status.     TECHNIQUE: Noncontrast CT of the chest, abdomen and pelvis. Images are reformatted in the sagittal and coronal planes.Postprocessed MIP reformatted chest images were created and reviewed.    COMPARISON: None.    FINDINGS:    Absence of intravenous contrast limits evaluation for traumatic injury, focal lesions, neoplasm, and vascular pathology.    Thorax:  Lines and tubes: None.  Airways: Tracheobronchial tree is patent.  Lungs and Pleura: Right basilar opacity containing air bronchograms. Mild patchy left lower lobe opacities are also identified. No significant pleural effusion. No pneumothorax.  Mediastinum and lymph nodes: No bulky adenopathy.  Heart: Trace pericardial effusion and/or thickening without significant cardiomegaly. Coronary artery calcification and/or stenting. Valvular calcifications.  Vessels: Atherosclerotic disease of the aorta and its branches. Ascending thoracic aorta measures up to 4.3 cm in maximum AP diameter.   Chest Wall: Within normal limits.    Abdomen/Pelvis:  Liver: No suspicious lesions.  Biliary: No significant dilatation. No calcified gallstones within the gallbladder.  Spleen: No suspicious lesions.  Pancreas: No inflammatory changes or ductal dilatation.  Adrenals: Normal.  Kidneys: No hydronephrosis. Bilateral renal cysts as well as too small to characterize hypodensities. Sub-cm nonobstructive right renal calculus.  Vessels: Atherosclerotic disease of the aorta and its branches.     GI tract: There is suggestion of focal circumferential wall thickening of distal ascending colonic loop without significant pericolonic stranding as best seen on images 78 through 79 of series 2. Consider nonemergent colonoscopy evaluation to exclude underlying malignancy. No evidence ofsmall bowel obstruction. No CT findings of acute appendicitis.    Peritoneum/retroperitoneum and mesentery: No free air. No organized fluid collection. No adenopathy.    Pelvic organs/Bladder: Heterogeneous prominent prostate with nodular density near the apex, cause mass effect and protrusion at the bladder base. Correlate with PSA and consider nonemergent prostatic workup to exclude malignancy. No significant bladder wall thickening.    Abdominal wall: Suggestion of left-sided sacral decubitus ulcer with punctate bubbles of air diffusely extending along symmetrically prominent left gluteal muscle and soft tissue folds. Partially imaged asymmetric density measuring 3.9 x 3.3 cm posteromedial to the left proximal femur on image 168 of series 2. Possibility of intramuscular hematoma considered in the differential.    Bones and soft tissues: Multilevel degenerative changes of the spine noted. Advanced osteoarthritis of bilateral hip joint. Advanced osteoarthritis of left shoulder joint withassociated soft tissue calcifications. Vague linear lucency identified at the level of the sacrococcygeal tip as best seen on images 149 through 151 of series 2. Correlate with point tenderness to exclude underlying nondisplaced fracture.    IMPRESSION:    Right basilar opacity containing air bronchograms. Additional patchy left lower lobe opacities. Bacterial and viral pneumonias including atypical agent such as COVID-19 considered in the differential. Correlate with clinical parameters, laboratory values and follow-up chest radiograph advised. No significant pleural effusion.     Suggestion of focal circumferential wall thickening of distal ascending colonic loop without significant pericolonic stranding. Consider nonemergent colonoscopy evaluation to exclude underlying malignancy. No evidence of small bowel obstruction.     Vague linear lucency identified at the level of the sacrococcygeal tip. Correlate with point tenderness to exclude underlying nondisplaced fracture.    Suggestion of left-sided sacral decubitus ulcer with punctate bubbles of air diffusely extending along symmetrically prominent left gluteal muscle and soft tissue folds. Partially imaged asymmetric density measuring 3.9 x 3.3 cm posteromedial to the left proximalfemur. Possibility of intramuscular hematoma considered in the differential.    Additional findings as mentioned above.    These results were discussed via telephone at 4/29/2020 3:55 AM by Dr. Baugh of radiology with Dr. Bailey, institution read-back verification policy was followed.                TATE BAUGH M.D., ATTENDING RADIOLOGIST  This document has been electronically signed. Apr 29 2020  4:10AM              < end of copied text >    < from: Xray Chest 1 View- PORTABLE-Routine (04.30.20 @ 09:11) >  EXAM:  XR CHEST PORTABLE ROUTINE 1V                            PROCEDURE DATE:  04/30/2020          INTERPRETATION:    Examination: XR CHEST    History: ADMDIAG1: K92.2 GASTROINTESTINAL HEMORRHAGE, UNSPECIFIED/, pn pneumonia    TECHNIQUE:  Portable AP view of the chest was obtained.    COMPARISON: A CT chest 1/29/2020 available for review.    FINDINGS:   Lungs clear. RIGHT hemidiaphragm mildly elevated. Posterior lung bases cannot be assessed due to elevated diaphragms. Lateral radiograph recommended if clinically warranted.  . The heart and mediastinum are within normal limits.    Visualized osseous structures are intact.        IMPRESSION: Visualized Lungs clear..   Elevated LEFT hemidiaphragm. Posterior lung bases cannot be assessed dueto elevated diaphragms. Lateral radiograph recommended if clinically warranted.      < end of copied text >

## 2020-05-05 NOTE — PROGRESS NOTE ADULT - PROBLEM SELECTOR PLAN 8
was  hypotensive on admission resolved . later    s/p   one  episode of svt  episode resume bb small dose titrate up upto home dose.  - Will hold off on home Valsartan , cardio jim chaudhary .

## 2020-05-05 NOTE — PROGRESS NOTE ADULT - PROBLEM SELECTOR PLAN 1
acute  blood Anemia possible  2/2 lt hip chr  hematoma .  - Hgb 6.8, Lactate 2.5 as per [facility] admission labs  - FOBT+   still no active bleed noticed and  later pt  drop hb possible dilutional  also  post iv fluid but all hb remain stable.  - s/p 2 unit prbc   - F/u CT abd/pelvis, U/S Abd-  left-sided sacral decubitus ulcer with punctate bubbles of air diffusely extending along symmetrically prominent left gluteal muscle and soft tissue folds. Partially imaged asymmetric density measuring 3.9 x 3.3 cm posteromedial to the left proximal femur.   Possibility of intramuscular hematoma considered in the differential.  - GI Dr. giordano no procedure endo / colonoscopy   this time  -   fu out pt for procedure .

## 2020-05-05 NOTE — PROGRESS NOTE ADULT - PROBLEM SELECTOR PLAN 2
stage  4  Possible  source of infection  - Leukocytosis resolved   , blood cult neg todate.   - s/p IV Zosyn 3.375 gm q8hr - finished 5 days course.   - Collagenase ointment application daily  - surg dr maguire  s/p debridement

## 2020-05-05 NOTE — PROGRESS NOTE ADULT - ASSESSMENT
Elderly pt with COVID pneumonia, resolved.   Should be ready for dc.   Doing well.  Clinically stable   Blood culture pos. --finished antibiotics.       Hypotension due to dehydration improved.   Anemia improved--borderline iron.   Sacral decubitus

## 2020-05-05 NOTE — PROGRESS NOTE ADULT - SUBJECTIVE AND OBJECTIVE BOX
Chief Complaint: Weakness, AMS    Interval Events: No events overnight.    Review of Systems:  General: No fevers, chills, weight loss or gain  Skin: No rashes, color changes  Cardiovascular: No chest pain, orthopnea  Respiratory: No shortness of breath, cough  Gastrointestinal: No nausea, abdominal pain  Genitourinary: No incontinence, pain with urination  Musculoskeletal: No pain, swelling, decreased range of motion  Neurological: No headache, weakness  Psychiatric: No depression, anxiety  Endocrine: No weight loss or gain, increased thirst  All other systems are comprehensively negative.    Physical Exam:  Vital Signs Last 24 Hrs  T(C): 36.8 (04 May 2020 23:58), Max: 36.8 (04 May 2020 23:58)  T(F): 98.2 (04 May 2020 23:58), Max: 98.2 (04 May 2020 23:58)  HR: 82 (05 May 2020 06:00) (82 - 93)  BP: 148/78 (05 May 2020 06:00) (129/71 - 148/78)  BP(mean): --  RR: 18 (04 May 2020 23:58) (18 - 18)  SpO2: 98% (04 May 2020 23:58) (93% - 98%)  General: NAD  HEENT: MMM  Neck: No JVD, no carotid bruit  Extremities: No LE edema, no cyanosis  Neuro: Non-focal  Skin: No rash    Labs:             05-05    143  |  105  |  30<H>  ----------------------------<  94  3.6   |  28  |  0.67    Ca    8.1<L>      05 May 2020 07:00                          8.7    11.78 )-----------( 285      ( 05 May 2020 07:00 )             27.7     Telemetry: Sinus rhythm

## 2020-05-05 NOTE — PROGRESS NOTE ADULT - ATTENDING COMMENTS
pt seen and examine today - acute metabolic encephalopathy  poa  likely due to hypovolemic shock   resolved  -   Hypotension likely due to hypovolemia from  acute blood loss  anemia   sec to  lt side hematoma possible    and  sec to possible lower gi bleed as colon thickening in ct scan but not candidate for colonoscopy as per gi dr giordano   s/p 2 liters of NS bolus    - bp stable    resumed bb  tolerated well .  mild marleny resolved sec to volume depletion cr stable  .   s/p 2 unit of PRBC  ,  fu up  hb/hct  remain  stable no further bleed noticed  ,   guiac positive / no fresh blood per rectum .  ct abd / pelvis  chr lt hematoma - stable hb /hct . sepsis poa   sacral decubitus  stage 4 -   10x 10 cm    Necrotic tissues were sharply debrided and the wound was packed s/p  chemical /mechanical debridement  by dr maguire surg bedside    continue  iv abx Zosyn 3.375 gm finished 5 days   tt   , blood cult neg todate .  moderate  protein malnutrition - ensure supplement. - Cardiac enzymes negative. pt with episode of s/p   - SVT overnight - resume metoprolol tartrate  titrated up to home dose of 50 mg bid   bp remain stable   .  COVID 19 Infection  + but  asymptomatic     no acute   pna   , ra pulse  ox above 92 .   pt having recurrent urinary ret ation on / off   sec   possible  constipation .    rodriguez placed  by  urologist evaluation dr tomas . hypokalemia replaced -  fu up k wnl .     pt wife aware  dc plan  rehab once bed available .

## 2020-05-05 NOTE — PROGRESS NOTE ADULT - PROBLEM SELECTOR PLAN 5
- Found to have Elevated LFTs poa , possible hx covid +  - Avoid hepatotoxic drug   - later  remain  stable lft  - GI Dr. Le consulted,

## 2020-05-05 NOTE — PROGRESS NOTE ADULT - ASSESSMENT
The patient is a 79 year old male with a history of HTN, HL, pre-DM, anxiety, spinal stenosis who presents with weakness and AMS in the setting of worsening anemia, dehydration, COVID-19.    Plan:  - ECG with above noted abnormalities; largely unchanged from prior  - Cardiac enzymes negative  - BNP normal at 47  - CXR with mild right atelectasis  - SVT - Increase metoprolol tartrate to home dose of 50 mg bid  - Underwent debridement of sacral decub  - COVID-19 positive  - Not hypoxic on RA  - Discharge planning

## 2020-05-06 LAB
ANION GAP SERPL CALC-SCNC: 5 MMOL/L — SIGNIFICANT CHANGE UP (ref 5–17)
BASOPHILS # BLD AUTO: 0.01 K/UL — SIGNIFICANT CHANGE UP (ref 0–0.2)
BASOPHILS NFR BLD AUTO: 0.1 % — SIGNIFICANT CHANGE UP (ref 0–2)
BUN SERPL-MCNC: 28 MG/DL — HIGH (ref 7–23)
CALCIUM SERPL-MCNC: 8.3 MG/DL — LOW (ref 8.5–10.1)
CHLORIDE SERPL-SCNC: 104 MMOL/L — SIGNIFICANT CHANGE UP (ref 96–108)
CO2 SERPL-SCNC: 28 MMOL/L — SIGNIFICANT CHANGE UP (ref 22–31)
CREAT SERPL-MCNC: 0.71 MG/DL — SIGNIFICANT CHANGE UP (ref 0.5–1.3)
EOSINOPHIL # BLD AUTO: 0.01 K/UL — SIGNIFICANT CHANGE UP (ref 0–0.5)
EOSINOPHIL NFR BLD AUTO: 0.1 % — SIGNIFICANT CHANGE UP (ref 0–6)
GLUCOSE SERPL-MCNC: 96 MG/DL — SIGNIFICANT CHANGE UP (ref 70–99)
HCT VFR BLD CALC: 29.8 % — LOW (ref 39–50)
HGB BLD-MCNC: 9.6 G/DL — LOW (ref 13–17)
IMM GRANULOCYTES NFR BLD AUTO: 1.1 % — SIGNIFICANT CHANGE UP (ref 0–1.5)
LYMPHOCYTES # BLD AUTO: 0.77 K/UL — LOW (ref 1–3.3)
LYMPHOCYTES # BLD AUTO: 6.6 % — LOW (ref 13–44)
MCHC RBC-ENTMCNC: 28.2 PG — SIGNIFICANT CHANGE UP (ref 27–34)
MCHC RBC-ENTMCNC: 32.2 GM/DL — SIGNIFICANT CHANGE UP (ref 32–36)
MCV RBC AUTO: 87.4 FL — SIGNIFICANT CHANGE UP (ref 80–100)
MONOCYTES # BLD AUTO: 0.32 K/UL — SIGNIFICANT CHANGE UP (ref 0–0.9)
MONOCYTES NFR BLD AUTO: 2.7 % — SIGNIFICANT CHANGE UP (ref 2–14)
NEUTROPHILS # BLD AUTO: 10.44 K/UL — HIGH (ref 1.8–7.4)
NEUTROPHILS NFR BLD AUTO: 89.4 % — HIGH (ref 43–77)
NRBC # BLD: 0 /100 WBCS — SIGNIFICANT CHANGE UP (ref 0–0)
PLATELET # BLD AUTO: 322 K/UL — SIGNIFICANT CHANGE UP (ref 150–400)
POTASSIUM SERPL-MCNC: 4.1 MMOL/L — SIGNIFICANT CHANGE UP (ref 3.5–5.3)
POTASSIUM SERPL-SCNC: 4.1 MMOL/L — SIGNIFICANT CHANGE UP (ref 3.5–5.3)
RBC # BLD: 3.41 M/UL — LOW (ref 4.2–5.8)
RBC # FLD: 15.1 % — HIGH (ref 10.3–14.5)
SODIUM SERPL-SCNC: 137 MMOL/L — SIGNIFICANT CHANGE UP (ref 135–145)
WBC # BLD: 11.68 K/UL — HIGH (ref 3.8–10.5)
WBC # FLD AUTO: 11.68 K/UL — HIGH (ref 3.8–10.5)

## 2020-05-06 PROCEDURE — 99232 SBSQ HOSP IP/OBS MODERATE 35: CPT | Mod: CS

## 2020-05-06 RX ADMIN — Medication 1 APPLICATION(S): at 23:21

## 2020-05-06 RX ADMIN — Medication 1 APPLICATION(S): at 12:20

## 2020-05-06 RX ADMIN — Medication 1 GRAM(S): at 11:46

## 2020-05-06 RX ADMIN — SERTRALINE 100 MILLIGRAM(S): 25 TABLET, FILM COATED ORAL at 11:45

## 2020-05-06 RX ADMIN — Medication 50 MILLIGRAM(S): at 17:32

## 2020-05-06 RX ADMIN — Medication 1 GRAM(S): at 17:32

## 2020-05-06 RX ADMIN — Medication 1 APPLICATION(S): at 05:18

## 2020-05-06 RX ADMIN — Medication 1 GRAM(S): at 23:20

## 2020-05-06 RX ADMIN — Medication 50 MILLIGRAM(S): at 05:18

## 2020-05-06 RX ADMIN — PANTOPRAZOLE SODIUM 40 MILLIGRAM(S): 20 TABLET, DELAYED RELEASE ORAL at 05:18

## 2020-05-06 NOTE — PROGRESS NOTE ADULT - SUBJECTIVE AND OBJECTIVE BOX
PULMONARY/CRITICAL CARE  Pt stable.   No sob. Now off antibiotics  Unchanged. Blood cultures pos.    Alert, confused.   Pt unchanged. No fever.      Patient is a 79y old  Male who presents with a chief complaint of Generalized weakness (28 Apr 2020 22:20)    BRIEF HOSPITAL COURSE: ***78 y/o M with PMHx of HTN, BPH, spinal stenosis, dyslipidemia, anxiety, neuropathy, B12 deficiency who presents from [facility] for evaluation of weakness and hypotension. As per transfer papers, pt with confusion after lunch today. Pt admits to generalized weakness. Lives with wife, Stephany and son. He came from [facility] in [place] since Feb 14. Patient lives with wife and son at home and ambulates with cane. [facility] and first started having symptoms on Mar 25, reporting symptoms of cough and low grade fever, diarrhea. Denies any chest pain, shortness of breath, headache, dizziness. History obtained from wife, Stephany([phone]) as pt is confused and a poor historian.   Of note, pt was noted to have sacral wounds today and reported low grade fever for two days and mental status changes today and loss of appetite.    Events last 24 hours: ***    PAST MEDICAL & SURGICAL HISTORY:  H/O vitamin B deficiency  Spinal stenosis  HTN (hypertension)  History of tonsillectomy    Allergies    No Known Allergies    Intolerances      FAMILY HISTORY:  No pertinent family history in first degree relatives      Review of Systems:  unobtainable    Medications:  piperacillin/tazobactam IVPB.. 3.375 Gram(s) IV Intermittent every 8 hours    metoprolol succinate ER 50 milliGRAM(s) Oral daily  midodrine. 10 milliGRAM(s) Oral three times a day      sertraline 100 milliGRAM(s) Oral daily        pantoprazole  Injectable 40 milliGRAM(s) IV Push every 12 hours        cyanocobalamin 1000 MICROGram(s) Oral daily      collagenase Ointment 1 Application(s) Topical three times a day            ICU Vital Signs Last 24 Hrs  T(C): 37.1 (29 Apr 2020 06:05), Max: 37.3 (29 Apr 2020 03:49)  T(F): 98.7 (29 Apr 2020 06:05), Max: 99.1 (29 Apr 2020 03:49)  HR: 102 (29 Apr 2020 06:05) (87 - 105)  BP: 100/57 (29 Apr 2020 06:05) (84/48 - 100/57)  BP(mean): --  ABP: --  ABP(mean): --  RR: 18 (29 Apr 2020 06:05) (17 - 22)  SpO2: 96% (29 Apr 2020 06:05) (96% - 98%)    Vital Signs Last 24 Hrs  T(C): 37.1 (29 Apr 2020 06:05), Max: 37.3 (29 Apr 2020 03:49)  T(F): 98.7 (29 Apr 2020 06:05), Max: 99.1 (29 Apr 2020 03:49)  HR: 102 (29 Apr 2020 06:05) (87 - 105)  BP: 100/57 (29 Apr 2020 06:05) (84/48 - 100/57)  BP(mean): --  RR: 18 (29 Apr 2020 06:05) (17 - 22)  SpO2: 96% (29 Apr 2020 06:05) (96% - 98%)        I&O's Detail        LABS:                        8.4    19.86 )-----------( 248      ( 29 Apr 2020 07:23 )             26.0     04-28    138  |  107  |  54<H>  ----------------------------<  92  4.6   |  26  |  1.10    Ca    7.6<L>      28 Apr 2020 22:41  Phos  2.4     04-28  Mg     2.0     04-28    TPro  5.1<L>  /  Alb  1.8<L>  /  TBili  1.8<H>  /  DBili  x   /  AST  230<H>  /  ALT  173<H>  /  AlkPhos  235<H>  04-28          CAPILLARY BLOOD GLUCOSE            CULTURES:          RADIOLOGY: ***< from: CT Chest No Cont (04.29.20 @ 01:24) >  EXAM:  CT ABDOMEN AND PELVIS                          EXAM:  CT CHEST                            PROCEDURE DATE:  04/29/2020          INTERPRETATION:  CT CHEST, ABDOMEN AND PELVIS WITHOUT CONTRAST    INDICATION: Shortness of breath. Hypotension. Altered mental status.     TECHNIQUE: Noncontrast CT of the chest, abdomen and pelvis. Images are reformatted in the sagittal and coronal planes.Postprocessed MIP reformatted chest images were created and reviewed.    COMPARISON: None.    FINDINGS:    Absence of intravenous contrast limits evaluation for traumatic injury, focal lesions, neoplasm, and vascular pathology.    Thorax:  Lines and tubes: None.  Airways: Tracheobronchial tree is patent.  Lungs and Pleura: Right basilar opacity containing air bronchograms. Mild patchy left lower lobe opacities are also identified. No significant pleural effusion. No pneumothorax.  Mediastinum and lymph nodes: No bulky adenopathy.  Heart: Trace pericardial effusion and/or thickening without significant cardiomegaly. Coronary artery calcification and/or stenting. Valvular calcifications.  Vessels: Atherosclerotic disease of the aorta and its branches. Ascending thoracic aorta measures up to 4.3 cm in maximum AP diameter.   Chest Wall: Within normal limits.    Abdomen/Pelvis:  Liver: No suspicious lesions.  Biliary: No significant dilatation. No calcified gallstones within the gallbladder.  Spleen: No suspicious lesions.  Pancreas: No inflammatory changes or ductal dilatation.  Adrenals: Normal.  Kidneys: No hydronephrosis. Bilateral renal cysts as well as too small to characterize hypodensities. Sub-cm nonobstructive right renal calculus.  Vessels: Atherosclerotic disease of the aorta and its branches.     GI tract: There is suggestion of focal circumferential wall thickening of distal ascending colonic loop without significant pericolonic stranding as best seen on images 78 through 79 of series 2. Consider nonemergent colonoscopy evaluation to exclude underlying malignancy. No evidence ofsmall bowel obstruction. No CT findings of acute appendicitis.    Peritoneum/retroperitoneum and mesentery: No free air. No organized fluid collection. No adenopathy.    Pelvic organs/Bladder: Heterogeneous prominent prostate with nodular density near the apex, cause mass effect and protrusion at the bladder base. Correlate with PSA and consider nonemergent prostatic workup to exclude malignancy. No significant bladder wall thickening.    Abdominal wall: Suggestion of left-sided sacral decubitus ulcer with punctate bubbles of air diffusely extending along symmetrically prominent left gluteal muscle and soft tissue folds. Partially imaged asymmetric density measuring 3.9 x 3.3 cm posteromedial to the left proximal femur on image 168 of series 2. Possibility of intramuscular hematoma considered in the differential.    Bones and soft tissues: Multilevel degenerative changes of the spine noted. Advanced osteoarthritis of bilateral hip joint. Advanced osteoarthritis of left shoulder joint withassociated soft tissue calcifications. Vague linear lucency identified at the level of the sacrococcygeal tip as best seen on images 149 through 151 of series 2. Correlate with point tenderness to exclude underlying nondisplaced fracture.    IMPRESSION:    Right basilar opacity containing air bronchograms. Additional patchy left lower lobe opacities. Bacterial and viral pneumonias including atypical agent such as COVID-19 considered in the differential. Correlate with clinical parameters, laboratory values and follow-up chest radiograph advised. No significant pleural effusion.     Suggestion of focal circumferential wall thickening of distal ascending colonic loop without significant pericolonic stranding. Consider nonemergent colonoscopy evaluation to exclude underlying malignancy. No evidence of small bowel obstruction.     Vague linear lucency identified at the level of the sacrococcygeal tip. Correlate with point tenderness to exclude underlying nondisplaced fracture.    Suggestion of left-sided sacral decubitus ulcer with punctate bubbles of air diffusely extending along symmetrically prominent left gluteal muscle and soft tissue folds. Partially imaged asymmetric density measuring 3.9 x 3.3 cm posteromedial to the left proximalfemur. Possibility of intramuscular hematoma considered in the differential.    Additional findings as mentioned above.    These results were discussed via telephone at 4/29/2020 3:55 AM by Dr. Baugh of radiology with Dr. Bailey, institution read-back verification policy was followed.                TATE BAUGH M.D., ATTENDING RADIOLOGIST  This document has been electronically signed. Apr 29 2020  4:10AM              < end of copied text >    < from: Xray Chest 1 View- PORTABLE-Routine (04.30.20 @ 09:11) >  EXAM:  XR CHEST PORTABLE ROUTINE 1V                            PROCEDURE DATE:  04/30/2020          INTERPRETATION:    Examination: XR CHEST    History: ADMDIAG1: K92.2 GASTROINTESTINAL HEMORRHAGE, UNSPECIFIED/, pn pneumonia    TECHNIQUE:  Portable AP view of the chest was obtained.    COMPARISON: A CT chest 1/29/2020 available for review.    FINDINGS:   Lungs clear. RIGHT hemidiaphragm mildly elevated. Posterior lung bases cannot be assessed due to elevated diaphragms. Lateral radiograph recommended if clinically warranted.  . The heart and mediastinum are within normal limits.    Visualized osseous structures are intact.        IMPRESSION: Visualized Lungs clear..   Elevated LEFT hemidiaphragm. Posterior lung bases cannot be assessed dueto elevated diaphragms. Lateral radiograph recommended if clinically warranted.      < end of copied text > Seek medical attention of develops worsening headache, nausea, vomiting, seizure, any focal neurological complaint, Pain not controlled with medication, difficulty breathing or fever. No Heavy lifting, pushing, pulling or excessive physical activity for 4 weeks. Incentive spirometry. Fall precautions. call offices for follow up appointments. follow up PMD as needed.

## 2020-05-06 NOTE — PROGRESS NOTE ADULT - SUBJECTIVE AND OBJECTIVE BOX
LISANDRA PADGETT is a 79yMale , patient examined and chart reviewed.    INTERVAL HPI/ OVERNIGHT EVENTS:   NAD.  Afebrile. On RA.  No events.    PAST MEDICAL & SURGICAL HISTORY:  H/O vitamin B deficiency  Spinal stenosis  HTN (hypertension)  History of tonsillectomy      For details regarding the patient's social history, family history, and other miscellaneous elements, please refer the initial infectious diseases consultation and/or the admitting history and physical examination for this admission.    ROS:  CONSTITUTIONAL:  Negative fever or chills  EYES:  Negative  blurry vision or double vision  CARDIOVASCULAR:  Negative for chest pain or palpitations  RESPIRATORY:  Negative for cough, wheezing, or SOB   GASTROINTESTINAL:  Negative for nausea, vomiting, diarrhea, constipation, or abdominal pain  GENITOURINARY:  Negative frequency, urgency or dysuria  NEUROLOGIC:  No headache, confusion, dizziness, lightheadedness  All other systems were reviewed and are negative         Current inpatient medications :  MEDICATIONS  (STANDING):  collagenase Ointment 1 Application(s) Topical three times a day  metoprolol tartrate 50 milliGRAM(s) Oral two times a day  pantoprazole    Tablet 40 milliGRAM(s) Oral before breakfast  sertraline 100 milliGRAM(s) Oral daily  sodium chloride 0.65% Nasal 1 Spray(s) Both Nostrils two times a day  sucralfate suspension 1 Gram(s) Oral four times a day    Objective:  Vital Signs Last 24 Hrs  T(C): 36.9 (06 May 2020 16:02), Max: 37 (06 May 2020 00:09)  T(F): 98.4 (06 May 2020 16:02), Max: 98.6 (06 May 2020 00:09)  HR: 75 (06 May 2020 16:02) (67 - 75)  BP: 148/79 (06 May 2020 16:02) (119/69 - 148/79)  RR: 18 (06 May 2020 16:02) (18 - 19)  SpO2: 95% (06 May 2020 16:02) (94% - 97%)    Physical Exam:  General:  no acute distress  Eyes: sclera anicteric, pupils equal and reactive to light  ENMT: buccal mucosa moist, pharynx not injected  Neck: supple, trachea midline  Lungs: clear, no wheeze/rhonchi  Cardiovascular: regular rate and rhythm, S1 S2  Abdomen: soft, nontender, no organomegaly present, bowel sounds normal  Neurological: alert and oriented x2 Cranial Nerves II-XII grossly intact  Skin: sacral decub drsg c/d/i   Lymph Nodes: no palpable cervical/supraclavicular lymph nodes enlargements  Extremities: no cyanosis/clubbing/edema        LABS:                        9.6    11.68 )-----------( 322      ( 06 May 2020 12:42 )             29.8   05-06    137  |  104  |  28<H>  ----------------------------<  96  4.1   |  28  |  0.71    Ca    8.3<L>      06 May 2020 12:42        MICROBIOLOGY:    Culture - Blood (collected 02 May 2020 00:30)  Source: .Blood Blood  Preliminary Report (03 May 2020 01:03):    No growth to date.    Culture - Blood (collected 02 May 2020 00:30)  Source: .Blood Blood  Preliminary Report (03 May 2020 01:03):    No growth to date.    COVID-19 PCR . (04.28.20 @ 22:41)    COVID-19 PCR: Detected: This test has been validated by [employer] to be accurate;  though it has not been FDA cleared/approved by the usual pathway  As with all laboratory test, results should be correlated with clinical  findings.  https://www.fda.gov/media/030063/download  https://www.fda.gov/media/550183/download    RADIOLOGY & ADDITIONAL STUDIES:    EXAM:  CT ABDOMEN AND PELVIS                          EXAM:  CT CHEST                            PROCEDURE DATE:  04/29/2020          INTERPRETATION:  CT CHEST, ABDOMEN AND PELVIS WITHOUT CONTRAST    INDICATION: Shortness of breath. Hypotension. Altered mental status.     TECHNIQUE: Noncontrast CT of the chest, abdomen and pelvis. Images are reformatted in the sagittal and coronal planes.Postprocessed MIP reformatted chest images were created and reviewed.    COMPARISON: None.    FINDINGS:    Absence of intravenous contrast limits evaluation for traumatic injury, focal lesions, neoplasm, and vascular pathology.    Thorax:  Lines and tubes: None.  Airways: Tracheobronchial tree is patent.  Lungs and Pleura: Right basilar opacity containing air bronchograms. Mild patchy left lower lobe opacities are also identified. No significant pleural effusion. No pneumothorax.  Mediastinum and lymph nodes: No bulky adenopathy.  Heart: Trace pericardial effusion and/or thickening without significant cardiomegaly. Coronary artery calcification and/or stenting. Valvular calcifications.  Vessels: Atherosclerotic disease of the aorta and its branches. Ascending thoracic aorta measures up to 4.3 cm in maximum AP diameter.   Chest Wall: Within normal limits.    Abdomen/Pelvis:  Liver: No suspicious lesions.  Biliary: No significant dilatation. No calcified gallstones within the gallbladder.  Spleen: No suspicious lesions.  Pancreas: No inflammatory changes or ductal dilatation.  Adrenals: Normal.  Kidneys: No hydronephrosis. Bilateral renal cysts as well as too small to characterize hypodensities. Sub-cm nonobstructive right renal calculus.  Vessels: Atherosclerotic disease of the aorta and its branches.     GI tract: There is suggestion of focal circumferential wall thickening of distal ascending colonic loop without significant pericolonic stranding as best seen on images 78 through 79 of series 2. Consider nonemergent colonoscopy evaluation to exclude underlying malignancy. No evidence ofsmall bowel obstruction. No CT findings of acute appendicitis.    Peritoneum/retroperitoneum and mesentery: No free air. No organized fluid collection. No adenopathy.    Pelvic organs/Bladder: Heterogeneous prominent prostate with nodular density near the apex, cause mass effect and protrusion at the bladder base. Correlate with PSA and consider nonemergent prostatic workup to exclude malignancy. No significant bladder wall thickening.    Abdominal wall: Suggestion of left-sided sacral decubitus ulcer with punctate bubbles of air diffusely extending along symmetrically prominent left gluteal muscle and soft tissue folds. Partially imaged asymmetric density measuring 3.9 x 3.3 cm posteromedial to the left proximal femur on image 168 of series 2. Possibility of intramuscular hematoma considered in the differential.    Bones and soft tissues: Multilevel degenerative changes of the spine noted. Advanced osteoarthritis of bilateral hip joint. Advanced osteoarthritis of left shoulder joint withassociated soft tissue calcifications. Vague linear lucency identified at the level of the sacrococcygeal tip as best seen on images 149 through 151 of series 2. Correlate with point tenderness to exclude underlying nondisplaced fracture.    IMPRESSION:    Right basilar opacity containing air bronchograms. Additional patchy left lower lobe opacities. Bacterial and viral pneumonias including atypical agent such as COVID-19 considered in the differential. Correlate with clinical parameters, laboratory values and follow-up chest radiograph advised. No significant pleural effusion.     Suggestion of focal circumferential wall thickening of distal ascending colonic loop without significant pericolonic stranding. Consider nonemergent colonoscopy evaluation to exclude underlying malignancy. No evidence of small bowel obstruction.     Vague linear lucency identified at the level of the sacrococcygeal tip. Correlate with point tenderness to exclude underlying nondisplaced fracture.    Suggestion of left-sided sacral decubitus ulcer with punctate bubbles of air diffusely extending along symmetrically prominent left gluteal muscle and soft tissue folds. Partially imaged asymmetric density measuring 3.9 x 3.3 cm posteromedial to the left proximalfemur. Possibility of intramuscular hematoma considered in the differential.    Assessment :   78 y/o M with PMHx of HTN, BPH, spinal stenosis, dyslipidemia, anxiety, neuropathy, B12 deficiency resident of [facility] for evaluation of weakness and hypotension with mental status change. Tested COVID 19 positive early April. In ED he was hypotensive. WBC 19K No SOB cough n/v/d. Pt is a poor historian. Has unstagable sacral decub ulcer. Also with Acute anemia. ?infectious process vs reactive leukocytosis. Leukocytosis poss chronic.  Respiratory status stable. Menendez placed sec AUR 5/3/2020. Clinically stable.    Plan :   Monitor off antibiotics  Completed Zosyn   Cultures ngtd  Seen by surgery and is sp bedside I&D of sacral decub  Menendez per urology  Frequent turning  Local wound care per surgery  COVID-19--critical period for decompensation  (7-14 days post symptom onset), avoid aerosolizing procedures, NSAIDs   -monitor respiratory status closely ( route of 02 and saturation) and titrate when able  -isolation precautions per protocol  -avoid excessive blood draws, blood cultures and frequent CXRs  -monitor biomarkers- CBC w diff  for NLRNLR <3 low vs >5 high) Ferritin (lower risk <450 vs >850) CRP (low risk <2 and higher risk >6) and LDH, D Dimer, procalcitonin  -therapies remains investigational-- including Hydroxychloroquine  -antibiotics only if there is concern for a bacterial process  -Steroids short course ( 5 days)  --for hypoxia/ARDS /shock    Dc planning to rehab      D/W Dr Hirsch    Continue with present regiment.  Appropriate use of antibiotics and adverse effects reviewed.      I have discussed the above plan of care with patient/ family in detail. They expressed understanding of the  treatment plan . Risks, benefits and alternatives discussed in detail. I have asked if they have any questions or concerns and appropriately addressed them to the best of my ability .    > 35 minutes were spent in direct patient care reviewing notes, medications ,labs data/ imaging , discussion with multidisciplinary team.    Thank you for allowing me to participate in care of your patient .    Naima Lopez MD  Infectious Disease  [phone]

## 2020-05-06 NOTE — PROGRESS NOTE ADULT - ASSESSMENT
The patient is a 79 year old male with a history of HTN, HL, pre-DM, anxiety, spinal stenosis who presents with weakness and AMS in the setting of worsening anemia, dehydration, COVID-19.    Plan:  - ECG with above noted abnormalities; largely unchanged from prior  - Cardiac enzymes negative  - BNP normal at 47  - CXR with mild right atelectasis  - SVT - continue metoprolol tartrate 50 mg bid  - Underwent debridement of sacral decub  - COVID-19 positive  - Not hypoxic on RA  - Discharge planning

## 2020-05-06 NOTE — PROGRESS NOTE ADULT - ASSESSMENT
78 y/o M with PMHx of HTN, BPH, spinal stenosis, dyslipidemia, anxiety, neuropathy, B12 deficiency who presents from [facility] rehab for evaluation of weakness and hypotension. Admitted for  acute  blood  loss   anemia and infection  poss 2/2  acute   possible lower GI bleed and stage 4 sacral decubitus .

## 2020-05-06 NOTE — PROGRESS NOTE ADULT - SUBJECTIVE AND OBJECTIVE BOX
Patient is a 79y old  Male who presents with a chief complaint of Generalized weakness (05 May 2020 17:11)      HPI:  80 y/o M with PMHx of HTN, BPH, spinal stenosis, dyslipidemia, anxiety, neuropathy, B12 deficiency who presents from [facility] rehab for evaluation of weakness and hypotension. As per transfer papers, pt with confusion after lunch today. Pt admits to generalized weakness. Lives with wife, Stephany and son. He came from [facility] rehab in [place] since Feb 14. Patient lives with wife and son at home and ambulates with cane. Contracted Covid-19 in [facility] rehab and first started having symptoms on Mar 25, reporting symptoms of cough and low grade fever, diarrhea. Denies any chest pain, shortness of breath, headache, dizziness. History obtained from wife, Stephany([phone]) as pt is confused and a poor historian.   Of note, pt was noted to have sacral wounds today and reported low grade fever for two days and mental status changes today .  Admitted for  acute  blood  loss   anemia and infection  poss 2/2  acute   possible lower GI bleed and stage 4 sacral decubitus .      INTERVAL HPI/OVERNIGHT EVENTS: Patient seen and examined at bedside, no acute complaints. Rodriguez in place draining clear yellow urine.     Vital Signs Last 24 Hrs  T(C): 36.8 (06 May 2020 07:30), Max: 37.1 (05 May 2020 12:11)  T(F): 98.3 (06 May 2020 07:30), Max: 98.7 (05 May 2020 12:11)  HR: 67 (06 May 2020 07:30) (67 - 85)  BP: 129/80 (06 May 2020 07:30) (106/62 - 133/68)  BP(mean): --  RR: 19 (06 May 2020 07:30) (19 - 19)  SpO2: 94% (06 May 2020 07:30) (93% - 97%)    I&O's Summary    04 May 2020 07:01  -  05 May 2020 07:00  --------------------------------------------------------  IN: 0 mL / OUT: 500 mL / NET: -500 mL    05 May 2020 07:01  -  05 May 2020 18:50  --------------------------------------------------------  IN: 0 mL / OUT: 400 mL / NET: -400 mL        REVIEW OF SYSTEMS: as in HPI    PHYSICAL EXAM:  GENERAL: NAD, well-groomed, weak +  HEAD:  Atraumatic, Normocephalic  EYES: EOMI, PERRLA, conjunctiva and sclera clear  ENMT: Moist mucous membranes No lesions  NECK: Supple  NERVOUS SYSTEM:  Alert & Oriented X3, Good concentration  CHEST/LUNG:  percussion bilaterally; No rales, rhonchi, wheezing,   HEART: Regular rate and rhythm; No murmurs,   ABDOMEN: Soft, Nontender, Nondistended; Bowel sounds present  EXTREMITIES:  2+ Peripheral Pulses, No clubbing, cyanosis, or edema  SKIN: No rashes or lesions , stage 4 10/ 10 cm  gu rodriguez clean urine      MEDICATIONS  (STANDING):  collagenase Ointment 1 Application(s) Topical three times a day  metoprolol tartrate 50 milliGRAM(s) Oral two times a day  pantoprazole    Tablet 40 milliGRAM(s) Oral before breakfast  sertraline 100 milliGRAM(s) Oral daily  sodium chloride 0.65% Nasal 1 Spray(s) Both Nostrils two times a day  sucralfate suspension 1 Gram(s) Oral four times a day    MEDICATIONS  (PRN):      LABS:                                   8.7    11.78 )-----------( 285      ( 05 May 2020 07:00 )             27.7     05 May 2020 07:00    143    |  105    |  30     ----------------------------<  94     3.6     |  28     |  0.67     Ca    8.1        05 May 2020 07:00          CAPILLARY BLOOD GLUCOSE          RADIOLOGY & ADDITIONAL TESTS:    Imaging Personally Reviewed:   no new test    Advance Directives:dnr      Palliative Care:    appropriate

## 2020-05-06 NOTE — PROGRESS NOTE ADULT - ASSESSMENT
78 y/o M with PMHx of HTN, BPH, spinal stenosis, dyslipidemia, anxiety, neuropathy, B12 deficiency who presents from rehab for evaluation of weakness and hypotension    Anemia   no overt gi bleeding  sp sacral decub debridement; care per surgery   monitor cbc, transfuse prn  cont ppi and carafate  monitor stool color   defer endoscopic eval as +Covid and increased risk for anesthesia in these pts   op gi f/u for possible colonoscopy if none recent once covid crisis resolved given ct findings    FTT/Dysphagia   unclear etiology; esophagram at [place] 2/2019 normal   correct elytes prn  cont ppi    rec appetite stimulant   on supplements per nutrition   defer endoscopic eval as +Covid and increased risk for anesthesia in these pts    COVID-19  monitor rep status   inc lfts in setting of viral illness  care per primary team

## 2020-05-06 NOTE — PROGRESS NOTE ADULT - PROBLEM SELECTOR PLAN 2
stage  4  Possible  source of infection  - Leukocytosis resolved   , blood cult neg to date.   - s/p IV Zosyn 3.375 gm q8hr - finished 5 days course.   - Collagenase ointment application daily  - surg dr maguire  s/p debridement

## 2020-05-06 NOTE — PROGRESS NOTE ADULT - PROBLEM SELECTOR PLAN 1
acute  blood Anemia possible  2/2 lt hip chr  hematoma .  - Hgb 6.8, Lactate 2.5 as per [facility] admission labs  - FOBT+   still no active bleed noticed and  later pt  drop hb possible dilutional  also  post iv fluid but all hb remain stable.  - s/p 2 unit prbc   - F/u CT abd/pelvis, U/S Abd-  left-sided sacral decubitus ulcer with punctate bubbles of air diffusely extending along symmetrically prominent left gluteal muscle and soft tissue folds. Partially imaged asymmetric density measuring 3.9 x 3.3 cm posteromedial to the left proximal femur.   Possibility of intramuscular hematoma considered in the differential.  - GI Dr. giordano no procedure endo / colonoscopy   this time  -   fu out pt for procedure   -dc planning [facility]- f/u with SW

## 2020-05-06 NOTE — PROGRESS NOTE ADULT - PROBLEM SELECTOR PLAN 6
COVID  +  remain room air above 92 .   - Maintain on airborne isolation.  - Not on O2, no hypoxia on RA  - Limit use of NSAIDs.

## 2020-05-06 NOTE — PROGRESS NOTE ADULT - SUBJECTIVE AND OBJECTIVE BOX
Chief Complaint: Weakness, AMS    Interval Events: No events overnight.    Review of Systems:  General: No fevers, chills, weight loss or gain  Skin: No rashes, color changes  Cardiovascular: No chest pain, orthopnea  Respiratory: No shortness of breath, cough  Gastrointestinal: No nausea, abdominal pain  Genitourinary: No incontinence, pain with urination  Musculoskeletal: No pain, swelling, decreased range of motion  Neurological: No headache, weakness  Psychiatric: No depression, anxiety  Endocrine: No weight loss or gain, increased thirst  All other systems are comprehensively negative.    Physical Exam:  Vital Signs Last 24 Hrs  T(C): 36.8 (06 May 2020 07:30), Max: 37.1 (05 May 2020 12:11)  T(F): 98.3 (06 May 2020 07:30), Max: 98.7 (05 May 2020 12:11)  HR: 67 (06 May 2020 07:30) (67 - 85)  BP: 129/80 (06 May 2020 07:30) (106/62 - 133/68)  BP(mean): --  RR: 19 (06 May 2020 07:30) (19 - 19)  SpO2: 94% (06 May 2020 07:30) (93% - 97%)  General: NAD  HEENT: MMM  Neck: No JVD, no carotid bruit  Extremities: No LE edema, no cyanosis  Neuro: Non-focal  Skin: No rash    Labs:             05-05    143  |  105  |  30<H>  ----------------------------<  94  3.6   |  28  |  0.67    Ca    8.1<L>      05 May 2020 07:00                          8.7    11.78 )-----------( 285      ( 05 May 2020 07:00 )             27.7       Telemetry: Sinus rhythm

## 2020-05-06 NOTE — CHART NOTE - NSCHARTNOTEFT_GEN_A_CORE
Assessment: Pt due for nutrition f/u. Chart reviewed, hospital course noted. Clinically stable, for d/c to rehab pending bed availability.    Subjective information obtained from comprehensive chart review, staff as able. Called patient's room but no response at this time. Per EMR, pt with poor-fair appetite/intake. Consumes 25-50%. Unclear how much of nutritional supplements patient has been drinking. Had been having loose stools, stool softener discontinued.     Factors impacting intake: [ ] none [ ] nausea  [ ] vomiting [ ] diarrhea [ ] constipation  [ ]chewing problems [ ] swallowing issues  [X] other:  low appetite/intake    Diet Presciption: Diet, Soft:   Low Fat (LOWFAT)  Supplement Feeding Modality:  Oral  Ensure Enlive Cans or Servings Per Day:  1       Frequency:  Two Times a day (04-30-20 @ 11:13)    Intake: 25-50%    Current Weight: 117 pounds (5/4)    Pertinent Medications: MEDICATIONS  (STANDING):  collagenase Ointment 1 Application(s) Topical three times a day  metoprolol tartrate 50 milliGRAM(s) Oral two times a day  pantoprazole    Tablet 40 milliGRAM(s) Oral before breakfast  sertraline 100 milliGRAM(s) Oral daily  sodium chloride 0.65% Nasal 1 Spray(s) Both Nostrils two times a day  sucralfate suspension 1 Gram(s) Oral four times a day    MEDICATIONS  (PRN):    Pertinent Labs: 05-05 Na143 mmol/L Glu 94 mg/dL K+ 3.6 mmol/L Cr  0.67 mg/dL BUN 30 mg/dL<H> 05-03 Phos 2.5 mg/dL     CAPILLARY BLOOD GLUCOSE        Skin: multiple pressure injuries including stage 4 decubitis see flowsheets, no edema noted    Estimated Needs:   [X] no change since previous assessment  [ ] recalculated:     Previous Nutrition Diagnosis:   [ ] Inadequate Energy Intake [ ]Inadequate Oral Intake [ ] Excessive Energy Intake   [ ] Underweight [ ] Increased Nutrient Needs [ ] Overweight/Obesity   [ ] Altered GI Function [ ] Unintended Weight Loss [ ] Food & Nutrition Related Knowledge Deficit [X] Malnutrition     Nutrition Diagnosis is [X] ongoing  [ ] resolved [ ] not applicable     New Nutrition Diagnosis: [X] not applicable       Interventions:   Recommend  [ ] Change Diet To:  [X] Nutrition Supplement: Continue oral nutritional supplements Ensure pudding and Ensure Enlive as ordered. Encourage intake between meals.  [ ] Nutrition Support  [X] Other: Soft diet as ordered. Encourage po intake. Small frequent meals/snacks/supplements to prevent further unintentional wt loss. Provide patient's preferences as verbalized. Recommend continuing patient's vitamin/mineral supplementation  - B12, Multivitamin. Consider appetite stimulant.    Monitoring and Evaluation:   [X] PO intake [ x ] Tolerance to diet prescription [ x ] weights [ x ] labs[ x ] follow up per protocol  [X] other: bowel movements

## 2020-05-07 LAB
ANION GAP SERPL CALC-SCNC: 6 MMOL/L — SIGNIFICANT CHANGE UP (ref 5–17)
BASOPHILS # BLD AUTO: 0.02 K/UL — SIGNIFICANT CHANGE UP (ref 0–0.2)
BASOPHILS NFR BLD AUTO: 0.2 % — SIGNIFICANT CHANGE UP (ref 0–2)
BUN SERPL-MCNC: 23 MG/DL — SIGNIFICANT CHANGE UP (ref 7–23)
CALCIUM SERPL-MCNC: 8.5 MG/DL — SIGNIFICANT CHANGE UP (ref 8.5–10.1)
CHLORIDE SERPL-SCNC: 103 MMOL/L — SIGNIFICANT CHANGE UP (ref 96–108)
CO2 SERPL-SCNC: 28 MMOL/L — SIGNIFICANT CHANGE UP (ref 22–31)
CREAT SERPL-MCNC: 0.65 MG/DL — SIGNIFICANT CHANGE UP (ref 0.5–1.3)
CULTURE RESULTS: SIGNIFICANT CHANGE UP
CULTURE RESULTS: SIGNIFICANT CHANGE UP
EOSINOPHIL # BLD AUTO: 0.04 K/UL — SIGNIFICANT CHANGE UP (ref 0–0.5)
EOSINOPHIL NFR BLD AUTO: 0.4 % — SIGNIFICANT CHANGE UP (ref 0–6)
GLUCOSE SERPL-MCNC: 73 MG/DL — SIGNIFICANT CHANGE UP (ref 70–99)
HCT VFR BLD CALC: 31.4 % — LOW (ref 39–50)
HGB BLD-MCNC: 9.9 G/DL — LOW (ref 13–17)
IMM GRANULOCYTES NFR BLD AUTO: 1.2 % — SIGNIFICANT CHANGE UP (ref 0–1.5)
LYMPHOCYTES # BLD AUTO: 1.5 K/UL — SIGNIFICANT CHANGE UP (ref 1–3.3)
LYMPHOCYTES # BLD AUTO: 13.9 % — SIGNIFICANT CHANGE UP (ref 13–44)
MCHC RBC-ENTMCNC: 27.6 PG — SIGNIFICANT CHANGE UP (ref 27–34)
MCHC RBC-ENTMCNC: 31.5 GM/DL — LOW (ref 32–36)
MCV RBC AUTO: 87.5 FL — SIGNIFICANT CHANGE UP (ref 80–100)
MONOCYTES # BLD AUTO: 0.38 K/UL — SIGNIFICANT CHANGE UP (ref 0–0.9)
MONOCYTES NFR BLD AUTO: 3.5 % — SIGNIFICANT CHANGE UP (ref 2–14)
NEUTROPHILS # BLD AUTO: 8.71 K/UL — HIGH (ref 1.8–7.4)
NEUTROPHILS NFR BLD AUTO: 80.8 % — HIGH (ref 43–77)
NRBC # BLD: 0 /100 WBCS — SIGNIFICANT CHANGE UP (ref 0–0)
PLATELET # BLD AUTO: 358 K/UL — SIGNIFICANT CHANGE UP (ref 150–400)
POTASSIUM SERPL-MCNC: 4.2 MMOL/L — SIGNIFICANT CHANGE UP (ref 3.5–5.3)
POTASSIUM SERPL-SCNC: 4.2 MMOL/L — SIGNIFICANT CHANGE UP (ref 3.5–5.3)
RBC # BLD: 3.59 M/UL — LOW (ref 4.2–5.8)
RBC # FLD: 15.3 % — HIGH (ref 10.3–14.5)
SODIUM SERPL-SCNC: 137 MMOL/L — SIGNIFICANT CHANGE UP (ref 135–145)
SPECIMEN SOURCE: SIGNIFICANT CHANGE UP
SPECIMEN SOURCE: SIGNIFICANT CHANGE UP
WBC # BLD: 10.78 K/UL — HIGH (ref 3.8–10.5)
WBC # FLD AUTO: 10.78 K/UL — HIGH (ref 3.8–10.5)

## 2020-05-07 PROCEDURE — 99232 SBSQ HOSP IP/OBS MODERATE 35: CPT | Mod: CS

## 2020-05-07 RX ADMIN — Medication 50 MILLIGRAM(S): at 05:39

## 2020-05-07 RX ADMIN — Medication 1 APPLICATION(S): at 05:39

## 2020-05-07 RX ADMIN — Medication 1 SPRAY(S): at 05:40

## 2020-05-07 RX ADMIN — Medication 1 APPLICATION(S): at 21:19

## 2020-05-07 RX ADMIN — Medication 1 GRAM(S): at 05:39

## 2020-05-07 RX ADMIN — Medication 1 APPLICATION(S): at 13:34

## 2020-05-07 RX ADMIN — PANTOPRAZOLE SODIUM 40 MILLIGRAM(S): 20 TABLET, DELAYED RELEASE ORAL at 05:39

## 2020-05-07 RX ADMIN — SERTRALINE 100 MILLIGRAM(S): 25 TABLET, FILM COATED ORAL at 11:52

## 2020-05-07 RX ADMIN — Medication 50 MILLIGRAM(S): at 17:39

## 2020-05-07 RX ADMIN — Medication 1 GRAM(S): at 17:39

## 2020-05-07 RX ADMIN — Medication 1 GRAM(S): at 11:52

## 2020-05-07 NOTE — PROGRESS NOTE ADULT - ASSESSMENT
78 y/o M with PMHx of HTN, BPH, spinal stenosis, dyslipidemia, anxiety, neuropathy, B12 deficiency who presents from rehab for evaluation of weakness and hypotension    Anemia   no overt gi bleeding; hgb stable  sp sacral decub debridement; care per surgery   monitor cbc, transfuse prn  cont ppi and carafate  monitor stool color   defer endoscopic eval as +Covid and increased risk for anesthesia in these pts   op gi f/u for possible colonoscopy if none recent once covid crisis resolved given ct findings    FTT/Dysphagia   unclear etiology; esophagram at [place] 2/2019 normal   correct elytes prn  cont ppi    consider adding appetite stimulant   on supplements per nutrition   defer endoscopic eval as +Covid and increased risk for anesthesia in these pts    COVID-19  monitor rep status   inc lfts in setting of viral illness  care per primary team

## 2020-05-07 NOTE — PROGRESS NOTE ADULT - SUBJECTIVE AND OBJECTIVE BOX
LISANDRA PADGETT is a 79yMale , patient examined and chart reviewed.    INTERVAL HPI/ OVERNIGHT EVENTS:   NAD.  Afebrile. On RA.  No events.    PAST MEDICAL & SURGICAL HISTORY:  H/O vitamin B deficiency  Spinal stenosis  HTN (hypertension)  History of tonsillectomy      For details regarding the patient's social history, family history, and other miscellaneous elements, please refer the initial infectious diseases consultation and/or the admitting history and physical examination for this admission.    ROS:  CONSTITUTIONAL:  Negative fever or chills  EYES:  Negative  blurry vision or double vision  CARDIOVASCULAR:  Negative for chest pain or palpitations  RESPIRATORY:  Negative for cough, wheezing, or SOB   GASTROINTESTINAL:  Negative for nausea, vomiting, diarrhea, constipation, or abdominal pain  GENITOURINARY:  Negative frequency, urgency or dysuria  NEUROLOGIC:  No headache, confusion, dizziness, lightheadedness  All other systems were reviewed and are negative         Current inpatient medications :  MEDICATIONS  (STANDING):  collagenase Ointment 1 Application(s) Topical three times a day  metoprolol tartrate 50 milliGRAM(s) Oral two times a day  pantoprazole    Tablet 40 milliGRAM(s) Oral before breakfast  sertraline 100 milliGRAM(s) Oral daily  sodium chloride 0.65% Nasal 1 Spray(s) Both Nostrils two times a day  sucralfate suspension 1 Gram(s) Oral four times a day      Objective:  Vital Signs Last 24 Hrs  T(C): 37 (07 May 2020 16:07), Max: 37.2 (07 May 2020 07:23)  T(F): 98.6 (07 May 2020 16:07), Max: 98.9 (07 May 2020 07:23)  HR: 75 (07 May 2020 16:07) (60 - 75)  BP: 105/56 (07 May 2020 16:07) (105/56 - 159/67)  RR: 18 (07 May 2020 16:07) (18 - 18)  SpO2: 92% (07 May 2020 16:07) (92% - 95%)    Physical Exam:  General:  no acute distress  Eyes: sclera anicteric, pupils equal and reactive to light  ENMT: buccal mucosa moist, pharynx not injected  Neck: supple, trachea midline  Lungs: clear, no wheeze/rhonchi  Cardiovascular: regular rate and rhythm, S1 S2  Abdomen: soft, nontender, no organomegaly present, bowel sounds normal  Neurological: alert and oriented x2 Cranial Nerves II-XII grossly intact  Skin: sacral decub drsg c/d/i   Lymph Nodes: no palpable cervical/supraclavicular lymph nodes enlargements  Extremities: no cyanosis/clubbing/edema        LABS:                        9.9    10.78 )-----------( 358      ( 07 May 2020 07:54 )             31.4   05-07    137  |  103  |  23  ----------------------------<  73  4.2   |  28  |  0.65    Ca    8.5      07 May 2020 07:54      MICROBIOLOGY:    Culture - Blood (collected 02 May 2020 00:30)  Source: .Blood Blood  Preliminary Report (03 May 2020 01:03):    No growth to date.    Culture - Blood (collected 02 May 2020 00:30)  Source: .Blood Blood  Preliminary Report (03 May 2020 01:03):    No growth to date.    COVID-19 PCR . (04.28.20 @ 22:41)    COVID-19 PCR: Detected: This test has been validated by [facility] to be accurate;  though it has not been FDA cleared/approved by the usual pathway  As with all laboratory test, results should be correlated with clinical  findings.  https://www.fda.gov/media/830568/download  https://www.fda.gov/media/308513/download    RADIOLOGY & ADDITIONAL STUDIES:    EXAM:  CT ABDOMEN AND PELVIS                          EXAM:  CT CHEST                            PROCEDURE DATE:  04/29/2020          INTERPRETATION:  CT CHEST, ABDOMEN AND PELVIS WITHOUT CONTRAST    INDICATION: Shortness of breath. Hypotension. Altered mental status.     TECHNIQUE: Noncontrast CT of the chest, abdomen and pelvis. Images are reformatted in the sagittal and coronal planes.Postprocessed MIP reformatted chest images were created and reviewed.    COMPARISON: None.    FINDINGS:    Absence of intravenous contrast limits evaluation for traumatic injury, focal lesions, neoplasm, and vascular pathology.    Thorax:  Lines and tubes: None.  Airways: Tracheobronchial tree is patent.  Lungs and Pleura: Right basilar opacity containing air bronchograms. Mild patchy left lower lobe opacities are also identified. No significant pleural effusion. No pneumothorax.  Mediastinum and lymph nodes: No bulky adenopathy.  Heart: Trace pericardial effusion and/or thickening without significant cardiomegaly. Coronary artery calcification and/or stenting. Valvular calcifications.  Vessels: Atherosclerotic disease of the aorta and its branches. Ascending thoracic aorta measures up to 4.3 cm in maximum AP diameter.   Chest Wall: Within normal limits.    Abdomen/Pelvis:  Liver: No suspicious lesions.  Biliary: No significant dilatation. No calcified gallstones within the gallbladder.  Spleen: No suspicious lesions.  Pancreas: No inflammatory changes or ductal dilatation.  Adrenals: Normal.  Kidneys: No hydronephrosis. Bilateral renal cysts as well as too small to characterize hypodensities. Sub-cm nonobstructive right renal calculus.  Vessels: Atherosclerotic disease of the aorta and its branches.     GI tract: There is suggestion of focal circumferential wall thickening of distal ascending colonic loop without significant pericolonic stranding as best seen on images 78 through 79 of series 2. Consider nonemergent colonoscopy evaluation to exclude underlying malignancy. No evidence ofsmall bowel obstruction. No CT findings of acute appendicitis.    Peritoneum/retroperitoneum and mesentery: No free air. No organized fluid collection. No adenopathy.    Pelvic organs/Bladder: Heterogeneous prominent prostate with nodular density near the apex, cause mass effect and protrusion at the bladder base. Correlate with PSA and consider nonemergent prostatic workup to exclude malignancy. No significant bladder wall thickening.    Abdominal wall: Suggestion of left-sided sacral decubitus ulcer with punctate bubbles of air diffusely extending along symmetrically prominent left gluteal muscle and soft tissue folds. Partially imaged asymmetric density measuring 3.9 x 3.3 cm posteromedial to the left proximal femur on image 168 of series 2. Possibility of intramuscular hematoma considered in the differential.    Bones and soft tissues: Multilevel degenerative changes of the spine noted. Advanced osteoarthritis of bilateral hip joint. Advanced osteoarthritis of left shoulder joint withassociated soft tissue calcifications. Vague linear lucency identified at the level of the sacrococcygeal tip as best seen on images 149 through 151 of series 2. Correlate with point tenderness to exclude underlying nondisplaced fracture.    IMPRESSION:    Right basilar opacity containing air bronchograms. Additional patchy left lower lobe opacities. Bacterial and viral pneumonias including atypical agent such as COVID-19 considered in the differential. Correlate with clinical parameters, laboratory values and follow-up chest radiograph advised. No significant pleural effusion.     Suggestion of focal circumferential wall thickening of distal ascending colonic loop without significant pericolonic stranding. Consider nonemergent colonoscopy evaluation to exclude underlying malignancy. No evidence of small bowel obstruction.     Vague linear lucency identified at the level of the sacrococcygeal tip. Correlate with point tenderness to exclude underlying nondisplaced fracture.    Suggestion of left-sided sacral decubitus ulcer with punctate bubbles of air diffusely extending along symmetrically prominent left gluteal muscle and soft tissue folds. Partially imaged asymmetric density measuring 3.9 x 3.3 cm posteromedial to the left proximalfemur. Possibility of intramuscular hematoma considered in the differential.    Assessment :   78 y/o M with PMHx of HTN, BPH, spinal stenosis, dyslipidemia, anxiety, neuropathy, B12 deficiency resident of [facility] rehab for evaluation of weakness and hypotension with mental status change. Tested COVID 19 positive early April. In ED he was hypotensive. WBC 19K No SOB cough n/v/d. Pt is a poor historian. Has unstagable sacral decub ulcer. Also with Acute anemia. ?infectious process vs reactive leukocytosis. Leukocytosis poss chronic.  Respiratory status stable. Menendez placed sec AUR 5/3/2020. Seen by surgery and is sp bedside I&D of sacral decub Clinically stable.    Plan :   Monitor off antibiotics  Completed Zosyn   Cultures ngtd  Menendez per urology  Frequent turning  Local wound care per surgery  COVID-19--critical period for decompensation  (7-14 days post symptom onset), avoid aerosolizing procedures, NSAIDs   -monitor respiratory status closely ( route of 02 and saturation) and titrate when able  -isolation precautions per protocol  -avoid excessive blood draws, blood cultures and frequent CXRs  -monitor biomarkers- CBC w diff  for NLRNLR <3 low vs >5 high) Ferritin (lower risk <450 vs >850) CRP (low risk <2 and higher risk >6) and LDH, D Dimer, procalcitonin  -therapies remains investigational-- including Hydroxychloroquine  -antibiotics only if there is concern for a bacterial process  -Steroids short course ( 5 days)  --for hypoxia/ARDS /shock    Dc planning to rehab      Continue with present regiment.  Appropriate use of antibiotics and adverse effects reviewed.      I have discussed the above plan of care with patient/ family in detail. They expressed understanding of the  treatment plan . Risks, benefits and alternatives discussed in detail. I have asked if they have any questions or concerns and appropriately addressed them to the best of my ability .    > 35 minutes were spent in direct patient care reviewing notes, medications ,labs data/ imaging , discussion with multidisciplinary team.    Thank you for allowing me to participate in care of your patient .    Naima Lopez MD  Infectious Disease  [phone]

## 2020-05-07 NOTE — PROGRESS NOTE ADULT - PROBLEM SELECTOR PLAN 1
acute  blood Anemia possible  2/2 lt hip chr  hematoma .  - Hgb 6.8, Lactate 2.5 as per [facility] admission labs  - FOBT+   still no active bleed noticed and  later pt  drop hb possible dilutional  also  post iv fluid but all hb remain stable, today Hb 9.9  - s/p 2 unit prbc   - F/u CT abd/pelvis, U/S Abd-  left-sided sacral decubitus ulcer with punctate bubbles of air diffusely extending along symmetrically prominent left gluteal muscle and soft tissue folds. Partially imaged asymmetric density measuring 3.9 x 3.3 cm posteromedial to the left proximal femur.   Possibility of intramuscular hematoma considered in the differential.  - GI Dr. giordano no procedure endo / colonoscopy   this time  -   fu out pt for procedure   -dc planning [facility]- f/u with SW

## 2020-05-07 NOTE — PROGRESS NOTE ADULT - SUBJECTIVE AND OBJECTIVE BOX
INTERVAL HPI/OVERNIGHT EVENTS:  hgb stable  dec po    MEDICATIONS  (STANDING):  collagenase Ointment 1 Application(s) Topical three times a day  metoprolol tartrate 50 milliGRAM(s) Oral two times a day  pantoprazole    Tablet 40 milliGRAM(s) Oral before breakfast  sertraline 100 milliGRAM(s) Oral daily  sodium chloride 0.65% Nasal 1 Spray(s) Both Nostrils two times a day  sucralfate suspension 1 Gram(s) Oral four times a day    MEDICATIONS  (PRN):      Allergies    No Known Allergies    Intolerances        Review of Systems:    unable to obtain       Vital Signs Last 24 Hrs  T(C): 37.2 (07 May 2020 07:23), Max: 37.2 (07 May 2020 07:23)  T(F): 98.9 (07 May 2020 07:23), Max: 98.9 (07 May 2020 07:23)  HR: 60 (07 May 2020 07:23) (60 - 75)  BP: 136/77 (07 May 2020 07:23) (136/77 - 159/67)  BP(mean): --  RR: 18 (07 May 2020 07:23) (18 - 18)  SpO2: 95% (07 May 2020 07:23) (95% - 95%)    PHYSICAL EXAM:    deferred f/u done remotely as covid +       LABS:                        9.9    10.78 )-----------( 358      ( 07 May 2020 07:54 )             31.4     05-07    137  |  103  |  23  ----------------------------<  73  4.2   |  28  |  0.65    Ca    8.5      07 May 2020 07:54            RADIOLOGY & ADDITIONAL TESTS:

## 2020-05-07 NOTE — PROGRESS NOTE ADULT - SUBJECTIVE AND OBJECTIVE BOX
Chief Complaint: Weakness, AMS    Interval Events: No events overnight.    Review of Systems:  General: No fevers, chills, weight loss or gain  Skin: No rashes, color changes  Cardiovascular: No chest pain, orthopnea  Respiratory: No shortness of breath, cough  Gastrointestinal: No nausea, abdominal pain  Genitourinary: No incontinence, pain with urination  Musculoskeletal: No pain, swelling, decreased range of motion  Neurological: No headache, weakness  Psychiatric: No depression, anxiety  Endocrine: No weight loss or gain, increased thirst  All other systems are comprehensively negative.    Physical Exam:  Vital Signs Last 24 Hrs  T(C): 36.5 (07 May 2020 04:21), Max: 36.9 (06 May 2020 16:02)  T(F): 97.7 (07 May 2020 04:21), Max: 98.4 (06 May 2020 16:02)  HR: 62 (07 May 2020 04:21) (62 - 75)  BP: 159/67 (07 May 2020 04:21) (148/79 - 159/67)  BP(mean): --  RR: 18 (07 May 2020 04:21) (18 - 18)  SpO2: 95% (07 May 2020 04:21) (95% - 95%)  General: NAD  HEENT: MMM  Neck: No JVD, no carotid bruit  Extremities: No LE edema, no cyanosis  Neuro: Non-focal  Skin: No rash    Labs:             05-06    137  |  104  |  28<H>  ----------------------------<  96  4.1   |  28  |  0.71    Ca    8.3<L>      06 May 2020 12:42                          9.6    11.68 )-----------( 322      ( 06 May 2020 12:42 )             29.8       Telemetry: Sinus rhythm

## 2020-05-07 NOTE — PROGRESS NOTE ADULT - ASSESSMENT
80 y/o M with PMHx of HTN, BPH, spinal stenosis, dyslipidemia, anxiety, neuropathy, B12 deficiency who presents from [facility] rehab for evaluation of weakness and hypotension. Admitted for  acute  blood  loss   anemia and infection  poss 2/2  acute   possible lower GI bleed and stage 4 sacral decubitus .

## 2020-05-07 NOTE — PROGRESS NOTE ADULT - SUBJECTIVE AND OBJECTIVE BOX
PULMONARY/CRITICAL CARE  Pt stable. Unchanged. No complaints.   No sob. Now off antibiotics  Unchanged. Blood cultures pos.    Alert, confused.    No fever.      Patient is a 79y old  Male who presents with a chief complaint of Generalized weakness (28 Apr 2020 22:20)    BRIEF HOSPITAL COURSE: ***78 y/o M with PMHx of HTN, BPH, spinal stenosis, dyslipidemia, anxiety, neuropathy, B12 deficiency who presents from [facility] rehab for evaluation of weakness and hypotension. As per transfer papers, pt with confusion after lunch today. Pt admits to generalized weakness. Lives with wife, Stephany and son. He came from [facility] in [place] since Feb 14. Patient lives with wife and son at home and ambulates with cane. Contracted Covid-19 in [facility] and first started having symptoms on Mar 25, reporting symptoms of cough and low grade fever, diarrhea. Denies any chest pain, shortness of breath, headache, dizziness. History obtained from wife, Stephany([phone]) as pt is confused and a poor historian.   Of note, pt was noted to have sacral wounds today and reported low grade fever for two days and mental status changes today and loss of appetite.    Events last 24 hours: ***    PAST MEDICAL & SURGICAL HISTORY:  H/O vitamin B deficiency  Spinal stenosis  HTN (hypertension)  History of tonsillectomy    Allergies    No Known Allergies    Intolerances      FAMILY HISTORY:  No pertinent family history in first degree relatives      Review of Systems:  unobtainable    Medications:  piperacillin/tazobactam IVPB.. 3.375 Gram(s) IV Intermittent every 8 hours    metoprolol succinate ER 50 milliGRAM(s) Oral daily  midodrine. 10 milliGRAM(s) Oral three times a day      sertraline 100 milliGRAM(s) Oral daily        pantoprazole  Injectable 40 milliGRAM(s) IV Push every 12 hours        cyanocobalamin 1000 MICROGram(s) Oral daily      collagenase Ointment 1 Application(s) Topical three times a day            ICU Vital Signs Last 24 Hrs  T(C): 37.1 (29 Apr 2020 06:05), Max: 37.3 (29 Apr 2020 03:49)  T(F): 98.7 (29 Apr 2020 06:05), Max: 99.1 (29 Apr 2020 03:49)  HR: 102 (29 Apr 2020 06:05) (87 - 105)  BP: 100/57 (29 Apr 2020 06:05) (84/48 - 100/57)  BP(mean): --  ABP: --  ABP(mean): --  RR: 18 (29 Apr 2020 06:05) (17 - 22)  SpO2: 96% (29 Apr 2020 06:05) (96% - 98%)    Vital Signs Last 24 Hrs  T(C): 37.1 (29 Apr 2020 06:05), Max: 37.3 (29 Apr 2020 03:49)  T(F): 98.7 (29 Apr 2020 06:05), Max: 99.1 (29 Apr 2020 03:49)  HR: 102 (29 Apr 2020 06:05) (87 - 105)  BP: 100/57 (29 Apr 2020 06:05) (84/48 - 100/57)  BP(mean): --  RR: 18 (29 Apr 2020 06:05) (17 - 22)  SpO2: 96% (29 Apr 2020 06:05) (96% - 98%)        I&O's Detail        LABS:                        8.4    19.86 )-----------( 248      ( 29 Apr 2020 07:23 )             26.0     04-28    138  |  107  |  54<H>  ----------------------------<  92  4.6   |  26  |  1.10    Ca    7.6<L>      28 Apr 2020 22:41  Phos  2.4     04-28  Mg     2.0     04-28    TPro  5.1<L>  /  Alb  1.8<L>  /  TBili  1.8<H>  /  DBili  x   /  AST  230<H>  /  ALT  173<H>  /  AlkPhos  235<H>  04-28          CAPILLARY BLOOD GLUCOSE            CULTURES:          RADIOLOGY: ***< from: CT Chest No Cont (04.29.20 @ 01:24) >  EXAM:  CT ABDOMEN AND PELVIS                          EXAM:  CT CHEST                            PROCEDURE DATE:  04/29/2020          INTERPRETATION:  CT CHEST, ABDOMEN AND PELVIS WITHOUT CONTRAST    INDICATION: Shortness of breath. Hypotension. Altered mental status.     TECHNIQUE: Noncontrast CT of the chest, abdomen and pelvis. Images are reformatted in the sagittal and coronal planes.Postprocessed MIP reformatted chest images were created and reviewed.    COMPARISON: None.    FINDINGS:    Absence of intravenous contrast limits evaluation for traumatic injury, focal lesions, neoplasm, and vascular pathology.    Thorax:  Lines and tubes: None.  Airways: Tracheobronchial tree is patent.  Lungs and Pleura: Right basilar opacity containing air bronchograms. Mild patchy left lower lobe opacities are also identified. No significant pleural effusion. No pneumothorax.  Mediastinum and lymph nodes: No bulky adenopathy.  Heart: Trace pericardial effusion and/or thickening without significant cardiomegaly. Coronary artery calcification and/or stenting. Valvular calcifications.  Vessels: Atherosclerotic disease of the aorta and its branches. Ascending thoracic aorta measures up to 4.3 cm in maximum AP diameter.   Chest Wall: Within normal limits.    Abdomen/Pelvis:  Liver: No suspicious lesions.  Biliary: No significant dilatation. No calcified gallstones within the gallbladder.  Spleen: No suspicious lesions.  Pancreas: No inflammatory changes or ductal dilatation.  Adrenals: Normal.  Kidneys: No hydronephrosis. Bilateral renal cysts as well as too small to characterize hypodensities. Sub-cm nonobstructive right renal calculus.  Vessels: Atherosclerotic disease of the aorta and its branches.     GI tract: There is suggestion of focal circumferential wall thickening of distal ascending colonic loop without significant pericolonic stranding as best seen on images 78 through 79 of series 2. Consider nonemergent colonoscopy evaluation to exclude underlying malignancy. No evidence ofsmall bowel obstruction. No CT findings of acute appendicitis.    Peritoneum/retroperitoneum and mesentery: No free air. No organized fluid collection. No adenopathy.    Pelvic organs/Bladder: Heterogeneous prominent prostate with nodular density near the apex, cause mass effect and protrusion at the bladder base. Correlate with PSA and consider nonemergent prostatic workup to exclude malignancy. No significant bladder wall thickening.    Abdominal wall: Suggestion of left-sided sacral decubitus ulcer with punctate bubbles of air diffusely extending along symmetrically prominent left gluteal muscle and soft tissue folds. Partially imaged asymmetric density measuring 3.9 x 3.3 cm posteromedial to the left proximal femur on image 168 of series 2. Possibility of intramuscular hematoma considered in the differential.    Bones and soft tissues: Multilevel degenerative changes of the spine noted. Advanced osteoarthritis of bilateral hip joint. Advanced osteoarthritis of left shoulder joint withassociated soft tissue calcifications. Vague linear lucency identified at the level of the sacrococcygeal tip as best seen on images 149 through 151 of series 2. Correlate with point tenderness to exclude underlying nondisplaced fracture.    IMPRESSION:    Right basilar opacity containing air bronchograms. Additional patchy left lower lobe opacities. Bacterial and viral pneumonias including atypical agent such as COVID-19 considered in the differential. Correlate with clinical parameters, laboratory values and follow-up chest radiograph advised. No significant pleural effusion.     Suggestion of focal circumferential wall thickening of distal ascending colonic loop without significant pericolonic stranding. Consider nonemergent colonoscopy evaluation to exclude underlying malignancy. No evidence of small bowel obstruction.     Vague linear lucency identified at the level of the sacrococcygeal tip. Correlate with point tenderness to exclude underlying nondisplaced fracture.    Suggestion of left-sided sacral decubitus ulcer with punctate bubbles of air diffusely extending along symmetrically prominent left gluteal muscle and soft tissue folds. Partially imaged asymmetric density measuring 3.9 x 3.3 cm posteromedial to the left proximalfemur. Possibility of intramuscular hematoma considered in the differential.    Additional findings as mentioned above.    These results were discussed via telephone at 4/29/2020 3:55 AM by Dr. Baugh of radiology with Dr. Bailey, institution read-back verification policy was followed.                TATE BAUGH M.D., ATTENDING RADIOLOGIST  This document has been electronically signed. Apr 29 2020  4:10AM              < end of copied text >    < from: Xray Chest 1 View- PORTABLE-Routine (04.30.20 @ 09:11) >  EXAM:  XR CHEST PORTABLE ROUTINE 1V                            PROCEDURE DATE:  04/30/2020          INTERPRETATION:    Examination: XR CHEST    History: ADMDIAG1: K92.2 GASTROINTESTINAL HEMORRHAGE, UNSPECIFIED/, pn pneumonia    TECHNIQUE:  Portable AP view of the chest was obtained.    COMPARISON: A CT chest 1/29/2020 available for review.    FINDINGS:   Lungs clear. RIGHT hemidiaphragm mildly elevated. Posterior lung bases cannot be assessed due to elevated diaphragms. Lateral radiograph recommended if clinically warranted.  . The heart and mediastinum are within normal limits.    Visualized osseous structures are intact.        IMPRESSION: Visualized Lungs clear..   Elevated LEFT hemidiaphragm. Posterior lung bases cannot be assessed dueto elevated diaphragms. Lateral radiograph recommended if clinically warranted.      < end of copied text >

## 2020-05-07 NOTE — PROGRESS NOTE ADULT - PROBLEM SELECTOR PLAN 6
discussed with PA in Dr Charlton's office  [phone] - baseline INR 1.71 has appointment on Monday 3/2 at 12:30pm for electrolyte and INR check COVID  +  remain room air above 92 .   - Maintain on airborne isolation.  - Not on O2, no hypoxia on RA  - Limit use of NSAIDs.

## 2020-05-07 NOTE — PROGRESS NOTE ADULT - SUBJECTIVE AND OBJECTIVE BOX
Patient is a 79y old  Male who presents with a chief complaint of Generalized weakness (05 May 2020 17:11)      HPI:  78 y/o M with PMHx of HTN, BPH, spinal stenosis, dyslipidemia, anxiety, neuropathy, B12 deficiency who presents from [facility] rehab for evaluation of weakness and hypotension. As per transfer papers, pt with confusion after lunch today. Pt admits to generalized weakness. Lives with wife, Stephany and son. He came from [facility] rehab in [place] since Feb 14. Patient lives with wife and son at home and ambulates with cane. Contracted Covid-19 in [facility] rehab and first started having symptoms on Mar 25, reporting symptoms of cough and low grade fever, diarrhea. Denies any chest pain, shortness of breath, headache, dizziness. History obtained from wife, Stephany([phone]) as pt is confused and a poor historian.   Of note, pt was noted to have sacral wounds today and reported low grade fever for two days and mental status changes today .  Admitted for  acute  blood  loss   anemia and infection  poss 2/2  acute   possible lower GI bleed and stage 4 sacral decubitus .      INTERVAL HPI/OVERNIGHT EVENTS: Patient seen and examined at bedside, no acute complaints. Rordiguez in place draining clear yellow urine.     ICU Vital Signs Last 24 Hrs  T(C): 37.2 (07 May 2020 07:23), Max: 37.2 (07 May 2020 07:23)  T(F): 98.9 (07 May 2020 07:23), Max: 98.9 (07 May 2020 07:23)  HR: 60 (07 May 2020 07:23) (60 - 75)  BP: 136/77 (07 May 2020 07:23) (136/77 - 159/67)  RR: 18 (07 May 2020 07:23) (18 - 18)  SpO2: 95% (07 May 2020 07:23) (95% - 95%)        REVIEW OF SYSTEMS: as in HPI    PHYSICAL EXAM:  GENERAL: NAD, well-groomed, weak +  HEAD:  Atraumatic, Normocephalic  EYES: EOMI, PERRLA, conjunctiva and sclera clear  ENMT: Moist mucous membranes No lesions  NECK: Supple  NERVOUS SYSTEM:  Alert & Oriented X3, Good concentration  CHEST/LUNG:  percussion bilaterally; No rales, rhonchi, wheezing,   HEART: Regular rate and rhythm; No murmurs,   ABDOMEN: Soft, Nontender, Nondistended; Bowel sounds present  EXTREMITIES:  2+ Peripheral Pulses, No clubbing, cyanosis, or edema  SKIN: No rashes or lesions , stage 4 10/ 10 cm  gu rodriguez clean urine      MEDICATIONS  (STANDING):  collagenase Ointment 1 Application(s) Topical three times a day  metoprolol tartrate 50 milliGRAM(s) Oral two times a day  pantoprazole    Tablet 40 milliGRAM(s) Oral before breakfast  sertraline 100 milliGRAM(s) Oral daily  sodium chloride 0.65% Nasal 1 Spray(s) Both Nostrils two times a day  sucralfate suspension 1 Gram(s) Oral four times a day    MEDICATIONS  (PRN):      LABS:                                               9.9    10.78 )-----------( 358      ( 07 May 2020 07:54 )             31.4     07 May 2020 07:54    137    |  103    |  23     ----------------------------<  73     4.2     |  28     |  0.65     Ca    8.5        07 May 2020 07:54          CAPILLARY BLOOD GLUCOSE      RADIOLOGY & ADDITIONAL TESTS:    Imaging Personally Reviewed:   no new test    Advance Directives:dnr      Palliative Care:    appropriate

## 2020-05-07 NOTE — PROGRESS NOTE ADULT - PROBLEM SELECTOR PLAN 8
was  hypotensive on admission resolved . later    s/p   one  episode of svt  episode resume bb small dose titrate up to home dose.  - Will hold off on home Valsartan , cardio dr rambo fernandez .

## 2020-05-08 LAB
ALBUMIN SERPL ELPH-MCNC: 2 G/DL — LOW (ref 3.3–5)
ALP SERPL-CCNC: 159 U/L — HIGH (ref 40–120)
ALT FLD-CCNC: 79 U/L — HIGH (ref 12–78)
ANION GAP SERPL CALC-SCNC: 8 MMOL/L — SIGNIFICANT CHANGE UP (ref 5–17)
AST SERPL-CCNC: 38 U/L — HIGH (ref 15–37)
BASOPHILS # BLD AUTO: 0.02 K/UL — SIGNIFICANT CHANGE UP (ref 0–0.2)
BASOPHILS NFR BLD AUTO: 0.2 % — SIGNIFICANT CHANGE UP (ref 0–2)
BILIRUB SERPL-MCNC: 0.5 MG/DL — SIGNIFICANT CHANGE UP (ref 0.2–1.2)
BUN SERPL-MCNC: 26 MG/DL — HIGH (ref 7–23)
CALCIUM SERPL-MCNC: 8 MG/DL — LOW (ref 8.5–10.1)
CHLORIDE SERPL-SCNC: 103 MMOL/L — SIGNIFICANT CHANGE UP (ref 96–108)
CO2 SERPL-SCNC: 28 MMOL/L — SIGNIFICANT CHANGE UP (ref 22–31)
CREAT SERPL-MCNC: 0.75 MG/DL — SIGNIFICANT CHANGE UP (ref 0.5–1.3)
EOSINOPHIL # BLD AUTO: 0.02 K/UL — SIGNIFICANT CHANGE UP (ref 0–0.5)
EOSINOPHIL NFR BLD AUTO: 0.2 % — SIGNIFICANT CHANGE UP (ref 0–6)
GLUCOSE SERPL-MCNC: 77 MG/DL — SIGNIFICANT CHANGE UP (ref 70–99)
HCT VFR BLD CALC: 30.1 % — LOW (ref 39–50)
HGB BLD-MCNC: 9.6 G/DL — LOW (ref 13–17)
IMM GRANULOCYTES NFR BLD AUTO: 0.6 % — SIGNIFICANT CHANGE UP (ref 0–1.5)
LYMPHOCYTES # BLD AUTO: 1.07 K/UL — SIGNIFICANT CHANGE UP (ref 1–3.3)
LYMPHOCYTES # BLD AUTO: 13.3 % — SIGNIFICANT CHANGE UP (ref 13–44)
MAGNESIUM SERPL-MCNC: 2.1 MG/DL — SIGNIFICANT CHANGE UP (ref 1.6–2.6)
MCHC RBC-ENTMCNC: 27.6 PG — SIGNIFICANT CHANGE UP (ref 27–34)
MCHC RBC-ENTMCNC: 31.9 GM/DL — LOW (ref 32–36)
MCV RBC AUTO: 86.5 FL — SIGNIFICANT CHANGE UP (ref 80–100)
MONOCYTES # BLD AUTO: 0.37 K/UL — SIGNIFICANT CHANGE UP (ref 0–0.9)
MONOCYTES NFR BLD AUTO: 4.6 % — SIGNIFICANT CHANGE UP (ref 2–14)
NEUTROPHILS # BLD AUTO: 6.53 K/UL — SIGNIFICANT CHANGE UP (ref 1.8–7.4)
NEUTROPHILS NFR BLD AUTO: 81.1 % — HIGH (ref 43–77)
NRBC # BLD: 0 /100 WBCS — SIGNIFICANT CHANGE UP (ref 0–0)
PHOSPHATE SERPL-MCNC: 2.4 MG/DL — LOW (ref 2.5–4.5)
PLATELET # BLD AUTO: 308 K/UL — SIGNIFICANT CHANGE UP (ref 150–400)
POTASSIUM SERPL-MCNC: 4 MMOL/L — SIGNIFICANT CHANGE UP (ref 3.5–5.3)
POTASSIUM SERPL-SCNC: 4 MMOL/L — SIGNIFICANT CHANGE UP (ref 3.5–5.3)
PROT SERPL-MCNC: 5.8 G/DL — LOW (ref 6–8.3)
RBC # BLD: 3.48 M/UL — LOW (ref 4.2–5.8)
RBC # FLD: 15.3 % — HIGH (ref 10.3–14.5)
SODIUM SERPL-SCNC: 139 MMOL/L — SIGNIFICANT CHANGE UP (ref 135–145)
WBC # BLD: 8.06 K/UL — SIGNIFICANT CHANGE UP (ref 3.8–10.5)
WBC # FLD AUTO: 8.06 K/UL — SIGNIFICANT CHANGE UP (ref 3.8–10.5)

## 2020-05-08 PROCEDURE — 99232 SBSQ HOSP IP/OBS MODERATE 35: CPT | Mod: CS

## 2020-05-08 RX ORDER — ACETAMINOPHEN 500 MG
650 TABLET ORAL ONCE
Refills: 0 | Status: COMPLETED | OUTPATIENT
Start: 2020-05-08 | End: 2020-05-08

## 2020-05-08 RX ADMIN — Medication 1 GRAM(S): at 22:16

## 2020-05-08 RX ADMIN — Medication 1 APPLICATION(S): at 13:08

## 2020-05-08 RX ADMIN — Medication 1 GRAM(S): at 17:21

## 2020-05-08 RX ADMIN — Medication 1 SPRAY(S): at 05:28

## 2020-05-08 RX ADMIN — PANTOPRAZOLE SODIUM 40 MILLIGRAM(S): 20 TABLET, DELAYED RELEASE ORAL at 05:27

## 2020-05-08 RX ADMIN — Medication 1 GRAM(S): at 11:43

## 2020-05-08 RX ADMIN — SERTRALINE 100 MILLIGRAM(S): 25 TABLET, FILM COATED ORAL at 11:43

## 2020-05-08 RX ADMIN — Medication 50 MILLIGRAM(S): at 05:27

## 2020-05-08 RX ADMIN — Medication 650 MILLIGRAM(S): at 22:15

## 2020-05-08 RX ADMIN — Medication 1 GRAM(S): at 05:26

## 2020-05-08 RX ADMIN — Medication 50 MILLIGRAM(S): at 17:21

## 2020-05-08 RX ADMIN — Medication 1 APPLICATION(S): at 05:28

## 2020-05-08 NOTE — PROGRESS NOTE ADULT - SUBJECTIVE AND OBJECTIVE BOX
Patient is a 79y old  Male who presents with a chief complaint of Generalized weakness (05 May 2020 17:11)      HPI:  80 y/o M with PMHx of HTN, BPH, spinal stenosis, dyslipidemia, anxiety, neuropathy, B12 deficiency who presents from [facility] rehab for evaluation of weakness and hypotension. As per transfer papers, pt with confusion after lunch today. Pt admits to generalized weakness. Lives with wife, Stephany and son. He came from [facility] rehab in [place] since Feb 14. Patient lives with wife and son at home and ambulates with cane. Contracted Covid-19 in [facility] rehab and first started having symptoms on Mar 25, reporting symptoms of cough and low grade fever, diarrhea. Denies any chest pain, shortness of breath, headache, dizziness. History obtained from wife, Stephany([phone]) as pt is confused and a poor historian.   Of note, pt was noted to have sacral wounds today and reported low grade fever for two days and mental status changes today .  Admitted for  acute  blood  loss   anemia and infection  poss 2/2  acute   possible lower GI bleed and stage 4 sacral decubitus .      INTERVAL HPI/OVERNIGHT EVENTS: Patient seen and examined at bedside, no acute complaints. Rodriguez in place draining clear yellow urine.     Vital Signs Last 24 Hrs  T(C): 36.7 (08 May 2020 07:00), Max: 37 (07 May 2020 16:07)  T(F): 98.1 (08 May 2020 07:00), Max: 98.6 (07 May 2020 16:07)  HR: 62 (08 May 2020 07:00) (62 - 75)  BP: 123/67 (08 May 2020 07:00) (105/56 - 140/74)  BP(mean): --  RR: 18 (08 May 2020 07:00) (18 - 18)  SpO2: 98% (08 May 2020 07:00) (92% - 98%)        REVIEW OF SYSTEMS: as in HPI    PHYSICAL EXAM:  GENERAL: NAD, well-groomed, weak +  HEAD:  Atraumatic, Normocephalic  EYES: EOMI, PERRLA, conjunctiva and sclera clear  ENMT: Moist mucous membranes No lesions  NECK: Supple  NERVOUS SYSTEM:  Alert & Oriented X3, Good concentration  CHEST/LUNG:  CTA, No rales, rhonchi, wheezing,   HEART: Regular rate and rhythm; No murmurs,   ABDOMEN: Soft, Nontender, Nondistended; Bowel sounds present  EXTREMITIES:  2+ Peripheral Pulses, No clubbing, cyanosis, or edema  SKIN: stage 4 sacral decub   gu rodriguez clean urine      MEDICATIONS  (STANDING):  collagenase Ointment 1 Application(s) Topical three times a day  metoprolol tartrate 50 milliGRAM(s) Oral two times a day  pantoprazole    Tablet 40 milliGRAM(s) Oral before breakfast  sertraline 100 milliGRAM(s) Oral daily  sodium chloride 0.65% Nasal 1 Spray(s) Both Nostrils two times a day  sucralfate suspension 1 Gram(s) Oral four times a day    MEDICATIONS  (PRN):      LABS:                        9.6    8.06  )-----------( 308      ( 08 May 2020 06:48 )             30.1     08 May 2020 06:48    139    |  103    |  26     ----------------------------<  77     4.0     |  28     |  0.75     Ca    8.0        08 May 2020 06:48  Phos  2.4       08 May 2020 06:48  Mg     2.1       08 May 2020 06:48    TPro  5.8    /  Alb  2.0    /  TBili  0.5    /  DBili  x      /  AST  38     /  ALT  79     /  AlkPhos  159    08 May 2020 06:48    LIVER FUNCTIONS - ( 08 May 2020 06:48 )  Alb: 2.0 g/dL / Pro: 5.8 g/dL / ALK PHOS: 159 U/L / ALT: 79 U/L / AST: 38 U/L / GGT: x             CAPILLARY BLOOD GLUCOSE              RADIOLOGY & ADDITIONAL TESTS:    Imaging Personally Reviewed:   no new test    Advance Directives:dnr      Palliative Care:    appropriate

## 2020-05-08 NOTE — PROGRESS NOTE ADULT - ASSESSMENT
The patient is a 79 year old male with a history of HTN, HL, pre-DM, anxiety, spinal stenosis who presents with weakness and AMS in the setting of worsening anemia, dehydration, COVID-19.    Plan:  - SVT - continue metoprolol tartrate 50 mg bid  - Underwent debridement of sacral decub  - Not hypoxic on RA  - Discharge planning

## 2020-05-08 NOTE — PROGRESS NOTE ADULT - PROBLEM SELECTOR PLAN 2
stage  4  Possible  source of infection  - Leukocytosis resolved, blood cult neg to date.   - s/p IV Zosyn 3.375 gm q8hr - finished 5 days course.   - wound care mgmt with Benita LINDO- wound vac to be placed today  - surg dr maguire  s/p debridement  -rodriguez placed for urinary retention by urology- TOV to be done when patient ambulatory, also for wound healing given sacral decub

## 2020-05-08 NOTE — PROGRESS NOTE ADULT - ASSESSMENT
78 y/o M with PMHx of HTN, BPH, spinal stenosis, dyslipidemia, anxiety, neuropathy, B12 deficiency who presents from rehab for evaluation of weakness and hypotension    Anemia   no overt gi bleeding; hgb stable  sp sacral decub debridement; care per surgery   monitor cbc, transfuse prn  cont ppi and carafate  monitor stool color   defer endoscopic eval as +Covid and increased risk for anesthesia in these pts   op gi f/u for possible colonoscopy if none recent once covid crisis resolved given ct findings    FTT/Dysphagia   unclear etiology; esophagram at [facility] 2/2019 normal   correct elytes prn  cont ppi    consider adding appetite stimulant   on supplements per nutrition   defer endoscopic eval as +Covid and increased risk for anesthesia in these pts    COVID-19  monitor rep status   inc lfts in setting of viral illness; improved from prior  care per primary team

## 2020-05-08 NOTE — PROGRESS NOTE ADULT - SUBJECTIVE AND OBJECTIVE BOX
INTERVAL HPI/OVERNIGHT EVENTS:  no gib per nursing      MEDICATIONS  (STANDING):  collagenase Ointment 1 Application(s) Topical three times a day  metoprolol tartrate 50 milliGRAM(s) Oral two times a day  pantoprazole    Tablet 40 milliGRAM(s) Oral before breakfast  sertraline 100 milliGRAM(s) Oral daily  sodium chloride 0.65% Nasal 1 Spray(s) Both Nostrils two times a day  sucralfate suspension 1 Gram(s) Oral four times a day    MEDICATIONS  (PRN):      Allergies    No Known Allergies    Intolerances        Review of Systems:  unable to obtain       Vital Signs Last 24 Hrs  T(C): 36.7 (08 May 2020 07:00), Max: 37 (07 May 2020 16:07)  T(F): 98.1 (08 May 2020 07:00), Max: 98.6 (07 May 2020 16:07)  HR: 62 (08 May 2020 07:00) (62 - 75)  BP: 123/67 (08 May 2020 07:00) (105/56 - 140/74)  BP(mean): --  RR: 18 (08 May 2020 07:00) (18 - 18)  SpO2: 98% (08 May 2020 07:00) (92% - 98%)    PHYSICAL EXAM:    deferred f/u done remotely as covid +     LABS:                        9.6    8.06  )-----------( 308      ( 08 May 2020 06:48 )             30.1     05-08    139  |  103  |  26<H>  ----------------------------<  77  4.0   |  28  |  0.75    Ca    8.0<L>      08 May 2020 06:48  Phos  2.4     05-08  Mg     2.1     05-08    TPro  5.8<L>  /  Alb  2.0<L>  /  TBili  0.5  /  DBili  x   /  AST  38<H>  /  ALT  79<H>  /  AlkPhos  159<H>  05-08          RADIOLOGY & ADDITIONAL TESTS:

## 2020-05-08 NOTE — PROGRESS NOTE ADULT - SUBJECTIVE AND OBJECTIVE BOX
Chief Complaint: Weakness, AMS    Interval Events: No events overnight.    Review of Systems:  General: No fevers, chills, weight loss or gain  Skin: No rashes, color changes  Cardiovascular: No chest pain, orthopnea  Respiratory: No shortness of breath, cough  Gastrointestinal: No nausea, abdominal pain  Genitourinary: No incontinence, pain with urination  Musculoskeletal: No pain, swelling, decreased range of motion  Neurological: No headache, weakness  Psychiatric: No depression, anxiety  Endocrine: No weight loss or gain, increased thirst  All other systems are comprehensively negative.    Physical Exam:  Vital Signs Last 24 Hrs  T(C): 36.7 (08 May 2020 07:00), Max: 37 (07 May 2020 16:07)  T(F): 98.1 (08 May 2020 07:00), Max: 98.6 (07 May 2020 16:07)  HR: 62 (08 May 2020 07:00) (62 - 75)  BP: 123/67 (08 May 2020 07:00) (105/56 - 140/74)  BP(mean): --  RR: 18 (08 May 2020 07:00) (18 - 18)  SpO2: 98% (08 May 2020 07:00) (92% - 98%)  General: NAD  HEENT: MMM  Neck: No JVD, no carotid bruit  Extremities: No LE edema, no cyanosis  Neuro: Non-focal  Skin: No rash    Labs:             05-08    139  |  103  |  26<H>  ----------------------------<  77  4.0   |  28  |  0.75    Ca    8.0<L>      08 May 2020 06:48  Phos  2.4     05-08  Mg     2.1     05-08    TPro  5.8<L>  /  Alb  2.0<L>  /  TBili  0.5  /  DBili  x   /  AST  38<H>  /  ALT  79<H>  /  AlkPhos  159<H>  05-08                        9.6    8.06  )-----------( 308      ( 08 May 2020 06:48 )             30.1       Telemetry: Sinus rhythm

## 2020-05-08 NOTE — PROGRESS NOTE ADULT - PROBLEM SELECTOR PLAN 1
acute  blood Anemia possible  2/2 lt hip chr  hematoma .  - no active bleed noticed and later drop hb possible dilutional  also  post iv fluid but all hb remain stable, today Hb 9.6  - s/p 2 unit prbc   - F/u CT abd/pelvis, U/S Abd-  left-sided sacral decubitus ulcer with punctate bubbles of air diffusely extending along symmetrically prominent left gluteal muscle and soft tissue folds. Partially imaged asymmetric density measuring 3.9 x 3.3 cm posteromedial to the left proximal femur.   Possibility of intramuscular hematoma considered in the differential.  - GI Dr. giordano no procedure endo / colonoscopy this time- fu out pt for procedure   -dc planning [facility]- f/u with SW

## 2020-05-08 NOTE — PROGRESS NOTE ADULT - SUBJECTIVE AND OBJECTIVE BOX
PULMONARY/CRITICAL CARE  Pt stable. Kurt lerma.   No sob. Now off antibiotics  Unchanged. Blood cultures pos.    Alert, confused.   Pt unchanged. No fever.      Patient is a 79y old  Male who presents with a chief complaint of Generalized weakness (28 Apr 2020 22:20)    BRIEF HOSPITAL COURSE: ***78 y/o M with PMHx of HTN, BPH, spinal stenosis, dyslipidemia, anxiety, neuropathy, B12 deficiency who presents from [facility] for evaluation of weakness and hypotension. As per transfer papers, pt with confusion after lunch today. Pt admits to generalized weakness. Lives with wife, Stephany and son. He came from [facility] in [place] since Feb 14. Patient lives with wife and son at home and ambulates with cane. Contracted Covid-19 in [facility] and first started having symptoms on Mar 25, reporting symptoms of cough and low grade fever, diarrhea. Denies any chest pain, shortness of breath, headache, dizziness. History obtained from wife, Stephany([phone]) as pt is confused and a poor historian.   Of note, pt was noted to have sacral wounds today and reported low grade fever for two days and mental status changes today and loss of appetite.    Events last 24 hours: ***    PAST MEDICAL & SURGICAL HISTORY:  H/O vitamin B deficiency  Spinal stenosis  HTN (hypertension)  History of tonsillectomy    Allergies    No Known Allergies    Intolerances      FAMILY HISTORY:  No pertinent family history in first degree relatives      Review of Systems:  unobtainable    Medications:  piperacillin/tazobactam IVPB.. 3.375 Gram(s) IV Intermittent every 8 hours    metoprolol succinate ER 50 milliGRAM(s) Oral daily  midodrine. 10 milliGRAM(s) Oral three times a day      sertraline 100 milliGRAM(s) Oral daily        pantoprazole  Injectable 40 milliGRAM(s) IV Push every 12 hours        cyanocobalamin 1000 MICROGram(s) Oral daily      collagenase Ointment 1 Application(s) Topical three times a day            ICU Vital Signs Last 24 Hrs  T(C): 37.1 (29 Apr 2020 06:05), Max: 37.3 (29 Apr 2020 03:49)  T(F): 98.7 (29 Apr 2020 06:05), Max: 99.1 (29 Apr 2020 03:49)  HR: 102 (29 Apr 2020 06:05) (87 - 105)  BP: 100/57 (29 Apr 2020 06:05) (84/48 - 100/57)  BP(mean): --  ABP: --  ABP(mean): --  RR: 18 (29 Apr 2020 06:05) (17 - 22)  SpO2: 96% (29 Apr 2020 06:05) (96% - 98%)    Vital Signs Last 24 Hrs  T(C): 37.1 (29 Apr 2020 06:05), Max: 37.3 (29 Apr 2020 03:49)  T(F): 98.7 (29 Apr 2020 06:05), Max: 99.1 (29 Apr 2020 03:49)  HR: 102 (29 Apr 2020 06:05) (87 - 105)  BP: 100/57 (29 Apr 2020 06:05) (84/48 - 100/57)  BP(mean): --  RR: 18 (29 Apr 2020 06:05) (17 - 22)  SpO2: 96% (29 Apr 2020 06:05) (96% - 98%)        I&O's Detail        LABS:                        8.4    19.86 )-----------( 248      ( 29 Apr 2020 07:23 )             26.0     04-28    138  |  107  |  54<H>  ----------------------------<  92  4.6   |  26  |  1.10    Ca    7.6<L>      28 Apr 2020 22:41  Phos  2.4     04-28  Mg     2.0     04-28    TPro  5.1<L>  /  Alb  1.8<L>  /  TBili  1.8<H>  /  DBili  x   /  AST  230<H>  /  ALT  173<H>  /  AlkPhos  235<H>  04-28          CAPILLARY BLOOD GLUCOSE            CULTURES:          RADIOLOGY: ***< from: CT Chest No Cont (04.29.20 @ 01:24) >  EXAM:  CT ABDOMEN AND PELVIS                          EXAM:  CT CHEST                            PROCEDURE DATE:  04/29/2020          INTERPRETATION:  CT CHEST, ABDOMEN AND PELVIS WITHOUT CONTRAST    INDICATION: Shortness of breath. Hypotension. Altered mental status.     TECHNIQUE: Noncontrast CT of the chest, abdomen and pelvis. Images are reformatted in the sagittal and coronal planes.Postprocessed MIP reformatted chest images were created and reviewed.    COMPARISON: None.    FINDINGS:    Absence of intravenous contrast limits evaluation for traumatic injury, focal lesions, neoplasm, and vascular pathology.    Thorax:  Lines and tubes: None.  Airways: Tracheobronchial tree is patent.  Lungs and Pleura: Right basilar opacity containing air bronchograms. Mild patchy left lower lobe opacities are also identified. No significant pleural effusion. No pneumothorax.  Mediastinum and lymph nodes: No bulky adenopathy.  Heart: Trace pericardial effusion and/or thickening without significant cardiomegaly. Coronary artery calcification and/or stenting. Valvular calcifications.  Vessels: Atherosclerotic disease of the aorta and its branches. Ascending thoracic aorta measures up to 4.3 cm in maximum AP diameter.   Chest Wall: Within normal limits.    Abdomen/Pelvis:  Liver: No suspicious lesions.  Biliary: No significant dilatation. No calcified gallstones within the gallbladder.  Spleen: No suspicious lesions.  Pancreas: No inflammatory changes or ductal dilatation.  Adrenals: Normal.  Kidneys: No hydronephrosis. Bilateral renal cysts as well as too small to characterize hypodensities. Sub-cm nonobstructive right renal calculus.  Vessels: Atherosclerotic disease of the aorta and its branches.     GI tract: There is suggestion of focal circumferential wall thickening of distal ascending colonic loop without significant pericolonic stranding as best seen on images 78 through 79 of series 2. Consider nonemergent colonoscopy evaluation to exclude underlying malignancy. No evidence ofsmall bowel obstruction. No CT findings of acute appendicitis.    Peritoneum/retroperitoneum and mesentery: No free air. No organized fluid collection. No adenopathy.    Pelvic organs/Bladder: Heterogeneous prominent prostate with nodular density near the apex, cause mass effect and protrusion at the bladder base. Correlate with PSA and consider nonemergent prostatic workup to exclude malignancy. No significant bladder wall thickening.    Abdominal wall: Suggestion of left-sided sacral decubitus ulcer with punctate bubbles of air diffusely extending along symmetrically prominent left gluteal muscle and soft tissue folds. Partially imaged asymmetric density measuring 3.9 x 3.3 cm posteromedial to the left proximal femur on image 168 of series 2. Possibility of intramuscular hematoma considered in the differential.    Bones and soft tissues: Multilevel degenerative changes of the spine noted. Advanced osteoarthritis of bilateral hip joint. Advanced osteoarthritis of left shoulder joint withassociated soft tissue calcifications. Vague linear lucency identified at the level of the sacrococcygeal tip as best seen on images 149 through 151 of series 2. Correlate with point tenderness to exclude underlying nondisplaced fracture.    IMPRESSION:    Right basilar opacity containing air bronchograms. Additional patchy left lower lobe opacities. Bacterial and viral pneumonias including atypical agent such as COVID-19 considered in the differential. Correlate with clinical parameters, laboratory values and follow-up chest radiograph advised. No significant pleural effusion.     Suggestion of focal circumferential wall thickening of distal ascending colonic loop without significant pericolonic stranding. Consider nonemergent colonoscopy evaluation to exclude underlying malignancy. No evidence of small bowel obstruction.     Vague linear lucency identified at the level of the sacrococcygeal tip. Correlate with point tenderness to exclude underlying nondisplaced fracture.    Suggestion of left-sided sacral decubitus ulcer with punctate bubbles of air diffusely extending along symmetrically prominent left gluteal muscle and soft tissue folds. Partially imaged asymmetric density measuring 3.9 x 3.3 cm posteromedial to the left proximalfemur. Possibility of intramuscular hematoma considered in the differential.    Additional findings as mentioned above.    These results were discussed via telephone at 4/29/2020 3:55 AM by Dr. Baugh of radiology with Dr. Bailey, institution read-back verification policy was followed.                TATE BAUGH M.D., ATTENDING RADIOLOGIST  This document has been electronically signed. Apr 29 2020  4:10AM              < end of copied text >    < from: Xray Chest 1 View- PORTABLE-Routine (04.30.20 @ 09:11) >  EXAM:  XR CHEST PORTABLE ROUTINE 1V                            PROCEDURE DATE:  04/30/2020          INTERPRETATION:    Examination: XR CHEST    History: ADMDIAG1: K92.2 GASTROINTESTINAL HEMORRHAGE, UNSPECIFIED/, pn pneumonia    TECHNIQUE:  Portable AP view of the chest was obtained.    COMPARISON: A CT chest 1/29/2020 available for review.    FINDINGS:   Lungs clear. RIGHT hemidiaphragm mildly elevated. Posterior lung bases cannot be assessed due to elevated diaphragms. Lateral radiograph recommended if clinically warranted.  . The heart and mediastinum are within normal limits.    Visualized osseous structures are intact.        IMPRESSION: Visualized Lungs clear..   Elevated LEFT hemidiaphragm. Posterior lung bases cannot be assessed dueto elevated diaphragms. Lateral radiograph recommended if clinically warranted.      < end of copied text >

## 2020-05-08 NOTE — PROGRESS NOTE ADULT - ASSESSMENT
80 y/o M with PMHx of HTN, BPH, spinal stenosis, dyslipidemia, anxiety, neuropathy, B12 deficiency who presents from [facility] rehab for evaluation of weakness and hypotension. Admitted for  acute  blood  loss   anemia and infection  poss 2/2  acute   possible lower GI bleed and stage 4 sacral decubitus .

## 2020-05-08 NOTE — PROGRESS NOTE ADULT - SUBJECTIVE AND OBJECTIVE BOX
LISANDRA PADGETT is a 79yMale , patient examined and chart reviewed.    INTERVAL HPI/ OVERNIGHT EVENTS:   NAD.  Afebrile. On RA.  No events.    PAST MEDICAL & SURGICAL HISTORY:  H/O vitamin B deficiency  Spinal stenosis  HTN (hypertension)  History of tonsillectomy      For details regarding the patient's social history, family history, and other miscellaneous elements, please refer the initial infectious diseases consultation and/or the admitting history and physical examination for this admission.    ROS:  CONSTITUTIONAL:  Negative fever or chills  EYES:  Negative  blurry vision or double vision  CARDIOVASCULAR:  Negative for chest pain or palpitations  RESPIRATORY:  Negative for cough, wheezing, or SOB   GASTROINTESTINAL:  Negative for nausea, vomiting, diarrhea, constipation, or abdominal pain  GENITOURINARY:  Negative frequency, urgency or dysuria  NEUROLOGIC:  No headache, confusion, dizziness, lightheadedness  All other systems were reviewed and are negative         Current inpatient medications :  MEDICATIONS  (STANDING):  collagenase Ointment 1 Application(s) Topical three times a day  metoprolol tartrate 50 milliGRAM(s) Oral two times a day  pantoprazole    Tablet 40 milliGRAM(s) Oral before breakfast  sertraline 100 milliGRAM(s) Oral daily  sodium chloride 0.65% Nasal 1 Spray(s) Both Nostrils two times a day  sucralfate suspension 1 Gram(s) Oral four times a day    Objective:  Vital Signs Last 24 Hrs  T(C): 36.7 (08 May 2020 17:00), Max: 36.7 (08 May 2020 07:00)  T(F): 98 (08 May 2020 17:00), Max: 98.1 (08 May 2020 07:00)  HR: 75 (08 May 2020 17:00) (62 - 75)  BP: 106/65 (08 May 2020 17:00) (106/65 - 140/74)  RR: 20 (08 May 2020 17:00) (18 - 20)  SpO2: 98% (08 May 2020 17:00) (97% - 98%)    Physical Exam:  General:  no acute distress  Eyes: sclera anicteric, pupils equal and reactive to light  ENMT: buccal mucosa moist, pharynx not injected  Neck: supple, trachea midline  Lungs: clear, no wheeze/rhonchi  Cardiovascular: regular rate and rhythm, S1 S2  Abdomen: soft, nontender, no organomegaly present, bowel sounds normal  Neurological: alert and oriented x2 Cranial Nerves II-XII grossly intact  Skin: sacral decub drsg c/d/i   Lymph Nodes: no palpable cervical/supraclavicular lymph nodes enlargements  Extremities: no cyanosis/clubbing/edema        LABS:                        9.6    8.06  )-----------( 308      ( 08 May 2020 06:48 )             30.1   05-08    139  |  103  |  26<H>  ----------------------------<  77  4.0   |  28  |  0.75    Ca    8.0<L>      08 May 2020 06:48  Phos  2.4     05-08  Mg     2.1     05-08    TPro  5.8<L>  /  Alb  2.0<L>  /  TBili  0.5  /  DBili  x   /  AST  38<H>  /  ALT  79<H>  /  AlkPhos  159<H>  05-08      MICROBIOLOGY:    Culture - Blood (collected 02 May 2020 00:30)  Source: .Blood Blood  Preliminary Report (03 May 2020 01:03):    No growth to date.    Culture - Blood (collected 02 May 2020 00:30)  Source: .Blood Blood  Preliminary Report (03 May 2020 01:03):    No growth to date.    COVID-19 PCR . (04.28.20 @ 22:41)    COVID-19 PCR: Detected: This test has been validated by [employer] to be accurate;  though it has not been FDA cleared/approved by the usual pathway  As with all laboratory test, results should be correlated with clinical  findings.  https://www.fda.gov/media/804372/download  https://www.fda.gov/media/435390/download    RADIOLOGY & ADDITIONAL STUDIES:    EXAM:  CT ABDOMEN AND PELVIS                          EXAM:  CT CHEST                            PROCEDURE DATE:  04/29/2020          INTERPRETATION:  CT CHEST, ABDOMEN AND PELVIS WITHOUT CONTRAST    INDICATION: Shortness of breath. Hypotension. Altered mental status.     TECHNIQUE: Noncontrast CT of the chest, abdomen and pelvis. Images are reformatted in the sagittal and coronal planes.Postprocessed MIP reformatted chest images were created and reviewed.    COMPARISON: None.    FINDINGS:    Absence of intravenous contrast limits evaluation for traumatic injury, focal lesions, neoplasm, and vascular pathology.    Thorax:  Lines and tubes: None.  Airways: Tracheobronchial tree is patent.  Lungs and Pleura: Right basilar opacity containing air bronchograms. Mild patchy left lower lobe opacities are also identified. No significant pleural effusion. No pneumothorax.  Mediastinum and lymph nodes: No bulky adenopathy.  Heart: Trace pericardial effusion and/or thickening without significant cardiomegaly. Coronary artery calcification and/or stenting. Valvular calcifications.  Vessels: Atherosclerotic disease of the aorta and its branches. Ascending thoracic aorta measures up to 4.3 cm in maximum AP diameter.   Chest Wall: Within normal limits.    Abdomen/Pelvis:  Liver: No suspicious lesions.  Biliary: No significant dilatation. No calcified gallstones within the gallbladder.  Spleen: No suspicious lesions.  Pancreas: No inflammatory changes or ductal dilatation.  Adrenals: Normal.  Kidneys: No hydronephrosis. Bilateral renal cysts as well as too small to characterize hypodensities. Sub-cm nonobstructive right renal calculus.  Vessels: Atherosclerotic disease of the aorta and its branches.     GI tract: There is suggestion of focal circumferential wall thickening of distal ascending colonic loop without significant pericolonic stranding as best seen on images 78 through 79 of series 2. Consider nonemergent colonoscopy evaluation to exclude underlying malignancy. No evidence ofsmall bowel obstruction. No CT findings of acute appendicitis.    Peritoneum/retroperitoneum and mesentery: No free air. No organized fluid collection. No adenopathy.    Pelvic organs/Bladder: Heterogeneous prominent prostate with nodular density near the apex, cause mass effect and protrusion at the bladder base. Correlate with PSA and consider nonemergent prostatic workup to exclude malignancy. No significant bladder wall thickening.    Abdominal wall: Suggestion of left-sided sacral decubitus ulcer with punctate bubbles of air diffusely extending along symmetrically prominent left gluteal muscle and soft tissue folds. Partially imaged asymmetric density measuring 3.9 x 3.3 cm posteromedial to the left proximal femur on image 168 of series 2. Possibility of intramuscular hematoma considered in the differential.    Bones and soft tissues: Multilevel degenerative changes of the spine noted. Advanced osteoarthritis of bilateral hip joint. Advanced osteoarthritis of left shoulder joint withassociated soft tissue calcifications. Vague linear lucency identified at the level of the sacrococcygeal tip as best seen on images 149 through 151 of series 2. Correlate with point tenderness to exclude underlying nondisplaced fracture.    IMPRESSION:    Right basilar opacity containing air bronchograms. Additional patchy left lower lobe opacities. Bacterial and viral pneumonias including atypical agent such as COVID-19 considered in the differential. Correlate with clinical parameters, laboratory values and follow-up chest radiograph advised. No significant pleural effusion.     Suggestion of focal circumferential wall thickening of distal ascending colonic loop without significant pericolonic stranding. Consider nonemergent colonoscopy evaluation to exclude underlying malignancy. No evidence of small bowel obstruction.     Vague linear lucency identified at the level of the sacrococcygeal tip. Correlate with point tenderness to exclude underlying nondisplaced fracture.    Suggestion of left-sided sacral decubitus ulcer with punctate bubbles of air diffusely extending along symmetrically prominent left gluteal muscle and soft tissue folds. Partially imaged asymmetric density measuring 3.9 x 3.3 cm posteromedial to the left proximalfemur. Possibility of intramuscular hematoma considered in the differential.    Assessment :   78 y/o M with PMHx of HTN, BPH, spinal stenosis, dyslipidemia, anxiety, neuropathy, B12 deficiency resident of [facility] for evaluation of weakness and hypotension with mental status change. Tested COVID 19 positive early April. In ED he was hypotensive. WBC 19K No SOB cough n/v/d. Pt is a poor historian. Has unstagable sacral decub ulcer. Also with Acute anemia. ?infectious process vs reactive leukocytosis. Leukocytosis poss chronic.  Respiratory status stable. Menendez placed sec AUR 5/3/2020. Seen by surgery and is sp bedside I&D of sacral decub Clinically stable.    Plan :   Monitor off antibiotics  Completed Zosyn   Cultures ngtd  Menendez per urology  Frequent turning  Local wound care per surgery  COVID-19--critical period for decompensation  (7-14 days post symptom onset), avoid aerosolizing procedures, NSAIDs   -monitor respiratory status closely ( route of 02 and saturation) and titrate when able  -isolation precautions per protocol  -avoid excessive blood draws, blood cultures and frequent CXRs  -monitor biomarkers- CBC w diff  for NLRNLR <3 low vs >5 high) Ferritin (lower risk <450 vs >850) CRP (low risk <2 and higher risk >6) and LDH, D Dimer, procalcitonin  -therapies remains investigational-- including Hydroxychloroquine  -antibiotics only if there is concern for a bacterial process  -Steroids short course ( 5 days)  --for hypoxia/ARDS /shock    Dc planning to rehab      Continue with present regiment.  Appropriate use of antibiotics and adverse effects reviewed.      I have discussed the above plan of care with patient/ family in detail. They expressed understanding of the  treatment plan . Risks, benefits and alternatives discussed in detail. I have asked if they have any questions or concerns and appropriately addressed them to the best of my ability .    > 35 minutes were spent in direct patient care reviewing notes, medications ,labs data/ imaging , discussion with multidisciplinary team.    Thank you for allowing me to participate in care of your patient .    Naima Lopez MD  Infectious Disease  [phone]

## 2020-05-09 LAB
ANION GAP SERPL CALC-SCNC: 6 MMOL/L — SIGNIFICANT CHANGE UP (ref 5–17)
BASOPHILS # BLD AUTO: 0.01 K/UL — SIGNIFICANT CHANGE UP (ref 0–0.2)
BASOPHILS NFR BLD AUTO: 0.1 % — SIGNIFICANT CHANGE UP (ref 0–2)
BUN SERPL-MCNC: 25 MG/DL — HIGH (ref 7–23)
CALCIUM SERPL-MCNC: 8 MG/DL — LOW (ref 8.5–10.1)
CHLORIDE SERPL-SCNC: 106 MMOL/L — SIGNIFICANT CHANGE UP (ref 96–108)
CO2 SERPL-SCNC: 27 MMOL/L — SIGNIFICANT CHANGE UP (ref 22–31)
CREAT SERPL-MCNC: 0.74 MG/DL — SIGNIFICANT CHANGE UP (ref 0.5–1.3)
EOSINOPHIL # BLD AUTO: 0.03 K/UL — SIGNIFICANT CHANGE UP (ref 0–0.5)
EOSINOPHIL NFR BLD AUTO: 0.4 % — SIGNIFICANT CHANGE UP (ref 0–6)
GLUCOSE SERPL-MCNC: 77 MG/DL — SIGNIFICANT CHANGE UP (ref 70–99)
HCT VFR BLD CALC: 28.8 % — LOW (ref 39–50)
HGB BLD-MCNC: 9.2 G/DL — LOW (ref 13–17)
IMM GRANULOCYTES NFR BLD AUTO: 0.8 % — SIGNIFICANT CHANGE UP (ref 0–1.5)
LYMPHOCYTES # BLD AUTO: 1.09 K/UL — SIGNIFICANT CHANGE UP (ref 1–3.3)
LYMPHOCYTES # BLD AUTO: 14.2 % — SIGNIFICANT CHANGE UP (ref 13–44)
MAGNESIUM SERPL-MCNC: 2 MG/DL — SIGNIFICANT CHANGE UP (ref 1.6–2.6)
MCHC RBC-ENTMCNC: 27.8 PG — SIGNIFICANT CHANGE UP (ref 27–34)
MCHC RBC-ENTMCNC: 31.9 GM/DL — LOW (ref 32–36)
MCV RBC AUTO: 87 FL — SIGNIFICANT CHANGE UP (ref 80–100)
MONOCYTES # BLD AUTO: 0.39 K/UL — SIGNIFICANT CHANGE UP (ref 0–0.9)
MONOCYTES NFR BLD AUTO: 5.1 % — SIGNIFICANT CHANGE UP (ref 2–14)
NEUTROPHILS # BLD AUTO: 6.11 K/UL — SIGNIFICANT CHANGE UP (ref 1.8–7.4)
NEUTROPHILS NFR BLD AUTO: 79.4 % — HIGH (ref 43–77)
NRBC # BLD: 0 /100 WBCS — SIGNIFICANT CHANGE UP (ref 0–0)
PHOSPHATE SERPL-MCNC: 2.3 MG/DL — LOW (ref 2.5–4.5)
PLATELET # BLD AUTO: 276 K/UL — SIGNIFICANT CHANGE UP (ref 150–400)
POTASSIUM SERPL-MCNC: 3.9 MMOL/L — SIGNIFICANT CHANGE UP (ref 3.5–5.3)
POTASSIUM SERPL-SCNC: 3.9 MMOL/L — SIGNIFICANT CHANGE UP (ref 3.5–5.3)
RBC # BLD: 3.31 M/UL — LOW (ref 4.2–5.8)
RBC # FLD: 15.4 % — HIGH (ref 10.3–14.5)
SODIUM SERPL-SCNC: 139 MMOL/L — SIGNIFICANT CHANGE UP (ref 135–145)
WBC # BLD: 7.69 K/UL — SIGNIFICANT CHANGE UP (ref 3.8–10.5)
WBC # FLD AUTO: 7.69 K/UL — SIGNIFICANT CHANGE UP (ref 3.8–10.5)

## 2020-05-09 PROCEDURE — 99232 SBSQ HOSP IP/OBS MODERATE 35: CPT | Mod: CS

## 2020-05-09 RX ORDER — SODIUM,POTASSIUM PHOSPHATES 278-250MG
1 POWDER IN PACKET (EA) ORAL
Refills: 0 | Status: COMPLETED | OUTPATIENT
Start: 2020-05-09 | End: 2020-05-10

## 2020-05-09 RX ADMIN — Medication 1 GRAM(S): at 22:07

## 2020-05-09 RX ADMIN — Medication 1 GRAM(S): at 11:44

## 2020-05-09 RX ADMIN — Medication 1 GRAM(S): at 05:40

## 2020-05-09 RX ADMIN — Medication 1 SPRAY(S): at 16:43

## 2020-05-09 RX ADMIN — SERTRALINE 100 MILLIGRAM(S): 25 TABLET, FILM COATED ORAL at 11:43

## 2020-05-09 RX ADMIN — Medication 1 TABLET(S): at 11:43

## 2020-05-09 RX ADMIN — PANTOPRAZOLE SODIUM 40 MILLIGRAM(S): 20 TABLET, DELAYED RELEASE ORAL at 05:40

## 2020-05-09 RX ADMIN — Medication 50 MILLIGRAM(S): at 05:40

## 2020-05-09 RX ADMIN — Medication 1 TABLET(S): at 16:43

## 2020-05-09 RX ADMIN — Medication 1 TABLET(S): at 22:06

## 2020-05-09 RX ADMIN — Medication 50 MILLIGRAM(S): at 16:43

## 2020-05-09 RX ADMIN — Medication 1 SPRAY(S): at 05:41

## 2020-05-09 RX ADMIN — Medication 1 GRAM(S): at 16:43

## 2020-05-09 NOTE — PROGRESS NOTE ADULT - SUBJECTIVE AND OBJECTIVE BOX
Chief Complaint: Weakness, AMS    Interval Events: No events overnight.    Review of Systems:  General: No fevers, chills, weight loss or gain  Skin: No rashes, color changes  Cardiovascular: No chest pain, orthopnea  Respiratory: No shortness of breath, cough  Gastrointestinal: No nausea, abdominal pain  Genitourinary: No incontinence, pain with urination  Musculoskeletal: No pain, swelling, decreased range of motion  Neurological: No headache, weakness  Psychiatric: No depression, anxiety  Endocrine: No weight loss or gain, increased thirst  All other systems are comprehensively negative.    Physical Exam:  Vital Signs Last 24 Hrs  T(C): 36.4 (09 May 2020 07:15), Max: 36.9 (08 May 2020 22:22)  T(F): 97.6 (09 May 2020 07:15), Max: 98.5 (08 May 2020 22:22)  HR: 68 (09 May 2020 07:15) (68 - 75)  BP: 127/67 (09 May 2020 07:15) (106/65 - 150/73)  BP(mean): --  RR: 19 (09 May 2020 07:15) (18 - 20)  SpO2: 92% (09 May 2020 07:15) (92% - 98%)  General: NAD  HEENT: MMM  Neck: No JVD, no carotid bruit  Extremities: No LE edema, no cyanosis  Neuro: Non-focal  Skin: No rash    Labs:             05-09    139  |  106  |  25<H>  ----------------------------<  77  3.9   |  27  |  0.74    Ca    8.0<L>      09 May 2020 06:38  Phos  2.3     05-09  Mg     2.0     05-09    TPro  5.8<L>  /  Alb  2.0<L>  /  TBili  0.5  /  DBili  x   /  AST  38<H>  /  ALT  79<H>  /  AlkPhos  159<H>  05-08                        9.2    7.69  )-----------( 276      ( 09 May 2020 06:38 )             28.8

## 2020-05-09 NOTE — PROGRESS NOTE ADULT - PROBLEM SELECTOR PLAN 8
-was hypotensive on admission which has resolved   -resume bb   -Will hold off on home Valsartan  -cardio dr rambo fernandez .

## 2020-05-09 NOTE — PROGRESS NOTE ADULT - PROBLEM SELECTOR PLAN 5
- Found to have Elevated LFTs poa , possibly due to hx of being covid +  -overall improved  - Avoid hepatotoxic drug

## 2020-05-09 NOTE — PROGRESS NOTE ADULT - SUBJECTIVE AND OBJECTIVE BOX
Patient is a 79y old  Male who presents with a chief complaint of Generalized weakness (09 May 2020 08:58)      FROM ADMISSION H+P:   HPI:  78 y/o M with PMHx of HTN, BPH, spinal stenosis, dyslipidemia, anxiety, neuropathy, B12 deficiency who presents from [facility] rehab for evaluation of weakness and hypotension. As per transfer papers, pt with confusion after lunch today. Pt admits to generalized weakness. Lives with wife, Stephany and son. He came from [facility] rehab in [place] since Feb 14. Patient lives with wife and son at home and ambulates with cane. Contracted Covid-19 in [facility] re[facility] and first started having symptoms on Mar 25, reporting symptoms of cough and low grade fever, diarrhea. Denies any chest pain, shortness of breath, headache, dizziness. History obtained from wife, Stephany([phone]) as pt is confused and a poor historian.   Of note, pt was noted to have sacral wounds today and reported low grade fever for two days and mental status changes today and loss of appetite.    The date the pt first felt unwell: March 25  Fever or chills: yes [ x ]   no [  ]   - Tmax:   Fatigue, malaise or generalized weakness: yes [ x ]   no [  ]  Shortness of breath/dyspnea: yes [ x ]   no [  ]  Cough: yes [ x ]   no [  ], sputum production: yes [  ]   no [x  ]  Blood in sputum: yes [  ]   no [ x ]  Anorexia/po intolerance: yes [  ]   no [ x ]  Chest pain or chest tightness: yes [  ]   no [ x ]  Edema in legs: yes [  ]   no [ x ]  Myalgias: yes [  ]   no [ x ]  Headache: yes [  ]   no [x  ]  Sore throat: yes [  ]   no [ x ]  Rhinorrhea: yes [  ]   no [  x]  Abd pain: yes [  ]   no [ x ]  Nausea: yes [  ]   no [x  ]  Vomiting: yes [  ]   no [  x]  Diarrhea: yes [  ]   no [  ]  Skin rashes: yes [  ]   no [  ]  Loss of sense of smell/anosmia: yes [  ]   no [ x ]  Conjunctivitis: yes [  ]   no [ x ]  Recent travel: yes [  ]   no [ x ] - Location:   Any sick contacts: yes [x  ]   no [  ]: Contracted from [facility] re[facility]  Close contact with someone confirmed positive with COVID-19 / SARS-CoV2 in the last 14 days: yes [  ]   no [ x ]  Code status: DNR, but not DNI      In the ED  Vitals: T--, , BP 84/48, RR 22, O2 96 on RA  Labs(In [facility]) significant for: wbc 18.45, hgb 6.8, FOBT+,   Coag studies: PT 14.9, INR 1.33, aPTT 43.7, d-dimer 561  Chem panel: Lactate 2.5, Na 133, AG 3, BUN/Cr 61/1.61, Alb 1.9, Alk phos 195, ,   UA neg  Covid-19 PCR Positive   EKG shows Accelerated Junctional rhythm with occasional PVCs  CXR in [facility] ED shows: Mild R atelectasis  In [facility] ED, s/p 2L NS bolus, 1 unit pRBC (28 Apr 2020 21:00)      ----  INTERVAL HPI/OVERNIGHT EVENTS: Pt seen and evaluated at the bedside. No acute overnight events occurred. Pt resting comfortably in bed with no supplemental oxygen. Pt complaining of weakness and pain in area of sacral wound.     Fever or chills: yes [  ]   no [  ]  Fatigue, malaise or generalized weakness: yes [ x ]   no [  ]  Chest pain: yes [  ]   no [ x ]  Palpitations: yes [  ]   no [ x ]  Shortness of breath/dyspnea: yes [  ]   no [ x ]  Cough: yes [  ]   no [  ], sputum production: yes [  ]   no [  ]  Anorexia/po intolerance: yes [  ]   no [  ]  Myalgias: yes [  ]   no [  ]  Headache: yes [  ]   no [  ]  Sore throat: yes [  ]   no [  ]  Rhinorrhea: yes [  ]   no [  ]  Nausea: yes [  ]   no [  ]  Vomiting: yes [  ]   no [  ]  Diarrhea: yes [  ]   no [  ]  Loss of sense of smell/anosmia: yes [  ]   no [  ]  Conjunctivitis: yes [  ]   no [  ]  Other associated complaints: see above    ----  PAST MEDICAL & SURGICAL HISTORY:  H/O vitamin B deficiency  Spinal stenosis  HTN (hypertension)  History of tonsillectomy      FAMILY HISTORY:  No pertinent family history in first degree relatives      Allergies    No Known Allergies    Intolerances        ----  REVIEW OF SYSTEMS:  as per HPI    ----  PHYSICAL EXAM:  GENERAL: patient appears frail, speaking in complete sentences, no acute distress   EYES: sclera clear without erythema, no exudates  ENMT: oropharynx clear without erythema, moist mucous membranes  LUNGS: decreased breath sounds b/l, no wheezing or rhonchi appreciated  HEART: soft S1/S2, regular rate and rhythm, no murmurs noted, no noted edema to b/l LE  GASTROINTESTINAL: abdomen is soft, nontender, nondistended, normoactive bowel sounds, no palpable masses  INTEGUMENT: good skin turgor, appropriate for ethnicity, appears well perfused, no jaundice noted, wound vac on sacral ulcer draining   MUSCULOSKELETAL: no clubbing or cyanosis, no obvious deformity  NEUROLOGIC: awake, alert, oriented x3, no focal neurological deficits     T(C): 36.4 (05-09-20 @ 07:15), Max: 36.9 (05-08-20 @ 22:22)  HR: 68 (05-09-20 @ 07:15) (68 - 75)  BP: 127/67 (05-09-20 @ 07:15) (106/65 - 150/73)  RR: 19 (05-09-20 @ 07:15) (18 - 20)  SpO2: 92% (05-09-20 @ 07:15) (92% - 98%)  Wt(kg): --    ----  I&O's Summary    08 May 2020 07:01  -  09 May 2020 07:00  --------------------------------------------------------  IN: 200 mL / OUT: 900 mL / NET: -700 mL        LABS:                        9.2    7.69  )-----------( 276      ( 09 May 2020 06:38 )             28.8     05-09    139  |  106  |  25<H>  ----------------------------<  77  3.9   |  27  |  0.74    Ca    8.0<L>      09 May 2020 06:38  Phos  2.3     05-09  Mg     2.0     05-09    TPro  5.8<L>  /  Alb  2.0<L>  /  TBili  0.5  /  DBili  x   /  AST  38<H>  /  ALT  79<H>  /  AlkPhos  159<H>  05-08        CAPILLARY BLOOD GLUCOSE                    ----  Personally reviewed:  Vital sign trends: [  ] yes    [  ] no     [  ] n/a  Laboratory results: [  ] yes    [  ] no     [  ] n/a  Radiology results: [  ] yes    [  ] no     [  ] n/a  Culture results: [  ] yes    [  ] no     [  ] n/a  Consultant recommendations: [  ] yes    [  ] no     [  ] n/a

## 2020-05-09 NOTE — CHART NOTE - NSCHARTNOTEFT_GEN_A_CORE
Assessment: Pt due for malnutrition f/u. Chart reviewed, hospital course noted. +wound vac.    Pt visited at bedside. States appetite has been "so-so", mildly improved from admission. Still only eating 50% of meals at best. States that everything "just runs through him". Denied N/V, does endorse loose stools which limit his po intake. Last BM 5/8. Discussed BRAT diet type foods and pt agreeable that this may help bulk his stools somewhat. 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     Factors impacting intake: [ ] none [ ] nausea  [ ] vomiting [ ] diarrhea [ ] constipation  [ ]chewing problems [ ] swallowing issues  [X] other: persistent lack of appetite, loose stools per pt    Diet Presciption: Diet, Soft:   Low Fat (LOWFAT)  Supplement Feeding Modality:  Oral  Ensure Enlive Cans or Servings Per Day:  1       Frequency:  Two Times a day (04-30-20 @ 11:13)    Intake: 25-50%    Current Weight: 116.1 pounds (5/8) 117 pounds (5/7), 119 pounds (5/2)  % Weight Change    Pertinent Medications: MEDICATIONS  (STANDING):  collagenase Ointment 1 Application(s) Topical three times a day  metoprolol tartrate 50 milliGRAM(s) Oral two times a day  pantoprazole    Tablet 40 milliGRAM(s) Oral before breakfast  potassium acid phosphate/sodium acid phosphate tablet (K-PHOS No. 2) 1 Tablet(s) Oral four times a day with meals  sertraline 100 milliGRAM(s) Oral daily  sodium chloride 0.65% Nasal 1 Spray(s) Both Nostrils two times a day  sucralfate suspension 1 Gram(s) Oral four times a day    MEDICATIONS  (PRN):    Pertinent Labs: 05-09 Na139 mmol/L Glu 77 mg/dL K+ 3.9 mmol/L Cr  0.74 mg/dL BUN 25 mg/dL<H> 05-09 Phos 2.3 mg/dL<L> 05-08 Alb 2.0 g/dL<L>     CAPILLARY BLOOD GLUCOSE        Skin: sacrum unstageable    Estimated Needs:   [X] no change since previous assessment  [ ] recalculated:     Previous Nutrition Diagnosis:   [ ] Inadequate Energy Intake [ ]Inadequate Oral Intake [ ] Excessive Energy Intake   [ ] Underweight [ ] Increased Nutrient Needs [ ] Overweight/Obesity   [ ] Altered GI Function [ ] Unintended Weight Loss [ ] Food & Nutrition Related Knowledge Deficit [X] Malnutrition     Nutrition Diagnosis is [X] ongoing  [ ] resolved [ ] not applicable     New Nutrition Diagnosis: [X] not applicable       Interventions:   Recommend  [ ] Change Diet To:  [X] Nutrition Supplement - Continue Ensure Enlive BID; supplement intake encouraged. Pt amenable to trying liquid protein supplement (prosource)   mixed into foods.   [ ] Nutrition Support  [X] Other: Encourage po intake. Consider appetite stimulant if not medically contraindicated. Recommend vitamin C, B12 and Multivitamin supplementation. Pt verbally educated on increased protein needs in setting of sacral decubitis. double intake of protein encouraged.     Monitoring and Evaluation:   [X] PO intake [ x ] Tolerance to diet prescription [ x ] weights [ x ] labs[ x ] follow up per protocol  [X] other: bowel function

## 2020-05-09 NOTE — PROGRESS NOTE ADULT - ASSESSMENT
80 y/o M with PMHx of HTN, BPH, spinal stenosis, dyslipidemia, anxiety, neuropathy, B12 deficiency who presents from [facility] rehab for evaluation of weakness and hypotension. Admitted for  acute  blood  loss   anemia and infection  poss 2/2  acute   possible lower GI bleed and stage 4 sacral decubitus .

## 2020-05-09 NOTE — PROGRESS NOTE ADULT - PROBLEM SELECTOR PLAN 6
-COVID +,  maintaining sats >90 on room air   - Maintain on airborne isolation.  - Limit use of NSAIDs.

## 2020-05-09 NOTE — PROGRESS NOTE ADULT - PROBLEM SELECTOR PLAN 1
- anemia possibly  2/2 ?left hip chronic  hematoma as noted on CTAP, GIB less likely, s/p 2u pRBC   -hgb stable today   -GI-Dr. giordano following; no procedure endo/colonoscopy this time- fu out pt for procedure   -dc planning [facility]- f/u with SW

## 2020-05-09 NOTE — PROGRESS NOTE ADULT - SUBJECTIVE AND OBJECTIVE BOX
INTERVAL HPI/OVERNIGHT EVENTS:  no gib per nursing  ate 50% of dinner      MEDICATIONS  (STANDING):  collagenase Ointment 1 Application(s) Topical three times a day  metoprolol tartrate 50 milliGRAM(s) Oral two times a day  pantoprazole    Tablet 40 milliGRAM(s) Oral before breakfast  sertraline 100 milliGRAM(s) Oral daily  sodium chloride 0.65% Nasal 1 Spray(s) Both Nostrils two times a day  sucralfate suspension 1 Gram(s) Oral four times a day    MEDICATIONS  (PRN):      Allergies    No Known Allergies    Intolerances        Review of Systems:  unable to obtain       Vital Signs Last 24 Hrs  T(C): 36.7 (08 May 2020 07:00), Max: 37 (07 May 2020 16:07)  T(F): 98.1 (08 May 2020 07:00), Max: 98.6 (07 May 2020 16:07)  HR: 62 (08 May 2020 07:00) (62 - 75)  BP: 123/67 (08 May 2020 07:00) (105/56 - 140/74)  BP(mean): --  RR: 18 (08 May 2020 07:00) (18 - 18)  SpO2: 98% (08 May 2020 07:00) (92% - 98%)    PHYSICAL EXAM:    deferred f/u done remotely as covid +     LABS:                        9.6    8.06  )-----------( 308      ( 08 May 2020 06:48 )             30.1     05-08    139  |  103  |  26<H>  ----------------------------<  77  4.0   |  28  |  0.75    Ca    8.0<L>      08 May 2020 06:48  Phos  2.4     05-08  Mg     2.1     05-08    TPro  5.8<L>  /  Alb  2.0<L>  /  TBili  0.5  /  DBili  x   /  AST  38<H>  /  ALT  79<H>  /  AlkPhos  159<H>  05-08          RADIOLOGY & ADDITIONAL TESTS:

## 2020-05-09 NOTE — PROGRESS NOTE ADULT - ASSESSMENT
78 y/o M with PMHx of HTN, BPH, spinal stenosis, dyslipidemia, anxiety, neuropathy, B12 deficiency who presents from rehab for evaluation of weakness and hypotension    Anemia   no overt gi bleeding; hgb stable  sp sacral decub debridement; care per surgery   monitor cbc, transfuse prn  cont ppi and carafate  monitor stool color   defer endoscopic eval as +Covid and increased risk for anesthesia in these pts   op gi f/u for possible colonoscopy if none recent once covid crisis resolved given ct findings    FTT/Dysphagia   unclear etiology; esophagram at [facility] 2/2019 normal   some improvement in po  correct elytes prn  cont ppi    on supplements per nutrition   consider adding appetite stimulant   defer endoscopic eval as +Covid and increased risk for anesthesia in these pts    COVID-19  monitor rep status   inc lfts in setting of viral illness; improved from prior  care per primary team

## 2020-05-09 NOTE — PROGRESS NOTE ADULT - PROBLEM SELECTOR PLAN 2
stage  4    - s/p debridement and 5 day course of zosyn  - blood cult neg to date    - wound care mgmt with Benita RN- s/p wound vac placement   -rodriguez placed for urinary retention by urology- TOV to be done when patient ambulatory, also for wound healing given sacral decub

## 2020-05-09 NOTE — PROGRESS NOTE ADULT - SUBJECTIVE AND OBJECTIVE BOX
PULMONARY/CRITICAL CARE  No sob  Pt stable. Doing well.   No sob. Now off antibiotics  Unchanged. Blood cultures pos.    Alert, confused.   Pt unchanged. No fever.      Patient is a 79y old  Male who presents with a chief complaint of Generalized weakness (28 Apr 2020 22:20)    BRIEF HOSPITAL COURSE: ***80 y/o M with PMHx of HTN, BPH, spinal stenosis, dyslipidemia, anxiety, neuropathy, B12 deficiency who presents from [facility] for evaluation of weakness and hypotension. As per transfer papers, pt with confusion after lunch today. Pt admits to generalized weakness. Lives with wife, Stephany and son. He came from [facility] in [place] since Feb 14. Patient lives with wife and son at home and ambulates with cane. Contracted Covid-19 in [facility] and first started having symptoms on Mar 25, reporting symptoms of cough and low grade fever, diarrhea. Denies any chest pain, shortness of breath, headache, dizziness. History obtained from wife, Stephany([phone]) as pt is confused and a poor historian.   Of note, pt was noted to have sacral wounds today and reported low grade fever for two days and mental status changes today and loss of appetite.    Events last 24 hours: ***    PAST MEDICAL & SURGICAL HISTORY:  H/O vitamin B deficiency  Spinal stenosis  HTN (hypertension)  History of tonsillectomy    Allergies    No Known Allergies    Intolerances      FAMILY HISTORY:  No pertinent family history in first degree relatives      Review of Systems:  unobtainable    Medications:  piperacillin/tazobactam IVPB.. 3.375 Gram(s) IV Intermittent every 8 hours    metoprolol succinate ER 50 milliGRAM(s) Oral daily  midodrine. 10 milliGRAM(s) Oral three times a day      sertraline 100 milliGRAM(s) Oral daily        pantoprazole  Injectable 40 milliGRAM(s) IV Push every 12 hours        cyanocobalamin 1000 MICROGram(s) Oral daily      collagenase Ointment 1 Application(s) Topical three times a day            ICU Vital Signs Last 24 Hrs  T(C): 37.1 (29 Apr 2020 06:05), Max: 37.3 (29 Apr 2020 03:49)  T(F): 98.7 (29 Apr 2020 06:05), Max: 99.1 (29 Apr 2020 03:49)  HR: 102 (29 Apr 2020 06:05) (87 - 105)  BP: 100/57 (29 Apr 2020 06:05) (84/48 - 100/57)  BP(mean): --  ABP: --  ABP(mean): --  RR: 18 (29 Apr 2020 06:05) (17 - 22)  SpO2: 96% (29 Apr 2020 06:05) (96% - 98%)    Vital Signs Last 24 Hrs  T(C): 37.1 (29 Apr 2020 06:05), Max: 37.3 (29 Apr 2020 03:49)  T(F): 98.7 (29 Apr 2020 06:05), Max: 99.1 (29 Apr 2020 03:49)  HR: 102 (29 Apr 2020 06:05) (87 - 105)  BP: 100/57 (29 Apr 2020 06:05) (84/48 - 100/57)  BP(mean): --  RR: 18 (29 Apr 2020 06:05) (17 - 22)  SpO2: 96% (29 Apr 2020 06:05) (96% - 98%)        I&O's Detail        LABS:                        8.4    19.86 )-----------( 248      ( 29 Apr 2020 07:23 )             26.0     04-28    138  |  107  |  54<H>  ----------------------------<  92  4.6   |  26  |  1.10    Ca    7.6<L>      28 Apr 2020 22:41  Phos  2.4     04-28  Mg     2.0     04-28    TPro  5.1<L>  /  Alb  1.8<L>  /  TBili  1.8<H>  /  DBili  x   /  AST  230<H>  /  ALT  173<H>  /  AlkPhos  235<H>  04-28          CAPILLARY BLOOD GLUCOSE            CULTURES:          RADIOLOGY: ***< from: CT Chest No Cont (04.29.20 @ 01:24) >  EXAM:  CT ABDOMEN AND PELVIS                          EXAM:  CT CHEST                            PROCEDURE DATE:  04/29/2020          INTERPRETATION:  CT CHEST, ABDOMEN AND PELVIS WITHOUT CONTRAST    INDICATION: Shortness of breath. Hypotension. Altered mental status.     TECHNIQUE: Noncontrast CT of the chest, abdomen and pelvis. Images are reformatted in the sagittal and coronal planes.Postprocessed MIP reformatted chest images were created and reviewed.    COMPARISON: None.    FINDINGS:    Absence of intravenous contrast limits evaluation for traumatic injury, focal lesions, neoplasm, and vascular pathology.    Thorax:  Lines and tubes: None.  Airways: Tracheobronchial tree is patent.  Lungs and Pleura: Right basilar opacity containing air bronchograms. Mild patchy left lower lobe opacities are also identified. No significant pleural effusion. No pneumothorax.  Mediastinum and lymph nodes: No bulky adenopathy.  Heart: Trace pericardial effusion and/or thickening without significant cardiomegaly. Coronary artery calcification and/or stenting. Valvular calcifications.  Vessels: Atherosclerotic disease of the aorta and its branches. Ascending thoracic aorta measures up to 4.3 cm in maximum AP diameter.   Chest Wall: Within normal limits.    Abdomen/Pelvis:  Liver: No suspicious lesions.  Biliary: No significant dilatation. No calcified gallstones within the gallbladder.  Spleen: No suspicious lesions.  Pancreas: No inflammatory changes or ductal dilatation.  Adrenals: Normal.  Kidneys: No hydronephrosis. Bilateral renal cysts as well as too small to characterize hypodensities. Sub-cm nonobstructive right renal calculus.  Vessels: Atherosclerotic disease of the aorta and its branches.     GI tract: There is suggestion of focal circumferential wall thickening of distal ascending colonic loop without significant pericolonic stranding as best seen on images 78 through 79 of series 2. Consider nonemergent colonoscopy evaluation to exclude underlying malignancy. No evidence ofsmall bowel obstruction. No CT findings of acute appendicitis.    Peritoneum/retroperitoneum and mesentery: No free air. No organized fluid collection. No adenopathy.    Pelvic organs/Bladder: Heterogeneous prominent prostate with nodular density near the apex, cause mass effect and protrusion at the bladder base. Correlate with PSA and consider nonemergent prostatic workup to exclude malignancy. No significant bladder wall thickening.    Abdominal wall: Suggestion of left-sided sacral decubitus ulcer with punctate bubbles of air diffusely extending along symmetrically prominent left gluteal muscle and soft tissue folds. Partially imaged asymmetric density measuring 3.9 x 3.3 cm posteromedial to the left proximal femur on image 168 of series 2. Possibility of intramuscular hematoma considered in the differential.    Bones and soft tissues: Multilevel degenerative changes of the spine noted. Advanced osteoarthritis of bilateral hip joint. Advanced osteoarthritis of left shoulder joint withassociated soft tissue calcifications. Vague linear lucency identified at the level of the sacrococcygeal tip as best seen on images 149 through 151 of series 2. Correlate with point tenderness to exclude underlying nondisplaced fracture.    IMPRESSION:    Right basilar opacity containing air bronchograms. Additional patchy left lower lobe opacities. Bacterial and viral pneumonias including atypical agent such as COVID-19 considered in the differential. Correlate with clinical parameters, laboratory values and follow-up chest radiograph advised. No significant pleural effusion.     Suggestion of focal circumferential wall thickening of distal ascending colonic loop without significant pericolonic stranding. Consider nonemergent colonoscopy evaluation to exclude underlying malignancy. No evidence of small bowel obstruction.     Vague linear lucency identified at the level of the sacrococcygeal tip. Correlate with point tenderness to exclude underlying nondisplaced fracture.    Suggestion of left-sided sacral decubitus ulcer with punctate bubbles of air diffusely extending along symmetrically prominent left gluteal muscle and soft tissue folds. Partially imaged asymmetric density measuring 3.9 x 3.3 cm posteromedial to the left proximalfemur. Possibility of intramuscular hematoma considered in the differential.    Additional findings as mentioned above.    These results were discussed via telephone at 4/29/2020 3:55 AM by Dr. Baugh of radiology with Dr. Bailey, institution read-back verification policy was followed.                TATE BAUGH M.D., ATTENDING RADIOLOGIST  This document has been electronically signed. Apr 29 2020  4:10AM              < end of copied text >    < from: Xray Chest 1 View- PORTABLE-Routine (04.30.20 @ 09:11) >  EXAM:  XR CHEST PORTABLE ROUTINE 1V                            PROCEDURE DATE:  04/30/2020          INTERPRETATION:    Examination: XR CHEST    History: ADMDIAG1: K92.2 GASTROINTESTINAL HEMORRHAGE, UNSPECIFIED/, pn pneumonia    TECHNIQUE:  Portable AP view of the chest was obtained.    COMPARISON: A CT chest 1/29/2020 available for review.    FINDINGS:   Lungs clear. RIGHT hemidiaphragm mildly elevated. Posterior lung bases cannot be assessed due to elevated diaphragms. Lateral radiograph recommended if clinically warranted.  . The heart and mediastinum are within normal limits.    Visualized osseous structures are intact.        IMPRESSION: Visualized Lungs clear..   Elevated LEFT hemidiaphragm. Posterior lung bases cannot be assessed dueto elevated diaphragms. Lateral radiograph recommended if clinically warranted.      < end of copied text >

## 2020-05-10 LAB
ALBUMIN SERPL ELPH-MCNC: 2.1 G/DL — LOW (ref 3.3–5)
ALP SERPL-CCNC: 144 U/L — HIGH (ref 40–120)
ALT FLD-CCNC: 61 U/L — SIGNIFICANT CHANGE UP (ref 12–78)
ANION GAP SERPL CALC-SCNC: 6 MMOL/L — SIGNIFICANT CHANGE UP (ref 5–17)
AST SERPL-CCNC: 37 U/L — SIGNIFICANT CHANGE UP (ref 15–37)
BASOPHILS # BLD AUTO: 0.03 K/UL — SIGNIFICANT CHANGE UP (ref 0–0.2)
BASOPHILS NFR BLD AUTO: 0.4 % — SIGNIFICANT CHANGE UP (ref 0–2)
BILIRUB SERPL-MCNC: 0.5 MG/DL — SIGNIFICANT CHANGE UP (ref 0.2–1.2)
BUN SERPL-MCNC: 25 MG/DL — HIGH (ref 7–23)
CALCIUM SERPL-MCNC: 8.2 MG/DL — LOW (ref 8.5–10.1)
CHLORIDE SERPL-SCNC: 106 MMOL/L — SIGNIFICANT CHANGE UP (ref 96–108)
CO2 SERPL-SCNC: 26 MMOL/L — SIGNIFICANT CHANGE UP (ref 22–31)
CREAT SERPL-MCNC: 0.77 MG/DL — SIGNIFICANT CHANGE UP (ref 0.5–1.3)
EOSINOPHIL # BLD AUTO: 0.01 K/UL — SIGNIFICANT CHANGE UP (ref 0–0.5)
EOSINOPHIL NFR BLD AUTO: 0.1 % — SIGNIFICANT CHANGE UP (ref 0–6)
GLUCOSE SERPL-MCNC: 80 MG/DL — SIGNIFICANT CHANGE UP (ref 70–99)
HCT VFR BLD CALC: 29.3 % — LOW (ref 39–50)
HGB BLD-MCNC: 9.3 G/DL — LOW (ref 13–17)
IMM GRANULOCYTES NFR BLD AUTO: 0.5 % — SIGNIFICANT CHANGE UP (ref 0–1.5)
LYMPHOCYTES # BLD AUTO: 1.26 K/UL — SIGNIFICANT CHANGE UP (ref 1–3.3)
LYMPHOCYTES # BLD AUTO: 15.3 % — SIGNIFICANT CHANGE UP (ref 13–44)
MCHC RBC-ENTMCNC: 27.1 PG — SIGNIFICANT CHANGE UP (ref 27–34)
MCHC RBC-ENTMCNC: 31.7 GM/DL — LOW (ref 32–36)
MCV RBC AUTO: 85.4 FL — SIGNIFICANT CHANGE UP (ref 80–100)
MONOCYTES # BLD AUTO: 0.44 K/UL — SIGNIFICANT CHANGE UP (ref 0–0.9)
MONOCYTES NFR BLD AUTO: 5.3 % — SIGNIFICANT CHANGE UP (ref 2–14)
NEUTROPHILS # BLD AUTO: 6.47 K/UL — SIGNIFICANT CHANGE UP (ref 1.8–7.4)
NEUTROPHILS NFR BLD AUTO: 78.4 % — HIGH (ref 43–77)
NRBC # BLD: 0 /100 WBCS — SIGNIFICANT CHANGE UP (ref 0–0)
PLATELET # BLD AUTO: 290 K/UL — SIGNIFICANT CHANGE UP (ref 150–400)
POTASSIUM SERPL-MCNC: 3.9 MMOL/L — SIGNIFICANT CHANGE UP (ref 3.5–5.3)
POTASSIUM SERPL-SCNC: 3.9 MMOL/L — SIGNIFICANT CHANGE UP (ref 3.5–5.3)
PROT SERPL-MCNC: 5.7 G/DL — LOW (ref 6–8.3)
RBC # BLD: 3.43 M/UL — LOW (ref 4.2–5.8)
RBC # FLD: 15.5 % — HIGH (ref 10.3–14.5)
SODIUM SERPL-SCNC: 138 MMOL/L — SIGNIFICANT CHANGE UP (ref 135–145)
WBC # BLD: 8.25 K/UL — SIGNIFICANT CHANGE UP (ref 3.8–10.5)
WBC # FLD AUTO: 8.25 K/UL — SIGNIFICANT CHANGE UP (ref 3.8–10.5)

## 2020-05-10 PROCEDURE — 99232 SBSQ HOSP IP/OBS MODERATE 35: CPT | Mod: CS

## 2020-05-10 RX ADMIN — Medication 1 SPRAY(S): at 05:07

## 2020-05-10 RX ADMIN — Medication 1 SPRAY(S): at 16:04

## 2020-05-10 RX ADMIN — Medication 1 TABLET(S): at 08:12

## 2020-05-10 RX ADMIN — Medication 1 GRAM(S): at 11:21

## 2020-05-10 RX ADMIN — SERTRALINE 100 MILLIGRAM(S): 25 TABLET, FILM COATED ORAL at 11:20

## 2020-05-10 RX ADMIN — Medication 1 GRAM(S): at 05:07

## 2020-05-10 RX ADMIN — Medication 50 MILLIGRAM(S): at 05:07

## 2020-05-10 RX ADMIN — Medication 50 MILLIGRAM(S): at 16:03

## 2020-05-10 RX ADMIN — Medication 1 GRAM(S): at 16:04

## 2020-05-10 RX ADMIN — PANTOPRAZOLE SODIUM 40 MILLIGRAM(S): 20 TABLET, DELAYED RELEASE ORAL at 05:07

## 2020-05-10 NOTE — PROGRESS NOTE ADULT - ASSESSMENT
80 y/o M with PMHx of HTN, BPH, spinal stenosis, dyslipidemia, anxiety, neuropathy, B12 deficiency who presents from rehab for evaluation of weakness and hypotension    Anemia   no overt gi bleeding; am labs pending  sp sacral decub debridement; care per surgery   monitor cbc, transfuse prn  cont ppi and carafate  monitor stool color   defer endoscopic eval as +Covid and increased risk for anesthesia in these pts   op gi f/u for possible colonoscopy if none recent once covid crisis resolved given ct findings    FTT/Dysphagia   unclear etiology; esophagram at [facility] 2/2019 normal   some improvement in po  correct elytes prn  cont ppi    on supplements per nutrition   defer endoscopic eval as +Covid and increased risk for anesthesia in these pts    COVID-19  monitor rep status   inc lfts in setting of viral illness; improved from prior  care per primary team

## 2020-05-10 NOTE — PROGRESS NOTE ADULT - PROBLEM SELECTOR PLAN 1
-anemia possibly  2/2 ?left hip chronic  hematoma as noted on CTAP, GIB less likely, s/p 2u pRBC   -hgb stable today   -GI-Dr. giordano following; no procedure endo/colonoscopy this time- fu out pt for procedure   -medically stable for d/c   -dc planning [facility]- awaiting placement

## 2020-05-10 NOTE — PROGRESS NOTE ADULT - SUBJECTIVE AND OBJECTIVE BOX
Chief Complaint: Weakness, AMS    Interval Events: No events overnight.    Review of Systems:  General: No fevers, chills, weight loss or gain  Skin: No rashes, color changes  Cardiovascular: No chest pain, orthopnea  Respiratory: No shortness of breath, cough  Gastrointestinal: No nausea, abdominal pain  Genitourinary: No incontinence, pain with urination  Musculoskeletal: No pain, swelling, decreased range of motion  Neurological: No headache, weakness  Psychiatric: No depression, anxiety  Endocrine: No weight loss or gain, increased thirst  All other systems are comprehensively negative.    Physical Exam:  Vital Signs Last 24 Hrs  T(C): 36.4 (10 May 2020 07:35), Max: 36.8 (09 May 2020 16:39)  T(F): 97.5 (10 May 2020 07:35), Max: 98.2 (09 May 2020 16:39)  HR: 74 (10 May 2020 07:35) (70 - 78)  BP: 145/70 (10 May 2020 07:35) (120/69 - 154/80)  BP(mean): --  RR: 19 (10 May 2020 07:35) (18 - 19)  SpO2: 97% (10 May 2020 07:35) (94% - 99%)  General: NAD  HEENT: MMM  Neck: No JVD, no carotid bruit  Extremities: No LE edema, no cyanosis  Neuro: Non-focal  Skin: No rash    Labs:             05-09    139  |  106  |  25<H>  ----------------------------<  77  3.9   |  27  |  0.74    Ca    8.0<L>      09 May 2020 06:38  Phos  2.3     05-09  Mg     2.0     05-09                          9.2    7.69  )-----------( 276      ( 09 May 2020 06:38 )             28.8

## 2020-05-10 NOTE — PROGRESS NOTE ADULT - SUBJECTIVE AND OBJECTIVE BOX
INTERVAL HPI/OVERNIGHT EVENTS:    MEDICATIONS  (STANDING):  collagenase Ointment 1 Application(s) Topical three times a day  metoprolol tartrate 50 milliGRAM(s) Oral two times a day  pantoprazole    Tablet 40 milliGRAM(s) Oral before breakfast  sertraline 100 milliGRAM(s) Oral daily  sodium chloride 0.65% Nasal 1 Spray(s) Both Nostrils two times a day  sucralfate suspension 1 Gram(s) Oral four times a day    MEDICATIONS  (PRN):      Allergies    No Known Allergies    Intolerances        Review of Systems:    General:  No wt loss, fevers, chills, night sweats, fatigue   Eyes:  Good vision, no reported pain  ENT:  No sore throat, pain, runny nose, dysphagia  CV:  No pain, palpitations, hypo/hypertension  Resp:  No dyspnea, cough, tachypnea, wheezing  GI:  No pain, No nausea, No vomiting, No diarrhea, No constipation, No weight loss, No fever, No pruritis, No rectal bleeding, No melena, No dysphagia  :  No pain, bleeding, incontinence, nocturia  Muscle:  No pain, weakness  Neuro:  No weakness, tingling, memory problems  Psych:  No fatigue, insomnia, mood problems, depression  Endocrine:  No polyuria, polydypsia, cold/heat intolerance  Heme:  No petechiae, ecchymosis, easy bruisability  Skin:  No rash, tattoos, scars, edema      Vital Signs Last 24 Hrs  T(C): 36.4 (10 May 2020 07:35), Max: 36.8 (09 May 2020 16:39)  T(F): 97.5 (10 May 2020 07:35), Max: 98.2 (09 May 2020 16:39)  HR: 74 (10 May 2020 07:35) (70 - 78)  BP: 145/70 (10 May 2020 07:35) (120/69 - 154/80)  BP(mean): --  RR: 19 (10 May 2020 07:35) (18 - 19)  SpO2: 97% (10 May 2020 07:35) (94% - 99%)    PHYSICAL EXAM:    Constitutional: NAD  HEENT: EOMI, throat clear  Neck: No LAD, supple  Respiratory: CTA and P  Cardiovascular: S1 and S2, RRR, no M  Gastrointestinal: BS+, soft, NT/ND, neg HSM,  Extremities: No peripheral edema, neg clubbing, cyanosis  Vascular: 2+ peripheral pulses  Neurological: A/O x 3, no focal deficits  Psychiatric: Normal mood, normal affect  Skin: No rashes      LABS:                        9.2    7.69  )-----------( 276      ( 09 May 2020 06:38 )             28.8     05-09    139  |  106  |  25<H>  ----------------------------<  77  3.9   |  27  |  0.74    Ca    8.0<L>      09 May 2020 06:38  Phos  2.3     05-09  Mg     2.0     05-09            RADIOLOGY & ADDITIONAL TESTS: INTERVAL HPI/OVERNIGHT EVENTS:  intake improved per flow sheets    MEDICATIONS  (STANDING):  collagenase Ointment 1 Application(s) Topical three times a day  metoprolol tartrate 50 milliGRAM(s) Oral two times a day  pantoprazole    Tablet 40 milliGRAM(s) Oral before breakfast  sertraline 100 milliGRAM(s) Oral daily  sodium chloride 0.65% Nasal 1 Spray(s) Both Nostrils two times a day  sucralfate suspension 1 Gram(s) Oral four times a day    MEDICATIONS  (PRN):      Allergies    No Known Allergies    Intolerances        Review of Systems:  unable to obtain       Vital Signs Last 24 Hrs  T(C): 36.4 (10 May 2020 07:35), Max: 36.8 (09 May 2020 16:39)  T(F): 97.5 (10 May 2020 07:35), Max: 98.2 (09 May 2020 16:39)  HR: 74 (10 May 2020 07:35) (70 - 78)  BP: 145/70 (10 May 2020 07:35) (120/69 - 154/80)  BP(mean): --  RR: 19 (10 May 2020 07:35) (18 - 19)  SpO2: 97% (10 May 2020 07:35) (94% - 99%)    PHYSICAL EXAM:    deferred f/u done remotely as covid +     LABS:                        9.2    7.69  )-----------( 276      ( 09 May 2020 06:38 )             28.8     05-09    139  |  106  |  25<H>  ----------------------------<  77  3.9   |  27  |  0.74    Ca    8.0<L>      09 May 2020 06:38  Phos  2.3     05-09  Mg     2.0     05-09            RADIOLOGY & ADDITIONAL TESTS:

## 2020-05-10 NOTE — PROGRESS NOTE ADULT - PROBLEM SELECTOR PLAN 2
stage  4    - s/p debridement and 5 day course of zosyn  - blood cult neg to date    - wound care mgmt with Benita LINDO- s/p wound vac placement   -rodriguez placed for urinary retention by urology - pt not yet ambulatory, will keep rodriguez in for wound healing given sacral ulcer, can attempt TOV at [facility] once more ambulatory

## 2020-05-10 NOTE — PROGRESS NOTE ADULT - SUBJECTIVE AND OBJECTIVE BOX
PROGRESS NOTE    Name: Phylicia Mathis   : 1969  MR#: [id number]    Reason For Visit  No chief complaint on file.    Accompanied by:  Spouse   Needed: No     Progress Note  Session Type: Individual   Date: 3/29/2019  Start Time: 12:54 pm  Therapy Duration: 60 minutes    Mood: euthymic  Recent Behavior: cooperative  Symptom Change: Generally improved   Diagnoses:   No diagnosis found.  Mental Status: No problem identified with interpersonal behavior, thought processes, or judgment  Risk of Harm: no risk   Suicide/Homicide: None  Self-Injury: None    Type of Treatment: Cognitive-behavior therapy    Session Summary: The appointment today centered largely around understanding automatic beliefs and how they can trigger significant emotional reactions. This stems from Phylicia clarifying that her concern about making mistakes is not so much messing up as it is how it affects other people. She described herself then as being irresponsible. This led to a discussion regarding weather she was receiving things accurately. We talked about the way in which certain negative attribution that are internal, stable, and global impact us. We discussed the types of assumptions and rules that we live by to defend ourselves from these flaws being scene and how when those assumptions are violated the automatic thoughts are triggered. For Phylicia there is something about being irresponsible and klutzy. For Luis A it is about being dismissed, which we discussed may have arisen from a childhood where is opinions didn't really matter and we're discounted. We discussed how understanding these issues when engaged in Conflict can be helpful for reaching resolution. They acknowledged having become more effective at doing so. There was a situation that occurred earlier this week that resulted in some angry confrontation between them, which they were effective at addressing, so progress continues to be evident.    Plan: Return in 1  Patient is a 79y old  Male who presents with a chief complaint of Generalized weakness (10 May 2020 08:37)      FROM ADMISSION H+P:   HPI:  78 y/o M with PMHx of HTN, BPH, spinal stenosis, dyslipidemia, anxiety, neuropathy, B12 deficiency who presents from [facility] rehab for evaluation of weakness and hypotension. As per transfer papers, pt with confusion after lunch today. Pt admits to generalized weakness. Lives with wife, Stephany and son. He came from [facility] rehab in [place] since Feb 14. Patient lives with wife and son at home and ambulates with cane. Contracted Covid-19 in [facility] rehab and first started having symptoms on Mar 25, reporting symptoms of cough and low grade fever, diarrhea. Denies any chest pain, shortness of breath, headache, dizziness. History obtained from wife, Stepahny([phone]) as pt is confused and a poor historian.   Of note, pt was noted to have sacral wounds today and reported low grade fever for two days and mental status changes today and loss of appetite.    The date the pt first felt unwell: March 25  Fever or chills: yes [ x ]   no [  ]   - Tmax:   Fatigue, malaise or generalized weakness: yes [ x ]   no [  ]  Shortness of breath/dyspnea: yes [ x ]   no [  ]  Cough: yes [ x ]   no [  ], sputum production: yes [  ]   no [x  ]  Blood in sputum: yes [  ]   no [ x ]  Anorexia/po intolerance: yes [  ]   no [ x ]  Chest pain or chest tightness: yes [  ]   no [ x ]  Edema in legs: yes [  ]   no [ x ]  Myalgias: yes [  ]   no [ x ]  Headache: yes [  ]   no [x  ]  Sore throat: yes [  ]   no [ x ]  Rhinorrhea: yes [  ]   no [  x]  Abd pain: yes [  ]   no [ x ]  Nausea: yes [  ]   no [x  ]  Vomiting: yes [  ]   no [  x]  Diarrhea: yes [  ]   no [  ]  Skin rashes: yes [  ]   no [  ]  Loss of sense of smell/anosmia: yes [  ]   no [ x ]  Conjunctivitis: yes [  ]   no [ x ]  Recent travel: yes [  ]   no [ x ] - Location:   Any sick contacts: yes [x  ]   no [  ]: Contracted from [facility] re[facility]  Close contact with someone confirmed positive with COVID-19 / SARS-CoV2 in the last 14 days: yes [  ]   no [ x ]  Code status: DNR, but not DNI      In the ED  Vitals: T--, , BP 84/48, RR 22, O2 96 on RA  Labs(In [facility]) significant for: wbc 18.45, hgb 6.8, FOBT+,   Coag studies: PT 14.9, INR 1.33, aPTT 43.7, d-dimer 561  Chem panel: Lactate 2.5, Na 133, AG 3, BUN/Cr 61/1.61, Alb 1.9, Alk phos 195, ,   UA neg  Covid-19 PCR Positive   EKG shows Accelerated Junctional rhythm with occasional PVCs  CXR in [facility] ED shows: Mild R atelectasis  In [facility] ED, s/p 2L NS bolus, 1 unit pRBC (28 Apr 2020 21:00)      ----  INTERVAL HPI/OVERNIGHT EVENTS: Pt seen and evaluated at the bedside. No acute overnight events occurred. Pt resting in bed. Complaining of sacral pain at site of sacral wound. Feels weak.     Fever or chills: yes [  ]   no [ x ]  Fatigue, malaise or generalized weakness: yes [ x ]   no [  ]  Chest pain: yes [  ]   no [ x ]  Palpitations: yes [  ]   no [ x ]  Shortness of breath/dyspnea: yes [  ]   no [ x ]  Cough: yes [  ]   no [ x ], sputum production: yes [  ]   no [  ]  Anorexia/po intolerance: yes [  ]   no [  ]  Myalgias: yes [  ]   no [  ]  Headache: yes [  ]   no [  ]  Sore throat: yes [  ]   no [  ]  Rhinorrhea: yes [  ]   no [  ]  Nausea: yes [  ]   no [  ]  Vomiting: yes [  ]   no [  ]  Diarrhea: yes [  ]   no [  ]  Loss of sense of smell/anosmia: yes [  ]   no [  ]  Conjunctivitis: yes [  ]   no [  ]  Other associated complaints:     ----  PAST MEDICAL & SURGICAL HISTORY:  H/O vitamin B deficiency  Spinal stenosis  HTN (hypertension)  History of tonsillectomy      FAMILY HISTORY:  No pertinent family history in first degree relatives      Allergies    No Known Allergies    Intolerances        ----  REVIEW OF SYSTEMS:  as per HPI    ----  PHYSICAL EXAM:  GENERAL: no acute distress, frail appearing, pale   EYES: sclera clear without erythema, no exudates  ENMT: oropharynx clear without erythema, moist mucous membranes  LUNGS: good air entry bilaterally, clear to auscultation, symmetric breath sounds, no wheezing or rhonchi appreciated  HEART: soft S1/S2, regular rate and rhythm, no murmurs noted, no noted edema to b/l LE  GASTROINTESTINAL: abdomen is soft, nontender, nondistended, normoactive bowel sounds, no palpable masses  : rodriguez draining clear urine   INTEGUMENT: good skin turgor, appropriate for ethnicity, appears well perfused, no jaundice noted, (+) wound vac on stage 4 sacral ulcer   MUSCULOSKELETAL: no clubbing or cyanosis, no obvious deformity  NEUROLOGIC: awake, alert, oriented x3, no obvious focal  neurological deficits     T(C): 36.4 (05-10-20 @ 07:35), Max: 36.8 (05-09-20 @ 16:39)  HR: 74 (05-10-20 @ 07:35) (70 - 78)  BP: 145/70 (05-10-20 @ 07:35) (120/69 - 154/80)  RR: 19 (05-10-20 @ 07:35) (18 - 19)  SpO2: 97% (05-10-20 @ 07:35) (94% - 99%)  Wt(kg): --    ----  I&O's Summary    09 May 2020 07:01  -  10 May 2020 07:00  --------------------------------------------------------  IN: 0 mL / OUT: 850 mL / NET: -850 mL        LABS:                        9.2    7.69  )-----------( 276      ( 09 May 2020 06:38 )             28.8     05-09    139  |  106  |  25<H>  ----------------------------<  77  3.9   |  27  |  0.74    Ca    8.0<L>      09 May 2020 06:38  Phos  2.3     05-09  Mg     2.0     05-09          CAPILLARY BLOOD GLUCOSE                    ----  Personally reviewed:  Vital sign trends: [  ] yes    [  ] no     [  ] n/a  Laboratory results: [  ] yes    [  ] no     [  ] n/a  Radiology results: [  ] yes    [  ] no     [  ] n/a  Culture results: [  ] yes    [  ] no     [  ] n/a  Consultant recommendations: [  ] yes    [  ] no     [  ] n/a week    Signatures  Electronically signed by : Sudheer Lugo, PHD, ABSNP, [facility]; 3/29/2019 12:51 PM CST

## 2020-05-10 NOTE — PROGRESS NOTE ADULT - ASSESSMENT
78 y/o M with PMHx of HTN, BPH, spinal stenosis, dyslipidemia, anxiety, neuropathy, B12 deficiency who presents from [facility] rehab for evaluation of weakness and hypotension. Admitted for  acute  blood  loss anemia 2/2 possible LGIB vs intramuscular hematoma and infected stage 4 sacral wound s/p debridement and IV abx. Pt medically stable for d/c, awaiting OMER placement.

## 2020-05-11 LAB
ALBUMIN SERPL ELPH-MCNC: 2.1 G/DL — LOW (ref 3.3–5)
ALP SERPL-CCNC: 142 U/L — HIGH (ref 40–120)
ALT FLD-CCNC: 53 U/L — SIGNIFICANT CHANGE UP (ref 12–78)
ANION GAP SERPL CALC-SCNC: 5 MMOL/L — SIGNIFICANT CHANGE UP (ref 5–17)
AST SERPL-CCNC: 34 U/L — SIGNIFICANT CHANGE UP (ref 15–37)
BASOPHILS # BLD AUTO: 0.02 K/UL — SIGNIFICANT CHANGE UP (ref 0–0.2)
BASOPHILS NFR BLD AUTO: 0.2 % — SIGNIFICANT CHANGE UP (ref 0–2)
BILIRUB SERPL-MCNC: 0.5 MG/DL — SIGNIFICANT CHANGE UP (ref 0.2–1.2)
BUN SERPL-MCNC: 23 MG/DL — SIGNIFICANT CHANGE UP (ref 7–23)
CALCIUM SERPL-MCNC: 8.3 MG/DL — LOW (ref 8.5–10.1)
CHLORIDE SERPL-SCNC: 106 MMOL/L — SIGNIFICANT CHANGE UP (ref 96–108)
CO2 SERPL-SCNC: 28 MMOL/L — SIGNIFICANT CHANGE UP (ref 22–31)
CREAT SERPL-MCNC: 0.72 MG/DL — SIGNIFICANT CHANGE UP (ref 0.5–1.3)
EOSINOPHIL # BLD AUTO: 0.02 K/UL — SIGNIFICANT CHANGE UP (ref 0–0.5)
EOSINOPHIL NFR BLD AUTO: 0.2 % — SIGNIFICANT CHANGE UP (ref 0–6)
GLUCOSE SERPL-MCNC: 77 MG/DL — SIGNIFICANT CHANGE UP (ref 70–99)
HCT VFR BLD CALC: 29.9 % — LOW (ref 39–50)
HGB BLD-MCNC: 9.4 G/DL — LOW (ref 13–17)
IMM GRANULOCYTES NFR BLD AUTO: 0.5 % — SIGNIFICANT CHANGE UP (ref 0–1.5)
LYMPHOCYTES # BLD AUTO: 1.41 K/UL — SIGNIFICANT CHANGE UP (ref 1–3.3)
LYMPHOCYTES # BLD AUTO: 16.6 % — SIGNIFICANT CHANGE UP (ref 13–44)
MCHC RBC-ENTMCNC: 27.2 PG — SIGNIFICANT CHANGE UP (ref 27–34)
MCHC RBC-ENTMCNC: 31.4 GM/DL — LOW (ref 32–36)
MCV RBC AUTO: 86.7 FL — SIGNIFICANT CHANGE UP (ref 80–100)
MONOCYTES # BLD AUTO: 0.46 K/UL — SIGNIFICANT CHANGE UP (ref 0–0.9)
MONOCYTES NFR BLD AUTO: 5.4 % — SIGNIFICANT CHANGE UP (ref 2–14)
NEUTROPHILS # BLD AUTO: 6.56 K/UL — SIGNIFICANT CHANGE UP (ref 1.8–7.4)
NEUTROPHILS NFR BLD AUTO: 77.1 % — HIGH (ref 43–77)
NRBC # BLD: 0 /100 WBCS — SIGNIFICANT CHANGE UP (ref 0–0)
PLATELET # BLD AUTO: 282 K/UL — SIGNIFICANT CHANGE UP (ref 150–400)
POTASSIUM SERPL-MCNC: 4.4 MMOL/L — SIGNIFICANT CHANGE UP (ref 3.5–5.3)
POTASSIUM SERPL-SCNC: 4.4 MMOL/L — SIGNIFICANT CHANGE UP (ref 3.5–5.3)
PROT SERPL-MCNC: 5.9 G/DL — LOW (ref 6–8.3)
RBC # BLD: 3.45 M/UL — LOW (ref 4.2–5.8)
RBC # FLD: 15.7 % — HIGH (ref 10.3–14.5)
SARS-COV-2 RNA SPEC QL NAA+PROBE: SIGNIFICANT CHANGE UP
SODIUM SERPL-SCNC: 139 MMOL/L — SIGNIFICANT CHANGE UP (ref 135–145)
WBC # BLD: 8.51 K/UL — SIGNIFICANT CHANGE UP (ref 3.8–10.5)
WBC # FLD AUTO: 8.51 K/UL — SIGNIFICANT CHANGE UP (ref 3.8–10.5)

## 2020-05-11 PROCEDURE — 99233 SBSQ HOSP IP/OBS HIGH 50: CPT | Mod: CS

## 2020-05-11 RX ORDER — ACETAMINOPHEN 500 MG
650 TABLET ORAL ONCE
Refills: 0 | Status: COMPLETED | OUTPATIENT
Start: 2020-05-11 | End: 2020-05-11

## 2020-05-11 RX ADMIN — PANTOPRAZOLE SODIUM 40 MILLIGRAM(S): 20 TABLET, DELAYED RELEASE ORAL at 05:53

## 2020-05-11 RX ADMIN — SERTRALINE 100 MILLIGRAM(S): 25 TABLET, FILM COATED ORAL at 11:14

## 2020-05-11 RX ADMIN — Medication 50 MILLIGRAM(S): at 18:38

## 2020-05-11 RX ADMIN — Medication 650 MILLIGRAM(S): at 16:54

## 2020-05-11 RX ADMIN — Medication 1 SPRAY(S): at 05:54

## 2020-05-11 RX ADMIN — Medication 1 GRAM(S): at 11:14

## 2020-05-11 RX ADMIN — Medication 1 GRAM(S): at 18:38

## 2020-05-11 RX ADMIN — Medication 50 MILLIGRAM(S): at 05:53

## 2020-05-11 RX ADMIN — Medication 1 GRAM(S): at 22:11

## 2020-05-11 RX ADMIN — Medication 1 GRAM(S): at 05:53

## 2020-05-11 NOTE — PROGRESS NOTE ADULT - PROBLEM SELECTOR PLAN 2
stage  4    - s/p debridement and 5 day course of zosyn  - blood cultures neg to date    - wound care mgmt with Benita LINDO- s/p wound vac placement   -rodriguez placed for urinary retention by urology - pt not yet ambulatory, will keep rodriguez in for wound healing given sacral ulcer,  attempt TOV at [facility] once more ambulatory

## 2020-05-11 NOTE — PROGRESS NOTE ADULT - PROBLEM SELECTOR PLAN 5
-resolved  -found to have Elevated LFTs, possibly 2/2 hx of being covid +  - Avoid hepatotoxic drugs

## 2020-05-11 NOTE — PROGRESS NOTE ADULT - PROBLEM SELECTOR PLAN 4
-likely 2/2 anemia,   -resolved   - off midodrine, back on bb  -cont bb -likely 2/2 anemia,   -resolved   - off midodrine, back on bb  -cont bb, will discharge on metropolol 50 BID

## 2020-05-11 NOTE — PROGRESS NOTE ADULT - PROBLEM SELECTOR PLAN 6
-COVID +,  maintaining sats >90 on room air   - Maintain on airborne isolation.  - Limit use of NSAIDs  -supportive care, continue to wean off O2 as tolerated to keep O2 sat >88%

## 2020-05-11 NOTE — PROGRESS NOTE ADULT - SUBJECTIVE AND OBJECTIVE BOX
LISANDRA PADGETT is a 79yMale , patient examined and chart reviewed.    INTERVAL HPI/ OVERNIGHT EVENTS:   NAD. Afebrile. On RA.  No events.    PAST MEDICAL & SURGICAL HISTORY:  H/O vitamin B deficiency  Spinal stenosis  HTN (hypertension)  History of tonsillectomy      For details regarding the patient's social history, family history, and other miscellaneous elements, please refer the initial infectious diseases consultation and/or the admitting history and physical examination for this admission.    ROS:  CONSTITUTIONAL:  Negative fever or chills  EYES:  Negative  blurry vision or double vision  CARDIOVASCULAR:  Negative for chest pain or palpitations  RESPIRATORY:  Negative for cough, wheezing, or SOB   GASTROINTESTINAL:  Negative for nausea, vomiting, diarrhea, constipation, or abdominal pain  GENITOURINARY:  Negative frequency, urgency or dysuria  NEUROLOGIC:  No headache, confusion, dizziness, lightheadedness  All other systems were reviewed and are negative     Current inpatient medications :  MEDICATIONS  (STANDING):  collagenase Ointment 1 Application(s) Topical three times a day  metoprolol tartrate 50 milliGRAM(s) Oral two times a day  pantoprazole    Tablet 40 milliGRAM(s) Oral before breakfast  sertraline 100 milliGRAM(s) Oral daily  sodium chloride 0.65% Nasal 1 Spray(s) Both Nostrils two times a day  sucralfate suspension 1 Gram(s) Oral four times a day    Objective:  Vital Signs Last 24 Hrs  T(C): 36.8 (11 May 2020 07:15), Max: 36.8 (11 May 2020 00:38)  T(F): 98.2 (11 May 2020 07:15), Max: 98.3 (11 May 2020 00:38)  HR: 63 (11 May 2020 07:15) (63 - 75)  BP: 155/82 (11 May 2020 07:15) (115/73 - 155/82)  RR: 19 (11 May 2020 07:15) (19 - 19)  SpO2: 97% (11 May 2020 07:15) (95% - 99%)    Physical Exam:  General:  no acute distress  Eyes: sclera anicteric, pupils equal and reactive to light  ENMT: buccal mucosa moist, pharynx not injected  Neck: supple, trachea midline  Lungs: clear, no wheeze/rhonchi  Cardiovascular: regular rate and rhythm, S1 S2  Abdomen: soft, nontender, no organomegaly present, bowel sounds normal  Neurological: alert and oriented x2 Cranial Nerves II-XII grossly intact  Skin: sacral decub drsg c/d/i   Lymph Nodes: no palpable cervical/supraclavicular lymph nodes enlargements  Extremities: no cyanosis/clubbing/edema        LABS:                        9.4    8.51  )-----------( 282      ( 11 May 2020 06:46 )             29.9   05-11    139  |  106  |  23  ----------------------------<  77  4.4   |  28  |  0.72    Ca    8.3<L>      11 May 2020 06:46    TPro  5.9<L>  /  Alb  2.1<L>  /  TBili  0.5  /  DBili  x   /  AST  34  /  ALT  53  /  AlkPhos  142<H>  05-11      MICROBIOLOGY:    Culture - Blood (collected 02 May 2020 00:30)  Source: .Blood Blood  Preliminary Report (03 May 2020 01:03):    No growth to date.    Culture - Blood (collected 02 May 2020 00:30)  Source: .Blood Blood  Preliminary Report (03 May 2020 01:03):    No growth to date.    COVID-19 PCR . (04.28.20 @ 22:41)    COVID-19 PCR: Detected: This test has been validated by [facility] to be accurate;  though it has not been FDA cleared/approved by the usual pathway  As with all laboratory test, results should be correlated with clinical  findings.  https://www.fda.gov/media/150552/download  https://www.fda.gov/media/359957/download    RADIOLOGY & ADDITIONAL STUDIES:    EXAM:  CT ABDOMEN AND PELVIS                          EXAM:  CT CHEST                            PROCEDURE DATE:  04/29/2020          INTERPRETATION:  CT CHEST, ABDOMEN AND PELVIS WITHOUT CONTRAST    INDICATION: Shortness of breath. Hypotension. Altered mental status.     TECHNIQUE: Noncontrast CT of the chest, abdomen and pelvis. Images are reformatted in the sagittal and coronal planes.Postprocessed MIP reformatted chest images were created and reviewed.    COMPARISON: None.    FINDINGS:    Absence of intravenous contrast limits evaluation for traumatic injury, focal lesions, neoplasm, and vascular pathology.    Thorax:  Lines and tubes: None.  Airways: Tracheobronchial tree is patent.  Lungs and Pleura: Right basilar opacity containing air bronchograms. Mild patchy left lower lobe opacities are also identified. No significant pleural effusion. No pneumothorax.  Mediastinum and lymph nodes: No bulky adenopathy.  Heart: Trace pericardial effusion and/or thickening without significant cardiomegaly. Coronary artery calcification and/or stenting. Valvular calcifications.  Vessels: Atherosclerotic disease of the aorta and its branches. Ascending thoracic aorta measures up to 4.3 cm in maximum AP diameter.   Chest Wall: Within normal limits.    Abdomen/Pelvis:  Liver: No suspicious lesions.  Biliary: No significant dilatation. No calcified gallstones within the gallbladder.  Spleen: No suspicious lesions.  Pancreas: No inflammatory changes or ductal dilatation.  Adrenals: Normal.  Kidneys: No hydronephrosis. Bilateral renal cysts as well as too small to characterize hypodensities. Sub-cm nonobstructive right renal calculus.  Vessels: Atherosclerotic disease of the aorta and its branches.     GI tract: There is suggestion of focal circumferential wall thickening of distal ascending colonic loop without significant pericolonic stranding as best seen on images 78 through 79 of series 2. Consider nonemergent colonoscopy evaluation to exclude underlying malignancy. No evidence ofsmall bowel obstruction. No CT findings of acute appendicitis.    Peritoneum/retroperitoneum and mesentery: No free air. No organized fluid collection. No adenopathy.    Pelvic organs/Bladder: Heterogeneous prominent prostate with nodular density near the apex, cause mass effect and protrusion at the bladder base. Correlate with PSA and consider nonemergent prostatic workup to exclude malignancy. No significant bladder wall thickening.    Abdominal wall: Suggestion of left-sided sacral decubitus ulcer with punctate bubbles of air diffusely extending along symmetrically prominent left gluteal muscle and soft tissue folds. Partially imaged asymmetric density measuring 3.9 x 3.3 cm posteromedial to the left proximal femur on image 168 of series 2. Possibility of intramuscular hematoma considered in the differential.    Bones and soft tissues: Multilevel degenerative changes of the spine noted. Advanced osteoarthritis of bilateral hip joint. Advanced osteoarthritis of left shoulder joint withassociated soft tissue calcifications. Vague linear lucency identified at the level of the sacrococcygeal tip as best seen on images 149 through 151 of series 2. Correlate with point tenderness to exclude underlying nondisplaced fracture.    IMPRESSION:    Right basilar opacity containing air bronchograms. Additional patchy left lower lobe opacities. Bacterial and viral pneumonias including atypical agent such as COVID-19 considered in the differential. Correlate with clinical parameters, laboratory values and follow-up chest radiograph advised. No significant pleural effusion.     Suggestion of focal circumferential wall thickening of distal ascending colonic loop without significant pericolonic stranding. Consider nonemergent colonoscopy evaluation to exclude underlying malignancy. No evidence of small bowel obstruction.     Vague linear lucency identified at the level of the sacrococcygeal tip. Correlate with point tenderness to exclude underlying nondisplaced fracture.    Suggestion of left-sided sacral decubitus ulcer with punctate bubbles of air diffusely extending along symmetrically prominent left gluteal muscle and soft tissue folds. Partially imaged asymmetric density measuring 3.9 x 3.3 cm posteromedial to the left proximalfemur. Possibility of intramuscular hematoma considered in the differential.    Assessment :   78 y/o M with PMHx of HTN, BPH, spinal stenosis, dyslipidemia, anxiety, neuropathy, B12 deficiency resident of [facility] for evaluation of weakness and hypotension with mental status change. Tested COVID 19 positive early April. In ED he was hypotensive. WBC 19K No SOB cough n/v/d. Pt is a poor historian. Has unstagable sacral decub ulcer. Also with Acute anemia. ?infectious process vs reactive leukocytosis. Leukocytosis poss chronic.  Respiratory status stable. Menendez placed sec AUR 5/3/2020. Seen by surgery and is sp bedside I&D of sacral decub Clinically stable.    Plan :   Monitor off antibiotics  Completed Zosyn   Cultures ngtd  Menendez per urology  Frequent turning  Local wound care per surgery  COVID-19--critical period for decompensation  (7-14 days post symptom onset), avoid aerosolizing procedures, NSAIDs   -monitor respiratory status closely ( route of 02 and saturation) and titrate when able  -isolation precautions per protocol  -avoid excessive blood draws, blood cultures and frequent CXRs  -monitor biomarkers- CBC w diff  for NLRNLR <3 low vs >5 high) Ferritin (lower risk <450 vs >850) CRP (low risk <2 and higher risk >6) and LDH, D Dimer, procalcitonin  -therapies remains investigational-- including Hydroxychloroquine  -antibiotics only if there is concern for a bacterial process  -Steroids short course ( 5 days)  --for hypoxia/ARDS /shock    Dc planning to rehab      Continue with present regiment.  Appropriate use of antibiotics and adverse effects reviewed.      I have discussed the above plan of care with patient/ family in detail. They expressed understanding of the  treatment plan . Risks, benefits and alternatives discussed in detail. I have asked if they have any questions or concerns and appropriately addressed them to the best of my ability .    > 35 minutes were spent in direct patient care reviewing notes, medications ,labs data/ imaging , discussion with multidisciplinary team.    Thank you for allowing me to participate in care of your patient .    Naima Lopez MD  Infectious Disease  [phone]

## 2020-05-11 NOTE — PROGRESS NOTE ADULT - SUBJECTIVE AND OBJECTIVE BOX
PULMONARY/CRITICAL CARE    Pt stable. Doing well.   Now off antibiotics  Unchanged. Blood cultures pos.    Alert, confused.   Pt unchanged. No fever.      Patient is a 79y old  Male who presents with a chief complaint of Generalized weakness (28 Apr 2020 22:20)    BRIEF HOSPITAL COURSE: ***78 y/o M with PMHx of HTN, BPH, spinal stenosis, dyslipidemia, anxiety, neuropathy, B12 deficiency who presents from [facility] for evaluation of weakness and hypotension. As per transfer papers, pt with confusion after lunch today. Pt admits to generalized weakness. Lives with wife, Stephany and son. He came from [facility] in [place] since Feb 14. Patient lives with wife and son at home and ambulates with cane. [facility] and first started having symptoms on Mar 25, reporting symptoms of cough and low grade fever, diarrhea. Denies any chest pain, shortness of breath, headache, dizziness. History obtained from wife, Stephany([phone]) as pt is confused and a poor historian.   Of note, pt was noted to have sacral wounds today and reported low grade fever for two days and mental status changes today and loss of appetite.    Events last 24 hours: ***    PAST MEDICAL & SURGICAL HISTORY:  H/O vitamin B deficiency  Spinal stenosis  HTN (hypertension)  History of tonsillectomy    Allergies    No Known Allergies    Intolerances      FAMILY HISTORY:  No pertinent family history in first degree relatives      Review of Systems:  unobtainable    Medications:  piperacillin/tazobactam IVPB.. 3.375 Gram(s) IV Intermittent every 8 hours    metoprolol succinate ER 50 milliGRAM(s) Oral daily  midodrine. 10 milliGRAM(s) Oral three times a day      sertraline 100 milliGRAM(s) Oral daily        pantoprazole  Injectable 40 milliGRAM(s) IV Push every 12 hours        cyanocobalamin 1000 MICROGram(s) Oral daily      collagenase Ointment 1 Application(s) Topical three times a day            ICU Vital Signs Last 24 Hrs  T(C): 37.1 (29 Apr 2020 06:05), Max: 37.3 (29 Apr 2020 03:49)  T(F): 98.7 (29 Apr 2020 06:05), Max: 99.1 (29 Apr 2020 03:49)  HR: 102 (29 Apr 2020 06:05) (87 - 105)  BP: 100/57 (29 Apr 2020 06:05) (84/48 - 100/57)  BP(mean): --  ABP: --  ABP(mean): --  RR: 18 (29 Apr 2020 06:05) (17 - 22)  SpO2: 96% (29 Apr 2020 06:05) (96% - 98%)    Vital Signs Last 24 Hrs  T(C): 37.1 (29 Apr 2020 06:05), Max: 37.3 (29 Apr 2020 03:49)  T(F): 98.7 (29 Apr 2020 06:05), Max: 99.1 (29 Apr 2020 03:49)  HR: 102 (29 Apr 2020 06:05) (87 - 105)  BP: 100/57 (29 Apr 2020 06:05) (84/48 - 100/57)  BP(mean): --  RR: 18 (29 Apr 2020 06:05) (17 - 22)  SpO2: 96% (29 Apr 2020 06:05) (96% - 98%)        I&O's Detail        LABS:                        8.4    19.86 )-----------( 248      ( 29 Apr 2020 07:23 )             26.0     04-28    138  |  107  |  54<H>  ----------------------------<  92  4.6   |  26  |  1.10    Ca    7.6<L>      28 Apr 2020 22:41  Phos  2.4     04-28  Mg     2.0     04-28    TPro  5.1<L>  /  Alb  1.8<L>  /  TBili  1.8<H>  /  DBili  x   /  AST  230<H>  /  ALT  173<H>  /  AlkPhos  235<H>  04-28          CAPILLARY BLOOD GLUCOSE            CULTURES:          RADIOLOGY: ***< from: CT Chest No Cont (04.29.20 @ 01:24) >  EXAM:  CT ABDOMEN AND PELVIS                          EXAM:  CT CHEST                            PROCEDURE DATE:  04/29/2020          INTERPRETATION:  CT CHEST, ABDOMEN AND PELVIS WITHOUT CONTRAST    INDICATION: Shortness of breath. Hypotension. Altered mental status.     TECHNIQUE: Noncontrast CT of the chest, abdomen and pelvis. Images are reformatted in the sagittal and coronal planes.Postprocessed MIP reformatted chest images were created and reviewed.    COMPARISON: None.    FINDINGS:    Absence of intravenous contrast limits evaluation for traumatic injury, focal lesions, neoplasm, and vascular pathology.    Thorax:  Lines and tubes: None.  Airways: Tracheobronchial tree is patent.  Lungs and Pleura: Right basilar opacity containing air bronchograms. Mild patchy left lower lobe opacities are also identified. No significant pleural effusion. No pneumothorax.  Mediastinum and lymph nodes: No bulky adenopathy.  Heart: Trace pericardial effusion and/or thickening without significant cardiomegaly. Coronary artery calcification and/or stenting. Valvular calcifications.  Vessels: Atherosclerotic disease of the aorta and its branches. Ascending thoracic aorta measures up to 4.3 cm in maximum AP diameter.   Chest Wall: Within normal limits.    Abdomen/Pelvis:  Liver: No suspicious lesions.  Biliary: No significant dilatation. No calcified gallstones within the gallbladder.  Spleen: No suspicious lesions.  Pancreas: No inflammatory changes or ductal dilatation.  Adrenals: Normal.  Kidneys: No hydronephrosis. Bilateral renal cysts as well as too small to characterize hypodensities. Sub-cm nonobstructive right renal calculus.  Vessels: Atherosclerotic disease of the aorta and its branches.     GI tract: There is suggestion of focal circumferential wall thickening of distal ascending colonic loop without significant pericolonic stranding as best seen on images 78 through 79 of series 2. Consider nonemergent colonoscopy evaluation to exclude underlying malignancy. No evidence ofsmall bowel obstruction. No CT findings of acute appendicitis.    Peritoneum/retroperitoneum and mesentery: No free air. No organized fluid collection. No adenopathy.    Pelvic organs/Bladder: Heterogeneous prominent prostate with nodular density near the apex, cause mass effect and protrusion at the bladder base. Correlate with PSA and consider nonemergent prostatic workup to exclude malignancy. No significant bladder wall thickening.    Abdominal wall: Suggestion of left-sided sacral decubitus ulcer with punctate bubbles of air diffusely extending along symmetrically prominent left gluteal muscle and soft tissue folds. Partially imaged asymmetric density measuring 3.9 x 3.3 cm posteromedial to the left proximal femur on image 168 of series 2. Possibility of intramuscular hematoma considered in the differential.    Bones and soft tissues: Multilevel degenerative changes of the spine noted. Advanced osteoarthritis of bilateral hip joint. Advanced osteoarthritis of left shoulder joint withassociated soft tissue calcifications. Vague linear lucency identified at the level of the sacrococcygeal tip as best seen on images 149 through 151 of series 2. Correlate with point tenderness to exclude underlying nondisplaced fracture.    IMPRESSION:    Right basilar opacity containing air bronchograms. Additional patchy left lower lobe opacities. Bacterial and viral pneumonias including atypical agent such as COVID-19 considered in the differential. Correlate with clinical parameters, laboratory values and follow-up chest radiograph advised. No significant pleural effusion.     Suggestion of focal circumferential wall thickening of distal ascending colonic loop without significant pericolonic stranding. Consider nonemergent colonoscopy evaluation to exclude underlying malignancy. No evidence of small bowel obstruction.     Vague linear lucency identified at the level of the sacrococcygeal tip. Correlate with point tenderness to exclude underlying nondisplaced fracture.    Suggestion of left-sided sacral decubitus ulcer with punctate bubbles of air diffusely extending along symmetrically prominent left gluteal muscle and soft tissue folds. Partially imaged asymmetric density measuring 3.9 x 3.3 cm posteromedial to the left proximalfemur. Possibility of intramuscular hematoma considered in the differential.    Additional findings as mentioned above.    These results were discussed via telephone at 4/29/2020 3:55 AM by Dr. Baugh of radiology with Dr. Bailey, institution read-back verification policy was followed.                TATE BAUGH M.D., ATTENDING RADIOLOGIST  This document has been electronically signed. Apr 29 2020  4:10AM              < end of copied text >    < from: Xray Chest 1 View- PORTABLE-Routine (04.30.20 @ 09:11) >  EXAM:  XR CHEST PORTABLE ROUTINE 1V                            PROCEDURE DATE:  04/30/2020          INTERPRETATION:    Examination: XR CHEST    History: ADMDIAG1: K92.2 GASTROINTESTINAL HEMORRHAGE, UNSPECIFIED/, pn pneumonia    TECHNIQUE:  Portable AP view of the chest was obtained.    COMPARISON: A CT chest 1/29/2020 available for review.    FINDINGS:   Lungs clear. RIGHT hemidiaphragm mildly elevated. Posterior lung bases cannot be assessed due to elevated diaphragms. Lateral radiograph recommended if clinically warranted.  . The heart and mediastinum are within normal limits.    Visualized osseous structures are intact.        IMPRESSION: Visualized Lungs clear..   Elevated LEFT hemidiaphragm. Posterior lung bases cannot be assessed dueto elevated diaphragms. Lateral radiograph recommended if clinically warranted.      < end of copied text >

## 2020-05-11 NOTE — PROGRESS NOTE ADULT - PROBLEM SELECTOR PLAN 1
-improved  -anemia possibly  2/2 left hip chronic hematoma as noted on CTAP, GIB less likely, s/p 2u pRBC   -hgb has been stable    -GI-Dr. giordano following; no procedure endo/colonoscopy inpatient- f/u outpatient for procedure   -medically stable for d/c   -dc planning [facility]- awaiting placement -improved  -anemia possibly  2/2 left hip chronic hematoma as noted on CTAP, GIB less likely, s/p 2u pRBC   -hgb has been stable    -GI-Dr. giordano following; no procedure endo/colonoscopy inpatient- f/u outpatient for procedure   -medically stable for d/c, will need outpt endoscopy and colonoscopy  -dc planning [facility]- awaiting placement

## 2020-05-11 NOTE — PROGRESS NOTE ADULT - ASSESSMENT
80 y/o M with PMHx of HTN, BPH, spinal stenosis, dyslipidemia, anxiety, neuropathy, B12 deficiency who presents from [facility] rehab for evaluation of weakness and hypotension. Admitted for  acute  blood  loss anemia 2/2 possible LGIB vs intramuscular hematoma and infected stage 4 sacral wound s/p debridement and IV abx. Pt medically stable for d/c, awaiting [facility] placement.

## 2020-05-11 NOTE — PROGRESS NOTE ADULT - PROBLEM SELECTOR PLAN 8
-was hypotensive on admission, now hypotension resolved   -continue bb   -continue to hold off on home Valsartan  -cardio dr rambo fernandez f/u reccs

## 2020-05-11 NOTE — PROGRESS NOTE ADULT - ASSESSMENT
78 y/o M with PMHx of HTN, BPH, spinal stenosis, dyslipidemia, anxiety, neuropathy, B12 deficiency who presents from rehab for evaluation of weakness and hypotension    Anemia   no overt gi bleeding; hgb stable  sp sacral decub debridement; care per surgery   monitor cbc, transfuse prn  cont ppi and carafate  monitor stool color   defer endoscopic eval as +Covid and increased risk for anesthesia in these pts   op gi f/u for possible colonoscopy if none recent once covid crisis resolved given ct findings    FTT/Dysphagia   unclear etiology; esophagram at [place] 2/2019 normal   some improvement in po  correct elytes prn  cont ppi    on supplements per nutrition   defer endoscopic eval as +Covid and increased risk for anesthesia in these pts    COVID-19  monitor rep status   inc lfts in setting of viral illness; improved from prior  care per primary team; dc planning in progress

## 2020-05-11 NOTE — PROGRESS NOTE ADULT - SUBJECTIVE AND OBJECTIVE BOX
This progress note was completed in part by resident acting as telephonic scribe during COVID-19 crisis. Physical exam and review of systems was completed by attending physician, and findings below are those of the attending physician, and have been reviewed in detail.     Patient is a 79y old  Male who presents with a chief complaint of Generalized weakness (11 May 2020 08:53)      INTERVAL HPI/OVERNIGHT EVENTS:  No acute events overnight. Pt sen and examined at bedside.     Pain Location & Control:     MEDICATIONS  (STANDING):  collagenase Ointment 1 Application(s) Topical three times a day  metoprolol tartrate 50 milliGRAM(s) Oral two times a day  pantoprazole    Tablet 40 milliGRAM(s) Oral before breakfast  sertraline 100 milliGRAM(s) Oral daily  sodium chloride 0.65% Nasal 1 Spray(s) Both Nostrils two times a day  sucralfate suspension 1 Gram(s) Oral four times a day    MEDICATIONS  (PRN):      Allergies    No Known Allergies    Intolerances        REVIEW OF SYSTEMS:  CONSTITUTIONAL: No fever, weight loss, or fatigue  EYES: No eye pain, visual disturbances, or discharge  ENMT:  No difficulty hearing, tinnitus, vertigo; No sinus or throat pain  NECK: No pain or stiffness  BREASTS: No pain, masses, or nipple discharge  RESPIRATORY: No cough, wheezing, chills or hemoptysis; No shortness of breath  CARDIOVASCULAR: No chest pain, palpitations, dizziness, or leg swelling  GASTROINTESTINAL: No abdominal or epigastric pain. No nausea, vomiting, or hematemesis; No diarrhea or constipation. No melena or hematochezia.  GENITOURINARY: No dysuria, frequency, hematuria, or incontinence  NEUROLOGICAL: No headaches, memory loss, loss of strength, numbness, or tremors  SKIN: No itching, burning, rashes, or lesions   LYMPH NODES: No enlarged glands  ENDOCRINE: No heat or cold intolerance; No hair loss; No polydipsia or polyuria  MUSCULOSKELETAL: No back pain  PSYCHIATRIC: No depression, anxiety, mood swings, or difficulty sleeping  HEME/LYMPH: No easy bruising, or bleeding gums  ALLERGY AND IMMUNOLOGIC: No hives or eczema    Vital Signs Last 24 Hrs  T(C): 36.8 (11 May 2020 07:15), Max: 36.8 (11 May 2020 00:38)  T(F): 98.2 (11 May 2020 07:15), Max: 98.3 (11 May 2020 00:38)  HR: 63 (11 May 2020 07:15) (63 - 75)  BP: 155/82 (11 May 2020 07:15) (115/73 - 155/82)  BP(mean): --  RR: 19 (11 May 2020 07:15) (19 - 19)  SpO2: 97% (11 May 2020 07:15) (95% - 99%)    PHYSICAL EXAM:  GENERAL: NAD, well-groomed, well-developed  HEAD:  Atraumatic, Normocephalic  EYES: EOMI, PERRLA, conjunctiva and sclera clear  ENMT: No tonsillar erythema, exudates, or enlargement; Moist mucous membranes, Good dentition, No lesions  NECK: Supple, No JVD, Normal thyroid  NERVOUS SYSTEM:  Alert & Oriented X3, Good concentration; Motor Strength 5/5 B/L upper and lower extremities; DTRs 2+ intact and symmetric  CHEST/LUNG: Clear to auscultation bilaterally; No rales, rhonchi, wheezing, or rubs  HEART: Regular rate and rhythm; No murmurs, rubs, or gallops  ABDOMEN: Soft, Nontender, Nondistended; Bowel sounds present  EXTREMITIES:  2+ Peripheral Pulses, No clubbing or cyanosis  LYMPH: No lymphadenopathy noted  SKIN: No rashes or lesions  INCISION:  Dressing dry and intact    LABS:                        9.4    8.51  )-----------( 282      ( 11 May 2020 06:46 )             29.9     11 May 2020 06:46    139    |  106    |  23     ----------------------------<  78     4.4     |  28     |  0.72     Ca    8.3        11 May 2020 06:46    TPro  5.9    /  Alb  2.1    /  TBili  0.5    /  DBili  x      /  AST  34     /  ALT  53     /  AlkPhos  142    11 May 2020 06:46        CAPILLARY BLOOD GLUCOSE            Cultures      RADIOLOGY & ADDITIONAL TESTS:    Imaging Personally Reviewed:  [x ] YES  [ ] NO    Consultant(s) Notes Reviewed:  [x ] YES  [ ] NO    Care Discussed with Consultants/Other Providers [x ] YES  [ ] NO This progress note was completed in part by resident acting as telephonic scribe during COVID-19 crisis. Physical exam and review of systems was completed by attending physician, and findings below are those of the attending physician, and have been reviewed in detail.     Patient is a 79y old  Male who presents with a chief complaint of Generalized weakness (11 May 2020 08:53)      INTERVAL HPI/OVERNIGHT EVENTS:  No acute events overnight. Pt seen and examined at bedside.     Pain Location & Control:     MEDICATIONS  (STANDING):  collagenase Ointment 1 Application(s) Topical three times a day  metoprolol tartrate 50 milliGRAM(s) Oral two times a day  pantoprazole    Tablet 40 milliGRAM(s) Oral before breakfast  sertraline 100 milliGRAM(s) Oral daily  sodium chloride 0.65% Nasal 1 Spray(s) Both Nostrils two times a day  sucralfate suspension 1 Gram(s) Oral four times a day    MEDICATIONS  (PRN):      Allergies    No Known Allergies    Intolerances        REVIEW OF SYSTEMS:  CONSTITUTIONAL: No fever, weight loss, or fatigue  EYES: No eye pain, visual disturbances, or discharge  ENMT:  No difficulty hearing, tinnitus, vertigo; No sinus or throat pain  NECK: No pain or stiffness  BREASTS: No pain, masses, or nipple discharge  RESPIRATORY: No cough, wheezing, chills or hemoptysis; No shortness of breath  CARDIOVASCULAR: No chest pain, palpitations, dizziness, or leg swelling  GASTROINTESTINAL: No abdominal or epigastric pain. No nausea, vomiting, or hematemesis; No diarrhea or constipation. No melena or hematochezia.  GENITOURINARY: No dysuria, frequency, hematuria, or incontinence  NEUROLOGICAL: No headaches, memory loss, loss of strength, numbness, or tremors  SKIN: No itching, burning, rashes, or lesions   LYMPH NODES: No enlarged glands  ENDOCRINE: No heat or cold intolerance; No hair loss; No polydipsia or polyuria  MUSCULOSKELETAL: No back pain  PSYCHIATRIC: No depression, anxiety, mood swings, or difficulty sleeping  HEME/LYMPH: No easy bruising, or bleeding gums  ALLERGY AND IMMUNOLOGIC: No hives or eczema    Vital Signs Last 24 Hrs  T(C): 36.8 (11 May 2020 07:15), Max: 36.8 (11 May 2020 00:38)  T(F): 98.2 (11 May 2020 07:15), Max: 98.3 (11 May 2020 00:38)  HR: 63 (11 May 2020 07:15) (63 - 75)  BP: 155/82 (11 May 2020 07:15) (115/73 - 155/82)  BP(mean): --  RR: 19 (11 May 2020 07:15) (19 - 19)  SpO2: 97% (11 May 2020 07:15) (95% - 99%)    PHYSICAL EXAM:  GENERAL: NAD, well-groomed, well-developed  HEAD:  Atraumatic, Normocephalic  EYES: EOMI, PERRLA, conjunctiva and sclera clear  ENMT: No tonsillar erythema, exudates, or enlargement; Moist mucous membranes, Good dentition, No lesions  NECK: Supple, No JVD, Normal thyroid  NERVOUS SYSTEM:  Alert & Oriented X3, Good concentration; Motor Strength 5/5 B/L upper and lower extremities; DTRs 2+ intact and symmetric  CHEST/LUNG: Clear to auscultation bilaterally; No rales, rhonchi, wheezing, or rubs  HEART: Regular rate and rhythm; No murmurs, rubs, or gallops  ABDOMEN: Soft, Nontender, Nondistended; Bowel sounds present  EXTREMITIES:  2+ Peripheral Pulses, No clubbing or cyanosis  LYMPH: No lymphadenopathy noted  SKIN: No rashes or lesions  INCISION:  Dressing dry and intact    LABS:                        9.4    8.51  )-----------( 282      ( 11 May 2020 06:46 )             29.9     11 May 2020 06:46    139    |  106    |  23     ----------------------------<  78     4.4     |  28     |  0.72     Ca    8.3        11 May 2020 06:46    TPro  5.9    /  Alb  2.1    /  TBili  0.5    /  DBili  x      /  AST  34     /  ALT  53     /  AlkPhos  142    11 May 2020 06:46        CAPILLARY BLOOD GLUCOSE            Cultures      RADIOLOGY & ADDITIONAL TESTS:    Imaging Personally Reviewed:  [x ] YES  [ ] NO    Consultant(s) Notes Reviewed:  [x ] YES  [ ] NO    Care Discussed with Consultants/Other Providers [x ] YES  [ ] NO This progress note was completed in part by resident acting as telephonic scribe during COVID-19 crisis. Physical exam and review of systems was completed by attending physician, and findings below are those of the attending physician, and have been reviewed in detail.     Patient is a 79y old  Male who presents with a chief complaint of Generalized weakness (11 May 2020 08:53)      INTERVAL HPI/OVERNIGHT EVENTS:  No acute events overnight. Pt seen and examined at bedside.     Pain Location & Control: controlled     MEDICATIONS  (STANDING):  collagenase Ointment 1 Application(s) Topical three times a day  metoprolol tartrate 50 milliGRAM(s) Oral two times a day  pantoprazole    Tablet 40 milliGRAM(s) Oral before breakfast  sertraline 100 milliGRAM(s) Oral daily  sodium chloride 0.65% Nasal 1 Spray(s) Both Nostrils two times a day  sucralfate suspension 1 Gram(s) Oral four times a day    MEDICATIONS  (PRN):      Allergies    No Known Allergies    Intolerances        REVIEW OF SYSTEMS:  peter ESPINOZA historian.     Vital Signs Last 24 Hrs  T(C): 36.8 (11 May 2020 07:15), Max: 36.8 (11 May 2020 00:38)  T(F): 98.2 (11 May 2020 07:15), Max: 98.3 (11 May 2020 00:38)  HR: 63 (11 May 2020 07:15) (63 - 75)  BP: 155/82 (11 May 2020 07:15) (115/73 - 155/82)  BP(mean): --  RR: 19 (11 May 2020 07:15) (19 - 19)  SpO2: 97% (11 May 2020 07:15) (95% - 99%)    PHYSICAL EXAM:  GENERAL: NAD, well-groomed, well-developed  HEAD:  Atraumatic, Normocephalic  EYES: EOMI, PERRLA, conjunctiva and sclera clear  ENMT: No tonsillar erythema, exudates, or enlargement; Moist mucous membranes, Good dentition, No lesions  NECK: Supple, No JVD, Normal thyroid  NERVOUS SYSTEM:  Alert & Oriented X1, general weakness   CHEST/LUNG: Clear to auscultation bilaterally; No rales, rhonchi, wheezing, or rubs  HEART: Regular rate and rhythm; No murmurs, rubs, or gallops  ABDOMEN: Soft, Nontender, Nondistended; Bowel sounds present  EXTREMITIES:  2+ Peripheral Pulses, No clubbing or cyanosis  LYMPH: No lymphadenopathy noted  SKIN: No rashes or lesions  INCISION:  Dressing dry and intact    LABS:                        9.4    8.51  )-----------( 282      ( 11 May 2020 06:46 )             29.9     11 May 2020 06:46    139    |  106    |  23     ----------------------------<  78     4.4     |  28     |  0.72     Ca    8.3        11 May 2020 06:46    TPro  5.9    /  Alb  2.1    /  TBili  0.5    /  DBili  x      /  AST  34     /  ALT  53     /  AlkPhos  142    11 May 2020 06:46        CAPILLARY BLOOD GLUCOSE            Cultures      RADIOLOGY & ADDITIONAL TESTS:    Imaging Personally Reviewed:  [x ] YES  [ ] NO    Consultant(s) Notes Reviewed:  [x ] YES  [ ] NO    Care Discussed with Consultants/Other Providers [x ] YES  [ ] NO This progress note was completed in part by resident acting as telephonic scribe during COVID-19 crisis. Physical exam and review of systems was completed by attending physician, and findings below are those of the attending physician, and have been reviewed in detail.     Patient is a 79y old  Male who presents with a chief complaint of Generalized weakness (11 May 2020 08:53)      INTERVAL HPI/OVERNIGHT EVENTS:  No acute events overnight. Pt seen and examined at bedside. On room air with 97% O2 sat.     Pain Location & Control: controlled     MEDICATIONS  (STANDING):  collagenase Ointment 1 Application(s) Topical three times a day  metoprolol tartrate 50 milliGRAM(s) Oral two times a day  pantoprazole    Tablet 40 milliGRAM(s) Oral before breakfast  sertraline 100 milliGRAM(s) Oral daily  sodium chloride 0.65% Nasal 1 Spray(s) Both Nostrils two times a day  sucralfate suspension 1 Gram(s) Oral four times a day    MEDICATIONS  (PRN):      Allergies    No Known Allergies    Intolerances        REVIEW OF SYSTEMS:  peter ESPINOZA historian.     Vital Signs Last 24 Hrs  T(C): 36.8 (11 May 2020 07:15), Max: 36.8 (11 May 2020 00:38)  T(F): 98.2 (11 May 2020 07:15), Max: 98.3 (11 May 2020 00:38)  HR: 63 (11 May 2020 07:15) (63 - 75)  BP: 155/82 (11 May 2020 07:15) (115/73 - 155/82)  BP(mean): --  RR: 19 (11 May 2020 07:15) (19 - 19)  SpO2: 97% (11 May 2020 07:15) (95% - 99%)    PHYSICAL EXAM:  GENERAL: pale, deconditioned, on room air, NAD    HEAD:  Atraumatic, Normocephalic  EYES: EOMI, PERRLA, conjunctiva and sclera clear  ENMT: No tonsillar erythema, exudates, or enlargement; Moist mucous membranes  NECK: Supple, No JVD, Normal thyroid  NERVOUS SYSTEM:  awake, Alert & Oriented X1, confused, general weakness, not following commands   CHEST/LUNG: b/l crackles no rhonchi, no wheezing,   HEART: Regular rate and rhythm; No murmurs, rubs, or gallops  ABDOMEN: Soft, Nontender, Nondistended; Bowel sounds present  EXTREMITIES:  movin all four extremities, 2+ Peripheral Pulses, No clubbing or cyanosis  LYMPH: No lymphadenopathy noted  SKIN: No rashes or lesions  INCISION:  Dressing dry and intact    LABS:                        9.4    8.51  )-----------( 282      ( 11 May 2020 06:46 )             29.9     11 May 2020 06:46    139    |  106    |  23     ----------------------------<  78     4.4     |  28     |  0.72     Ca    8.3        11 May 2020 06:46    TPro  5.9    /  Alb  2.1    /  TBili  0.5    /  DBili  x      /  AST  34     /  ALT  53     /  AlkPhos  142    11 May 2020 06:46        CAPILLARY BLOOD GLUCOSE            Cultures      RADIOLOGY & ADDITIONAL TESTS:    Imaging Personally Reviewed:  [x ] YES  [ ] NO    Consultant(s) Notes Reviewed:  [x ] YES  [ ] NO    Care Discussed with Consultants/Other Providers [x ] YES  [ ] NO This progress note was completed in part by resident acting as telephonic scribe during COVID-19 crisis. Physical exam and review of systems was completed by attending physician, and findings below are those of the attending physician, and have been reviewed in detail.     Patient is a 79y old  Male who presents with a chief complaint of Generalized weakness (11 May 2020 08:53)      INTERVAL HPI/OVERNIGHT EVENTS:  No acute events overnight. Pt seen and examined at bedside. On room air with 97% O2 sat.     Pain Location & Control: controlled     MEDICATIONS  (STANDING):  collagenase Ointment 1 Application(s) Topical three times a day  metoprolol tartrate 50 milliGRAM(s) Oral two times a day  pantoprazole    Tablet 40 milliGRAM(s) Oral before breakfast  sertraline 100 milliGRAM(s) Oral daily  sodium chloride 0.65% Nasal 1 Spray(s) Both Nostrils two times a day  sucralfate suspension 1 Gram(s) Oral four times a day    MEDICATIONS  (PRN):      Allergies    No Known Allergies    Intolerances        REVIEW OF SYSTEMS:  peter ESPINOZA historian.     Vital Signs Last 24 Hrs  T(C): 36.8 (11 May 2020 07:15), Max: 36.8 (11 May 2020 00:38)  T(F): 98.2 (11 May 2020 07:15), Max: 98.3 (11 May 2020 00:38)  HR: 63 (11 May 2020 07:15) (63 - 75)  BP: 155/82 (11 May 2020 07:15) (115/73 - 155/82)  BP(mean): --  RR: 19 (11 May 2020 07:15) (19 - 19)  SpO2: 97% (11 May 2020 07:15) (95% - 99%)    PHYSICAL EXAM:  GENERAL: pale, deconditioned, on room air, NAD    HEAD:  Atraumatic, Normocephalic  EYES: EOMI, PERRLA, conjunctiva and sclera clear  ENMT: No tonsillar erythema, exudates, or enlargement; Moist mucous membranes  NECK: Supple, No JVD, Normal thyroid  NERVOUS SYSTEM:  awake, Alert & Oriented X1, confused, general weakness, not following commands   CHEST/LUNG: b/l crackles no rhonchi, no wheezing,   HEART: Regular rate and rhythm; No murmurs, rubs, or gallops  ABDOMEN: Soft, Nontender, Nondistended; Bowel sounds present  EXTREMITIES:  movin all four extremities, 2+ Peripheral Pulses, No clubbing or cyanosis    LABS:                        9.4    8.51  )-----------( 282      ( 11 May 2020 06:46 )             29.9     11 May 2020 06:46    139    |  106    |  23     ----------------------------<  78     4.4     |  28     |  0.72     Ca    8.3        11 May 2020 06:46    TPro  5.9    /  Alb  2.1    /  TBili  0.5    /  DBili  x      /  AST  34     /  ALT  53     /  AlkPhos  142    11 May 2020 06:46        CAPILLARY BLOOD GLUCOSE            Cultures      RADIOLOGY & ADDITIONAL TESTS:    Imaging Personally Reviewed:  [x ] YES  [ ] NO    Consultant(s) Notes Reviewed:  [x ] YES  [ ] NO    Care Discussed with Consultants/Other Providers [x ] YES  [ ] NO

## 2020-05-11 NOTE — PROGRESS NOTE ADULT - SUBJECTIVE AND OBJECTIVE BOX
INTERVAL HPI/OVERNIGHT EVENTS:  brown bm ON per nursing    MEDICATIONS  (STANDING):  collagenase Ointment 1 Application(s) Topical three times a day  metoprolol tartrate 50 milliGRAM(s) Oral two times a day  pantoprazole    Tablet 40 milliGRAM(s) Oral before breakfast  sertraline 100 milliGRAM(s) Oral daily  sodium chloride 0.65% Nasal 1 Spray(s) Both Nostrils two times a day  sucralfate suspension 1 Gram(s) Oral four times a day    MEDICATIONS  (PRN):      Allergies    No Known Allergies    Intolerances        Review of Systems:    unable to obtain     Vital Signs Last 24 Hrs  T(C): 36.8 (11 May 2020 07:15), Max: 36.8 (11 May 2020 00:38)  T(F): 98.2 (11 May 2020 07:15), Max: 98.3 (11 May 2020 00:38)  HR: 63 (11 May 2020 07:15) (63 - 75)  BP: 155/82 (11 May 2020 07:15) (115/73 - 155/82)  BP(mean): --  RR: 19 (11 May 2020 07:15) (19 - 19)  SpO2: 97% (11 May 2020 07:15) (95% - 99%)    PHYSICAL EXAM:    deferred f/u done remotely as covid +     LABS:                        9.4    8.51  )-----------( 282      ( 11 May 2020 06:46 )             29.9     05-11    139  |  106  |  23  ----------------------------<  78  4.4   |  28  |  0.72    Ca    8.3<L>      11 May 2020 06:46    TPro  5.9<L>  /  Alb  2.1<L>  /  TBili  0.5  /  DBili  x   /  AST  34  /  ALT  53  /  AlkPhos  142<H>  05-11          RADIOLOGY & ADDITIONAL TESTS:

## 2020-05-11 NOTE — PROGRESS NOTE ADULT - SUBJECTIVE AND OBJECTIVE BOX
Chief Complaint: Weakness, AMS    Interval Events: No events overnight.    Review of Systems:  General: No fevers, chills, weight loss or gain  Skin: No rashes, color changes  Cardiovascular: No chest pain, orthopnea  Respiratory: No shortness of breath, cough  Gastrointestinal: No nausea, abdominal pain  Genitourinary: No incontinence, pain with urination  Musculoskeletal: No pain, swelling, decreased range of motion  Neurological: No headache, weakness  Psychiatric: No depression, anxiety  Endocrine: No weight loss or gain, increased thirst  All other systems are comprehensively negative.    Physical Exam:  Vital Signs Last 24 Hrs  T(C): 36.8 (11 May 2020 07:15), Max: 36.8 (11 May 2020 00:38)  T(F): 98.2 (11 May 2020 07:15), Max: 98.3 (11 May 2020 00:38)  HR: 63 (11 May 2020 07:15) (63 - 75)  BP: 155/82 (11 May 2020 07:15) (115/73 - 155/82)  BP(mean): --  RR: 19 (11 May 2020 07:15) (19 - 19)  SpO2: 97% (11 May 2020 07:15) (95% - 99%)  General: NAD  HEENT: MMM  Neck: No JVD, no carotid bruit  Extremities: No LE edema, no cyanosis  Neuro: Non-focal  Skin: No rash    Labs:             05-11    139  |  106  |  23  ----------------------------<  78  4.4   |  28  |  0.72    Ca    8.3<L>      11 May 2020 06:46    TPro  5.9<L>  /  Alb  2.1<L>  /  TBili  0.5  /  DBili  x   /  AST  34  /  ALT  53  /  AlkPhos  142<H>  05-11                        9.4    8.51  )-----------( 282      ( 11 May 2020 06:46 )             29.9

## 2020-05-12 LAB
ALBUMIN SERPL ELPH-MCNC: 2.1 G/DL — LOW (ref 3.3–5)
ALP SERPL-CCNC: 132 U/L — HIGH (ref 40–120)
ALT FLD-CCNC: 49 U/L — SIGNIFICANT CHANGE UP (ref 12–78)
ANION GAP SERPL CALC-SCNC: 3 MMOL/L — LOW (ref 5–17)
AST SERPL-CCNC: 31 U/L — SIGNIFICANT CHANGE UP (ref 15–37)
BASOPHILS # BLD AUTO: 0.02 K/UL — SIGNIFICANT CHANGE UP (ref 0–0.2)
BASOPHILS NFR BLD AUTO: 0.2 % — SIGNIFICANT CHANGE UP (ref 0–2)
BILIRUB SERPL-MCNC: 0.4 MG/DL — SIGNIFICANT CHANGE UP (ref 0.2–1.2)
BUN SERPL-MCNC: 25 MG/DL — HIGH (ref 7–23)
CALCIUM SERPL-MCNC: 8.2 MG/DL — LOW (ref 8.5–10.1)
CHLORIDE SERPL-SCNC: 105 MMOL/L — SIGNIFICANT CHANGE UP (ref 96–108)
CO2 SERPL-SCNC: 30 MMOL/L — SIGNIFICANT CHANGE UP (ref 22–31)
CREAT SERPL-MCNC: 0.75 MG/DL — SIGNIFICANT CHANGE UP (ref 0.5–1.3)
EOSINOPHIL # BLD AUTO: 0.04 K/UL — SIGNIFICANT CHANGE UP (ref 0–0.5)
EOSINOPHIL NFR BLD AUTO: 0.5 % — SIGNIFICANT CHANGE UP (ref 0–6)
GLUCOSE SERPL-MCNC: 94 MG/DL — SIGNIFICANT CHANGE UP (ref 70–99)
HCT VFR BLD CALC: 29.7 % — LOW (ref 39–50)
HGB BLD-MCNC: 9.3 G/DL — LOW (ref 13–17)
IMM GRANULOCYTES NFR BLD AUTO: 0.5 % — SIGNIFICANT CHANGE UP (ref 0–1.5)
LYMPHOCYTES # BLD AUTO: 1.21 K/UL — SIGNIFICANT CHANGE UP (ref 1–3.3)
LYMPHOCYTES # BLD AUTO: 14.4 % — SIGNIFICANT CHANGE UP (ref 13–44)
MCHC RBC-ENTMCNC: 27.1 PG — SIGNIFICANT CHANGE UP (ref 27–34)
MCHC RBC-ENTMCNC: 31.3 GM/DL — LOW (ref 32–36)
MCV RBC AUTO: 86.6 FL — SIGNIFICANT CHANGE UP (ref 80–100)
MONOCYTES # BLD AUTO: 0.53 K/UL — SIGNIFICANT CHANGE UP (ref 0–0.9)
MONOCYTES NFR BLD AUTO: 6.3 % — SIGNIFICANT CHANGE UP (ref 2–14)
NEUTROPHILS # BLD AUTO: 6.58 K/UL — SIGNIFICANT CHANGE UP (ref 1.8–7.4)
NEUTROPHILS NFR BLD AUTO: 78.1 % — HIGH (ref 43–77)
NRBC # BLD: 0 /100 WBCS — SIGNIFICANT CHANGE UP (ref 0–0)
PLATELET # BLD AUTO: 242 K/UL — SIGNIFICANT CHANGE UP (ref 150–400)
POTASSIUM SERPL-MCNC: 4.7 MMOL/L — SIGNIFICANT CHANGE UP (ref 3.5–5.3)
POTASSIUM SERPL-SCNC: 4.7 MMOL/L — SIGNIFICANT CHANGE UP (ref 3.5–5.3)
PROT SERPL-MCNC: 5.8 G/DL — LOW (ref 6–8.3)
RBC # BLD: 3.43 M/UL — LOW (ref 4.2–5.8)
RBC # FLD: 15.6 % — HIGH (ref 10.3–14.5)
SARS-COV-2 RNA SPEC QL NAA+PROBE: DETECTED
SODIUM SERPL-SCNC: 138 MMOL/L — SIGNIFICANT CHANGE UP (ref 135–145)
WBC # BLD: 8.42 K/UL — SIGNIFICANT CHANGE UP (ref 3.8–10.5)
WBC # FLD AUTO: 8.42 K/UL — SIGNIFICANT CHANGE UP (ref 3.8–10.5)

## 2020-05-12 PROCEDURE — 99232 SBSQ HOSP IP/OBS MODERATE 35: CPT | Mod: CS

## 2020-05-12 RX ORDER — PREGABALIN 225 MG/1
1 CAPSULE ORAL
Qty: 0 | Refills: 0 | DISCHARGE
Start: 2020-05-12

## 2020-05-12 RX ORDER — LOPERAMIDE HCL 2 MG
1 TABLET ORAL
Qty: 0 | Refills: 0 | DISCHARGE
Start: 2020-05-12

## 2020-05-12 RX ADMIN — Medication 50 MILLIGRAM(S): at 17:15

## 2020-05-12 RX ADMIN — PANTOPRAZOLE SODIUM 40 MILLIGRAM(S): 20 TABLET, DELAYED RELEASE ORAL at 05:14

## 2020-05-12 RX ADMIN — Medication 1 APPLICATION(S): at 13:41

## 2020-05-12 RX ADMIN — Medication 1 GRAM(S): at 11:49

## 2020-05-12 RX ADMIN — Medication 1 GRAM(S): at 17:15

## 2020-05-12 RX ADMIN — Medication 1 GRAM(S): at 21:20

## 2020-05-12 RX ADMIN — Medication 1 GRAM(S): at 05:14

## 2020-05-12 RX ADMIN — Medication 50 MILLIGRAM(S): at 05:14

## 2020-05-12 RX ADMIN — Medication 1 SPRAY(S): at 05:14

## 2020-05-12 RX ADMIN — SERTRALINE 100 MILLIGRAM(S): 25 TABLET, FILM COATED ORAL at 11:49

## 2020-05-12 NOTE — PROGRESS NOTE ADULT - ASSESSMENT
78 y/o M with PMHx of HTN, BPH, spinal stenosis, dyslipidemia, anxiety, neuropathy, B12 deficiency who presents from rehab for evaluation of weakness and hypotension    Anemia   no overt gi bleeding; hgb stable  sp sacral decub debridement; care per surgery   monitor cbc, transfuse prn  cont ppi and carafate  monitor stool color   defer endoscopic eval as +Covid and increased risk for anesthesia in these pts   op gi f/u for possible colonoscopy if none recent once covid crisis resolved given ct findings    FTT/Dysphagia   unclear etiology; esophagram at [facility] 2/2019 normal   po intake better  correct elytes prn  cont ppi    on supplements per nutrition   defer endoscopic eval as +Covid and increased risk for anesthesia in these pts    COVID-19  monitor rep status   inc lfts in setting of viral illness; improved from prior  care per primary team; dc planning in progress

## 2020-05-12 NOTE — PROGRESS NOTE ADULT - PROBLEM SELECTOR PLAN 1
-improved, Hgb now stable   -anemia possibly  2/2 left hip chronic hematoma as noted on CTAP, GIB less likely, s/p 2u pRBC   -ppi and carafate  -monitor for further bleeding,  stool color   -GI-Dr. giordano following; no procedure endo/colonoscopy inpatient- f/u outpatient for procedure   -medically stable for d/c, will need outpt endoscopy and colonoscopy  -dc planning [facility]- awaiting placement, repeat COVID 19 negative 5/11/20 -improved, Hgb now stable   -anemia possibly  2/2 left hip chronic hematoma as noted on CTAP, GIB less likely, s/p 2u pRBC   -ppi and carafate  -monitor for further bleeding,  stool color   -GI-Dr. giordano following; no procedure endo/colonoscopy inpatient- f/u outpatient for procedure   -medically stable for d/c, will need outpt endoscopy and colonoscopy  -dc planning [facility]- awaiting placement, repeat COVID 19 PCR negative 5/11/20, 5/12/20 COVID 19 PCR pending, if negative can discharge to [facility] when placement made -improved, Hgb now stable   -anemia possibly  2/2 left hip chronic hematoma as noted on CTAP, GIB less likely, s/p 2u pRBC   -ppi and carafate  -monitor for further bleeding,  stool color   -GI-Dr. giordano following; no procedure endo/colonoscopy inpatient- f/u outpatient for procedure   -medically stable for d/c, will need outpt endoscopy and colonoscopy  -dc planning [facility]- awaiting placement, repeat COVID 19 PCR negative 5/11/20, 5/12/20 COVID 19 PCR +

## 2020-05-12 NOTE — PROGRESS NOTE ADULT - SUBJECTIVE AND OBJECTIVE BOX
Chief Complaint: Weakness, AMS    Interval Events: No events overnight.    Review of Systems:  General: No fevers, chills, weight loss or gain  Skin: No rashes, color changes  Cardiovascular: No chest pain, orthopnea  Respiratory: No shortness of breath, cough  Gastrointestinal: No nausea, abdominal pain  Genitourinary: No incontinence, pain with urination  Musculoskeletal: No pain, swelling, decreased range of motion  Neurological: No headache, weakness  Psychiatric: No depression, anxiety  Endocrine: No weight loss or gain, increased thirst  All other systems are comprehensively negative.    Physical Exam:  Vital Signs Last 24 Hrs  T(C): 36.6 (12 May 2020 07:44), Max: 36.7 (12 May 2020 04:17)  T(F): 97.8 (12 May 2020 07:44), Max: 98 (12 May 2020 04:17)  HR: 63 (12 May 2020 07:44) (63 - 70)  BP: 145/72 (12 May 2020 07:44) (106/64 - 152/76)  BP(mean): --  RR: 18 (12 May 2020 07:44) (18 - 19)  SpO2: 99% (12 May 2020 07:44) (97% - 99%)  General: NAD  HEENT: MMM  Neck: No JVD, no carotid bruit  Extremities: No LE edema, no cyanosis  Neuro: Non-focal  Skin: No rash    Labs:             05-12    138  |  105  |  25<H>  ----------------------------<  94  4.7   |  30  |  0.75    Ca    8.2<L>      12 May 2020 07:01    TPro  5.8<L>  /  Alb  2.1<L>  /  TBili  0.4  /  DBili  x   /  AST  31  /  ALT  49  /  AlkPhos  132<H>  05-12                        9.3    8.42  )-----------( 242      ( 12 May 2020 07:01 )             29.7

## 2020-05-12 NOTE — PROGRESS NOTE ADULT - SUBJECTIVE AND OBJECTIVE BOX
This progress note was completed in part by resident acting as telephonic scribe during COVID-19 crisis. Physical exam and review of systems was completed by attending physician, and findings below are those of the attending physician, and have been reviewed in detail.     Patient is a 79y old  Male who presents with a chief complaint of Generalized weakness (11 May 2020 08:53)      INTERVAL HPI/OVERNIGHT EVENTS:  No acute events overnight. Pt seen and examined at bedside. On room air with 99% O2 sat.     Pain Location & Control: controlled     MEDICATIONS  (STANDING):  collagenase Ointment 1 Application(s) Topical three times a day  metoprolol tartrate 50 milliGRAM(s) Oral two times a day  pantoprazole    Tablet 40 milliGRAM(s) Oral before breakfast  sertraline 100 milliGRAM(s) Oral daily  sodium chloride 0.65% Nasal 1 Spray(s) Both Nostrils two times a day  sucralfate suspension 1 Gram(s) Oral four times a day    MEDICATIONS  (PRN):      Allergies    No Known Allergies    Intolerances        REVIEW OF SYSTEMS:  peter ESPINOZA historian.     ICU Vital Signs Last 24 Hrs  T(C): 36.6 (12 May 2020 07:44), Max: 36.7 (12 May 2020 04:17)  T(F): 97.8 (12 May 2020 07:44), Max: 98 (12 May 2020 04:17)  HR: 63 (12 May 2020 07:44) (63 - 70)  BP: 145/72 (12 May 2020 07:44) (106/64 - 152/76)  BP(mean): --  ABP: --  ABP(mean): --  RR: 18 (12 May 2020 07:44) (18 - 19)  SpO2: 99% (12 May 2020 07:44) (97% - 99%)    PHYSICAL EXAM:  GENERAL: pale, deconditioned, on room air, NAD    HEAD:  Atraumatic, Normocephalic  EYES: EOMI, PERRLA, conjunctiva and sclera clear  ENMT: No tonsillar erythema, exudates, or enlargement; Moist mucous membranes  NECK: Supple, No JVD, Normal thyroid  NERVOUS SYSTEM:  awake, Alert & Oriented X1, confused, general weakness, not following commands   CHEST/LUNG: b/l crackles no rhonchi, no wheezing,   HEART: Regular rate and rhythm; No murmurs, rubs, or gallops  ABDOMEN: Soft, Nontender, Nondistended; Bowel sounds present  EXTREMITIES:  movin all four extremities, 2+ Peripheral Pulses, No clubbing or cyanosis    05-12    138  |  105  |  25<H>  ----------------------------<  94  4.7   |  30  |  0.75    Ca    8.2<L>      12 May 2020 07:01    TPro  5.8<L>  /  Alb  2.1<L>  /  TBili  0.4  /  DBili  x   /  AST  31  /  ALT  49  /  AlkPhos  132<H>  05-12      CAPILLARY BLOOD GLUCOSE            Cultures      RADIOLOGY & ADDITIONAL TESTS:    Imaging Personally Reviewed:  [x ] YES  [ ] NO    Consultant(s) Notes Reviewed:  [x ] YES  [ ] NO    Care Discussed with Consultants/Other Providers [x ] YES  [ ] NO

## 2020-05-12 NOTE — PROGRESS NOTE ADULT - SUBJECTIVE AND OBJECTIVE BOX
PULMONARY/CRITICAL CARE    Pt stable. Doing well.   Now off antibiotics  Unchanged. Blood cultures pos.    Alert, confused.   Pt unchanged. No fever.      Patient is a 79y old  Male who presents with a chief complaint of Generalized weakness (28 Apr 2020 22:20)    BRIEF HOSPITAL COURSE: ***78 y/o M with PMHx of HTN, BPH, spinal stenosis, dyslipidemia, anxiety, neuropathy, B12 deficiency who presents from [facility] for evaluation of weakness and hypotension. As per transfer papers, pt with confusion after lunch today. Pt admits to generalized weakness. Lives with wife, Stephany and son. He came from [facility] in [place] since Feb 14. Patient lives with wife and son at home and ambulates with cane. Contracted Covid-19 in [facility] and first started having symptoms on Mar 25, reporting symptoms of cough and low grade fever, diarrhea. Denies any chest pain, shortness of breath, headache, dizziness. History obtained from wife, Stephany([phone]) as pt is confused and a poor historian.   Of note, pt was noted to have sacral wounds today and reported low grade fever for two days and mental status changes today and loss of appetite.    Events last 24 hours: ***    PAST MEDICAL & SURGICAL HISTORY:  H/O vitamin B deficiency  Spinal stenosis  HTN (hypertension)  History of tonsillectomy    Allergies    No Known Allergies    Intolerances      FAMILY HISTORY:  No pertinent family history in first degree relatives      Review of Systems:  unobtainable    Medications:  piperacillin/tazobactam IVPB.. 3.375 Gram(s) IV Intermittent every 8 hours    metoprolol succinate ER 50 milliGRAM(s) Oral daily  midodrine. 10 milliGRAM(s) Oral three times a day      sertraline 100 milliGRAM(s) Oral daily        pantoprazole  Injectable 40 milliGRAM(s) IV Push every 12 hours        cyanocobalamin 1000 MICROGram(s) Oral daily      collagenase Ointment 1 Application(s) Topical three times a day            ICU Vital Signs Last 24 Hrs  T(C): 37.1 (29 Apr 2020 06:05), Max: 37.3 (29 Apr 2020 03:49)  T(F): 98.7 (29 Apr 2020 06:05), Max: 99.1 (29 Apr 2020 03:49)  HR: 102 (29 Apr 2020 06:05) (87 - 105)  BP: 100/57 (29 Apr 2020 06:05) (84/48 - 100/57)  BP(mean): --  ABP: --  ABP(mean): --  RR: 18 (29 Apr 2020 06:05) (17 - 22)  SpO2: 96% (29 Apr 2020 06:05) (96% - 98%)    Vital Signs Last 24 Hrs  T(C): 37.1 (29 Apr 2020 06:05), Max: 37.3 (29 Apr 2020 03:49)  T(F): 98.7 (29 Apr 2020 06:05), Max: 99.1 (29 Apr 2020 03:49)  HR: 102 (29 Apr 2020 06:05) (87 - 105)  BP: 100/57 (29 Apr 2020 06:05) (84/48 - 100/57)  BP(mean): --  RR: 18 (29 Apr 2020 06:05) (17 - 22)  SpO2: 96% (29 Apr 2020 06:05) (96% - 98%)        I&O's Detail        LABS:                        8.4    19.86 )-----------( 248      ( 29 Apr 2020 07:23 )             26.0     04-28    138  |  107  |  54<H>  ----------------------------<  92  4.6   |  26  |  1.10    Ca    7.6<L>      28 Apr 2020 22:41  Phos  2.4     04-28  Mg     2.0     04-28    TPro  5.1<L>  /  Alb  1.8<L>  /  TBili  1.8<H>  /  DBili  x   /  AST  230<H>  /  ALT  173<H>  /  AlkPhos  235<H>  04-28          CAPILLARY BLOOD GLUCOSE            CULTURES:          RADIOLOGY: ***< from: CT Chest No Cont (04.29.20 @ 01:24) >  EXAM:  CT ABDOMEN AND PELVIS                          EXAM:  CT CHEST                            PROCEDURE DATE:  04/29/2020          INTERPRETATION:  CT CHEST, ABDOMEN AND PELVIS WITHOUT CONTRAST    INDICATION: Shortness of breath. Hypotension. Altered mental status.     TECHNIQUE: Noncontrast CT of the chest, abdomen and pelvis. Images are reformatted in the sagittal and coronal planes.Postprocessed MIP reformatted chest images were created and reviewed.    COMPARISON: None.    FINDINGS:    Absence of intravenous contrast limits evaluation for traumatic injury, focal lesions, neoplasm, and vascular pathology.    Thorax:  Lines and tubes: None.  Airways: Tracheobronchial tree is patent.  Lungs and Pleura: Right basilar opacity containing air bronchograms. Mild patchy left lower lobe opacities are also identified. No significant pleural effusion. No pneumothorax.  Mediastinum and lymph nodes: No bulky adenopathy.  Heart: Trace pericardial effusion and/or thickening without significant cardiomegaly. Coronary artery calcification and/or stenting. Valvular calcifications.  Vessels: Atherosclerotic disease of the aorta and its branches. Ascending thoracic aorta measures up to 4.3 cm in maximum AP diameter.   Chest Wall: Within normal limits.    Abdomen/Pelvis:  Liver: No suspicious lesions.  Biliary: No significant dilatation. No calcified gallstones within the gallbladder.  Spleen: No suspicious lesions.  Pancreas: No inflammatory changes or ductal dilatation.  Adrenals: Normal.  Kidneys: No hydronephrosis. Bilateral renal cysts as well as too small to characterize hypodensities. Sub-cm nonobstructive right renal calculus.  Vessels: Atherosclerotic disease of the aorta and its branches.     GI tract: There is suggestion of focal circumferential wall thickening of distal ascending colonic loop without significant pericolonic stranding as best seen on images 78 through 79 of series 2. Consider nonemergent colonoscopy evaluation to exclude underlying malignancy. No evidence ofsmall bowel obstruction. No CT findings of acute appendicitis.    Peritoneum/retroperitoneum and mesentery: No free air. No organized fluid collection. No adenopathy.    Pelvic organs/Bladder: Heterogeneous prominent prostate with nodular density near the apex, cause mass effect and protrusion at the bladder base. Correlate with PSA and consider nonemergent prostatic workup to exclude malignancy. No significant bladder wall thickening.    Abdominal wall: Suggestion of left-sided sacral decubitus ulcer with punctate bubbles of air diffusely extending along symmetrically prominent left gluteal muscle and soft tissue folds. Partially imaged asymmetric density measuring 3.9 x 3.3 cm posteromedial to the left proximal femur on image 168 of series 2. Possibility of intramuscular hematoma considered in the differential.    Bones and soft tissues: Multilevel degenerative changes of the spine noted. Advanced osteoarthritis of bilateral hip joint. Advanced osteoarthritis of left shoulder joint withassociated soft tissue calcifications. Vague linear lucency identified at the level of the sacrococcygeal tip as best seen on images 149 through 151 of series 2. Correlate with point tenderness to exclude underlying nondisplaced fracture.    IMPRESSION:    Right basilar opacity containing air bronchograms. Additional patchy left lower lobe opacities. Bacterial and viral pneumonias including atypical agent such as COVID-19 considered in the differential. Correlate with clinical parameters, laboratory values and follow-up chest radiograph advised. No significant pleural effusion.     Suggestion of focal circumferential wall thickening of distal ascending colonic loop without significant pericolonic stranding. Consider nonemergent colonoscopy evaluation to exclude underlying malignancy. No evidence of small bowel obstruction.     Vague linear lucency identified at the level of the sacrococcygeal tip. Correlate with point tenderness to exclude underlying nondisplaced fracture.    Suggestion of left-sided sacral decubitus ulcer with punctate bubbles of air diffusely extending along symmetrically prominent left gluteal muscle and soft tissue folds. Partially imaged asymmetric density measuring 3.9 x 3.3 cm posteromedial to the left proximalfemur. Possibility of intramuscular hematoma considered in the differential.    Additional findings as mentioned above.    These results were discussed via telephone at 4/29/2020 3:55 AM by Dr. Baugh of radiology with Dr. Bailey, institution read-back verification policy was followed.                TATE BAUGH M.D., ATTENDING RADIOLOGIST  This document has been electronically signed. Apr 29 2020  4:10AM              < end of copied text >    < from: Xray Chest 1 View- PORTABLE-Routine (04.30.20 @ 09:11) >  EXAM:  XR CHEST PORTABLE ROUTINE 1V                            PROCEDURE DATE:  04/30/2020          INTERPRETATION:    Examination: XR CHEST    History: ADMDIAG1: K92.2 GASTROINTESTINAL HEMORRHAGE, UNSPECIFIED/, pn pneumonia    TECHNIQUE:  Portable AP view of the chest was obtained.    COMPARISON: A CT chest 1/29/2020 available for review.    FINDINGS:   Lungs clear. RIGHT hemidiaphragm mildly elevated. Posterior lung bases cannot be assessed due to elevated diaphragms. Lateral radiograph recommended if clinically warranted.  . The heart and mediastinum are within normal limits.    Visualized osseous structures are intact.        IMPRESSION: Visualized Lungs clear..   Elevated LEFT hemidiaphragm. Posterior lung bases cannot be assessed dueto elevated diaphragms. Lateral radiograph recommended if clinically warranted.      < end of copied text >

## 2020-05-12 NOTE — CHART NOTE - NSCHARTNOTEFT_GEN_A_CORE
Assessment:   patient seen for malnutrition follow up. spoke with RN who reports patient with good PO intake. dislikes ensure pudding taking ensure enlive as ordered. 5/12 BM loose stools continue   Factors impacting intake: [ ] none [ ] nausea  [ ] vomiting [ ] diarrhea [ ] constipation  [ ]chewing problems [ ] swallowing issues  [ x] other: loose BM's    Diet Presciption: Diet, Soft:   Low Fat (LOWFAT)  Supplement Feeding Modality:  Oral  Ensure Enlive Cans or Servings Per Day:  1       Frequency:  Two Times a day (04-30-20 @ 11:13)    Intake: % per flow sheet    Current Weight: 5/12 wt 113.9# 5/11 118.1# 4/28 wt 134.9#       Pertinent Medications: MEDICATIONS  (STANDING):  collagenase Ointment 1 Application(s) Topical three times a day  metoprolol tartrate 50 milliGRAM(s) Oral two times a day  pantoprazole    Tablet 40 milliGRAM(s) Oral before breakfast  sertraline 100 milliGRAM(s) Oral daily  sodium chloride 0.65% Nasal 1 Spray(s) Both Nostrils two times a day  sucralfate suspension 1 Gram(s) Oral four times a day    Pertinent Labs: 05-12 Na138 mmol/L Glu 94 mg/dL K+ 4.7 mmol/L Cr  0.75 mg/dL BUN 25 mg/dL<H> 05-09 Phos 2.3 mg/dL<L> 05-12 Alb 2.1 g/dL<L>      Skin:   no edema  sacral wound vac for un-stageable right sacrum, right heel DTI right ankle DTI left ankle DTI   Estimated Needs:   [x ] no change since previous assessment 1625-1950kcals 25-30kcals/kg # and 78-91gms protein 1.2-1.4 gms/kg IBW  [ ] recalculated:     Previous Nutrition Diagnosis:   [ ] Inadequate Energy Intake [ ]Inadequate Oral Intake [ ] Excessive Energy Intake   [ ] Underweight [ ] Increased Nutrient Needs [ ] Overweight/Obesity   [ ] Altered GI Function [ ] Unintended Weight Loss [ ] Food & Nutrition Related Knowledge Deficit [x ] Malnutrition     Nutrition Diagnosis is [x ] ongoing  [ ] resolved [ ] not applicable     New Nutrition Diagnosis: [x ] not applicable       Interventions:   Recommend  [ ] Change Diet To:  [ ] Nutrition Supplement  [ ] Nutrition Support  [x ] Other: continue diet as ordered suggest add MVI and Vit C continue ensure enlive supplement, per MD discretion consider probiotic already on soft diet appropriate with continuing loose BM's    Monitoring and Evaluation:   [x ] PO intake [ x ] Tolerance to diet prescription [ x ] weights [ x ] labs[ x ] follow up per protocol  [ ] other:
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
## 2020-05-12 NOTE — PROGRESS NOTE ADULT - PROBLEM SELECTOR PLAN 6
-COVID +,  maintaining sats >90 on room air   - Maintain on airborne isolation.  - Limit use of NSAIDs  -supportive care, continue to wean off O2 as tolerated to keep O2 sat >88% -COVID +,  maintaining sats >90 on room air   - Maintain on airborne isolation.  - Limit use of NSAIDs  -supportive care, continue to wean off O2 as tolerated to keep O2 sat >88%  -repeat COVID19 PCR 5/11/20 negative, 5/12/20 COVID 19PCR pending, if negative can discharge to [facility] when bed is ready

## 2020-05-12 NOTE — PROGRESS NOTE ADULT - PROBLEM SELECTOR PLAN 2
stage  4    - s/p debridement and 5 day course of zosyn  - blood cultures neg to date    -dressing changed 5/10/12   - wound care mgmt with Benita LINDO- s/p wound vac placement   -rodriguez placed for urinary retention by urology - pt not yet ambulatory, will keep rodriguez in for wound healing given sacral ulcer,  attempt TOV at [facility] once more ambulatory

## 2020-05-13 LAB
ALBUMIN SERPL ELPH-MCNC: 2.1 G/DL — LOW (ref 3.3–5)
ALP SERPL-CCNC: 119 U/L — SIGNIFICANT CHANGE UP (ref 40–120)
ALT FLD-CCNC: 40 U/L — SIGNIFICANT CHANGE UP (ref 12–78)
ANION GAP SERPL CALC-SCNC: 7 MMOL/L — SIGNIFICANT CHANGE UP (ref 5–17)
AST SERPL-CCNC: 26 U/L — SIGNIFICANT CHANGE UP (ref 15–37)
BASOPHILS # BLD AUTO: 0.02 K/UL — SIGNIFICANT CHANGE UP (ref 0–0.2)
BASOPHILS NFR BLD AUTO: 0.2 % — SIGNIFICANT CHANGE UP (ref 0–2)
BILIRUB SERPL-MCNC: 0.4 MG/DL — SIGNIFICANT CHANGE UP (ref 0.2–1.2)
BUN SERPL-MCNC: 24 MG/DL — HIGH (ref 7–23)
CALCIUM SERPL-MCNC: 8 MG/DL — LOW (ref 8.5–10.1)
CHLORIDE SERPL-SCNC: 102 MMOL/L — SIGNIFICANT CHANGE UP (ref 96–108)
CO2 SERPL-SCNC: 29 MMOL/L — SIGNIFICANT CHANGE UP (ref 22–31)
CREAT SERPL-MCNC: 0.76 MG/DL — SIGNIFICANT CHANGE UP (ref 0.5–1.3)
EOSINOPHIL # BLD AUTO: 0.01 K/UL — SIGNIFICANT CHANGE UP (ref 0–0.5)
EOSINOPHIL NFR BLD AUTO: 0.1 % — SIGNIFICANT CHANGE UP (ref 0–6)
GLUCOSE SERPL-MCNC: 96 MG/DL — SIGNIFICANT CHANGE UP (ref 70–99)
HCT VFR BLD CALC: 28 % — LOW (ref 39–50)
HGB BLD-MCNC: 8.9 G/DL — LOW (ref 13–17)
IMM GRANULOCYTES NFR BLD AUTO: 0.4 % — SIGNIFICANT CHANGE UP (ref 0–1.5)
LYMPHOCYTES # BLD AUTO: 1.42 K/UL — SIGNIFICANT CHANGE UP (ref 1–3.3)
LYMPHOCYTES # BLD AUTO: 15.5 % — SIGNIFICANT CHANGE UP (ref 13–44)
MCHC RBC-ENTMCNC: 27.4 PG — SIGNIFICANT CHANGE UP (ref 27–34)
MCHC RBC-ENTMCNC: 31.8 GM/DL — LOW (ref 32–36)
MCV RBC AUTO: 86.2 FL — SIGNIFICANT CHANGE UP (ref 80–100)
MONOCYTES # BLD AUTO: 0.51 K/UL — SIGNIFICANT CHANGE UP (ref 0–0.9)
MONOCYTES NFR BLD AUTO: 5.6 % — SIGNIFICANT CHANGE UP (ref 2–14)
NEUTROPHILS # BLD AUTO: 7.16 K/UL — SIGNIFICANT CHANGE UP (ref 1.8–7.4)
NEUTROPHILS NFR BLD AUTO: 78.2 % — HIGH (ref 43–77)
NRBC # BLD: 0 /100 WBCS — SIGNIFICANT CHANGE UP (ref 0–0)
PHOSPHATE SERPL-MCNC: 1.9 MG/DL — LOW (ref 2.5–4.5)
PLATELET # BLD AUTO: 229 K/UL — SIGNIFICANT CHANGE UP (ref 150–400)
POTASSIUM SERPL-MCNC: 4 MMOL/L — SIGNIFICANT CHANGE UP (ref 3.5–5.3)
POTASSIUM SERPL-SCNC: 4 MMOL/L — SIGNIFICANT CHANGE UP (ref 3.5–5.3)
PROT SERPL-MCNC: 5.8 G/DL — LOW (ref 6–8.3)
RBC # BLD: 3.25 M/UL — LOW (ref 4.2–5.8)
RBC # FLD: 15.7 % — HIGH (ref 10.3–14.5)
SARS-COV-2 RNA SPEC QL NAA+PROBE: DETECTED
SODIUM SERPL-SCNC: 138 MMOL/L — SIGNIFICANT CHANGE UP (ref 135–145)
WBC # BLD: 9.16 K/UL — SIGNIFICANT CHANGE UP (ref 3.8–10.5)
WBC # FLD AUTO: 9.16 K/UL — SIGNIFICANT CHANGE UP (ref 3.8–10.5)

## 2020-05-13 PROCEDURE — 99232 SBSQ HOSP IP/OBS MODERATE 35: CPT | Mod: CS

## 2020-05-13 RX ADMIN — Medication 1 SPRAY(S): at 05:11

## 2020-05-13 RX ADMIN — Medication 1 GRAM(S): at 05:11

## 2020-05-13 RX ADMIN — Medication 1 APPLICATION(S): at 20:43

## 2020-05-13 RX ADMIN — Medication 1 GRAM(S): at 11:52

## 2020-05-13 RX ADMIN — Medication 50 MILLIGRAM(S): at 17:12

## 2020-05-13 RX ADMIN — PANTOPRAZOLE SODIUM 40 MILLIGRAM(S): 20 TABLET, DELAYED RELEASE ORAL at 05:11

## 2020-05-13 RX ADMIN — SERTRALINE 100 MILLIGRAM(S): 25 TABLET, FILM COATED ORAL at 11:52

## 2020-05-13 RX ADMIN — Medication 1 GRAM(S): at 17:12

## 2020-05-13 RX ADMIN — Medication 50 MILLIGRAM(S): at 05:11

## 2020-05-13 NOTE — PROGRESS NOTE ADULT - ATTENDING COMMENTS
Discussed w/ pt/family. Needs repeat COVID-19 testing prior to dispo planning to [facility].

## 2020-05-13 NOTE — PROGRESS NOTE ADULT - PROBLEM SELECTOR PLAN 9
- stable  - On Sertraline at home DVT PPx: SCDs, given GIB  Code Status: Pt is DNR, but NOT DNI, MOLST form in chart DVT PPx: SCD's, given GIB  Code Status: Pt is DNR, but NOT DNI, MOLST form in chart

## 2020-05-13 NOTE — PROGRESS NOTE ADULT - PROBLEM SELECTOR PLAN 8
-was hypotensive on admission, now hypotension resolved   -continue bb   -continue to hold off on home Valsartan  -cardio dr rambo fernandez f/u reccs Stable  - Continue Sertraline

## 2020-05-13 NOTE — PROGRESS NOTE ADULT - PROBLEM SELECTOR PLAN 2
stage  4    - s/p debridement and 5 day course of zosyn  - blood cultures neg to date    -dressing changed 5/10/12   - wound care mgmt with Benita LINDO- s/p wound vac placement   -rodriguez placed for urinary retention by urology - pt not yet ambulatory, will keep rodriguez in for wound healing given sacral ulcer,  attempt TOV at [facility] once more ambulatory Anemia possibly 2/2 L hip chronic hematoma as noted on CT AP, GIB less likely  - Hb stable s/p 2 u pRBCs  - Continue Protonix 40 mg daily, Carafate 1 g four times daily.  - Monitor for further bleeding, stool color   - Per GI Dr. Horne, no endo/colonoscopy inpatient - f/u as outpatient for procedure endoscopy and colonoscopy Anemia possibly 2/2 L hip chronic hematoma as noted on CT AP, GIB less likely  - Hb stable s/p 2 u pRBCs  - h/h slightly down but no significant change  - Continue Protonix 40 mg daily, Carafate 1 g four times daily  - Monitor for further bleeding, stool color   - Per GI Dr. Horne, no endo/colonoscopy inpatient - f/u as outpatient for procedure endoscopy and colonoscopy

## 2020-05-13 NOTE — PROGRESS NOTE ADULT - PROBLEM SELECTOR PLAN 1
-improved, Hgb now stable   -anemia possibly  2/2 left hip chronic hematoma as noted on CTAP, GIB less likely, s/p 2u pRBC   -ppi and carafate  -monitor for further bleeding,  stool color   -GI-Dr. giordano following; no procedure endo/colonoscopy inpatient- f/u outpatient for procedure   -medically stable for d/c, will need outpt endoscopy and colonoscopy  -dc planning [facility]- awaiting placement, repeat COVID 19 PCR negative 5/11/20, 5/12/20 COVID 19 PCR + COVID-19, maintaining sat >90 on room air   - Maintain on airborne isolation.  - Limit use of NSAIDs.  - Medically stable for d/c, awaiting placement at [facility]  - Repeat COVID PCR (-) 5/11, (+) 5/12. F/u repeat PCR 5/13 - if negative can discharge when bed is ready

## 2020-05-13 NOTE — PROGRESS NOTE ADULT - ASSESSMENT
The patient is a 79 year old male with a history of HTN, HL, pre-DM, anxiety, spinal stenosis who presents with weakness and AMS in the setting of worsening anemia, dehydration, COVID-19.    Plan:  - SVT - continue metoprolol tartrate 50 mg bid  - Underwent debridement of sacral decub  - Not hypoxic on RA  - Awaiting negative COVID-19 for placement  - Discharge planning

## 2020-05-13 NOTE — PROGRESS NOTE ADULT - SUBJECTIVE AND OBJECTIVE BOX
Chief Complaint: Weakness, AMS    Interval Events: No events overnight.    Review of Systems:  General: No fevers, chills, weight loss or gain  Skin: No rashes, color changes  Cardiovascular: No chest pain, orthopnea  Respiratory: No shortness of breath, cough  Gastrointestinal: No nausea, abdominal pain  Genitourinary: No incontinence, pain with urination  Musculoskeletal: No pain, swelling, decreased range of motion  Neurological: No headache, weakness  Psychiatric: No depression, anxiety  Endocrine: No weight loss or gain, increased thirst  All other systems are comprehensively negative.    Physical Exam:  Vital Signs Last 24 Hrs  T(C): 36.9 (13 May 2020 07:18), Max: 37.4 (12 May 2020 17:13)  T(F): 98.5 (13 May 2020 07:18), Max: 99.4 (12 May 2020 17:13)  HR: 67 (13 May 2020 07:18) (67 - 83)  BP: 104/56 (13 May 2020 07:18) (104/56 - 126/77)  BP(mean): --  RR: 18 (13 May 2020 07:18) (18 - 18)  SpO2: 99% (13 May 2020 07:18) (95% - 99%)  General: NAD  HEENT: MMM  Neck: No JVD, no carotid bruit  Extremities: No LE edema, no cyanosis  Neuro: Non-focal  Skin: No rash    Labs:             05-13    138  |  102  |  24<H>  ----------------------------<  96  4.0   |  29  |  0.76    Ca    8.0<L>      13 May 2020 06:41  Phos  1.9     05-13    TPro  5.8<L>  /  Alb  2.1<L>  /  TBili  0.4  /  DBili  x   /  AST  26  /  ALT  40  /  AlkPhos  119  05-13                        8.9    9.16  )-----------( 229      ( 13 May 2020 06:41 )             28.0

## 2020-05-13 NOTE — PROGRESS NOTE ADULT - NSHPATTENDINGPLANDISCUSS_GEN_ALL_CORE
pt, SW, CM, RN, residency team, family over the phone - re: tx plan, disposition planning, above detailed plan

## 2020-05-13 NOTE — PROGRESS NOTE ADULT - PROBLEM SELECTOR PLAN 3
acute metabolic encephalopathy  -resolved   -CT Head - neg for acute stroke Stage 4 Sacral Ulcer  - s/p debridement and Zosyn course  - s/p wound vac placement - wound care NP Benita following  - Menendez placed for urinary retention by Urology - as pt not yet ambulatory, will keep Menendez in for wound healing given sacral ulcer, attempt TOV at [facility] once more ambulatory

## 2020-05-13 NOTE — PROGRESS NOTE ADULT - PROBLEM SELECTOR PLAN 4
-likely 2/2 anemia,   -resolved   - off midodrine, back on bb  -cont bb, will discharge on metropolol 50 BID Was hypotensive on admission, now resolved   - Off Midodrine, continue BB - will discharge on Metoprolol 50 BID  - Continue to hold off on home Valsartan  - Cardio Dr Ordonez following

## 2020-05-13 NOTE — PROGRESS NOTE ADULT - ASSESSMENT
80 y/o M with PMHx of HTN, BPH, spinal stenosis, dyslipidemia, anxiety, neuropathy, B12 deficiency who presents from rehab for evaluation of weakness and hypotension    Anemia   no overt gi bleeding; hgb stable  sp sacral decub debridement; care per surgery   monitor cbc, transfuse prn  cont ppi and carafate  monitor stool color   defer endoscopic eval as +Covid and increased risk for anesthesia in these pts   op gi f/u for possible colonoscopy if none recent once covid crisis resolved given ct findings    FTT/Dysphagia   unclear etiology; esophagram at [facility] 2/2019 normal   po intake better  correct elytes prn  cont ppi    on supplements per nutrition   defer endoscopic eval as +Covid and increased risk for anesthesia in these pts    COVID-19  monitor rep status   inc lfts in setting of viral illness; improved from prior  care per primary team; dc planning in progress

## 2020-05-13 NOTE — PROGRESS NOTE ADULT - ASSESSMENT
80 y/o M with PMHx of HTN, BPH, spinal stenosis, dyslipidemia, anxiety, neuropathy, B12 deficiency who presents from [facility] rehab for evaluation of weakness and hypotension. Admitted for  acute  blood  loss anemia 2/2 possible LGIB vs intramuscular hematoma and infected stage 4 sacral wound s/p debridement and IV abx. Pt medically stable for d/c, awaiting [facility] placement. 80 yo M with PMHx of HTN, BPH, spinal stenosis, dyslipidemia, anxiety, neuropathy, B12 deficiency presented from [facility] rehab for evaluation of weakness and hypotension. 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 Pt medically stable for d/c, awaiting [facility] placement.

## 2020-05-13 NOTE — PROGRESS NOTE ADULT - PROBLEM SELECTOR PLAN 5
-resolved  -found to have Elevated LFTs, possibly 2/2 hx of being covid +  - Avoid hepatotoxic drugs Acute metabolic encephalopathy - resolved   - Continue to monitor mental status.

## 2020-05-13 NOTE — PROGRESS NOTE ADULT - PROBLEM SELECTOR PLAN 7
- resolved, likely pre-prenal from SE[facility]  - Avoid nephrotoxic meds Likely pre-prenal - resolved  - Monitor BMP  - Avoid nephrotoxic meds

## 2020-05-13 NOTE — PROGRESS NOTE ADULT - SUBJECTIVE AND OBJECTIVE BOX
INTERVAL HPI/OVERNIGHT EVENTS:    no new events       MEDICATIONS  (STANDING):  collagenase Ointment 1 Application(s) Topical three times a day  metoprolol tartrate 50 milliGRAM(s) Oral two times a day  pantoprazole    Tablet 40 milliGRAM(s) Oral before breakfast  sertraline 100 milliGRAM(s) Oral daily  sodium chloride 0.65% Nasal 1 Spray(s) Both Nostrils two times a day  sucralfate suspension 1 Gram(s) Oral four times a day    MEDICATIONS  (PRN):      Allergies    No Known Allergies    Intolerances        Review of Systems:    unable to obtain     Vital Signs Last 24 Hrs  T(C): 36.6 (12 May 2020 07:44), Max: 36.7 (12 May 2020 04:17)  T(F): 97.8 (12 May 2020 07:44), Max: 98 (12 May 2020 04:17)  HR: 63 (12 May 2020 07:44) (63 - 70)  BP: 145/72 (12 May 2020 07:44) (106/64 - 152/76)  BP(mean): --  RR: 18 (12 May 2020 07:44) (18 - 19)  SpO2: 99% (12 May 2020 07:44) (97% - 99%)    PHYSICAL EXAM:    deferred f/u done remotely as covid +       LABS:                        9.3    8.42  )-----------( 242      ( 12 May 2020 07:01 )             29.7     05-12    138  |  105  |  25<H>  ----------------------------<  94  4.7   |  30  |  0.75    Ca    8.2<L>      12 May 2020 07:01    TPro  5.8<L>  /  Alb  2.1<L>  /  TBili  0.4  /  DBili  x   /  AST  31  /  ALT  49  /  AlkPhos  132<H>  05-12          RADIOLOGY & ADDITIONAL TESTS:

## 2020-05-13 NOTE — PROGRESS NOTE ADULT - SUBJECTIVE AND OBJECTIVE BOX
This progress note was completed in part by resident acting as telephonic scribe during COVID-19 crisis. Physical exam was completed by attending physician, and findings below are those of the attending physician, and have been reviewed in detail.    Patient is a 79y old  Male who presents with a chief complaint of Generalized weakness (12 May 2020 14:41)    FROM ADMISSION H+P:   HPI: 78 y/o M with PMHx of HTN, BPH, spinal stenosis, dyslipidemia, anxiety, neuropathy, B12 deficiency who presents from [facility] rehab for evaluation of weakness and hypotension. As per transfer papers, pt with confusion after lunch today. Pt admits to generalized weakness. Lives with wife, Stephany and son. He came from [facility] rehab in [place] since Feb 14. Patient lives with wife and son at home and ambulates with cane. Contracted Covid-19 in [facility] rehab and first started having symptoms on Mar 25, reporting symptoms of cough and low grade fever, diarrhea. Denies any chest pain, shortness of breath, headache, dizziness. History obtained from wife, Stephany([phone]) as pt is confused and a poor historian.   Of note, pt was noted to have sacral wounds today and reported low grade fever for two days and mental status changes today and loss of appetite.    The date the pt first felt unwell: March 25  Fever or chills: yes [ x ]   no [  ]   - Tmax:   Fatigue, malaise or generalized weakness: yes [ x ]   no [  ]  Shortness of breath/dyspnea: yes [ x ]   no [  ]  Cough: yes [ x ]   no [  ], sputum production: yes [  ]   no [x  ]  Blood in sputum: yes [  ]   no [ x ]  Anorexia/po intolerance: yes [  ]   no [ x ]  Chest pain or chest tightness: yes [  ]   no [ x ]  Edema in legs: yes [  ]   no [ x ]  Myalgias: yes [  ]   no [ x ]  Headache: yes [  ]   no [x  ]  Sore throat: yes [  ]   no [ x ]  Rhinorrhea: yes [  ]   no [  x]  Abd pain: yes [  ]   no [ x ]  Nausea: yes [  ]   no [x  ]  Vomiting: yes [  ]   no [  x]  Diarrhea: yes [  ]   no [  ]  Skin rashes: yes [  ]   no [  ]  Loss of sense of smell/anosmia: yes [  ]   no [ x ]  Conjunctivitis: yes [  ]   no [ x ]  Recent travel: yes [  ]   no [ x ] - Location:   Any sick contacts: yes [x  ]   no [  ]: Contracted from [facility]  Close contact with someone confirmed positive with COVID-19 / SARS-CoV2 in the last 14 days: yes [  ]   no [ x ]  Code status: DNR, but not DNI    In the ED  Vitals: T--, , BP 84/48, RR 22, O2 96 on RA  Lab s(In [place]) significant for: wbc 18.45, hgb 6.8, FOBT+,   Coag studies: PT 14.9, INR 1.33, aPTT 43.7, d-dimer 561  Chem panel: Lactate 2.5, Na 133, AG 3, BUN/Cr 61/1.61, Alb 1.9, Alk phos 195, ,   UA neg. Covid-19 PCR Positive   EKG shows Accelerated Junctional rhythm with occasional PVCs  CXR in [place] ED shows: Mild R atelectasis  In [place] ED, s/p 2L NS bolus, 1 unit pRBC (28 Apr 2020 21:00)      ----  INTERVAL HPI/OVERNIGHT EVENTS: Pt seen and evaluated at the bedside. No acute overnight events occurred. Saturating well on RA.    Fever or chills: yes [  ]   no [  ]  Fatigue, malaise or generalized weakness: yes [  ]   no [  ]  Chest pain: yes [  ]   no [  ]  Palpitations: yes [  ]   no [  ]  Shortness of breath/dyspnea: yes [  ]   no [  ]  Cough: yes [  ]   no [  ], sputum production: yes [  ]   no [  ]  Anorexia/po intolerance: yes [  ]   no [  ]  Myalgias: yes [  ]   no [  ]  Headache: yes [  ]   no [  ]  Sore throat: yes [  ]   no [  ]  Rhinorrhea: yes [  ]   no [  ]  Nausea: yes [  ]   no [  ]  Vomiting: yes [  ]   no [  ]  Diarrhea: yes [  ]   no [  ]  Loss of sense of smell/anosmia: yes [  ]   no [  ]  Conjunctivitis: yes [  ]   no [  ]  Other associated complaints:     ----  PAST MEDICAL & SURGICAL HISTORY:  H/O vitamin B deficiency  Spinal stenosis  HTN (hypertension)  History of tonsillectomy      FAMILY HISTORY:  No pertinent family history in first degree relatives      Allergies    No Known Allergies    Intolerances        ----  REVIEW OF SYSTEMS:  as per HPI    ----  PHYSICAL EXAM:  GENERAL: patient appears well, no acute distress, speaking in complete sentences, nontoxic  EYES: sclera clear without erythema, no exudates  ENMT: oropharynx clear without erythema, moist mucous membranes  LUNGS: good air entry bilaterally, clear to auscultation, symmetric breath sounds, no wheezing or rhonchi appreciated  HEART: soft S1/S2, regular rate and rhythm, no murmurs noted, no noted edema to b/l LE  GASTROINTESTINAL: abdomen is soft, nontender, nondistended, normoactive bowel sounds, no palpable masses  INTEGUMENT: good skin turgor, appropriate for ethnicity, appears well perfused, no jaundice noted  MUSCULOSKELETAL: no clubbing or cyanosis, no obvious deformity  NEUROLOGIC: awake, alert, oriented x3, good muscle tone in 4 extremities, no obvious sensory deficits    T(C): 36.9 (05-13-20 @ 07:18), Max: 37.4 (05-12-20 @ 17:13)  HR: 67 (05-13-20 @ 07:18) (67 - 83)  BP: 104/56 (05-13-20 @ 07:18) (104/56 - 126/77)  RR: 18 (05-13-20 @ 07:18) (18 - 18)  SpO2: 99% (05-13-20 @ 07:18) (95% - 99%)  Wt(kg): --    ----  I&O's Summary    12 May 2020 07:01  -  13 May 2020 07:00  --------------------------------------------------------  IN: 0 mL / OUT: 900 mL / NET: -900 mL        LABS:                        8.9    9.16  )-----------( 229      ( 13 May 2020 06:41 )             28.0     05-13    138  |  102  |  24<H>  ----------------------------<  96  4.0   |  29  |  0.76    Ca    8.0<L>      13 May 2020 06:41  Phos  1.9     05-13    TPro  5.8<L>  /  Alb  2.1<L>  /  TBili  0.4  /  DBili  x   /  AST  26  /  ALT  40  /  AlkPhos  119  05-13          COVID-19 Inflammatory Markers:  Auto Neutrophil #: 7.16 K/uL (05-13-20 @ 06:41)  Auto Neutrophil #: 6.58 K/uL (05-12-20 @ 07:01)  Auto Neutrophil #: 6.56 K/uL (05-11-20 @ 06:46)  Auto Neutrophil #: 6.47 K/uL (05-10-20 @ 11:35)  Auto Neutrophil #: 6.11 K/uL (05-09-20 @ 06:38)  Auto Neutrophil #: 6.53 K/uL (05-08-20 @ 06:48)  Auto Neutrophil #: 8.71 K/uL (05-07-20 @ 07:54)  Auto Neutrophil #: 10.44 K/uL (05-06-20 @ 12:42)  Auto Neutrophil #: 10.10 K/uL (05-05-20 @ 07:00)  Auto Neutrophil #: 10.76 K/uL (05-04-20 @ 07:34)    Auto Lymphocyte #: 1.42 K/uL (05-13-20 @ 06:41)  Auto Lymphocyte #: 1.21 K/uL (05-12-20 @ 07:01)  Auto Lymphocyte #: 1.41 K/uL (05-11-20 @ 06:46)  Auto Lymphocyte #: 1.26 K/uL (05-10-20 @ 11:35)  Auto Lymphocyte #: 1.09 K/uL (05-09-20 @ 06:38)  Auto Lymphocyte #: 1.07 K/uL (05-08-20 @ 06:48)  Auto Lymphocyte #: 1.50 K/uL (05-07-20 @ 07:54)  Auto Lymphocyte #: 0.77 K/uL (05-06-20 @ 12:42)  Auto Lymphocyte #: 1.09 K/uL (05-05-20 @ 07:00)  Auto Lymphocyte #: 1.09 K/uL (05-04-20 @ 07:34) This progress note was completed in part by resident acting as telephonic scribe during COVID-19 crisis. Physical exam was completed by attending physician, and findings below are those of the attending physician, and have been reviewed in detail.    Patient is a 79y old  Male who presents with a chief complaint of Generalized weakness (12 May 2020 14:41)    FROM ADMISSION H+P:   HPI: 78 y/o M with PMHx of HTN, BPH, spinal stenosis, dyslipidemia, anxiety, neuropathy, B12 deficiency who presents from [facility] rehab for evaluation of weakness and hypotension. As per transfer papers, pt with confusion after lunch today. Pt admits to generalized weakness. Lives with wife, Stephany and son. He came from [facility] rehab in [place] since Feb 14. Patient lives with wife and son at home and ambulates with cane. Contracted Covid-19 in [facility] rehab and first started having symptoms on Mar 25, reporting symptoms of cough and low grade fever, diarrhea. Denies any chest pain, shortness of breath, headache, dizziness. History obtained from wife, Stephany([phone]) as pt is confused and a poor historian.   Of note, pt was noted to have sacral wounds today and reported low grade fever for two days and mental status changes today and loss of appetite.    The date the pt first felt unwell: March 25  Fever or chills: yes [ x ]   no [  ]   - Tmax:   Fatigue, malaise or generalized weakness: yes [ x ]   no [  ]  Shortness of breath/dyspnea: yes [ x ]   no [  ]  Cough: yes [ x ]   no [  ], sputum production: yes [  ]   no [x  ]  Blood in sputum: yes [  ]   no [ x ]  Anorexia/po intolerance: yes [  ]   no [ x ]  Chest pain or chest tightness: yes [  ]   no [ x ]  Edema in legs: yes [  ]   no [ x ]  Myalgias: yes [  ]   no [ x ]  Headache: yes [  ]   no [x  ]  Sore throat: yes [  ]   no [ x ]  Rhinorrhea: yes [  ]   no [  x]  Abd pain: yes [  ]   no [ x ]  Nausea: yes [  ]   no [x  ]  Vomiting: yes [  ]   no [  x]  Diarrhea: yes [  ]   no [  ]  Skin rashes: yes [  ]   no [  ]  Loss of sense of smell/anosmia: yes [  ]   no [ x ]  Conjunctivitis: yes [  ]   no [ x ]  Recent travel: yes [  ]   no [ x ] - Location:   Any sick contacts: yes [x  ]   no [  ]: Contracted from [facility]  Close contact with someone confirmed positive with COVID-19 / SARS-CoV2 in the last 14 days: yes [  ]   no [ x ]  Code status: DNR, but not DNI    In the ED  Vitals: T--, , BP 84/48, RR 22, O2 96 on RA  Lab s(In [place]) significant for: wbc 18.45, hgb 6.8, FOBT+,   Coag studies: PT 14.9, INR 1.33, aPTT 43.7, d-dimer 561  Chem panel: Lactate 2.5, Na 133, AG 3, BUN/Cr 61/1.61, Alb 1.9, Alk phos 195, ,   UA neg. Covid-19 PCR Positive   EKG shows Accelerated Junctional rhythm with occasional PVCs  CXR in [place] ED shows: Mild R atelectasis  In [place] ED, s/p 2L NS bolus, 1 unit pRBC (28 Apr 2020 21:00)      ----  INTERVAL HPI/OVERNIGHT EVENTS: Pt seen and evaluated at the bedside via tele-rounding. Patient lifted his hand to say hello but initially had difficulty verbalizing. He was A&Ox3 (to [facility] "[facility]", 2020, self). He admits to feeling weak, denies pain. Spoke with spouse Stephany Bradley via phone while tele-rounding.     No acute overnight events occurred. Saturating well on RA.    Fever or chills: yes [  ]   no [ x ]  Fatigue, malaise or generalized weakness: yes [ x ]   no [  ]  Chest pain: yes [  ]   no [ x ]  Palpitations: yes [  ]   no [  ]  Shortness of breath/dyspnea: yes [  ]   no [  ]  Cough: yes [  ]   no [  ], sputum production: yes [  ]   no [  ]  Anorexia/po intolerance: yes [  ]   no [  ]  Myalgias: yes [  ]   no [ x ]  Headache: yes [  ]   no [ x ]  Sore throat: yes [  ]   no [  ]  Rhinorrhea: yes [  ]   no [  ]  Nausea: yes [  ]   no [  ]  Vomiting: yes [  ]   no [  ]  Diarrhea: yes [  ]   no [  ]  Loss of sense of smell/anosmia: yes [  ]   no [  ]  Conjunctivitis: yes [  ]   no [  ]  Other associated complaints:     ----  PAST MEDICAL & SURGICAL HISTORY:  H/O vitamin B deficiency  Spinal stenosis  HTN (hypertension)  History of tonsillectomy      FAMILY HISTORY:  No pertinent family history in first degree relatives      Allergies    No Known Allergies    Intolerances        ----  REVIEW OF SYSTEMS:  as per HPI    ----  PHYSICAL EXAM:  GENERAL: patient appears well, no acute distress, speaking in complete sentences, nontoxic  EYES: sclera clear without erythema, no exudates  ENMT: oropharynx clear without erythema, moist mucous membranes  LUNGS: good air entry bilaterally, clear to auscultation, symmetric breath sounds, no wheezing or rhonchi appreciated  HEART: soft S1/S2, regular rate and rhythm, no murmurs noted, no noted edema to b/l LE  GASTROINTESTINAL: abdomen is soft, nontender, nondistended, normoactive bowel sounds, no palpable masses  INTEGUMENT: good skin turgor, appropriate for ethnicity, appears well perfused, no jaundice noted  MUSCULOSKELETAL: no clubbing or cyanosis, no obvious deformity  NEUROLOGIC: awake, alert, oriented x3, good muscle tone in 4 extremities, no obvious sensory deficits    T(C): 36.9 (05-13-20 @ 07:18), Max: 37.4 (05-12-20 @ 17:13)  HR: 67 (05-13-20 @ 07:18) (67 - 83)  BP: 104/56 (05-13-20 @ 07:18) (104/56 - 126/77)  RR: 18 (05-13-20 @ 07:18) (18 - 18)  SpO2: 99% (05-13-20 @ 07:18) (95% - 99%)  Wt(kg): --    ----  I&O's Summary    12 May 2020 07:01  -  13 May 2020 07:00  --------------------------------------------------------  IN: 0 mL / OUT: 900 mL / NET: -900 mL        LABS:                        8.9    9.16  )-----------( 229      ( 13 May 2020 06:41 )             28.0     05-13    138  |  102  |  24<H>  ----------------------------<  96  4.0   |  29  |  0.76    Ca    8.0<L>      13 May 2020 06:41  Phos  1.9     05-13    TPro  5.8<L>  /  Alb  2.1<L>  /  TBili  0.4  /  DBili  x   /  AST  26  /  ALT  40  /  AlkPhos  119  05-13          COVID-19 Inflammatory Markers:  Auto Neutrophil #: 7.16 K/uL (05-13-20 @ 06:41)  Auto Neutrophil #: 6.58 K/uL (05-12-20 @ 07:01)  Auto Neutrophil #: 6.56 K/uL (05-11-20 @ 06:46)  Auto Neutrophil #: 6.47 K/uL (05-10-20 @ 11:35)  Auto Neutrophil #: 6.11 K/uL (05-09-20 @ 06:38)  Auto Neutrophil #: 6.53 K/uL (05-08-20 @ 06:48)  Auto Neutrophil #: 8.71 K/uL (05-07-20 @ 07:54)  Auto Neutrophil #: 10.44 K/uL (05-06-20 @ 12:42)  Auto Neutrophil #: 10.10 K/uL (05-05-20 @ 07:00)  Auto Neutrophil #: 10.76 K/uL (05-04-20 @ 07:34)    Auto Lymphocyte #: 1.42 K/uL (05-13-20 @ 06:41)  Auto Lymphocyte #: 1.21 K/uL (05-12-20 @ 07:01)  Auto Lymphocyte #: 1.41 K/uL (05-11-20 @ 06:46)  Auto Lymphocyte #: 1.26 K/uL (05-10-20 @ 11:35)  Auto Lymphocyte #: 1.09 K/uL (05-09-20 @ 06:38)  Auto Lymphocyte #: 1.07 K/uL (05-08-20 @ 06:48)  Auto Lymphocyte #: 1.50 K/uL (05-07-20 @ 07:54)  Auto Lymphocyte #: 0.77 K/uL (05-06-20 @ 12:42)  Auto Lymphocyte #: 1.09 K/uL (05-05-20 @ 07:00)  Auto Lymphocyte #: 1.09 K/uL (05-04-20 @ 07:34) This progress note was completed in part by resident acting as telephonic scribe during COVID-19 crisis. Physical exam was completed by attending physician, and findings below are those of the attending physician, and have been reviewed in detail.    Patient is a 79y old  Male who presents with a chief complaint of Generalized weakness (12 May 2020 14:41)    FROM ADMISSION H+P:   HPI: 78 y/o M with PMHx of HTN, BPH, spinal stenosis, dyslipidemia, anxiety, neuropathy, B12 deficiency who presents from [facility] rehab for evaluation of weakness and hypotension. As per transfer papers, pt with confusion after lunch today. Pt admits to generalized weakness. Lives with wife, Stephany and son. He came from [facility] rehab in [place] since Feb 14. Patient lives with wife and son at home and ambulates with cane. Contracted Covid-19 in [facility] rehab and first started having symptoms on Mar 25, reporting symptoms of cough and low grade fever, diarrhea. Denies any chest pain, shortness of breath, headache, dizziness. History obtained from wife, Stephany([phone]) as pt is confused and a poor historian.   Of note, pt was noted to have sacral wounds today and reported low grade fever for two days and mental status changes today and loss of appetite.    The date the pt first felt unwell: March 25  Fever or chills: yes [ x ]   no [  ]   - Tmax:   Fatigue, malaise or generalized weakness: yes [ x ]   no [  ]  Shortness of breath/dyspnea: yes [ x ]   no [  ]  Cough: yes [ x ]   no [  ], sputum production: yes [  ]   no [x  ]  Blood in sputum: yes [  ]   no [ x ]  Anorexia/po intolerance: yes [  ]   no [ x ]  Chest pain or chest tightness: yes [  ]   no [ x ]  Edema in legs: yes [  ]   no [ x ]  Myalgias: yes [  ]   no [ x ]  Headache: yes [  ]   no [x  ]  Sore throat: yes [  ]   no [ x ]  Rhinorrhea: yes [  ]   no [  x]  Abd pain: yes [  ]   no [ x ]  Nausea: yes [  ]   no [x  ]  Vomiting: yes [  ]   no [  x]  Diarrhea: yes [  ]   no [  ]  Skin rashes: yes [  ]   no [  ]  Loss of sense of smell/anosmia: yes [  ]   no [ x ]  Conjunctivitis: yes [  ]   no [ x ]  Recent travel: yes [  ]   no [ x ] - Location:   Any sick contacts: yes [x  ]   no [  ]: Contracted from [facility]  Close contact with someone confirmed positive with COVID-19 / SARS-CoV2 in the last 14 days: yes [  ]   no [ x ]  Code status: DNR, but not DNI    In the ED  Vitals: T--, , BP 84/48, RR 22, O2 96 on RA  Lab s(In [facility]) significant for: wbc 18.45, hgb 6.8, FOBT+,   Coag studies: PT 14.9, INR 1.33, aPTT 43.7, d-dimer 561  Chem panel: Lactate 2.5, Na 133, AG 3, BUN/Cr 61/1.61, Alb 1.9, Alk phos 195, ,   UA neg. Covid-19 PCR Positive   EKG shows Accelerated Junctional rhythm with occasional PVCs  CXR in [facility] ED shows: Mild R atelectasis  In [facility] ED, s/p 2L NS bolus, 1 unit pRBC (28 Apr 2020 21:00)      ----  INTERVAL HPI/OVERNIGHT EVENTS: Pt seen and evaluated at the bedside via tele-rounding. Patient lifted his hand to say hello but initially had difficulty verbalizing. He was A&Ox3 (to [facility] "[facility]", 2020, self). He admits to feeling weak, denies pain or other complaints. Spoke with spouse Stephany Bradley via phone while tele-rounding.     No acute overnight events occurred. Saturating well on RA.    Fever or chills: yes [  ]   no [ x ]  Fatigue, malaise or generalized weakness: yes [ x ]   no [  ]  Chest pain: yes [  ]   no [ x ]  Palpitations: yes [  ]   no [  ]  Shortness of breath/dyspnea: yes [  ]   no [  ]  Cough: yes [  ]   no [  ], sputum production: yes [  ]   no [  ]  Anorexia/po intolerance: yes [  ]   no [  ]  Myalgias: yes [  ]   no [ x ]  Headache: yes [  ]   no [ x ]  Sore throat: yes [  ]   no [  ]  Rhinorrhea: yes [  ]   no [  ]  Nausea: yes [  ]   no [  ]  Vomiting: yes [  ]   no [  ]  Diarrhea: yes [  ]   no [  ]  Loss of sense of smell/anosmia: yes [  ]   no [  ]  Conjunctivitis: yes [  ]   no [  ]  Other associated complaints:     ----  PAST MEDICAL & SURGICAL HISTORY:  H/O vitamin B deficiency  Spinal stenosis  HTN (hypertension)  History of tonsillectomy      FAMILY HISTORY:  No pertinent family history in first degree relatives      Allergies    No Known Allergies    Intolerances        ----  REVIEW OF SYSTEMS:  as per HPI    ----  PHYSICAL EXAM:  GENERAL: patient appears well, no acute distress, speaking in complete sentences, nontoxic  EYES: sclera clear without erythema, no exudates  ENMT: oropharynx clear without erythema, moist mucous membranes  LUNGS: good air entry bilaterally, clear to auscultation, symmetric breath sounds, no wheezing or rhonchi appreciated  HEART: soft S1/S2, regular rate and rhythm, no murmurs noted, no noted edema to b/l LE  GASTROINTESTINAL: abdomen is soft, nontender, nondistended, normoactive bowel sounds, no palpable masses  INTEGUMENT: good skin turgor, appropriate for ethnicity, appears well perfused, no jaundice noted  MUSCULOSKELETAL: no clubbing or cyanosis, no obvious deformity  NEUROLOGIC: awake, alert, oriented x3, good muscle tone in 4 extremities, no obvious sensory deficits    T(C): 36.9 (05-13-20 @ 07:18), Max: 37.4 (05-12-20 @ 17:13)  HR: 67 (05-13-20 @ 07:18) (67 - 83)  BP: 104/56 (05-13-20 @ 07:18) (104/56 - 126/77)  RR: 18 (05-13-20 @ 07:18) (18 - 18)  SpO2: 99% (05-13-20 @ 07:18) (95% - 99%)  Wt(kg): --    ----  I&O's Summary    12 May 2020 07:01  -  13 May 2020 07:00  --------------------------------------------------------  IN: 0 mL / OUT: 900 mL / NET: -900 mL        LABS:                        8.9    9.16  )-----------( 229      ( 13 May 2020 06:41 )             28.0     05-13    138  |  102  |  24<H>  ----------------------------<  96  4.0   |  29  |  0.76    Ca    8.0<L>      13 May 2020 06:41  Phos  1.9     05-13    TPro  5.8<L>  /  Alb  2.1<L>  /  TBili  0.4  /  DBili  x   /  AST  26  /  ALT  40  /  AlkPhos  119  05-13          COVID-19 Inflammatory Markers:  Auto Neutrophil #: 7.16 K/uL (05-13-20 @ 06:41)  Auto Neutrophil #: 6.58 K/uL (05-12-20 @ 07:01)  Auto Neutrophil #: 6.56 K/uL (05-11-20 @ 06:46)  Auto Neutrophil #: 6.47 K/uL (05-10-20 @ 11:35)  Auto Neutrophil #: 6.11 K/uL (05-09-20 @ 06:38)  Auto Neutrophil #: 6.53 K/uL (05-08-20 @ 06:48)  Auto Neutrophil #: 8.71 K/uL (05-07-20 @ 07:54)  Auto Neutrophil #: 10.44 K/uL (05-06-20 @ 12:42)  Auto Neutrophil #: 10.10 K/uL (05-05-20 @ 07:00)  Auto Neutrophil #: 10.76 K/uL (05-04-20 @ 07:34)    Auto Lymphocyte #: 1.42 K/uL (05-13-20 @ 06:41)  Auto Lymphocyte #: 1.21 K/uL (05-12-20 @ 07:01)  Auto Lymphocyte #: 1.41 K/uL (05-11-20 @ 06:46)  Auto Lymphocyte #: 1.26 K/uL (05-10-20 @ 11:35)  Auto Lymphocyte #: 1.09 K/uL (05-09-20 @ 06:38)  Auto Lymphocyte #: 1.07 K/uL (05-08-20 @ 06:48)  Auto Lymphocyte #: 1.50 K/uL (05-07-20 @ 07:54)  Auto Lymphocyte #: 0.77 K/uL (05-06-20 @ 12:42)  Auto Lymphocyte #: 1.09 K/uL (05-05-20 @ 07:00)  Auto Lymphocyte #: 1.09 K/uL (05-04-20 @ 07:34) This progress note was completed in part by resident acting as telephonic scribe during COVID-19 crisis. Physical exam was completed by attending physician, and findings below are those of the attending physician, and have been reviewed in detail.    Patient is a 79y old  Male who presents with a chief complaint of Generalized weakness (12 May 2020 14:41)    FROM ADMISSION H+P:   HPI: 78 y/o M with PMHx of HTN, BPH, spinal stenosis, dyslipidemia, anxiety, neuropathy, B12 deficiency who presents from [facility] rehab for evaluation of weakness and hypotension. As per transfer papers, pt with confusion after lunch today. Pt admits to generalized weakness. Lives with wife, Stephany and son. He came from [facility] rehab in [place] since Feb 14. Patient lives with wife and son at home and ambulates with cane. Contracted Covid-19 in [facility] rehab and first started having symptoms on Mar 25, reporting symptoms of cough and low grade fever, diarrhea. Denies any chest pain, shortness of breath, headache, dizziness. History obtained from wife, Stephany([phone]) as pt is confused and a poor historian.   Of note, pt was noted to have sacral wounds today and reported low grade fever for two days and mental status changes today and loss of appetite.    The date the pt first felt unwell: March 25  Fever or chills: yes [ x ]   no [  ]   - Tmax:   Fatigue, malaise or generalized weakness: yes [ x ]   no [  ]  Shortness of breath/dyspnea: yes [ x ]   no [  ]  Cough: yes [ x ]   no [  ], sputum production: yes [  ]   no [x  ]  Blood in sputum: yes [  ]   no [ x ]  Anorexia/po intolerance: yes [  ]   no [ x ]  Chest pain or chest tightness: yes [  ]   no [ x ]  Edema in legs: yes [  ]   no [ x ]  Myalgias: yes [  ]   no [ x ]  Headache: yes [  ]   no [x  ]  Sore throat: yes [  ]   no [ x ]  Rhinorrhea: yes [  ]   no [  x]  Abd pain: yes [  ]   no [ x ]  Nausea: yes [  ]   no [x  ]  Vomiting: yes [  ]   no [  x]  Diarrhea: yes [  ]   no [  ]  Skin rashes: yes [  ]   no [  ]  Loss of sense of smell/anosmia: yes [  ]   no [ x ]  Conjunctivitis: yes [  ]   no [ x ]  Recent travel: yes [  ]   no [ x ] - Location:   Any sick contacts: yes [x  ]   no [  ]: Contracted from [facility] re[facility]  Close contact with someone confirmed positive with COVID-19 / SARS-CoV2 in the last 14 days: yes [  ]   no [ x ]  Code status: DNR, but not DNI    In the ED  Vitals: T--, , BP 84/48, RR 22, O2 96 on RA  Lab s(In [place]) significant for: wbc 18.45, hgb 6.8, FOBT+,   Coag studies: PT 14.9, INR 1.33, aPTT 43.7, d-dimer 561  Chem panel: Lactate 2.5, Na 133, AG 3, BUN/Cr 61/1.61, Alb 1.9, Alk phos 195, ,   UA neg. Covid-19 PCR Positive   EKG shows Accelerated Junctional rhythm with occasional PVCs  CXR in [facility] ED shows: Mild R atelectasis  In [facility] ED, s/p 2L NS bolus, 1 unit pRBC (28 Apr 2020 21:00)      ----  INTERVAL HPI/OVERNIGHT EVENTS: Pt seen and evaluated at the bedside via tele-rounding. Patient lifted his hand to say hello but initially had difficulty verbalizing. He was A&Ox4 (to [facility] "[facility]", 2020, self), understood why he is hospitalized. He admits to feeling weak, denies pain or other complaints. Spoke with spouse Stephany Bradley via phone while tele-rounding.     No acute overnight events occurred. Saturating well on RA.    Fever or chills: yes [  ]   no [ x ]  Fatigue, malaise or generalized weakness: yes [ x ]   no [  ]  Chest pain: yes [  ]   no [ x ]  Palpitations: yes [  ]   no [  ]  Shortness of breath/dyspnea: yes [  ]   no [  ]  Cough: yes [  ]   no [  ], sputum production: yes [  ]   no [  ]  Anorexia/po intolerance: yes [  ]   no [  ]  Myalgias: yes [  ]   no [ x ]  Headache: yes [  ]   no [ x ]  Sore throat: yes [  ]   no [  ]  Rhinorrhea: yes [  ]   no [  ]  Nausea: yes [  ]   no [  ]  Vomiting: yes [  ]   no [  ]  Diarrhea: yes [  ]   no [  ]  Loss of sense of smell/anosmia: yes [  ]   no [  ]  Conjunctivitis: yes [  ]   no [  ]  Other associated complaints:     ----  PAST MEDICAL & SURGICAL HISTORY:  H/O vitamin B deficiency  Spinal stenosis  HTN (hypertension)  History of tonsillectomy      FAMILY HISTORY:  No pertinent family history in first degree relatives      Allergies    No Known Allergies    Intolerances        ----  REVIEW OF SYSTEMS:  as per HPI    ----  PHYSICAL EXAM:  GENERAL: patient appears well, no acute distress, speaking in complete sentences, nontoxic  EYES: sclera clear without erythema, no exudates  ENMT: oropharynx clear without erythema, moist mucous membranes  LUNGS: good air entry bilaterally, clear to auscultation, symmetric breath sounds, no wheezing or rhonchi appreciated  HEART: soft S1/S2, regular rate and rhythm, no murmurs noted, no noted edema to b/l LE  GASTROINTESTINAL: abdomen is soft, nontender, nondistended, normoactive bowel sounds, no palpable masses  INTEGUMENT: good skin turgor, appropriate for ethnicity, appears well perfused, no jaundice noted  MUSCULOSKELETAL: no clubbing or cyanosis, no obvious deformity  NEUROLOGIC: awake, alert, oriented x3, good muscle tone in 4 extremities, no obvious sensory deficits    T(C): 36.9 (05-13-20 @ 07:18), Max: 37.4 (05-12-20 @ 17:13)  HR: 67 (05-13-20 @ 07:18) (67 - 83)  BP: 104/56 (05-13-20 @ 07:18) (104/56 - 126/77)  RR: 18 (05-13-20 @ 07:18) (18 - 18)  SpO2: 99% (05-13-20 @ 07:18) (95% - 99%)  Wt(kg): --    ----  I&O's Summary    12 May 2020 07:01  -  13 May 2020 07:00  --------------------------------------------------------  IN: 0 mL / OUT: 900 mL / NET: -900 mL        LABS:                        8.9    9.16  )-----------( 229      ( 13 May 2020 06:41 )             28.0     05-13    138  |  102  |  24<H>  ----------------------------<  96  4.0   |  29  |  0.76    Ca    8.0<L>      13 May 2020 06:41  Phos  1.9     05-13    TPro  5.8<L>  /  Alb  2.1<L>  /  TBili  0.4  /  DBili  x   /  AST  26  /  ALT  40  /  AlkPhos  119  05-13          COVID-19 Inflammatory Markers:  Auto Neutrophil #: 7.16 K/uL (05-13-20 @ 06:41)  Auto Neutrophil #: 6.58 K/uL (05-12-20 @ 07:01)  Auto Neutrophil #: 6.56 K/uL (05-11-20 @ 06:46)  Auto Neutrophil #: 6.47 K/uL (05-10-20 @ 11:35)  Auto Neutrophil #: 6.11 K/uL (05-09-20 @ 06:38)  Auto Neutrophil #: 6.53 K/uL (05-08-20 @ 06:48)  Auto Neutrophil #: 8.71 K/uL (05-07-20 @ 07:54)  Auto Neutrophil #: 10.44 K/uL (05-06-20 @ 12:42)  Auto Neutrophil #: 10.10 K/uL (05-05-20 @ 07:00)  Auto Neutrophil #: 10.76 K/uL (05-04-20 @ 07:34)    Auto Lymphocyte #: 1.42 K/uL (05-13-20 @ 06:41)  Auto Lymphocyte #: 1.21 K/uL (05-12-20 @ 07:01)  Auto Lymphocyte #: 1.41 K/uL (05-11-20 @ 06:46)  Auto Lymphocyte #: 1.26 K/uL (05-10-20 @ 11:35)  Auto Lymphocyte #: 1.09 K/uL (05-09-20 @ 06:38)  Auto Lymphocyte #: 1.07 K/uL (05-08-20 @ 06:48)  Auto Lymphocyte #: 1.50 K/uL (05-07-20 @ 07:54)  Auto Lymphocyte #: 0.77 K/uL (05-06-20 @ 12:42)  Auto Lymphocyte #: 1.09 K/uL (05-05-20 @ 07:00)  Auto Lymphocyte #: 1.09 K/uL (05-04-20 @ 07:34) This progress note was completed in part by resident acting as telephonic scribe during COVID-19 crisis. Physical exam was completed by attending physician, and findings below are those of the attending physician, and have been reviewed in detail.    Patient is a 79y old  Male who presents with a chief complaint of Generalized weakness (12 May 2020 14:41)    FROM ADMISSION H+P:   HPI: 80 y/o M with PMHx of HTN, BPH, spinal stenosis, dyslipidemia, anxiety, neuropathy, B12 deficiency who presents from [facility] for evaluation of weakness and hypotension. As per transfer papers, pt with confusion after lunch today. Pt admits to generalized weakness. Lives with wife, Stephany and son. He came from [facility] rehab in [place] since Feb 14. Patient lives with wife and son at home and ambulates with cane. Contracted Covid-19 in [facility] rehab and first started having symptoms on Mar 25, reporting symptoms of cough and low grade fever, diarrhea. Denies any chest pain, shortness of breath, headache, dizziness. History obtained from wife, Stephany([phone]) as pt is confused and a poor historian.   Of note, pt was noted to have sacral wounds today and reported low grade fever for two days and mental status changes today and loss of appetite.    ----  INTERVAL HPI/OVERNIGHT EVENTS: Pt seen and evaluated at the bedside via tele-rounding. Patient lifted his hand to say hello but initially had difficulty verbalizing. He was A&Ox4 (to \[facility]\"" "[facility]", 2020, self), understood why he is hospitalized. He admits to feeling weak, denies pain or other complaints. Spoke with spouse Stephany [facility] via phone while tele-rounding. Pt deferred much of the conversation to family member over the phone.     No acute overnight events occurred. Saturating well on RA.    Fever or chills: yes [  ]   no [ x ]  Fatigue, malaise or generalized weakness: yes [ x ]   no [  ]  Chest pain: yes [  ]   no [ x ]  Palpitations: yes [  ]   no [ x ]  Shortness of breath/dyspnea: yes [ x ]   no [  ]  Cough: yes [  ]   no [ x ], sputum production: yes [  ]   no [ x ]   Myalgias: yes [  ]   no [ x ]  Headache: yes [  ]   no [ x ]     ----  PAST MEDICAL & SURGICAL HISTORY:  H/O vitamin B deficiency  Spinal stenosis  HTN (hypertension)  History of tonsillectomy      FAMILY HISTORY:  No pertinent family history in first degree relatives      Allergies    No Known Allergies    Intolerances        ----  REVIEW OF SYSTEMS:  as per HPI    ----  PHYSICAL EXAM:  This encounter took place via video conferencing. The following findings were noted but a full physical examination was not deemed possible  GENERAL: no sign of distress  PULM: no signs of respiratory distress, speaking in full sentences. no coughing during our interaction  NEURO: alert, oriented to person, place, time. voice is clear and not muffled or slurred, no noted tremors  PSYCH: organized thought pattern, able to interact readily  SKIN: visualized skin on arms, legs, face without obvious lesions    Other noted pertinent findings  OXYGEN SUPPORT: Nasal cannula: yes [  ]   no [  ] - if yes, the flow rate:        - nonrebreather mask: yes [  ]   no [  ]         - venti-mask: yes [  ]   no [  ] - if yes, the flow rate:   LINES: central line, midline, PICC line: yes [  ]   no [  ]  REYNA CATHETER: yes [  ]   no [  ]    T(C): 36.9 (05-13-20 @ 07:18), Max: 37.4 (05-12-20 @ 17:13)  HR: 67 (05-13-20 @ 07:18) (67 - 83)  BP: 104/56 (05-13-20 @ 07:18) (104/56 - 126/77)  RR: 18 (05-13-20 @ 07:18) (18 - 18)  SpO2: 99% (05-13-20 @ 07:18) (95% - 99%)  Wt(kg): --    ----  I&O's Summary    12 May 2020 07:01  -  13 May 2020 07:00  --------------------------------------------------------  IN: 0 mL / OUT: 900 mL / NET: -900 mL        LABS:                        8.9    9.16  )-----------( 229      ( 13 May 2020 06:41 )             28.0     05-13    138  |  102  |  24<H>  ----------------------------<  96  4.0   |  29  |  0.76    Ca    8.0<L>      13 May 2020 06:41  Phos  1.9     05-13    TPro  5.8<L>  /  Alb  2.1<L>  /  TBili  0.4  /  DBili  x   /  AST  26  /  ALT  40  /  AlkPhos  119  05-13          COVID-19 Inflammatory Markers:  Auto Neutrophil #: 7.16 K/uL (05-13-20 @ 06:41)  Auto Neutrophil #: 6.58 K/uL (05-12-20 @ 07:01)  Auto Neutrophil #: 6.56 K/uL (05-11-20 @ 06:46)  Auto Neutrophil #: 6.47 K/uL (05-10-20 @ 11:35)  Auto Neutrophil #: 6.11 K/uL (05-09-20 @ 06:38)  Auto Neutrophil #: 6.53 K/uL (05-08-20 @ 06:48)  Auto Neutrophil #: 8.71 K/uL (05-07-20 @ 07:54)  Auto Neutrophil #: 10.44 K/uL (05-06-20 @ 12:42)  Auto Neutrophil #: 10.10 K/uL (05-05-20 @ 07:00)  Auto Neutrophil #: 10.76 K/uL (05-04-20 @ 07:34)    Auto Lymphocyte #: 1.42 K/uL (05-13-20 @ 06:41)  Auto Lymphocyte #: 1.21 K/uL (05-12-20 @ 07:01)  Auto Lymphocyte #: 1.41 K/uL (05-11-20 @ 06:46)  Auto Lymphocyte #: 1.26 K/uL (05-10-20 @ 11:35)  Auto Lymphocyte #: 1.09 K/uL (05-09-20 @ 06:38)  Auto Lymphocyte #: 1.07 K/uL (05-08-20 @ 06:48)  Auto Lymphocyte #: 1.50 K/uL (05-07-20 @ 07:54)  Auto Lymphocyte #: 0.77 K/uL (05-06-20 @ 12:42)  Auto Lymphocyte #: 1.09 K/uL (05-05-20 @ 07:00)  Auto Lymphocyte #: 1.09 K/uL (05-04-20 @ 07:34)

## 2020-05-13 NOTE — PROGRESS NOTE ADULT - PROBLEM SELECTOR PLAN 6
-COVID +,  maintaining sats >90 on room air   - Maintain on airborne isolation.  - Limit use of NSAIDs  -supportive care, continue to wean off O2 as tolerated to keep O2 sat >88%  -repeat COVID19 PCR 5/11/20 negative, 5/12/20 COVID 19PCR pending, if negative can discharge to [facility] when bed is ready Elevated LFTs possibly 2/2 COVID-19 infection - resolved  - Avoid hepatotoxic drugs

## 2020-05-14 LAB
ALBUMIN SERPL ELPH-MCNC: 2.1 G/DL — LOW (ref 3.3–5)
ALP SERPL-CCNC: 123 U/L — HIGH (ref 40–120)
ALT FLD-CCNC: 45 U/L — SIGNIFICANT CHANGE UP (ref 12–78)
ANION GAP SERPL CALC-SCNC: 2 MMOL/L — LOW (ref 5–17)
AST SERPL-CCNC: 34 U/L — SIGNIFICANT CHANGE UP (ref 15–37)
BASOPHILS # BLD AUTO: 0.02 K/UL — SIGNIFICANT CHANGE UP (ref 0–0.2)
BASOPHILS NFR BLD AUTO: 0.2 % — SIGNIFICANT CHANGE UP (ref 0–2)
BILIRUB SERPL-MCNC: 0.6 MG/DL — SIGNIFICANT CHANGE UP (ref 0.2–1.2)
BUN SERPL-MCNC: 25 MG/DL — HIGH (ref 7–23)
CALCIUM SERPL-MCNC: 8.4 MG/DL — LOW (ref 8.5–10.1)
CHLORIDE SERPL-SCNC: 104 MMOL/L — SIGNIFICANT CHANGE UP (ref 96–108)
CO2 SERPL-SCNC: 30 MMOL/L — SIGNIFICANT CHANGE UP (ref 22–31)
CREAT SERPL-MCNC: 0.79 MG/DL — SIGNIFICANT CHANGE UP (ref 0.5–1.3)
EOSINOPHIL # BLD AUTO: 0.02 K/UL — SIGNIFICANT CHANGE UP (ref 0–0.5)
EOSINOPHIL NFR BLD AUTO: 0.2 % — SIGNIFICANT CHANGE UP (ref 0–6)
GLUCOSE SERPL-MCNC: 83 MG/DL — SIGNIFICANT CHANGE UP (ref 70–99)
HCT VFR BLD CALC: 29.5 % — LOW (ref 39–50)
HGB BLD-MCNC: 9.1 G/DL — LOW (ref 13–17)
IMM GRANULOCYTES NFR BLD AUTO: 0.3 % — SIGNIFICANT CHANGE UP (ref 0–1.5)
LYMPHOCYTES # BLD AUTO: 1.37 K/UL — SIGNIFICANT CHANGE UP (ref 1–3.3)
LYMPHOCYTES # BLD AUTO: 14.1 % — SIGNIFICANT CHANGE UP (ref 13–44)
MCHC RBC-ENTMCNC: 27 PG — SIGNIFICANT CHANGE UP (ref 27–34)
MCHC RBC-ENTMCNC: 30.8 GM/DL — LOW (ref 32–36)
MCV RBC AUTO: 87.5 FL — SIGNIFICANT CHANGE UP (ref 80–100)
MONOCYTES # BLD AUTO: 0.55 K/UL — SIGNIFICANT CHANGE UP (ref 0–0.9)
MONOCYTES NFR BLD AUTO: 5.7 % — SIGNIFICANT CHANGE UP (ref 2–14)
NEUTROPHILS # BLD AUTO: 7.72 K/UL — HIGH (ref 1.8–7.4)
NEUTROPHILS NFR BLD AUTO: 79.5 % — HIGH (ref 43–77)
NRBC # BLD: 0 /100 WBCS — SIGNIFICANT CHANGE UP (ref 0–0)
PLATELET # BLD AUTO: 232 K/UL — SIGNIFICANT CHANGE UP (ref 150–400)
POTASSIUM SERPL-MCNC: 4.4 MMOL/L — SIGNIFICANT CHANGE UP (ref 3.5–5.3)
POTASSIUM SERPL-SCNC: 4.4 MMOL/L — SIGNIFICANT CHANGE UP (ref 3.5–5.3)
PROT SERPL-MCNC: 6 G/DL — SIGNIFICANT CHANGE UP (ref 6–8.3)
RBC # BLD: 3.37 M/UL — LOW (ref 4.2–5.8)
RBC # FLD: 15.8 % — HIGH (ref 10.3–14.5)
SODIUM SERPL-SCNC: 136 MMOL/L — SIGNIFICANT CHANGE UP (ref 135–145)
WBC # BLD: 9.71 K/UL — SIGNIFICANT CHANGE UP (ref 3.8–10.5)
WBC # FLD AUTO: 9.71 K/UL — SIGNIFICANT CHANGE UP (ref 3.8–10.5)

## 2020-05-14 PROCEDURE — 99232 SBSQ HOSP IP/OBS MODERATE 35: CPT | Mod: CS

## 2020-05-14 RX ORDER — ACETAMINOPHEN 500 MG
1000 TABLET ORAL ONCE
Refills: 0 | Status: COMPLETED | OUTPATIENT
Start: 2020-05-14 | End: 2020-05-14

## 2020-05-14 RX ADMIN — Medication 1 SPRAY(S): at 17:30

## 2020-05-14 RX ADMIN — Medication 1 APPLICATION(S): at 14:00

## 2020-05-14 RX ADMIN — PANTOPRAZOLE SODIUM 40 MILLIGRAM(S): 20 TABLET, DELAYED RELEASE ORAL at 04:13

## 2020-05-14 RX ADMIN — Medication 1 GRAM(S): at 12:55

## 2020-05-14 RX ADMIN — Medication 1 APPLICATION(S): at 04:12

## 2020-05-14 RX ADMIN — Medication 1 GRAM(S): at 00:48

## 2020-05-14 RX ADMIN — Medication 1 SPRAY(S): at 04:13

## 2020-05-14 RX ADMIN — Medication 1 GRAM(S): at 17:27

## 2020-05-14 RX ADMIN — Medication 1000 MILLIGRAM(S): at 06:22

## 2020-05-14 RX ADMIN — Medication 50 MILLIGRAM(S): at 04:13

## 2020-05-14 RX ADMIN — SERTRALINE 100 MILLIGRAM(S): 25 TABLET, FILM COATED ORAL at 12:55

## 2020-05-14 RX ADMIN — Medication 1 GRAM(S): at 04:13

## 2020-05-14 RX ADMIN — Medication 50 MILLIGRAM(S): at 17:27

## 2020-05-14 NOTE — PROGRESS NOTE ADULT - ASSESSMENT
80 yo M with PMHx of HTN, BPH, spinal stenosis, dyslipidemia, anxiety, neuropathy, B12 deficiency presented from [facility] rehab for evaluation of weakness and hypotension. 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 Pt medically stable for d/c, awaiting OMER placement.

## 2020-05-14 NOTE — PROGRESS NOTE ADULT - PROBLEM SELECTOR PLAN 2
-Anemia possibly 2/2 L hip chronic hematoma as noted on CT AP, GIB less likely  -continued, however Hb stable s/p 2 u pRBCs  - Continue Protonix 40 mg daily, Carafate 1 g four times daily  - Monitor for further bleeding, stool color   - Per GI Dr. Horne, no endo/colonoscopy inpatient - f/u as outpatient for procedure endoscopy and colonoscopy

## 2020-05-14 NOTE — PROGRESS NOTE ADULT - NSHPATTENDINGPLANDISCUSS_GEN_ALL_CORE
pt, SW, CM, RN, residency team, sister over the phone - re: tx plan, disposition planning, above detailed plan

## 2020-05-14 NOTE — PROGRESS NOTE ADULT - PROBLEM SELECTOR PLAN 4
was hypotensive on admission, resolved   - Off Midodrine, continue BB with hold parameters- will discharge on Metoprolol 50 BID  - Continue to hold off on home Valsartan  - Cardio Dr Ordonez following

## 2020-05-14 NOTE — PROGRESS NOTE ADULT - SUBJECTIVE AND OBJECTIVE BOX
INTERVAL HPI/OVERNIGHT EVENTS:    no s/s gib per nursing  taking in some po      MEDICATIONS  (STANDING):  collagenase Ointment 1 Application(s) Topical three times a day  metoprolol tartrate 50 milliGRAM(s) Oral two times a day  pantoprazole    Tablet 40 milliGRAM(s) Oral before breakfast  sertraline 100 milliGRAM(s) Oral daily  sodium chloride 0.65% Nasal 1 Spray(s) Both Nostrils two times a day  sucralfate suspension 1 Gram(s) Oral four times a day    MEDICATIONS  (PRN):      Allergies    No Known Allergies    Intolerances        Review of Systems:    unable to obtain     Vital Signs Last 24 Hrs  T(C): 36.6 (12 May 2020 07:44), Max: 36.7 (12 May 2020 04:17)  T(F): 97.8 (12 May 2020 07:44), Max: 98 (12 May 2020 04:17)  HR: 63 (12 May 2020 07:44) (63 - 70)  BP: 145/72 (12 May 2020 07:44) (106/64 - 152/76)  BP(mean): --  RR: 18 (12 May 2020 07:44) (18 - 19)  SpO2: 99% (12 May 2020 07:44) (97% - 99%)    PHYSICAL EXAM:    deferred f/u done remotely as covid +       LABS:                        9.3    8.42  )-----------( 242      ( 12 May 2020 07:01 )             29.7     05-12    138  |  105  |  25<H>  ----------------------------<  94  4.7   |  30  |  0.75    Ca    8.2<L>      12 May 2020 07:01    TPro  5.8<L>  /  Alb  2.1<L>  /  TBili  0.4  /  DBili  x   /  AST  31  /  ALT  49  /  AlkPhos  132<H>  05-12          RADIOLOGY & ADDITIONAL TESTS:

## 2020-05-14 NOTE — PROGRESS NOTE ADULT - SUBJECTIVE AND OBJECTIVE BOX
LISANDRA PADGETT is a 79yMale , patient examined and chart reviewed.    INTERVAL HPI/ OVERNIGHT EVENTS:   NAD. Afebrile. On RA.  No events.    PAST MEDICAL & SURGICAL HISTORY:  H/O vitamin B deficiency  Spinal stenosis  HTN (hypertension)  History of tonsillectomy      For details regarding the patient's social history, family history, and other miscellaneous elements, please refer the initial infectious diseases consultation and/or the admitting history and physical examination for this admission.    ROS:  CONSTITUTIONAL:  Negative fever or chills  EYES:  Negative  blurry vision or double vision  CARDIOVASCULAR:  Negative for chest pain or palpitations  RESPIRATORY:  Negative for cough, wheezing, or SOB   GASTROINTESTINAL:  Negative for nausea, vomiting, diarrhea, constipation, or abdominal pain  GENITOURINARY:  Negative frequency, urgency or dysuria  NEUROLOGIC:  No headache, confusion, dizziness, lightheadedness  All other systems were reviewed and are negative     Current inpatient medications :  MEDICATIONS  (STANDING):  collagenase Ointment 1 Application(s) Topical three times a day  metoprolol tartrate 50 milliGRAM(s) Oral two times a day  pantoprazole    Tablet 40 milliGRAM(s) Oral before breakfast  sertraline 100 milliGRAM(s) Oral daily  sodium chloride 0.65% Nasal 1 Spray(s) Both Nostrils two times a day  sucralfate suspension 1 Gram(s) Oral four times a day      Objective:  Vital Signs Last 24 Hrs  T(C): 36.7 (14 May 2020 07:52), Max: 37.3 (14 May 2020 00:44)  T(F): 98 (14 May 2020 07:52), Max: 99.2 (14 May 2020 00:44)  HR: 64 (14 May 2020 07:52) (64 - 84)  BP: 96/58 (14 May 2020 07:52) (96/58 - 130/65)  RR: 17 (14 May 2020 07:52) (17 - 20)  SpO2: 100% (14 May 2020 07:52) (95% - 100%)    Physical Exam:  General:  no acute distress  Eyes: sclera anicteric, pupils equal and reactive to light  ENMT: buccal mucosa moist, pharynx not injected  Neck: supple, trachea midline  Lungs: clear, no wheeze/rhonchi  Cardiovascular: regular rate and rhythm, S1 S2  Abdomen: soft, nontender, no organomegaly present, bowel sounds normal  Neurological: alert and oriented x2 Cranial Nerves II-XII grossly intact  Skin: sacral decub drsg c/d/i   Lymph Nodes: no palpable cervical/supraclavicular lymph nodes enlargements  Extremities: no cyanosis/clubbing/edema        LABS:                               9.1    9.71  )-----------( 232      ( 14 May 2020 06:36 )             29.5   05-14    136  |  104  |  25<H>  ----------------------------<  83  4.4   |  30  |  0.79    Ca    8.4<L>      14 May 2020 06:36  Phos  1.9     05-13    TPro  6.0  /  Alb  2.1<L>  /  TBili  0.6  /  DBili  x   /  AST  34  /  ALT  45  /  AlkPhos  123<H>  05-14    MICROBIOLOGY:    Culture - Blood (collected 02 May 2020 00:30)  Source: .Blood Blood  Preliminary Report (03 May 2020 01:03):    No growth to date.    Culture - Blood (collected 02 May 2020 00:30)  Source: .Blood Blood  Preliminary Report (03 May 2020 01:03):    No growth to date.    COVID-19 PCR . (04.28.20 @ 22:41)    COVID-19 PCR: Detected: This test has been validated by [employer] to be accurate;  though it has not been FDA cleared/approved by the usual pathway  As with all laboratory test, results should be correlated with clinical  findings.  https://www.fda.gov/media/813805/download  https://www.fda.gov/media/894173/download    RADIOLOGY & ADDITIONAL STUDIES:    EXAM:  XR CHEST PORTABLE ROUTINE 1V                            PROCEDURE DATE:  04/30/2020          INTERPRETATION:    Examination: XR CHEST    History: ADMDIAG1: K92.2 GASTROINTESTINAL HEMORRHAGE, UNSPECIFIED/, pn pneumonia    TECHNIQUE:  Portable AP view of the chest was obtained.    COMPARISON: A CT chest 1/29/2020 available for review.    FINDINGS:   Lungs clear. RIGHT hemidiaphragm mildly elevated. Posterior lung bases cannot be assessed due to elevated diaphragms. Lateral radiograph recommended if clinically warranted.  . The heart and mediastinum are within normal limits.    Visualized osseous structures are intact.      IMPRESSION: Visualized Lungs clear..   Elevated LEFT hemidiaphragm. Posterior lung bases cannot be assessed dueto elevated diaphragms. Lateral radiograph recommended if clinically warranted.      Assessment :   78 y/o M with PMHx of HTN, BPH, spinal stenosis, dyslipidemia, anxiety, neuropathy, B12 deficiency resident of [facility] for evaluation of weakness and hypotension with mental status change. Tested COVID 19 positive early April. In ED he was hypotensive. WBC 19K No SOB cough n/v/d. Pt is a poor historian. Has unstagable sacral decub ulcer. Also with Acute anemia. ?infectious process vs reactive leukocytosis. Leukocytosis poss chronic.  Respiratory status stable. Menendez placed sec AUR 5/3/2020. Seen by surgery and is sp bedside I&D of sacral decub Clinically stable.    Plan :   Monitor off antibiotics  Completed Zosyn   Cultures ngtd  Menendez per urology  Frequent turning  Local wound care per surgery  COVID-19--critical period for decompensation  (7-14 days post symptom onset), avoid aerosolizing procedures, NSAIDs   -monitor respiratory status closely ( route of 02 and saturation) and titrate when able  -isolation precautions per protocol  -avoid excessive blood draws, blood cultures and frequent CXRs  -monitor biomarkers- CBC w diff  for NLRNLR <3 low vs >5 high) Ferritin (lower risk <450 vs >850) CRP (low risk <2 and higher risk >6) and LDH, D Dimer, procalcitonin  -therapies remains investigational-- including Hydroxychloroquine  -antibiotics only if there is concern for a bacterial process  -Steroids short course ( 5 days)  --for hypoxia/ARDS /shock    Dc planning to rehab pending COVID 19 neg x 2       Continue with present regiment.  Appropriate use of antibiotics and adverse effects reviewed.      I have discussed the above plan of care with patient/ family in detail. They expressed understanding of the  treatment plan . Risks, benefits and alternatives discussed in detail. I have asked if they have any questions or concerns and appropriately addressed them to the best of my ability .    > 35 minutes were spent in direct patient care reviewing notes, medications ,labs data/ imaging , discussion with multidisciplinary team.    Thank you for allowing me to participate in care of your patient .    Naima Lopez MD  Infectious Disease  [phone]

## 2020-05-14 NOTE — PROGRESS NOTE ADULT - SUBJECTIVE AND OBJECTIVE BOX
Chief Complaint: Weakness, AMS    Interval Events: No events overnight.    Review of Systems:  General: No fevers, chills, weight loss or gain  Skin: No rashes, color changes  Cardiovascular: No chest pain, orthopnea  Respiratory: No shortness of breath, cough  Gastrointestinal: No nausea, abdominal pain  Genitourinary: No incontinence, pain with urination  Musculoskeletal: No pain, swelling, decreased range of motion  Neurological: No headache, weakness  Psychiatric: No depression, anxiety  Endocrine: No weight loss or gain, increased thirst  All other systems are comprehensively negative.    Physical Exam:  Vital Signs Last 24 Hrs  T(C): 36.7 (14 May 2020 07:52), Max: 37.3 (14 May 2020 00:44)  T(F): 98 (14 May 2020 07:52), Max: 99.2 (14 May 2020 00:44)  HR: 64 (14 May 2020 07:52) (64 - 84)  BP: 96/58 (14 May 2020 07:52) (96/58 - 130/65)  BP(mean): --  RR: 17 (14 May 2020 07:52) (17 - 20)  SpO2: 100% (14 May 2020 07:52) (95% - 100%)  General: NAD  HEENT: MMM  Neck: No JVD, no carotid bruit  Extremities: No LE edema, no cyanosis  Neuro: Non-focal  Skin: No rash    Labs:    05-14    136  |  104  |  25<H>  ----------------------------<  83  4.4   |  30  |  0.79    Ca    8.4<L>      14 May 2020 06:36  Phos  1.9     05-13    TPro  6.0  /  Alb  2.1<L>  /  TBili  0.6  /  DBili  x   /  AST  34  /  ALT  45  /  AlkPhos  123<H>  05-14                        9.1    9.71  )-----------( 232      ( 14 May 2020 06:36 )             29.5

## 2020-05-14 NOTE — PROGRESS NOTE ADULT - SUBJECTIVE AND OBJECTIVE BOX
This progress note was completed in part by resident acting as telephonic scribe during COVID-19 crisis. Physical exam was completed by attending physician, and findings below are those of the attending physician, and have been reviewed in detail.    Patient is a 79y old  Male who presents with a chief complaint of Generalized weakness (14 May 2020 09:51)      FROM ADMISSION H+P:   HPI:  78 y/o M with PMHx of HTN, BPH, spinal stenosis, dyslipidemia, anxiety, neuropathy, B12 deficiency who presents from [facility] rehab for evaluation of weakness and hypotension. As per transfer papers, pt with confusion after lunch today. Pt admits to generalized weakness. Lives with wife, Stephany and son. He came from [facility] rehab in [place] since Feb 14. Patient lives with wife and son at home and ambulates with cane. Contracted Covid-19 in [facility] rehab and first started having symptoms on Mar 25, reporting symptoms of cough and low grade fever, diarrhea. Denies any chest pain, shortness of breath, headache, dizziness. History obtained from wife, Stephany([phone]) as pt is confused and a poor historian.   Of note, pt was noted to have sacral wounds today and reported low grade fever for two days and mental status changes today and loss of appetite.    The date the pt first felt unwell: March 25  Fever or chills: yes [ x ]   no [  ]   - Tmax:   Fatigue, malaise or generalized weakness: yes [ x ]   no [  ]  Shortness of breath/dyspnea: yes [ x ]   no [  ]  Cough: yes [ x ]   no [  ], sputum production: yes [  ]   no [x  ]  Blood in sputum: yes [  ]   no [ x ]  Anorexia/po intolerance: yes [  ]   no [ x ]  Chest pain or chest tightness: yes [  ]   no [ x ]  Edema in legs: yes [  ]   no [ x ]  Myalgias: yes [  ]   no [ x ]  Headache: yes [  ]   no [x  ]  Sore throat: yes [  ]   no [ x ]  Rhinorrhea: yes [  ]   no [  x]  Abd pain: yes [  ]   no [ x ]  Nausea: yes [  ]   no [x  ]  Vomiting: yes [  ]   no [  x]  Diarrhea: yes [  ]   no [  ]  Skin rashes: yes [  ]   no [  ]  Loss of sense of smell/anosmia: yes [  ]   no [ x ]  Conjunctivitis: yes [  ]   no [ x ]  Recent travel: yes [  ]   no [ x ] - Location:   Any sick contacts: yes [x  ]   no [  ]: Contracted from [facility]  Close contact with someone confirmed positive with COVID-19 / SARS-CoV2 in the last 14 days: yes [  ]   no [ x ]  Code status: DNR, but not DNI      In the ED  Vitals: T--, , BP 84/48, RR 22, O2 96 on RA  Labs(In [facility]) significant for: wbc 18.45, hgb 6.8, FOBT+,   Coag studies: PT 14.9, INR 1.33, aPTT 43.7, d-dimer 561  Chem panel: Lactate 2.5, Na 133, AG 3, BUN/Cr 61/1.61, Alb 1.9, Alk phos 195, ,   UA neg  Covid-19 PCR Positive   EKG shows Accelerated Junctional rhythm with occasional PVCs  CXR in [facility] ED shows: Mild R atelectasis  In [facility] ED, s/p 2L NS bolus, 1 unit pRBC (28 Apr 2020 21:00)      ----  INTERVAL HPI/OVERNIGHT EVENTS: Pt seen and evaluated at the bedside. No acute overnight events occurred.     Fever or chills: yes [  ]   no [  ]  Fatigue, malaise or generalized weakness: yes [  ]   no [  ]  Chest pain: yes [  ]   no [  ]  Palpitations: yes [  ]   no [  ]  Shortness of breath/dyspnea: yes [  ]   no [  ]  Cough: yes [  ]   no [  ], sputum production: yes [  ]   no [  ]  Anorexia/po intolerance: yes [  ]   no [  ]  Myalgias: yes [  ]   no [  ]  Headache: yes [  ]   no [  ]  Sore throat: yes [ x ]   no [  ]  Rhinorrhea: yes [  ]   no [  ]  Nausea: yes [  ]   no [  ]  Vomiting: yes [  ]   no [  ]  Diarrhea: yes [  ]   no [  ]  Loss of sense of smell/anosmia: yes [  ]   no [  ]  Conjunctivitis: yes [  ]   no [  ]  Other associated complaints:     ----  PAST MEDICAL & SURGICAL HISTORY:  H/O vitamin B deficiency  Spinal stenosis  HTN (hypertension)  History of tonsillectomy      FAMILY HISTORY:  No pertinent family history in first degree relatives      Allergies    No Known Allergies    Intolerances        ----  REVIEW OF SYSTEMS:  as per HPI    ----  PHYSICAL EXAM:  This encounter took place via video conferencing. The following findings were noted but a full physical examination was not deemed possible  GENERAL: no sign of distress  PULM: no signs of respiratory distress, speaking in full sentences. no coughing during our interaction  NEURO: alert, oriented to person, place, time. voice is clear and not muffled or slurred, no noted tremors  PSYCH: organized thought pattern, able to interact readily  SKIN: visualized skin on arms, legs, face without obvious lesions    Other noted pertinent findings  OXYGEN SUPPORT: Nasal cannula: yes [  ]   no [  ] - if yes, the flow rate:        - nonrebreather mask: yes [  ]   no [  ]         - venti-mask: yes [  ]   no [  ] - if yes, the flow rate:   LINES: central line, midline, PICC line: yes [  ]   no [  ]  REYNA CATHETER: yes [  ]   no [  ]    T(C): 36.7 (05-14-20 @ 07:52), Max: 37.3 (05-14-20 @ 00:44)  HR: 64 (05-14-20 @ 07:52) (64 - 84)  BP: 96/58 (05-14-20 @ 07:52) (96/58 - 130/65)  RR: 17 (05-14-20 @ 07:52) (17 - 20)  SpO2: 100% (05-14-20 @ 07:52) (95% - 100%)  Wt(kg): --    ----  I&O's Summary    13 May 2020 07:01  -  14 May 2020 07:00  --------------------------------------------------------  IN: 230 mL / OUT: 775 mL / NET: -545 mL        LABS:                        9.1    9.71  )-----------( 232      ( 14 May 2020 06:36 )             29.5     05-14    136  |  104  |  25<H>  ----------------------------<  83  4.4   |  30  |  0.79    Ca    8.4<L>      14 May 2020 06:36  Phos  1.9     05-13    TPro  6.0  /  Alb  2.1<L>  /  TBili  0.6  /  DBili  x   /  AST  34  /  ALT  45  /  AlkPhos  123<H>  05-14        CAPILLARY BLOOD GLUCOSE                    COVID-19 Inflammatory Markers:  Auto Neutrophil #: 7.72 K/uL (05-14-20 @ 06:36)  Auto Neutrophil #: 7.16 K/uL (05-13-20 @ 06:41)  Auto Neutrophil #: 6.58 K/uL (05-12-20 @ 07:01)  Auto Neutrophil #: 6.56 K/uL (05-11-20 @ 06:46)  Auto Neutrophil #: 6.47 K/uL (05-10-20 @ 11:35)  Auto Neutrophil #: 6.11 K/uL (05-09-20 @ 06:38)  Auto Neutrophil #: 6.53 K/uL (05-08-20 @ 06:48)  Auto Neutrophil #: 8.71 K/uL (05-07-20 @ 07:54)  Auto Neutrophil #: 10.44 K/uL (05-06-20 @ 12:42)  Auto Neutrophil #: 10.10 K/uL (05-05-20 @ 07:00)    Auto Lymphocyte #: 1.37 K/uL (05-14-20 @ 06:36)  Auto Lymphocyte #: 1.42 K/uL (05-13-20 @ 06:41)  Auto Lymphocyte #: 1.21 K/uL (05-12-20 @ 07:01)  Auto Lymphocyte #: 1.41 K/uL (05-11-20 @ 06:46)  Auto Lymphocyte #: 1.26 K/uL (05-10-20 @ 11:35)  Auto Lymphocyte #: 1.09 K/uL (05-09-20 @ 06:38)  Auto Lymphocyte #: 1.07 K/uL (05-08-20 @ 06:48)  Auto Lymphocyte #: 1.50 K/uL (05-07-20 @ 07:54)  Auto Lymphocyte #: 0.77 K/uL (05-06-20 @ 12:42)  Auto Lymphocyte #: 1.09 K/uL (05-05-20 @ 07:00)                                ----  Personally reviewed:  Vital sign trends: [  ] yes    [  ] no     [  ] n/a  Laboratory results: [  ] yes    [  ] no     [  ] n/a  Radiology results: [  ] yes    [  ] no     [  ] n/a  Culture results: [  ] yes    [  ] no     [  ] n/a  Consultant recommendations: [  ] yes    [  ] no     [  ] n/a This progress note was completed in part by resident acting as telephonic scribe during COVID-19 crisis. Physical exam was completed by attending physician, and findings below are those of the attending physician, and have been reviewed in detail.    Patient is a 79y old  Male who presents with a chief complaint of Generalized weakness  FROM ADMISSION H+P:   HPI:  78 y/o M with PMHx of HTN, BPH, spinal stenosis, dyslipidemia, anxiety, neuropathy, B12 deficiency who presents from [facility] rehab for evaluation of weakness and hypotension. As per transfer papers, pt with confusion after lunch today. Pt admits to generalized weakness. Lives with wife, Stephany and son. He came from [facility] rehab in [place] since Feb 14. Patient lives with wife and son at home and ambulates with cane. Contracted Covid-19 in [facility] rehab and first started having symptoms on Mar 25, reporting symptoms of cough and low grade fever, diarrhea. Denies any chest pain, shortness of breath, headache, dizziness. History obtained from wife, Stephany([phone]) as pt is confused and a poor historian.   Of note, pt was noted to have sacral wounds today and reported low grade fever for two days and mental status changes today and loss of appetite.    The date the pt first felt unwell: March 25  Fever or chills: yes [ x ]   no [  ]   - Tmax:   Fatigue, malaise or generalized weakness: yes [ x ]   no [  ]  Shortness of breath/dyspnea: yes [ x ]   no [  ]  Cough: yes [ x ]   no [  ], sputum production: yes [  ]   no [x  ]  Blood in sputum: yes [  ]   no [ x ]  Anorexia/po intolerance: yes [  ]   no [ x ]  Chest pain or chest tightness: yes [  ]   no [ x ]  Edema in legs: yes [  ]   no [ x ]  Myalgias: yes [  ]   no [ x ]  Headache: yes [  ]   no [x  ]  Sore throat: yes [  ]   no [ x ]  Rhinorrhea: yes [  ]   no [  x]  Abd pain: yes [  ]   no [ x ]  Nausea: yes [  ]   no [x  ]  Vomiting: yes [  ]   no [  x]  Diarrhea: yes [  ]   no [  ]  Skin rashes: yes [  ]   no [  ]  Loss of sense of smell/anosmia: yes [  ]   no [ x ]  Conjunctivitis: yes [  ]   no [ x ]  Recent travel: yes [  ]   no [ x ] - Location:   Any sick contacts: yes [x  ]   no [  ]: Contracted from [facility]  Close contact with someone confirmed positive with COVID-19 / SARS-CoV2 in the last 14 days: yes [  ]   no [ x ]  Code status: DNR, but not DNI      In the ED  Vitals: T--, , BP 84/48, RR 22, O2 96 on RA  Labs(In [facility]) significant for: wbc 18.45, hgb 6.8, FOBT+,   Coag studies: PT 14.9, INR 1.33, aPTT 43.7, d-dimer 561  Chem panel: Lactate 2.5, Na 133, AG 3, BUN/Cr 61/1.61, Alb 1.9, Alk phos 195, ,   UA neg  Covid-19 PCR Positive   EKG shows Accelerated Junctional rhythm with occasional PVCs  CXR in [facility] ED shows: Mild R atelectasis  In [facility] ED, s/p 2L NS bolus, 1 unit pRBC (28 Apr 2020 21:00)      ----  INTERVAL HPI/OVERNIGHT EVENTS: Pt seen and evaluated at the bedside with televideo conferencing. No acute overnight events occurred. Repeat COVID 19 PCR 5/12 adnd now 5/13 positive. Pt states he feels "so, so". Denies pain, chest pain, shortness of breath, fever, runny nose, nausea, vomiting, cough, diarrhea, constipation. Pt admits to sore throat and dry mouth, water at bedside, encouraged to increase water intake. Spoke with patient's sister during video encounter. She was concerned about the patient's sacral ulcer. Sister reassured ulcer slowly improving and that patient has wound vac in place, with veraflo dressing.     Fever or chills: yes [  ]   no [x  ]  Fatigue, malaise or generalized weakness: yes [  ]   no [x  ]  Chest pain: yes [  ]   no [ x ]  Palpitations: yes [  ]   no [  ]  Shortness of breath/dyspnea: yes [  ]   no [x  ]  Cough: yes [  ]   no [  ], sputum production: yes [  ]   no [ x ]  Anorexia/po intolerance: yes [  ]   no [ x ]  Myalgias: yes [  ]   no [ x ]  Headache: yes [  ]   no [ x ]  Sore throat: yes [ x ]   no [  ]  Rhinorrhea: yes [  ]   no [ x ]  Nausea: yes [  ]   no [  ]  Vomiting: yes [  ]   no [ x ]  Diarrhea: yes [  ]   no [ x ]  Loss of sense of smell/anosmia: yes [  ]   no [  ]  Conjunctivitis: yes [  ]   no [  ]  Other associated complaints:     ----  PAST MEDICAL & SURGICAL HISTORY:  H/O vitamin B deficiency  Spinal stenosis  HTN (hypertension)  History of tonsillectomy      FAMILY HISTORY:  No pertinent family history in first degree relatives      Allergies    No Known Allergies    Intolerances        ----  REVIEW OF SYSTEMS:  as per HPI    ----  PHYSICAL EXAM:  This encounter took place via video conferencing. The following findings were noted but a full physical examination was not deemed possible  GENERAL: no sign of distress  PULM: no signs of respiratory distress, speaking in full sentences. no coughing during our interaction  NEURO: alert, oriented to person, place, time. voice is clear and not muffled or slurred, no noted tremors  PSYCH: organized thought pattern, able to interact readily  SKIN: visualized skin on arms, legs, face without obvious lesions    Other noted pertinent findings  OXYGEN SUPPORT: room air 99% O2 sat    LINES: central line, midline, PICC line: yes [  ]   no [ x ]  REYNA CATHETER: yes [ x ]   no [  ]    T(C): 36.7 (05-14-20 @ 07:52), Max: 37.3 (05-14-20 @ 00:44)  HR: 64 (05-14-20 @ 07:52) (64 - 84)  BP: 96/58 (05-14-20 @ 07:52) (96/58 - 130/65)  RR: 17 (05-14-20 @ 07:52) (17 - 20)  SpO2: 100% (05-14-20 @ 07:52) (95% - 100%)  Wt(kg): --    ----  I&O's Summary    13 May 2020 07:01  -  14 May 2020 07:00  --------------------------------------------------------  IN: 230 mL / OUT: 775 mL / NET: -545 mL        LABS:                        9.1    9.71  )-----------( 232      ( 14 May 2020 06:36 )             29.5     05-14    136  |  104  |  25<H>  ----------------------------<  83  4.4   |  30  |  0.79    Ca    8.4<L>      14 May 2020 06:36  Phos  1.9     05-13    TPro  6.0  /  Alb  2.1<L>  /  TBili  0.6  /  DBili  x   /  AST  34  /  ALT  45  /  AlkPhos  123<H>  05-14        CAPILLARY BLOOD GLUCOSE                    COVID-19 Inflammatory Markers:  Auto Neutrophil #: 7.72 K/uL (05-14-20 @ 06:36)  Auto Neutrophil #: 7.16 K/uL (05-13-20 @ 06:41)  Auto Neutrophil #: 6.58 K/uL (05-12-20 @ 07:01)  Auto Neutrophil #: 6.56 K/uL (05-11-20 @ 06:46)  Auto Neutrophil #: 6.47 K/uL (05-10-20 @ 11:35)  Auto Neutrophil #: 6.11 K/uL (05-09-20 @ 06:38)  Auto Neutrophil #: 6.53 K/uL (05-08-20 @ 06:48)  Auto Neutrophil #: 8.71 K/uL (05-07-20 @ 07:54)  Auto Neutrophil #: 10.44 K/uL (05-06-20 @ 12:42)  Auto Neutrophil #: 10.10 K/uL (05-05-20 @ 07:00)    Auto Lymphocyte #: 1.37 K/uL (05-14-20 @ 06:36)  Auto Lymphocyte #: 1.42 K/uL (05-13-20 @ 06:41)  Auto Lymphocyte #: 1.21 K/uL (05-12-20 @ 07:01)  Auto Lymphocyte #: 1.41 K/uL (05-11-20 @ 06:46)  Auto Lymphocyte #: 1.26 K/uL (05-10-20 @ 11:35)  Auto Lymphocyte #: 1.09 K/uL (05-09-20 @ 06:38)  Auto Lymphocyte #: 1.07 K/uL (05-08-20 @ 06:48)  Auto Lymphocyte #: 1.50 K/uL (05-07-20 @ 07:54)  Auto Lymphocyte #: 0.77 K/uL (05-06-20 @ 12:42)  Auto Lymphocyte #: 1.09 K/uL (05-05-20 @ 07:00)                                ----  Personally reviewed:  Vital sign trends: [x  ] yes    [  ] no     [  ] n/a  Laboratory results: [ x ] yes    [  ] no     [  ] n/a  Radiology results: [ x ] yes    [  ] no     [  ] n/a  Culture results: [ x ] yes    [  ] no     [  ] n/a  Consultant recommendations: [ x ] yes    [  ] no     [  ] n/a This progress note was completed in part by resident acting as telephonic scribe during COVID-19 crisis. Physical exam was completed by attending physician, and findings below are those of the attending physician, and have been reviewed in detail.    Patient is a 79y old  Male who presents with a chief complaint of Generalized weakness  FROM ADMISSION H+P:   HPI:  80 y/o M with PMHx of HTN, BPH, spinal stenosis, dyslipidemia, anxiety, neuropathy, B12 deficiency who presents from [facility] rehab for evaluation of weakness and hypotension. As per transfer papers, pt with confusion after lunch today. Pt admits to generalized weakness. Lives with wife, Stephany and son. He came from [facility] rehab in [place] since Feb 14. Patient lives with wife and son at home and ambulates with cane. Contracted Covid-19 in [facility] rehab and first started having symptoms on Mar 25, reporting symptoms of cough and low grade fever, diarrhea. Denies any chest pain, shortness of breath, headache, dizziness. History obtained from wife, Stephany([phone]) as pt is confused and a poor historian.   Of note, pt was noted to have sacral wounds today and reported low grade fever for two days and mental status changes today and loss of appetite.    ----  INTERVAL HPI/OVERNIGHT EVENTS: Pt seen and evaluated at the bedside with televideo conferencing. No acute overnight events occurred. Repeat COVID 19 PCR 5/12 adnd now 5/13 positive. Pt states he feels "so, so". Denies pain, chest pain, shortness of breath, fever, runny nose, nausea, vomiting, cough, diarrhea, constipation. Pt admits to sore throat and dry mouth, water at bedside, encouraged to increase water intake. Spoke with patient's sister during video encounter. She was concerned about the patient's sacral ulcer. Sister on phone during our interaction. She was reassured ulcer slowly improving and that patient has wound vac in place, with veraflo dressing.     Fever or chills: yes [  ]   no [x  ]  Fatigue, malaise or generalized weakness: yes [  ]   no [x  ]  Chest pain: yes [  ]   no [ x ]  Shortness of breath/dyspnea: yes [  ]   no [x  ]  Cough: yes [  ]   no [ x ], sputum production: yes [  ]   no [ x ]  Anorexia/po intolerance: yes [  ]   no [ x ]  Myalgias: yes [  ]   no [ x ]  Headache: yes [  ]   no [ x ]  Sore throat: yes [ x ]   no [  ]  Rhinorrhea: yes [  ]   no [ x ]  Vomiting: yes [  ]   no [ x ]  Diarrhea: yes [  ]   no [ x ]    ----  PAST MEDICAL & SURGICAL HISTORY:  H/O vitamin B deficiency  Spinal stenosis  HTN (hypertension)  History of tonsillectomy      FAMILY HISTORY:  No pertinent family history in first degree relatives      Allergies    No Known Allergies    Intolerances        ----  REVIEW OF SYSTEMS:  as per HPI    ----  PHYSICAL EXAM:  GENERAL: no sign of distress  PULM: no signs of respiratory distress, speaking in full sentences. no coughing during our interaction  NEURO: alert, oriented to person, place, time. voice is clear and not muffled or slurred, no noted tremors  PSYCH: organized thought pattern, able to interact readily  SKIN: visualized skin on arms, legs, face without obvious lesions    Other noted pertinent findings  OXYGEN SUPPORT: room air 99% O2 sat    LINES: central line, midline, PICC line: yes [  ]   no [ x ]  REYNA CATHETER: yes [ x ]   no [  ]    T(C): 36.7 (05-14-20 @ 07:52), Max: 37.3 (05-14-20 @ 00:44)  HR: 64 (05-14-20 @ 07:52) (64 - 84)  BP: 96/58 (05-14-20 @ 07:52) (96/58 - 130/65)  RR: 17 (05-14-20 @ 07:52) (17 - 20)  SpO2: 100% (05-14-20 @ 07:52) (95% - 100%)  Wt(kg): --    ----  I&O's Summary    13 May 2020 07:01  -  14 May 2020 07:00  --------------------------------------------------------  IN: 230 mL / OUT: 775 mL / NET: -545 mL        LABS:                        9.1    9.71  )-----------( 232      ( 14 May 2020 06:36 )             29.5     05-14    136  |  104  |  25<H>  ----------------------------<  83  4.4   |  30  |  0.79    Ca    8.4<L>      14 May 2020 06:36  Phos  1.9     05-13    TPro  6.0  /  Alb  2.1<L>  /  TBili  0.6  /  DBili  x   /  AST  34  /  ALT  45  /  AlkPhos  123<H>  05-14        CAPILLARY BLOOD GLUCOSE                    COVID-19 Inflammatory Markers:  Auto Neutrophil #: 7.72 K/uL (05-14-20 @ 06:36)  Auto Neutrophil #: 7.16 K/uL (05-13-20 @ 06:41)  Auto Neutrophil #: 6.58 K/uL (05-12-20 @ 07:01)  Auto Neutrophil #: 6.56 K/uL (05-11-20 @ 06:46)  Auto Neutrophil #: 6.47 K/uL (05-10-20 @ 11:35)  Auto Neutrophil #: 6.11 K/uL (05-09-20 @ 06:38)  Auto Neutrophil #: 6.53 K/uL (05-08-20 @ 06:48)  Auto Neutrophil #: 8.71 K/uL (05-07-20 @ 07:54)  Auto Neutrophil #: 10.44 K/uL (05-06-20 @ 12:42)  Auto Neutrophil #: 10.10 K/uL (05-05-20 @ 07:00)    Auto Lymphocyte #: 1.37 K/uL (05-14-20 @ 06:36)  Auto Lymphocyte #: 1.42 K/uL (05-13-20 @ 06:41)  Auto Lymphocyte #: 1.21 K/uL (05-12-20 @ 07:01)  Auto Lymphocyte #: 1.41 K/uL (05-11-20 @ 06:46)  Auto Lymphocyte #: 1.26 K/uL (05-10-20 @ 11:35)  Auto Lymphocyte #: 1.09 K/uL (05-09-20 @ 06:38)  Auto Lymphocyte #: 1.07 K/uL (05-08-20 @ 06:48)  Auto Lymphocyte #: 1.50 K/uL (05-07-20 @ 07:54)  Auto Lymphocyte #: 0.77 K/uL (05-06-20 @ 12:42)  Auto Lymphocyte #: 1.09 K/uL (05-05-20 @ 07:00)                                ----  Personally reviewed:  Vital sign trends: [x  ] yes    [  ] no     [  ] n/a  Laboratory results: [ x ] yes    [  ] no     [  ] n/a  Radiology results: [ x ] yes    [  ] no     [  ] n/a  Culture results: [ x ] yes    [  ] no     [  ] n/a  Consultant recommendations: [ x ] yes    [  ] no     [  ] n/a

## 2020-05-14 NOTE — PROGRESS NOTE ADULT - PROBLEM SELECTOR PLAN 3
Stage 4 Sacral Ulcer  - s/p debridement and Zosyn course  - s/p wound vac placement - wound care NP Benita following  - Menendez placed for urinary retention by Urology - as pt not yet ambulatory, will keep Menendez in for wound healing given sacral ulcer, attempt TOV at [facility] once more ambulatory

## 2020-05-14 NOTE — PROGRESS NOTE ADULT - PROBLEM SELECTOR PLAN 1
COVID-19, maintaining sat >90 on room air   - Maintain on airborne isolation.  - Limit use of NSAIDs.  - Medically stable for d/c, however last 2 repeat COVID19 PCR tests positive 5/12 and 5/13, will hold off on repeat PCR till 5/15, awaiting placement at [facility]

## 2020-05-14 NOTE — PROGRESS NOTE ADULT - ASSESSMENT
80 y/o M with PMHx of HTN, BPH, spinal stenosis, dyslipidemia, anxiety, neuropathy, B12 deficiency who presents from rehab for evaluation of weakness and hypotension    Anemia   no overt gi bleeding; hgb stable  sp sacral decub debridement; care per surgery   monitor cbc, transfuse prn  cont ppi and carafate  monitor stool color   defer endoscopic eval as +Covid and increased risk for anesthesia in these pts   op gi f/u for possible colonoscopy if none recent once covid crisis resolved given ct findings    FTT/Dysphagia   unclear etiology; esophagram at [place] 2/2019 normal   po intake somewhat better  correct elytes prn  cont ppi    on supplements per nutrition   defer endoscopic eval as +Covid and increased risk for anesthesia in these pts    COVID-19  monitor rep status   inc lfts in setting of viral illness; resolving  care per primary team

## 2020-05-15 DIAGNOSIS — E46 UNSPECIFIED PROTEIN-CALORIE MALNUTRITION: ICD-10-CM

## 2020-05-15 LAB
ALBUMIN SERPL ELPH-MCNC: 2.1 G/DL — LOW (ref 3.3–5)
ALP SERPL-CCNC: 119 U/L — SIGNIFICANT CHANGE UP (ref 40–120)
ALT FLD-CCNC: 44 U/L — SIGNIFICANT CHANGE UP (ref 12–78)
ANION GAP SERPL CALC-SCNC: 5 MMOL/L — SIGNIFICANT CHANGE UP (ref 5–17)
AST SERPL-CCNC: 36 U/L — SIGNIFICANT CHANGE UP (ref 15–37)
BASOPHILS # BLD AUTO: 0.01 K/UL — SIGNIFICANT CHANGE UP (ref 0–0.2)
BASOPHILS NFR BLD AUTO: 0.1 % — SIGNIFICANT CHANGE UP (ref 0–2)
BILIRUB SERPL-MCNC: 0.5 MG/DL — SIGNIFICANT CHANGE UP (ref 0.2–1.2)
BUN SERPL-MCNC: 25 MG/DL — HIGH (ref 7–23)
CALCIUM SERPL-MCNC: 8.5 MG/DL — SIGNIFICANT CHANGE UP (ref 8.5–10.1)
CHLORIDE SERPL-SCNC: 103 MMOL/L — SIGNIFICANT CHANGE UP (ref 96–108)
CO2 SERPL-SCNC: 28 MMOL/L — SIGNIFICANT CHANGE UP (ref 22–31)
CREAT SERPL-MCNC: 0.75 MG/DL — SIGNIFICANT CHANGE UP (ref 0.5–1.3)
EOSINOPHIL # BLD AUTO: 0.02 K/UL — SIGNIFICANT CHANGE UP (ref 0–0.5)
EOSINOPHIL NFR BLD AUTO: 0.2 % — SIGNIFICANT CHANGE UP (ref 0–6)
GLUCOSE SERPL-MCNC: 82 MG/DL — SIGNIFICANT CHANGE UP (ref 70–99)
HCT VFR BLD CALC: 27.1 % — LOW (ref 39–50)
HGB BLD-MCNC: 8.5 G/DL — LOW (ref 13–17)
IMM GRANULOCYTES NFR BLD AUTO: 0.5 % — SIGNIFICANT CHANGE UP (ref 0–1.5)
LYMPHOCYTES # BLD AUTO: 1.27 K/UL — SIGNIFICANT CHANGE UP (ref 1–3.3)
LYMPHOCYTES # BLD AUTO: 15.5 % — SIGNIFICANT CHANGE UP (ref 13–44)
MCHC RBC-ENTMCNC: 27.1 PG — SIGNIFICANT CHANGE UP (ref 27–34)
MCHC RBC-ENTMCNC: 31.4 GM/DL — LOW (ref 32–36)
MCV RBC AUTO: 86.3 FL — SIGNIFICANT CHANGE UP (ref 80–100)
MONOCYTES # BLD AUTO: 0.42 K/UL — SIGNIFICANT CHANGE UP (ref 0–0.9)
MONOCYTES NFR BLD AUTO: 5.1 % — SIGNIFICANT CHANGE UP (ref 2–14)
NEUTROPHILS # BLD AUTO: 6.44 K/UL — SIGNIFICANT CHANGE UP (ref 1.8–7.4)
NEUTROPHILS NFR BLD AUTO: 78.6 % — HIGH (ref 43–77)
NRBC # BLD: 0 /100 WBCS — SIGNIFICANT CHANGE UP (ref 0–0)
PLATELET # BLD AUTO: 207 K/UL — SIGNIFICANT CHANGE UP (ref 150–400)
POTASSIUM SERPL-MCNC: 4 MMOL/L — SIGNIFICANT CHANGE UP (ref 3.5–5.3)
POTASSIUM SERPL-SCNC: 4 MMOL/L — SIGNIFICANT CHANGE UP (ref 3.5–5.3)
PROT SERPL-MCNC: 5.9 G/DL — LOW (ref 6–8.3)
RBC # BLD: 3.14 M/UL — LOW (ref 4.2–5.8)
RBC # FLD: 15.9 % — HIGH (ref 10.3–14.5)
SARS-COV-2 RNA SPEC QL NAA+PROBE: DETECTED
SODIUM SERPL-SCNC: 136 MMOL/L — SIGNIFICANT CHANGE UP (ref 135–145)
WBC # BLD: 8.2 K/UL — SIGNIFICANT CHANGE UP (ref 3.8–10.5)
WBC # FLD AUTO: 8.2 K/UL — SIGNIFICANT CHANGE UP (ref 3.8–10.5)

## 2020-05-15 PROCEDURE — 99232 SBSQ HOSP IP/OBS MODERATE 35: CPT | Mod: CS

## 2020-05-15 RX ORDER — OXYCODONE HYDROCHLORIDE 5 MG/1
5 TABLET ORAL EVERY 6 HOURS
Refills: 0 | Status: DISCONTINUED | OUTPATIENT
Start: 2020-05-15 | End: 2020-05-20

## 2020-05-15 RX ORDER — POVIDONE-IODINE 5 %
1 AEROSOL (ML) TOPICAL DAILY
Refills: 0 | Status: DISCONTINUED | OUTPATIENT
Start: 2020-05-15 | End: 2020-05-28

## 2020-05-15 RX ORDER — PREGABALIN 225 MG/1
1000 CAPSULE ORAL DAILY
Refills: 0 | Status: DISCONTINUED | OUTPATIENT
Start: 2020-05-15 | End: 2020-06-01

## 2020-05-15 RX ADMIN — Medication 50 MILLIGRAM(S): at 17:41

## 2020-05-15 RX ADMIN — Medication 1 GRAM(S): at 00:15

## 2020-05-15 RX ADMIN — Medication 1 SPRAY(S): at 17:42

## 2020-05-15 RX ADMIN — SERTRALINE 100 MILLIGRAM(S): 25 TABLET, FILM COATED ORAL at 11:49

## 2020-05-15 RX ADMIN — Medication 1 GRAM(S): at 17:42

## 2020-05-15 RX ADMIN — Medication 1 SPRAY(S): at 05:53

## 2020-05-15 RX ADMIN — Medication 1 GRAM(S): at 11:49

## 2020-05-15 RX ADMIN — OXYCODONE HYDROCHLORIDE 5 MILLIGRAM(S): 5 TABLET ORAL at 12:33

## 2020-05-15 RX ADMIN — Medication 1 APPLICATION(S): at 14:53

## 2020-05-15 RX ADMIN — Medication 1 APPLICATION(S): at 05:54

## 2020-05-15 RX ADMIN — Medication 1 GRAM(S): at 05:54

## 2020-05-15 RX ADMIN — OXYCODONE HYDROCHLORIDE 5 MILLIGRAM(S): 5 TABLET ORAL at 18:46

## 2020-05-15 RX ADMIN — Medication 50 MILLIGRAM(S): at 05:54

## 2020-05-15 RX ADMIN — Medication 1 APPLICATION(S): at 00:16

## 2020-05-15 RX ADMIN — PANTOPRAZOLE SODIUM 40 MILLIGRAM(S): 20 TABLET, DELAYED RELEASE ORAL at 05:54

## 2020-05-15 NOTE — PROGRESS NOTE ADULT - SUBJECTIVE AND OBJECTIVE BOX
LISANDRA PADGETT is a 79yMale , patient examined and chart reviewed.    INTERVAL HPI/ OVERNIGHT EVENTS:   NAD. Afebrile. No events.    PAST MEDICAL & SURGICAL HISTORY:  H/O vitamin B deficiency  Spinal stenosis  HTN (hypertension)  History of tonsillectomy      For details regarding the patient's social history, family history, and other miscellaneous elements, please refer the initial infectious diseases consultation and/or the admitting history and physical examination for this admission.    ROS:  CONSTITUTIONAL:  Negative fever or chills  EYES:  Negative  blurry vision or double vision  CARDIOVASCULAR:  Negative for chest pain or palpitations  RESPIRATORY:  Negative for cough, wheezing, or SOB   GASTROINTESTINAL:  Negative for nausea, vomiting, diarrhea, constipation, or abdominal pain  GENITOURINARY:  Negative frequency, urgency or dysuria  NEUROLOGIC:  No headache, confusion, dizziness, lightheadedness  All other systems were reviewed and are negative     Current inpatient medications :  MEDICATIONS  (STANDING):  cyanocobalamin 1000 MICROGram(s) Oral daily  metoprolol tartrate 50 milliGRAM(s) Oral two times a day  pantoprazole    Tablet 40 milliGRAM(s) Oral before breakfast  povidone iodine 10% Solution 1 Application(s) Topical daily  sertraline 100 milliGRAM(s) Oral daily  sodium chloride 0.65% Nasal 1 Spray(s) Both Nostrils two times a day  sucralfate suspension 1 Gram(s) Oral four times a day    MEDICATIONS  (PRN):  oxyCODONE    IR 5 milliGRAM(s) Oral every 6 hours PRN Severe Pain (7 - 10)    Objective:  Vital Signs Last 24 Hrs  T(C): 36.8 (15 May 2020 12:29), Max: 36.8 (15 May 2020 12:29)  T(F): 98.2 (15 May 2020 12:29), Max: 98.2 (15 May 2020 12:29)  HR: 69 (15 May 2020 12:29) (65 - 79)  BP: 106/70 (15 May 2020 12:29) (106/70 - 122/65)  RR: 20 (15 May 2020 12:29) (17 - 23)  SpO2: 97% (15 May 2020 12:29) (94% - 98%)    Physical Exam:  General:  no acute distress  Eyes: sclera anicteric, pupils equal and reactive to light  ENMT: buccal mucosa moist, pharynx not injected  Neck: supple, trachea midline  Lungs: clear, no wheeze/rhonchi  Cardiovascular: regular rate and rhythm, S1 S2  Abdomen: soft, nontender, no organomegaly present, bowel sounds normal  Neurological: alert and oriented x2 Cranial Nerves II-XII grossly intact  Skin: sacral decub drsg c/d/i   Lymph Nodes: no palpable cervical/supraclavicular lymph nodes enlargements  Extremities: no cyanosis/clubbing/edema        LABS:                               8.5    8.20  )-----------( 207      ( 15 May 2020 07:30 )             27.1   05-15    136  |  103  |  25<H>  ----------------------------<  82  4.0   |  28  |  0.75    Ca    8.5      15 May 2020 07:30    TPro  5.9<L>  /  Alb  2.1<L>  /  TBili  0.5  /  DBili  x   /  AST  36  /  ALT  44  /  AlkPhos  119  05-15    MICROBIOLOGY:    Culture - Blood (collected 02 May 2020 00:30)  Source: .Blood Blood  Preliminary Report (03 May 2020 01:03):    No growth to date.    Culture - Blood (collected 02 May 2020 00:30)  Source: .Blood Blood  Preliminary Report (03 May 2020 01:03):    No growth to date.    COVID-19 PCR . (04.28.20 @ 22:41)    COVID-19 PCR: Detected: This test has been validated by [facility] to be accurate;  though it has not been FDA cleared/approved by the usual pathway  As with all laboratory test, results should be correlated with clinical  findings.  https://www.fda.gov/media/616496/download  https://www.fda.gov/media/930187/download    RADIOLOGY & ADDITIONAL STUDIES:    EXAM:  XR CHEST PORTABLE ROUTINE 1V                            PROCEDURE DATE:  04/30/2020          INTERPRETATION:    Examination: XR CHEST    History: ADMDIAG1: K92.2 GASTROINTESTINAL HEMORRHAGE, UNSPECIFIED/, pn pneumonia    TECHNIQUE:  Portable AP view of the chest was obtained.    COMPARISON: A CT chest 1/29/2020 available for review.    FINDINGS:   Lungs clear. RIGHT hemidiaphragm mildly elevated. Posterior lung bases cannot be assessed due to elevated diaphragms. Lateral radiograph recommended if clinically warranted.  . The heart and mediastinum are within normal limits.    Visualized osseous structures are intact.      IMPRESSION: Visualized Lungs clear..   Elevated LEFT hemidiaphragm. Posterior lung bases cannot be assessed dueto elevated diaphragms. Lateral radiograph recommended if clinically warranted.      Assessment :   80 y/o M with PMHx of HTN, BPH, spinal stenosis, dyslipidemia, anxiety, neuropathy, B12 deficiency resident of [facility] for evaluation of weakness and hypotension with mental status change. Tested COVID 19 positive early April. In ED he was hypotensive. WBC 19K No SOB cough n/v/d. Pt is a poor historian. Has unstagable sacral decub ulcer. Also with Acute anemia. ?infectious process vs reactive leukocytosis. Leukocytosis poss chronic.  Respiratory status stable. Menendez placed sec AUR 5/3/2020. Seen by surgery and is sp bedside I&D of sacral decub Clinically stable.    Plan :   Monitor off antibiotics  Completed Zosyn   Cultures ngtd  Menendez per urology  Frequent turning  Local wound care per surgery  COVID-19--critical period for decompensation  (7-14 days post symptom onset), avoid aerosolizing procedures, NSAIDs   -monitor respiratory status closely ( route of 02 and saturation) and titrate when able  -isolation precautions per protocol  -avoid excessive blood draws, blood cultures and frequent CXRs  -monitor biomarkers- CBC w diff  for NLRNLR <3 low vs >5 high) Ferritin (lower risk <450 vs >850) CRP (low risk <2 and higher risk >6) and LDH, D Dimer, procalcitonin  -therapies remains investigational-- including Hydroxychloroquine  -antibiotics only if there is concern for a bacterial process  -Steroids short course ( 5 days)  --for hypoxia/ARDS /shock    Dc planning to rehab pending COVID 19 neg x 2       Continue with present regiment.  Appropriate use of antibiotics and adverse effects reviewed.      I have discussed the above plan of care with patient/ family in detail. They expressed understanding of the  treatment plan . Risks, benefits and alternatives discussed in detail. I have asked if they have any questions or concerns and appropriately addressed them to the best of my ability .    > 35 minutes were spent in direct patient care reviewing notes, medications ,labs data/ imaging , discussion with multidisciplinary team.    Thank you for allowing me to participate in care of your patient .    Naima Lopez MD  Infectious Disease  [phone]

## 2020-05-15 NOTE — PROGRESS NOTE ADULT - SUBJECTIVE AND OBJECTIVE BOX
INTERVAL HPI/OVERNIGHT EVENTS:    passed brown bm per nursing    MEDICATIONS  (STANDING):  collagenase Ointment 1 Application(s) Topical three times a day  metoprolol tartrate 50 milliGRAM(s) Oral two times a day  pantoprazole    Tablet 40 milliGRAM(s) Oral before breakfast  sertraline 100 milliGRAM(s) Oral daily  sodium chloride 0.65% Nasal 1 Spray(s) Both Nostrils two times a day  sucralfate suspension 1 Gram(s) Oral four times a day    MEDICATIONS  (PRN):      Allergies    No Known Allergies    Intolerances        Review of Systems:    unable to obtain     Vital Signs Last 24 Hrs  T(C): 36.6 (12 May 2020 07:44), Max: 36.7 (12 May 2020 04:17)  T(F): 97.8 (12 May 2020 07:44), Max: 98 (12 May 2020 04:17)  HR: 63 (12 May 2020 07:44) (63 - 70)  BP: 145/72 (12 May 2020 07:44) (106/64 - 152/76)  BP(mean): --  RR: 18 (12 May 2020 07:44) (18 - 19)  SpO2: 99% (12 May 2020 07:44) (97% - 99%)    PHYSICAL EXAM:    deferred f/u done remotely as covid +       LABS:                        9.3    8.42  )-----------( 242      ( 12 May 2020 07:01 )             29.7     05-12    138  |  105  |  25<H>  ----------------------------<  94  4.7   |  30  |  0.75    Ca    8.2<L>      12 May 2020 07:01    TPro  5.8<L>  /  Alb  2.1<L>  /  TBili  0.4  /  DBili  x   /  AST  31  /  ALT  49  /  AlkPhos  132<H>  05-12          RADIOLOGY & ADDITIONAL TESTS:

## 2020-05-15 NOTE — PROGRESS NOTE ADULT - PROBLEM SELECTOR PLAN 4
was hypotensive on admission, resolved   - Off Midodrine, continue BB with hold parameters- will discharge on Metoprolol 50 BID  - Continue to hold off on home Valsartan given pressures remain soft   - Cardio Dr Ordonez following

## 2020-05-15 NOTE — PROGRESS NOTE ADULT - SUBJECTIVE AND OBJECTIVE BOX
This progress note was completed in part by resident acting as telephonic scribe during COVID-19 crisis. Physical exam was completed by attending physician, and findings below are those of the attending physician, and have been reviewed in detail.    Patient is a 79y old  Male who presents with a chief complaint of Generalized weakness (14 May 2020 15:15)      FROM ADMISSION H+P:   HPI:  80 y/o M with PMHx of HTN, BPH, spinal stenosis, dyslipidemia, anxiety, neuropathy, B12 deficiency who presents from [facility] rehab for evaluation of weakness and hypotension. As per transfer papers, pt with confusion after lunch today. Pt admits to generalized weakness. Lives with wife, Stephany and son. He came from [facility] rehab in [place] since Feb 14. Patient lives with wife and son at home and ambulates with cane. Contracted Covid-19 in [facility] rehab and first started having symptoms on Mar 25, reporting symptoms of cough and low grade fever, diarrhea. Denies any chest pain, shortness of breath, headache, dizziness. History obtained from wife, Stephany([phone]) as pt is confused and a poor historian.   Of note, pt was noted to have sacral wounds today and reported low grade fever for two days and mental status changes today and loss of appetite.    The date the pt first felt unwell: March 25  Fever or chills: yes [ x ]   no [  ]   - Tmax:   Fatigue, malaise or generalized weakness: yes [ x ]   no [  ]  Shortness of breath/dyspnea: yes [ x ]   no [  ]  Cough: yes [ x ]   no [  ], sputum production: yes [  ]   no [x  ]  Blood in sputum: yes [  ]   no [ x ]  Anorexia/po intolerance: yes [  ]   no [ x ]  Chest pain or chest tightness: yes [  ]   no [ x ]  Edema in legs: yes [  ]   no [ x ]  Myalgias: yes [  ]   no [ x ]  Headache: yes [  ]   no [x  ]  Sore throat: yes [  ]   no [ x ]  Rhinorrhea: yes [  ]   no [  x]  Abd pain: yes [  ]   no [ x ]  Nausea: yes [  ]   no [x  ]  Vomiting: yes [  ]   no [  x]  Diarrhea: yes [  ]   no [  ]  Skin rashes: yes [  ]   no [  ]  Loss of sense of smell/anosmia: yes [  ]   no [ x ]  Conjunctivitis: yes [  ]   no [ x ]  Recent travel: yes [  ]   no [ x ] - Location:   Any sick contacts: yes [x  ]   no [  ]: Contracted from [facility]  Close contact with someone confirmed positive with COVID-19 / SARS-CoV2 in the last 14 days: yes [  ]   no [ x ]  Code status: DNR, but not DNI      In the ED  Vitals: T--, , BP 84/48, RR 22, O2 96 on RA  Labs(In [place]) significant for: wbc 18.45, hgb 6.8, FOBT+,   Coag studies: PT 14.9, INR 1.33, aPTT 43.7, d-dimer 561  Chem panel: Lactate 2.5, Na 133, AG 3, BUN/Cr 61/1.61, Alb 1.9, Alk phos 195, ,   UA neg  Covid-19 PCR Positive   EKG shows Accelerated Junctional rhythm with occasional PVCs  CXR in [place] ED shows: Mild R atelectasis  In [place] ED, s/p 2L NS bolus, 1 unit pRBC (28 Apr 2020 21:00)      ----  INTERVAL HPI/OVERNIGHT EVENTS: Pt seen and evaluated at the bedside. No acute overnight events occurred.     Fever or chills: yes [  ]   no [  ]  Fatigue, malaise or generalized weakness: yes [  ]   no [  ]  Chest pain: yes [  ]   no [  ]  Palpitations: yes [  ]   no [  ]  Shortness of breath/dyspnea: yes [  ]   no [  ]  Cough: yes [  ]   no [  ], sputum production: yes [  ]   no [  ]  Anorexia/po intolerance: yes [  ]   no [  ]  Myalgias: yes [  ]   no [  ]  Headache: yes [  ]   no [  ]  Sore throat: yes [  ]   no [  ]  Rhinorrhea: yes [  ]   no [  ]  Nausea: yes [  ]   no [  ]  Vomiting: yes [  ]   no [  ]  Diarrhea: yes [  ]   no [  ]  Loss of sense of smell/anosmia: yes [  ]   no [  ]  Conjunctivitis: yes [  ]   no [  ]  Other associated complaints:     ----  PAST MEDICAL & SURGICAL HISTORY:  H/O vitamin B deficiency  Spinal stenosis  HTN (hypertension)  History of tonsillectomy      FAMILY HISTORY:  No pertinent family history in first degree relatives      Allergies    No Known Allergies    Intolerances        ----  REVIEW OF SYSTEMS:  as per HPI    ----  PHYSICAL EXAM:  This encounter took place via video conferencing. The following findings were noted but a full physical examination was not deemed possible  GENERAL: no sign of distress  PULM: no signs of respiratory distress, speaking in full sentences. no coughing during our interaction  NEURO: alert, oriented to person, place, time. voice is clear and not muffled or slurred, no noted tremors  PSYCH: organized thought pattern, able to interact readily  SKIN: visualized skin on arms, legs, face without obvious lesions    Other noted pertinent findings  OXYGEN SUPPORT: Nasal cannula: yes [  ]   no [  ] - if yes, the flow rate:        - nonrebreather mask: yes [  ]   no [  ]         - venti-mask: yes [  ]   no [  ] - if yes, the flow rate:   LINES: central line, midline, PICC line: yes [  ]   no [  ]  REYNA CATHETER: yes [  ]   no [  ]    T(C): 36.6 (05-15-20 @ 04:02), Max: 36.7 (05-14-20 @ 07:52)  HR: 79 (05-15-20 @ 05:54) (64 - 79)  BP: 117/65 (05-15-20 @ 05:54) (96/58 - 122/65)  RR: 18 (05-15-20 @ 04:02) (17 - 18)  SpO2: 98% (05-15-20 @ 04:02) (94% - 100%)  Wt(kg): --    ----  I&O's Summary    14 May 2020 07:01  -  15 May 2020 07:00  --------------------------------------------------------  IN: 200 mL / OUT: 1350 mL / NET: -1150 mL        LABS:                        8.5    8.20  )-----------( 207      ( 15 May 2020 07:30 )             27.1     05-14    136  |  104  |  25<H>  ----------------------------<  83  4.4   |  30  |  0.79    Ca    8.4<L>      14 May 2020 06:36    TPro  6.0  /  Alb  2.1<L>  /  TBili  0.6  /  DBili  x   /  AST  34  /  ALT  45  /  AlkPhos  123<H>  05-14        CAPILLARY BLOOD GLUCOSE                    COVID-19 Inflammatory Markers:  Auto Neutrophil #: 6.44 K/uL (05-15-20 @ 07:30)  Auto Neutrophil #: 7.72 K/uL (05-14-20 @ 06:36)  Auto Neutrophil #: 7.16 K/uL (05-13-20 @ 06:41)  Auto Neutrophil #: 6.58 K/uL (05-12-20 @ 07:01)  Auto Neutrophil #: 6.56 K/uL (05-11-20 @ 06:46)  Auto Neutrophil #: 6.47 K/uL (05-10-20 @ 11:35)  Auto Neutrophil #: 6.11 K/uL (05-09-20 @ 06:38)  Auto Neutrophil #: 6.53 K/uL (05-08-20 @ 06:48)  Auto Neutrophil #: 8.71 K/uL (05-07-20 @ 07:54)  Auto Neutrophil #: 10.44 K/uL (05-06-20 @ 12:42)    Auto Lymphocyte #: 1.27 K/uL (05-15-20 @ 07:30)  Auto Lymphocyte #: 1.37 K/uL (05-14-20 @ 06:36)  Auto Lymphocyte #: 1.42 K/uL (05-13-20 @ 06:41)  Auto Lymphocyte #: 1.21 K/uL (05-12-20 @ 07:01)  Auto Lymphocyte #: 1.41 K/uL (05-11-20 @ 06:46)  Auto Lymphocyte #: 1.26 K/uL (05-10-20 @ 11:35)  Auto Lymphocyte #: 1.09 K/uL (05-09-20 @ 06:38)  Auto Lymphocyte #: 1.07 K/uL (05-08-20 @ 06:48)  Auto Lymphocyte #: 1.50 K/uL (05-07-20 @ 07:54)  Auto Lymphocyte #: 0.77 K/uL (05-06-20 @ 12:42)                                ----  Personally reviewed:  Vital sign trends: [  ] yes    [  ] no     [  ] n/a  Laboratory results: [  ] yes    [  ] no     [  ] n/a  Radiology results: [  ] yes    [  ] no     [  ] n/a  Culture results: [  ] yes    [  ] no     [  ] n/a  Consultant recommendations: [  ] yes    [  ] no     [  ] n/a This progress note was completed in part by resident acting as telephonic scribe during COVID-19 crisis. Physical exam was completed by attending physician, and findings below are those of the attending physician, and have been reviewed in detail.    Patient is a 79y old  Male who presents with a chief complaint of Generalized weakness (14 May 2020 15:15)      FROM ADMISSION H+P:   HPI:  80 y/o M with PMHx of HTN, BPH, spinal stenosis, dyslipidemia, anxiety, neuropathy, B12 deficiency who presents from [facility] rehab for evaluation of weakness and hypotension. As per transfer papers, pt with confusion after lunch today. Pt admits to generalized weakness. Lives with wife, Stephany and son. He came from [facility] rehab in [place] since Feb 14. Patient lives with wife and son at home and ambulates with cane. Contracted Covid-19 in [facility] rehab and first started having symptoms on Mar 25, reporting symptoms of cough and low grade fever, diarrhea. Denies any chest pain, shortness of breath, headache, dizziness. History obtained from wife, Stephany([phone]) as pt is confused and a poor historian.   Of note, pt was noted to have sacral wounds today and reported low grade fever for two days and mental status changes today and loss of appetite.    The date the pt first felt unwell: March 25  Fever or chills: yes [ x ]   no [  ]   - Tmax:   Fatigue, malaise or generalized weakness: yes [ x ]   no [  ]  Shortness of breath/dyspnea: yes [ x ]   no [  ]  Cough: yes [ x ]   no [  ], sputum production: yes [  ]   no [x  ]  Blood in sputum: yes [  ]   no [ x ]  Anorexia/po intolerance: yes [  ]   no [ x ]  Chest pain or chest tightness: yes [  ]   no [ x ]  Edema in legs: yes [  ]   no [ x ]  Myalgias: yes [  ]   no [ x ]  Headache: yes [  ]   no [x  ]  Sore throat: yes [  ]   no [ x ]  Rhinorrhea: yes [  ]   no [  x]  Abd pain: yes [  ]   no [ x ]  Nausea: yes [  ]   no [x  ]  Vomiting: yes [  ]   no [  x]  Diarrhea: yes [  ]   no [  ]  Skin rashes: yes [  ]   no [  ]  Loss of sense of smell/anosmia: yes [  ]   no [ x ]  Conjunctivitis: yes [  ]   no [ x ]  Recent travel: yes [  ]   no [ x ] - Location:   Any sick contacts: yes [x  ]   no [  ]: Contracted from [facility]  Close contact with someone confirmed positive with COVID-19 / SARS-CoV2 in the last 14 days: yes [  ]   no [ x ]  Code status: DNR, but not DNI      In the ED  Vitals: T--, , BP 84/48, RR 22, O2 96 on RA  Labs(In [place]) significant for: wbc 18.45, hgb 6.8, FOBT+,   Coag studies: PT 14.9, INR 1.33, aPTT 43.7, d-dimer 561  Chem panel: Lactate 2.5, Na 133, AG 3, BUN/Cr 61/1.61, Alb 1.9, Alk phos 195, ,   UA neg  Covid-19 PCR Positive   EKG shows Accelerated Junctional rhythm with occasional PVCs  CXR in [place] ED shows: Mild R atelectasis  In [place] ED, s/p 2L NS bolus, 1 unit pRBC (28 Apr 2020 21:00)      ----  INTERVAL HPI/OVERNIGHT EVENTS: Pt seen and evaluated at the bedside with televideo conferencing. No acute overnight events occurred. Pt answering questions appropriately, making eye contact. When asked how he feels today he stated "what am I supposed to feel like". Pt admits to pain on his buttocks. Admits to feeling tired, apetite is "ok", continued sore throat and dry mouth, taste intact. Pt denies chills, fever, SOB, cough, N/V, itchy eyes, dysgeusia, rhinorrhea chest pressure, abdominal pain.      Fever or chills: yes [  ]   no [ x ]  Fatigue, malaise or generalized weakness: yes [ x ]   no [  ]  Chest pain: yes [  ]   no [ x ]  Palpitations: yes [  ]   no [  ]  Shortness of breath/dyspnea: yes [  ]   no [ x ]  Cough: yes [  ]   no [ x ], sputum production: yes [  ]   no [ x ]  Anorexia/po intolerance: yes [  ]   no [ x ]  Myalgias: yes [  ]   no [ x ]  Headache: yes [  ]   no [ x ]  Sore throat: yes [ x ]   no [  ]  Rhinorrhea: yes [  ]   no [x  ]  Nausea: yes [  ]   no [ x ]  Vomiting: yes [  ]   no [x  ]  Diarrhea: yes [  ]   no [ x ]  Loss of sense of smell/anosmia: yes [  ]   no [ x ]  Conjunctivitis: yes [  ]   no [ x ]  Other associated complaints:     ----  PAST MEDICAL & SURGICAL HISTORY:  H/O vitamin B deficiency  Spinal stenosis  HTN (hypertension)  History of tonsillectomy      FAMILY HISTORY:  No pertinent family history in first degree relatives      Allergies    No Known Allergies    Intolerances        ----  REVIEW OF SYSTEMS:  as per HPI    ----  PHYSICAL EXAM:  This encounter took place via video conferencing. The following findings were noted but a full physical examination was not deemed possible  GENERAL: no sign of distress, appears elderly and weak, answering question appropriately  PULM: no signs of respiratory distress, speaking in full sentences. no coughing during our interaction  NEURO: alert, oriented to person, place, time. voice is soft, weak but clear and not muffled or slurred, no noted tremors  PSYCH: organized thought pattern, able to interact readily  SKIN: visualized skin on arms, legs, face without obvious lesions, however has known sacral wound stage 4     Other noted pertinent findings  OXYGEN SUPPORT: Nasal cannula: yes [  ]   no [ x ] - if yes, the flow rate:       LINES: central line, midline, PICC line: yes [  ]   no [ x ]  REYNA CATHETER: yes [ x ]   no [  ]    T(C): 36.6 (05-15-20 @ 04:02), Max: 36.7 (05-14-20 @ 07:52)  HR: 79 (05-15-20 @ 05:54) (64 - 79)  BP: 117/65 (05-15-20 @ 05:54) (96/58 - 122/65)  RR: 18 (05-15-20 @ 04:02) (17 - 18)  SpO2: 98% (05-15-20 @ 04:02) (94% - 100%)  Wt(kg): --    ----  I&O's Summary    14 May 2020 07:01  -  15 May 2020 07:00  --------------------------------------------------------  IN: 200 mL / OUT: 1350 mL / NET: -1150 mL        LABS:                        8.5    8.20  )-----------( 207      ( 15 May 2020 07:30 )             27.1     05-14    136  |  104  |  25<H>  ----------------------------<  83  4.4   |  30  |  0.79    Ca    8.4<L>      14 May 2020 06:36    TPro  6.0  /  Alb  2.1<L>  /  TBili  0.6  /  DBili  x   /  AST  34  /  ALT  45  /  AlkPhos  123<H>  05-14        CAPILLARY BLOOD GLUCOSE                    COVID-19 Inflammatory Markers:  Auto Neutrophil #: 6.44 K/uL (05-15-20 @ 07:30)  Auto Neutrophil #: 7.72 K/uL (05-14-20 @ 06:36)  Auto Neutrophil #: 7.16 K/uL (05-13-20 @ 06:41)  Auto Neutrophil #: 6.58 K/uL (05-12-20 @ 07:01)  Auto Neutrophil #: 6.56 K/uL (05-11-20 @ 06:46)  Auto Neutrophil #: 6.47 K/uL (05-10-20 @ 11:35)  Auto Neutrophil #: 6.11 K/uL (05-09-20 @ 06:38)  Auto Neutrophil #: 6.53 K/uL (05-08-20 @ 06:48)  Auto Neutrophil #: 8.71 K/uL (05-07-20 @ 07:54)  Auto Neutrophil #: 10.44 K/uL (05-06-20 @ 12:42)    Auto Lymphocyte #: 1.27 K/uL (05-15-20 @ 07:30)  Auto Lymphocyte #: 1.37 K/uL (05-14-20 @ 06:36)  Auto Lymphocyte #: 1.42 K/uL (05-13-20 @ 06:41)  Auto Lymphocyte #: 1.21 K/uL (05-12-20 @ 07:01)  Auto Lymphocyte #: 1.41 K/uL (05-11-20 @ 06:46)  Auto Lymphocyte #: 1.26 K/uL (05-10-20 @ 11:35)  Auto Lymphocyte #: 1.09 K/uL (05-09-20 @ 06:38)  Auto Lymphocyte #: 1.07 K/uL (05-08-20 @ 06:48)  Auto Lymphocyte #: 1.50 K/uL (05-07-20 @ 07:54)  Auto Lymphocyte #: 0.77 K/uL (05-06-20 @ 12:42)                                ----  Personally reviewed:  Vital sign trends: [  ] yes    [  ] no     [  ] n/a  Laboratory results: [  ] yes    [  ] no     [  ] n/a  Radiology results: [  ] yes    [  ] no     [  ] n/a  Culture results: [  ] yes    [  ] no     [  ] n/a  Consultant recommendations: [  ] yes    [  ] no     [  ] n/a This progress note was completed in part by resident acting as telephonic scribe during COVID-19 crisis. Physical exam was completed by attending physician, and findings below are those of the attending physician, and have been reviewed in detail.    Patient is a 79y old  Male who presents with a chief complaint of Generalized weakness (14 May 2020 15:15)      FROM ADMISSION H+P:   HPI:  80 y/o M with PMHx of HTN, BPH, spinal stenosis, dyslipidemia, anxiety, neuropathy, B12 deficiency who presents from [facility] rehab for evaluation of weakness and hypotension. As per transfer papers, pt with confusion after lunch today. Pt admits to generalized weakness. Lives with wife, Stephany and son. He came from [facility] rehab in [place] since Feb 14. Patient lives with wife and son at home and ambulates with cane. Contracted Covid-19 in [facility] rehab and first started having symptoms on Mar 25, reporting symptoms of cough and low grade fever, diarrhea. Denies any chest pain, shortness of breath, headache, dizziness. History obtained from wife, Stephany([phone]) as pt is confused and a poor historian.   Of note, pt was noted to have sacral wounds today and reported low grade fever for two days and mental status changes today and loss of appetite.    ----  INTERVAL HPI/OVERNIGHT EVENTS: Pt seen and evaluated at the bedside with televideo conferencing. No acute overnight events occurred. Pt answering questions appropriately, making eye contact. When asked how he feels today he stated "what am I supposed to feel like". Pt admits to pain on his buttocks. Admits to feeling tired, apetite is "ok", continued sore throat and dry mouth, taste intact. Pt denies chills, fever, SOB, cough, N/V, itchy eyes, dysgeusia, rhinorrhea chest pressure, abdominal pain.      Fever or chills: yes [  ]   no [ x ]  Fatigue, malaise or generalized weakness: yes [ x ]   no [  ]  Chest pain: yes [  ]   no [ x ]  Palpitations: yes [  ]   no [  ]  Shortness of breath/dyspnea: yes [  ]   no [ x ]  Cough: yes [  ]   no [ x ], sputum production: yes [  ]   no [ x ]  Anorexia/po intolerance: yes [  ]   no [ x ]  Myalgias: yes [  ]   no [ x ]  Headache: yes [  ]   no [ x ]  Sore throat: yes [ x ]   no [  ]  Rhinorrhea: yes [  ]   no [x  ]  Nausea: yes [  ]   no [ x ]  Vomiting: yes [  ]   no [x  ]  Diarrhea: yes [  ]   no [ x ]  Loss of sense of smell/anosmia: yes [  ]   no [ x ]  Conjunctivitis: yes [  ]   no [ x ]  Other associated complaints:     ----  PAST MEDICAL & SURGICAL HISTORY:  H/O vitamin B deficiency  Spinal stenosis  HTN (hypertension)  History of tonsillectomy      FAMILY HISTORY:  No pertinent family history in first degree relatives      Allergies    No Known Allergies    Intolerances        ----  REVIEW OF SYSTEMS:  as per HPI    ----  PHYSICAL EXAM:  GENERAL: patient appears weak, tired but is readily arousable  ENMT: no exudates, moist mucous membranes, temporal wasting  LUNGS: reduced air entry, no rhonchi  HEART: S1/S2, regular rate and rhythm, no murmurs noted, no lower extremity edema  GASTROINTESTINAL: abd is markedly thin, active bowel sounds  INTEGUMENT: pale, dry  MUSCULOSKELETAL: no clubbing or cyanosis, no obvious deformity  NEUROLOGIC: awake, alert, oriented x3, good muscle tone in 4 extremities, no obvious sensory deficits    Other noted pertinent findings  OXYGEN SUPPORT: Nasal cannula: yes [  ]   no [ x ] - if yes, the flow rate:       LINES: central line, midline, PICC line: yes [  ]   no [ x ]  REYNA CATHETER: yes [ x ]   no [  ]    T(C): 36.6 (05-15-20 @ 04:02), Max: 36.7 (05-14-20 @ 07:52)  HR: 79 (05-15-20 @ 05:54) (64 - 79)  BP: 117/65 (05-15-20 @ 05:54) (96/58 - 122/65)  RR: 18 (05-15-20 @ 04:02) (17 - 18)  SpO2: 98% (05-15-20 @ 04:02) (94% - 100%)  Wt(kg): --    ----  I&O's Summary    14 May 2020 07:01  -  15 May 2020 07:00  --------------------------------------------------------  IN: 200 mL / OUT: 1350 mL / NET: -1150 mL        LABS:                        8.5    8.20  )-----------( 207      ( 15 May 2020 07:30 )             27.1     05-14    136  |  104  |  25<H>  ----------------------------<  83  4.4   |  30  |  0.79    Ca    8.4<L>      14 May 2020 06:36    TPro  6.0  /  Alb  2.1<L>  /  TBili  0.6  /  DBili  x   /  AST  34  /  ALT  45  /  AlkPhos  123<H>  05-14        CAPILLARY BLOOD GLUCOSE                    COVID-19 Inflammatory Markers:  Auto Neutrophil #: 6.44 K/uL (05-15-20 @ 07:30)  Auto Neutrophil #: 7.72 K/uL (05-14-20 @ 06:36)  Auto Neutrophil #: 7.16 K/uL (05-13-20 @ 06:41)  Auto Neutrophil #: 6.58 K/uL (05-12-20 @ 07:01)  Auto Neutrophil #: 6.56 K/uL (05-11-20 @ 06:46)  Auto Neutrophil #: 6.47 K/uL (05-10-20 @ 11:35)  Auto Neutrophil #: 6.11 K/uL (05-09-20 @ 06:38)  Auto Neutrophil #: 6.53 K/uL (05-08-20 @ 06:48)  Auto Neutrophil #: 8.71 K/uL (05-07-20 @ 07:54)  Auto Neutrophil #: 10.44 K/uL (05-06-20 @ 12:42)    Auto Lymphocyte #: 1.27 K/uL (05-15-20 @ 07:30)  Auto Lymphocyte #: 1.37 K/uL (05-14-20 @ 06:36)  Auto Lymphocyte #: 1.42 K/uL (05-13-20 @ 06:41)  Auto Lymphocyte #: 1.21 K/uL (05-12-20 @ 07:01)  Auto Lymphocyte #: 1.41 K/uL (05-11-20 @ 06:46)  Auto Lymphocyte #: 1.26 K/uL (05-10-20 @ 11:35)  Auto Lymphocyte #: 1.09 K/uL (05-09-20 @ 06:38)  Auto Lymphocyte #: 1.07 K/uL (05-08-20 @ 06:48)  Auto Lymphocyte #: 1.50 K/uL (05-07-20 @ 07:54)  Auto Lymphocyte #: 0.77 K/uL (05-06-20 @ 12:42)

## 2020-05-15 NOTE — PROGRESS NOTE ADULT - PROBLEM SELECTOR PLAN 9
-malnutrition 2/2 poor PO intake  -malnutirion f/u alexandra recommending Continue Ensure Enlive BID, add Prosource BID to encourage sacral wound healing -malnutrition 2/2 poor PO intake  -malnutrition f/u eval recommending Continue Ensure Enlive BID, add Prosource BID to encourage sacral wound healing

## 2020-05-15 NOTE — PROGRESS NOTE ADULT - SUBJECTIVE AND OBJECTIVE BOX
Chief Complaint: Weakness, AMS    Interval Events: No events overnight.    Review of Systems:  General: No fevers, chills, weight loss or gain  Skin: No rashes, color changes  Cardiovascular: No chest pain, orthopnea  Respiratory: No shortness of breath, cough  Gastrointestinal: No nausea, abdominal pain  Genitourinary: No incontinence, pain with urination  Musculoskeletal: No pain, swelling, decreased range of motion  Neurological: No headache, weakness  Psychiatric: No depression, anxiety  Endocrine: No weight loss or gain, increased thirst  All other systems are comprehensively negative.    Physical Exam:  Vital Signs Last 24 Hrs  T(C): 36.6 (15 May 2020 07:50), Max: 36.6 (15 May 2020 04:02)  T(F): 97.9 (15 May 2020 07:50), Max: 97.9 (15 May 2020 07:50)  HR: 65 (15 May 2020 07:50) (65 - 79)  BP: 115/63 (15 May 2020 07:50) (115/63 - 122/65)  BP(mean): --  RR: 23 (15 May 2020 07:50) (17 - 23)  SpO2: 95% (15 May 2020 07:50) (94% - 98%)  General: NAD  HEENT: MMM  Neck: No JVD, no carotid bruit  Extremities: No LE edema, no cyanosis  Neuro: Non-focal  Skin: No rash    Labs:    05-15    136  |  103  |  25<H>  ----------------------------<  82  4.0   |  28  |  0.75    Ca    8.5      15 May 2020 07:30    TPro  5.9<L>  /  Alb  2.1<L>  /  TBili  0.5  /  DBili  x   /  AST  36  /  ALT  44  /  AlkPhos  119  05-15                        8.5    8.20  )-----------( 207      ( 15 May 2020 07:30 )             27.1

## 2020-05-15 NOTE — PROGRESS NOTE ADULT - ASSESSMENT
80 y/o M with PMHx of HTN, BPH, spinal stenosis, dyslipidemia, anxiety, neuropathy, B12 deficiency who presents from rehab for evaluation of weakness and hypotension    Anemia   no overt gi bleeding  likely r/t sacral decub, sp debridement  monitor cbc, transfuse prn  cont ppi and carafate  monitor stool color   defer endoscopic eval as +Covid and increased risk for anesthesia in these pts   op gi f/u for possible colonoscopy if none recent once covid crisis resolved given ct findings    FTT/Dysphagia   unclear etiology; esophagram at [place] 2/2019 normal   correct elytes prn  cont ppi    on supplements per nutrition   defer endoscopic eval as +Covid and increased risk for anesthesia in these pts    COVID-19  monitor rep status   inc lfts in setting of viral illness; resolved   care per primary team 80 y/o M with PMHx of HTN, BPH, spinal stenosis, dyslipidemia, anxiety, neuropathy, B12 deficiency who presents from rehab for evaluation of weakness and hypotension    Anemia   no overt gi bleeding  likely r/t sacral decub/hematoma, sp debridement  monitor cbc, transfuse prn  cont ppi and carafate  monitor stool color   defer endoscopic eval as +Covid and increased risk for anesthesia in these pts   op gi f/u for possible colonoscopy if none recent once covid crisis resolved given ct findings    FTT/Dysphagia   unclear etiology; esophagram at [place] 2/2019 normal   correct elytes prn  cont ppi    on supplements per nutrition   defer endoscopic eval as +Covid and increased risk for anesthesia in these pts    COVID-19  monitor rep status   inc lfts in setting of viral illness; resolved   care per primary team

## 2020-05-15 NOTE — PROGRESS NOTE ADULT - PROBLEM SELECTOR PLAN 2
-Anemia possibly 2/2 L hip chronic hematoma as noted on CT AP, GIB less likely  -Hgb appears to be mildly downtrending stable s/p 2 u pRBCs, will hold off on transfusing unless symptomatic or Hgb drops below 8   - Continue Protonix 40 mg daily, Carafate 1 g four times daily  - Monitor for further bleeding, stool color   - Per GI Dr. Horne, no endo/colonoscopy inpatient - f/u as outpatient for procedure endoscopy and colonoscopy

## 2020-05-15 NOTE — CHART NOTE - NSCHARTNOTEFT_GEN_A_CORE
Pt due for malnutrition follow up. Chart reviewed, hospital course noted. Medically stable for d/c but awaiting negative COVID PCR for [facility] placement. +wound vac for unstageable sacral wound.     Subjective information obtained from comprehensive chart review, staff as able. Pt continues with suboptimal po intake requiring much encouragement from staff (last po intake noted of 25%, 35%) though pt uncooperative at times. +BM 5/15, consistently loose per EMR.    Factors impacting intake: [ ] none [ ] nausea  [ ] vomiting [ ] diarrhea [ ] constipation  [ ]chewing problems [ ] swallowing issues  [X] other: poor appetite, loose BMs    Diet, Soft:   Low Fat (LOWFAT)  Supplement Feeding Modality:  Oral  Ensure Enlive Cans or Servings Per Day:  1       Frequency:  Two Times a day (04-30-20 @ 11:13)    Intake: 25-35% per flowsheets    Current Weight: 110.4 pounds (5/15)   5/12 wt 113.9# 5/11 118.1# 4/28 wt 134.9#     Pertinent Medications: MEDICATIONS  (STANDING):  collagenase Ointment 1 Application(s) Topical three times a day  metoprolol tartrate 50 milliGRAM(s) Oral two times a day  pantoprazole    Tablet 40 milliGRAM(s) Oral before breakfast  sertraline 100 milliGRAM(s) Oral daily  sodium chloride 0.65% Nasal 1 Spray(s) Both Nostrils two times a day  sucralfate suspension 1 Gram(s) Oral four times a day    MEDICATIONS  (PRN):    Pertinent Labs: 05-15 Na136 mmol/L Glu 82 mg/dL K+ 4.0 mmol/L Cr  0.75 mg/dL BUN 25 mg/dL<H> 05-13 Phos 1.9 mg/dL<L> 05-15 Alb 2.1 g/dL<L>     CAPILLARY BLOOD GLUCOSE        Skin: pressure injuries per flowsheets, no edema    Estimated Needs:   [X] no change since previous assessment  [ ] recalculated:     Previous Nutrition Diagnosis:   [ ] Inadequate Energy Intake [ ]Inadequate Oral Intake [ ] Excessive Energy Intake   [ ] Underweight [ ] Increased Nutrient Needs [ ] Overweight/Obesity   [ ] Altered GI Function [ ] Unintended Weight Loss [ ] Food & Nutrition Related Knowledge Deficit [X] Malnutrition     Nutrition Diagnosis is [X] ongoing  [ ] resolved [ ] not applicable     New Nutrition Diagnosis: [X] not applicable       Interventions:   Recommend  [ ] Change Diet To:  [X] Nutrition Supplement: Continue Ensure Enlive BID, add Prosource BID pending order placed to covering MD. Pt has had trial of protein supplement on previous interview, advised to mix into meals for additional protein in setting of increased nutrient needs to support wound healing.   [ ] Nutrition Support  [X] Other: Encourage po intake. Consider appetite stimulant if not medically contraindicated. Recommend vitamin C, B12 and Multivitamin supplementation.    Monitoring and Evaluation:   [X] PO intake [ x ] Tolerance to diet prescription [ x ] weights [ x ] labs[ x ] follow up per protocol  [ ] other:

## 2020-05-15 NOTE — PROGRESS NOTE ADULT - PROBLEM SELECTOR PLAN 3
Stage 4 Sacral Ulcer  - s/p debridement and Zosyn course  - s/p wound vac placement - wound care NP Benita following  - Menendez placed for urinary retention by Urology - as pt not yet ambulatory, will keep Menendez in for wound healing given sacral ulcer, attempt TOV at [facility] once more ambulatory

## 2020-05-15 NOTE — PROGRESS NOTE ADULT - PROBLEM SELECTOR PLAN 1
COVID-19, maintaining sat >90 on room air   - Maintain on airborne isolation.  - Limit use of NSAIDs.  - Medically stable for d/c, however last 2 repeat COVID19 PCR tests positive 5/12 and 5/13, f/u on repeat PCR from today 5/15, awaiting placement at [facility]

## 2020-05-16 LAB
ALBUMIN SERPL ELPH-MCNC: 2 G/DL — LOW (ref 3.3–5)
ALP SERPL-CCNC: 115 U/L — SIGNIFICANT CHANGE UP (ref 40–120)
ALT FLD-CCNC: 54 U/L — SIGNIFICANT CHANGE UP (ref 12–78)
ANION GAP SERPL CALC-SCNC: 9 MMOL/L — SIGNIFICANT CHANGE UP (ref 5–17)
AST SERPL-CCNC: 47 U/L — HIGH (ref 15–37)
BASOPHILS # BLD AUTO: 0.02 K/UL — SIGNIFICANT CHANGE UP (ref 0–0.2)
BASOPHILS NFR BLD AUTO: 0.3 % — SIGNIFICANT CHANGE UP (ref 0–2)
BILIRUB SERPL-MCNC: 0.4 MG/DL — SIGNIFICANT CHANGE UP (ref 0.2–1.2)
BUN SERPL-MCNC: 26 MG/DL — HIGH (ref 7–23)
CALCIUM SERPL-MCNC: 8 MG/DL — LOW (ref 8.5–10.1)
CHLORIDE SERPL-SCNC: 101 MMOL/L — SIGNIFICANT CHANGE UP (ref 96–108)
CO2 SERPL-SCNC: 28 MMOL/L — SIGNIFICANT CHANGE UP (ref 22–31)
CREAT SERPL-MCNC: 0.7 MG/DL — SIGNIFICANT CHANGE UP (ref 0.5–1.3)
EOSINOPHIL # BLD AUTO: 0.05 K/UL — SIGNIFICANT CHANGE UP (ref 0–0.5)
EOSINOPHIL NFR BLD AUTO: 0.7 % — SIGNIFICANT CHANGE UP (ref 0–6)
GLUCOSE SERPL-MCNC: 84 MG/DL — SIGNIFICANT CHANGE UP (ref 70–99)
HCT VFR BLD CALC: 25.5 % — LOW (ref 39–50)
HGB BLD-MCNC: 7.9 G/DL — LOW (ref 13–17)
IMM GRANULOCYTES NFR BLD AUTO: 0.5 % — SIGNIFICANT CHANGE UP (ref 0–1.5)
LYMPHOCYTES # BLD AUTO: 1.44 K/UL — SIGNIFICANT CHANGE UP (ref 1–3.3)
LYMPHOCYTES # BLD AUTO: 18.8 % — SIGNIFICANT CHANGE UP (ref 13–44)
MCHC RBC-ENTMCNC: 26.9 PG — LOW (ref 27–34)
MCHC RBC-ENTMCNC: 31 GM/DL — LOW (ref 32–36)
MCV RBC AUTO: 86.7 FL — SIGNIFICANT CHANGE UP (ref 80–100)
MONOCYTES # BLD AUTO: 0.54 K/UL — SIGNIFICANT CHANGE UP (ref 0–0.9)
MONOCYTES NFR BLD AUTO: 7 % — SIGNIFICANT CHANGE UP (ref 2–14)
NEUTROPHILS # BLD AUTO: 5.57 K/UL — SIGNIFICANT CHANGE UP (ref 1.8–7.4)
NEUTROPHILS NFR BLD AUTO: 72.7 % — SIGNIFICANT CHANGE UP (ref 43–77)
NRBC # BLD: 0 /100 WBCS — SIGNIFICANT CHANGE UP (ref 0–0)
PLATELET # BLD AUTO: 180 K/UL — SIGNIFICANT CHANGE UP (ref 150–400)
POTASSIUM SERPL-MCNC: 3.9 MMOL/L — SIGNIFICANT CHANGE UP (ref 3.5–5.3)
POTASSIUM SERPL-SCNC: 3.9 MMOL/L — SIGNIFICANT CHANGE UP (ref 3.5–5.3)
PROT SERPL-MCNC: 5.8 G/DL — LOW (ref 6–8.3)
RBC # BLD: 2.94 M/UL — LOW (ref 4.2–5.8)
RBC # FLD: 15.6 % — HIGH (ref 10.3–14.5)
SODIUM SERPL-SCNC: 138 MMOL/L — SIGNIFICANT CHANGE UP (ref 135–145)
WBC # BLD: 7.66 K/UL — SIGNIFICANT CHANGE UP (ref 3.8–10.5)
WBC # FLD AUTO: 7.66 K/UL — SIGNIFICANT CHANGE UP (ref 3.8–10.5)

## 2020-05-16 PROCEDURE — 99233 SBSQ HOSP IP/OBS HIGH 50: CPT | Mod: CS

## 2020-05-16 RX ADMIN — Medication 50 MILLIGRAM(S): at 17:07

## 2020-05-16 RX ADMIN — OXYCODONE HYDROCHLORIDE 5 MILLIGRAM(S): 5 TABLET ORAL at 05:37

## 2020-05-16 RX ADMIN — Medication 1 SPRAY(S): at 05:37

## 2020-05-16 RX ADMIN — Medication 1 APPLICATION(S): at 12:42

## 2020-05-16 RX ADMIN — PREGABALIN 1000 MICROGRAM(S): 225 CAPSULE ORAL at 12:42

## 2020-05-16 RX ADMIN — PANTOPRAZOLE SODIUM 40 MILLIGRAM(S): 20 TABLET, DELAYED RELEASE ORAL at 05:37

## 2020-05-16 RX ADMIN — Medication 1 GRAM(S): at 12:42

## 2020-05-16 RX ADMIN — Medication 1 GRAM(S): at 17:07

## 2020-05-16 RX ADMIN — OXYCODONE HYDROCHLORIDE 5 MILLIGRAM(S): 5 TABLET ORAL at 23:53

## 2020-05-16 RX ADMIN — Medication 50 MILLIGRAM(S): at 05:38

## 2020-05-16 RX ADMIN — Medication 1 SPRAY(S): at 17:08

## 2020-05-16 RX ADMIN — SERTRALINE 100 MILLIGRAM(S): 25 TABLET, FILM COATED ORAL at 12:42

## 2020-05-16 NOTE — PROGRESS NOTE ADULT - ASSESSMENT
80 y/o M with PMHx of HTN, BPH, spinal stenosis, dyslipidemia, anxiety, neuropathy, B12 deficiency who presents from rehab for evaluation of weakness and hypotension    Anemia   no overt gi bleeding  likely r/t sacral decub/hematoma, sp debridement  monitor cbc, transfuse prn  cont ppi and carafate  monitor stool color   defer endoscopic eval as +Covid and increased risk for anesthesia in these pts   op gi f/u for possible colonoscopy if none recent once covid crisis resolved given ct findings    FTT/Dysphagia   unclear etiology; esophagram at [place] 2/2019 normal   correct elytes prn  cont ppi    on supplements per nutrition   defer endoscopic eval as +Covid and increased risk for anesthesia in these pts    COVID-19  monitor rep status   inc lfts in setting of viral illness; resolved   care per primary team

## 2020-05-16 NOTE — PROGRESS NOTE ADULT - PROBLEM SELECTOR PLAN 3
Stage 4 Sacral Ulcer  - s/p debridement and Zosyn course  - s/p wound vac placement - wound care NP Benita following  - Menendez placed for urinary retention by Urology - as pt not yet ambulatory, will keep Menendez in for wound healing given sacral ulcer, attempt TOV at [facility] once more ambulatory

## 2020-05-16 NOTE — PROGRESS NOTE ADULT - NSHPATTENDINGPLANDISCUSS_GEN_ALL_CORE
pt, family over the phone, RN, residency team - re: tx plan, disposition planning, above detailed plan

## 2020-05-16 NOTE — PROGRESS NOTE ADULT - SUBJECTIVE AND OBJECTIVE BOX
This progress note was completed in part by resident acting as telephonic scribe during COVID-19 crisis. Physical exam was completed by attending physician, and findings below are those of the attending physician, and have been reviewed in detail.    Patient is a 79y old  Male who presents with a chief complaint of Generalized weakness (15 May 2020 14:35)      FROM ADMISSION H+P:   HPI:  80 y/o M with PMHx of HTN, BPH, spinal stenosis, dyslipidemia, anxiety, neuropathy, B12 deficiency who presents from [facility] rehab for evaluation of weakness and hypotension. As per transfer papers, pt with confusion after lunch today. Pt admits to generalized weakness. Lives with wife, Stephany and son. He came from [facility] rehab in [place] since Feb 14. Patient lives with wife and son at home and ambulates with cane. Contracted Covid-19 in [facility] rehab and first started having symptoms on Mar 25, reporting symptoms of cough and low grade fever, diarrhea. Denies any chest pain, shortness of breath, headache, dizziness. History obtained from wife, Stephany([phone]) as pt is confused and a poor historian.   Of note, pt was noted to have sacral wounds today and reported low grade fever for two days and mental status changes today and loss of appetite.    The date the pt first felt unwell: March 25  Fever or chills: yes [ x ]   no [  ]   - Tmax:   Fatigue, malaise or generalized weakness: yes [ x ]   no [  ]  Shortness of breath/dyspnea: yes [ x ]   no [  ]  Cough: yes [ x ]   no [  ], sputum production: yes [  ]   no [x  ]  Blood in sputum: yes [  ]   no [ x ]  Anorexia/po intolerance: yes [  ]   no [ x ]  Chest pain or chest tightness: yes [  ]   no [ x ]  Edema in legs: yes [  ]   no [ x ]  Myalgias: yes [  ]   no [ x ]  Headache: yes [  ]   no [x  ]  Sore throat: yes [  ]   no [ x ]  Rhinorrhea: yes [  ]   no [  x]  Abd pain: yes [  ]   no [ x ]  Nausea: yes [  ]   no [x  ]  Vomiting: yes [  ]   no [  x]  Diarrhea: yes [  ]   no [  ]  Skin rashes: yes [  ]   no [  ]  Loss of sense of smell/anosmia: yes [  ]   no [ x ]  Conjunctivitis: yes [  ]   no [ x ]  Recent travel: yes [  ]   no [ x ] - Location:   Any sick contacts: yes [x  ]   no [  ]: Contracted from [facility] re[facility]  Close contact with someone confirmed positive with COVID-19 / SARS-CoV2 in the last 14 days: yes [  ]   no [ x ]  Code status: DNR, but not DNI      In the ED  Vitals: T--, , BP 84/48, RR 22, O2 96 on RA  Labs(In [place]) significant for: wbc 18.45, hgb 6.8, FOBT+,   Coag studies: PT 14.9, INR 1.33, aPTT 43.7, d-dimer 561  Chem panel: Lactate 2.5, Na 133, AG 3, BUN/Cr 61/1.61, Alb 1.9, Alk phos 195, ,   UA neg  Covid-19 PCR Positive   EKG shows Accelerated Junctional rhythm with occasional PVCs  CXR in [place] ED shows: Mild R atelectasis  In [place] ED, s/p 2L NS bolus, 1 unit pRBC (28 Apr 2020 21:00)      ----  INTERVAL HPI/OVERNIGHT EVENTS: Pt seen and evaluated at the bedside. No acute overnight events occurred.     Fever or chills: yes [  ]   no [  ]  Fatigue, malaise or generalized weakness: yes [  ]   no [  ]  Chest pain: yes [  ]   no [  ]  Palpitations: yes [  ]   no [  ]  Shortness of breath/dyspnea: yes [  ]   no [  ]  Cough: yes [  ]   no [  ], sputum production: yes [  ]   no [  ]  Anorexia/po intolerance: yes [  ]   no [  ]  Myalgias: yes [  ]   no [  ]  Headache: yes [  ]   no [  ]  Sore throat: yes [  ]   no [  ]  Rhinorrhea: yes [  ]   no [  ]  Nausea: yes [  ]   no [  ]  Vomiting: yes [  ]   no [  ]  Diarrhea: yes [  ]   no [  ]  Loss of sense of smell/anosmia: yes [  ]   no [  ]  Conjunctivitis: yes [  ]   no [  ]  Other associated complaints:     ----  PAST MEDICAL & SURGICAL HISTORY:  H/O vitamin B deficiency  Spinal stenosis  HTN (hypertension)  History of tonsillectomy      FAMILY HISTORY:  No pertinent family history in first degree relatives      Allergies    No Known Allergies    Intolerances        ----  REVIEW OF SYSTEMS:  as per HPI    ----  PHYSICAL EXAM:  This encounter took place via video conferencing. The following findings were noted but a full physical examination was not deemed possible  GENERAL: no sign of distress  PULM: no signs of respiratory distress, speaking in full sentences. no coughing during our interaction  NEURO: alert, oriented to person, place, time. voice is clear and not muffled or slurred, no noted tremors  PSYCH: organized thought pattern, able to interact readily  SKIN: visualized skin on arms, legs, face without obvious lesions    Other noted pertinent findings  OXYGEN SUPPORT: Nasal cannula: yes [  ]   no [  ] - if yes, the flow rate:        - nonrebreather mask: yes [  ]   no [  ]         - venti-mask: yes [  ]   no [  ] - if yes, the flow rate:   LINES: central line, midline, PICC line: yes [  ]   no [  ]  REYNA CATHETER: yes [  ]   no [  ]    T(C): 36.9 (05-16-20 @ 04:38), Max: 36.9 (05-15-20 @ 17:39)  HR: 79 (05-16-20 @ 04:38) (68 - 79)  BP: 120/68 (05-16-20 @ 04:38) (106/61 - 120/68)  RR: 20 (05-16-20 @ 04:38) (20 - 20)  SpO2: 99% (05-16-20 @ 04:38) (97% - 99%)  Wt(kg): --    ----  I&O's Summary    15 May 2020 07:01  -  16 May 2020 07:00  --------------------------------------------------------  IN: 780 mL / OUT: 650 mL / NET: 130 mL        LABS:                        7.9    7.66  )-----------( 180      ( 16 May 2020 06:41 )             25.5     05-16    138  |  101  |  26<H>  ----------------------------<  84  3.9   |  28  |  0.70    Ca    8.0<L>      16 May 2020 06:41    TPro  5.8<L>  /  Alb  2.0<L>  /  TBili  0.4  /  DBili  x   /  AST  47<H>  /  ALT  54  /  AlkPhos  115  05-16        CAPILLARY BLOOD GLUCOSE                    COVID-19 Inflammatory Markers:  Auto Neutrophil #: 5.57 K/uL (05-16-20 @ 06:41)  Auto Neutrophil #: 6.44 K/uL (05-15-20 @ 07:30)  Auto Neutrophil #: 7.72 K/uL (05-14-20 @ 06:36)  Auto Neutrophil #: 7.16 K/uL (05-13-20 @ 06:41)  Auto Neutrophil #: 6.58 K/uL (05-12-20 @ 07:01)  Auto Neutrophil #: 6.56 K/uL (05-11-20 @ 06:46)  Auto Neutrophil #: 6.47 K/uL (05-10-20 @ 11:35)  Auto Neutrophil #: 6.11 K/uL (05-09-20 @ 06:38)  Auto Neutrophil #: 6.53 K/uL (05-08-20 @ 06:48)  Auto Neutrophil #: 8.71 K/uL (05-07-20 @ 07:54)  Auto Neutrophil #: 10.44 K/uL (05-06-20 @ 12:42)    Auto Lymphocyte #: 1.44 K/uL (05-16-20 @ 06:41)  Auto Lymphocyte #: 1.27 K/uL (05-15-20 @ 07:30)  Auto Lymphocyte #: 1.37 K/uL (05-14-20 @ 06:36)  Auto Lymphocyte #: 1.42 K/uL (05-13-20 @ 06:41)  Auto Lymphocyte #: 1.21 K/uL (05-12-20 @ 07:01)  Auto Lymphocyte #: 1.41 K/uL (05-11-20 @ 06:46)  Auto Lymphocyte #: 1.26 K/uL (05-10-20 @ 11:35)  Auto Lymphocyte #: 1.09 K/uL (05-09-20 @ 06:38)  Auto Lymphocyte #: 1.07 K/uL (05-08-20 @ 06:48)  Auto Lymphocyte #: 1.50 K/uL (05-07-20 @ 07:54)  Auto Lymphocyte #: 0.77 K/uL (05-06-20 @ 12:42)                                ----  Personally reviewed:  Vital sign trends: [  ] yes    [  ] no     [  ] n/a  Laboratory results: [  ] yes    [  ] no     [  ] n/a  Radiology results: [  ] yes    [  ] no     [  ] n/a  Culture results: [  ] yes    [  ] no     [  ] n/a  Consultant recommendations: [  ] yes    [  ] no     [  ] n/a This progress note was completed in part by resident acting as telephonic scribe during COVID-19 crisis. Physical exam was completed by attending physician, and findings below are those of the attending physician, and have been reviewed in detail.    Patient is a 79y old  Male who presents with a chief complaint of Generalized weakness (15 May 2020 14:35)      FROM ADMISSION H+P:   HPI:  80 y/o M with PMHx of HTN, BPH, spinal stenosis, dyslipidemia, anxiety, neuropathy, B12 deficiency who presents from [facility] rehab for evaluation of weakness and hypotension. As per transfer papers, pt with confusion after lunch today. Pt admits to generalized weakness. Lives with wife, Stephany and son. He came from [facility] rehab in [place] since Feb 14. Patient lives with wife and son at home and ambulates with cane. Contracted Covid-19 in [facility] rehab and first started having symptoms on Mar 25, reporting symptoms of cough and low grade fever, diarrhea. Denies any chest pain, shortness of breath, headache, dizziness. History obtained from wife, Stephany([phone]) as pt is confused and a poor historian.   Of note, pt was noted to have sacral wounds today and reported low grade fever for two days and mental status changes today and loss of appetite.    ----  INTERVAL HPI/OVERNIGHT EVENTS: Pt seen and evaluated at the bedside. No acute overnight events occurred. Pt has no complaints today. Denies CP. Denies lightheadedness but admits some intermittent dizziness. No other new complaints. Had a normal BM yesterday.     Fever or chills: yes [  ]   no [ x ]  Fatigue, malaise or generalized weakness: yes [ x ]   no [  ]  Chest pain: yes [  ]   no [ x ]  Palpitations: yes [  ]   no [ x ]  Shortness of breath/dyspnea: yes [  ]   no [ x ]  Cough: yes [  ]   no [ x ], sputum production: yes [  ]   no [ x ]  Anorexia/po intolerance: yes [  ]   no [  x]  Myalgias: yes [  ]   no [x  ]  Headache: yes [  ]   no [ x ]  Sore throat: yes [ x ]   no [  ]  Rhinorrhea: yes [  ]   no [ x ]  Nausea: yes [  ]   no [x  ]  Vomiting: yes [  ]   no [ x ]  Diarrhea: yes [  ]   no [ x ]  Loss of sense of smell/anosmia: yes [  ]   no [x  ]  Conjunctivitis: yes [  ]   no [ x ]    ----  PAST MEDICAL & SURGICAL HISTORY:  H/O vitamin B deficiency  Spinal stenosis  HTN (hypertension)  History of tonsillectomy      FAMILY HISTORY:  No pertinent family history in first degree relatives      Allergies    No Known Allergies    Intolerances        ----  REVIEW OF SYSTEMS:  as per HPI    ----  PHYSICAL EXAM:  GENERAL: patient appears weak, tired but is readily arousable  ENMT: no exudates, moist mucous membranes, temporal wasting  LUNGS: reduced air entry, no rhonchi  HEART: S1/S2, regular rate and rhythm, no murmurs noted, no lower extremity edema  GASTROINTESTINAL: abd is markedly thin, active bowel sounds  INTEGUMENT: pale, dry  MUSCULOSKELETAL: no clubbing or cyanosis, no obvious deformity  NEUROLOGIC: awake, alert, oriented x3, good muscle tone in 4 extremities, no obvious sensory deficits    Other noted pertinent findings  OXYGEN SUPPORT: Nasal cannula: yes [  ]   no [ x ]    LINES: central line, midline, PICC line: yes [  ]   no [ x ]  REYNA CATHETER: yes [ x ]   no [  ]    T(C): 36.9 (05-16-20 @ 04:38), Max: 36.9 (05-15-20 @ 17:39)  HR: 79 (05-16-20 @ 04:38) (68 - 79)  BP: 120/68 (05-16-20 @ 04:38) (106/61 - 120/68)  RR: 20 (05-16-20 @ 04:38) (20 - 20)  SpO2: 99% (05-16-20 @ 04:38) (97% - 99%)  Wt(kg): --    ----  I&O's Summary    15 May 2020 07:01  -  16 May 2020 07:00  --------------------------------------------------------  IN: 780 mL / OUT: 650 mL / NET: 130 mL        LABS:                        7.9    7.66  )-----------( 180      ( 16 May 2020 06:41 )             25.5     05-16    138  |  101  |  26<H>  ----------------------------<  84  3.9   |  28  |  0.70    Ca    8.0<L>      16 May 2020 06:41    TPro  5.8<L>  /  Alb  2.0<L>  /  TBili  0.4  /  DBili  x   /  AST  47<H>  /  ALT  54  /  AlkPhos  115  05-16        CAPILLARY BLOOD GLUCOSE                    COVID-19 Inflammatory Markers:  Auto Neutrophil #: 5.57 K/uL (05-16-20 @ 06:41)  Auto Neutrophil #: 6.44 K/uL (05-15-20 @ 07:30)  Auto Neutrophil #: 7.72 K/uL (05-14-20 @ 06:36)  Auto Neutrophil #: 7.16 K/uL (05-13-20 @ 06:41)  Auto Neutrophil #: 6.58 K/uL (05-12-20 @ 07:01)  Auto Neutrophil #: 6.56 K/uL (05-11-20 @ 06:46)  Auto Neutrophil #: 6.47 K/uL (05-10-20 @ 11:35)  Auto Neutrophil #: 6.11 K/uL (05-09-20 @ 06:38)  Auto Neutrophil #: 6.53 K/uL (05-08-20 @ 06:48)  Auto Neutrophil #: 8.71 K/uL (05-07-20 @ 07:54)  Auto Neutrophil #: 10.44 K/uL (05-06-20 @ 12:42)    Auto Lymphocyte #: 1.44 K/uL (05-16-20 @ 06:41)  Auto Lymphocyte #: 1.27 K/uL (05-15-20 @ 07:30)  Auto Lymphocyte #: 1.37 K/uL (05-14-20 @ 06:36)  Auto Lymphocyte #: 1.42 K/uL (05-13-20 @ 06:41)  Auto Lymphocyte #: 1.21 K/uL (05-12-20 @ 07:01)  Auto Lymphocyte #: 1.41 K/uL (05-11-20 @ 06:46)  Auto Lymphocyte #: 1.26 K/uL (05-10-20 @ 11:35)  Auto Lymphocyte #: 1.09 K/uL (05-09-20 @ 06:38)  Auto Lymphocyte #: 1.07 K/uL (05-08-20 @ 06:48)  Auto Lymphocyte #: 1.50 K/uL (05-07-20 @ 07:54)  Auto Lymphocyte #: 0.77 K/uL (05-06-20 @ 12:42)                                ----  Personally reviewed:  Vital sign trends: [  ] yes    [  ] no     [  ] n/a  Laboratory results: [  ] yes    [  ] no     [  ] n/a  Radiology results: [  ] yes    [  ] no     [  ] n/a  Culture results: [  ] yes    [  ] no     [  ] n/a  Consultant recommendations: [  ] yes    [  ] no     [  ] n/a

## 2020-05-16 NOTE — PROGRESS NOTE ADULT - PROBLEM SELECTOR PLAN 1
COVID-19, maintaining sat >90 on room air   - Maintain on airborne isolation.  - Limit use of NSAIDs.  - Medically stable for d/c, however last 2 repeat COVID19 PCR tests positive 5/12 and 5/13, f/u on repeat PCR from today 5/15, awaiting placement at [facility] COVID-19, maintaining sat >90 on room air   - Maintain on airborne isolation.  - Limit use of NSAIDs.  - Medically stable for d/c, however last 3 repeat COVID19 PCR tests positive 5/12 and 5/13, and 5/15, will repeat PCR every 48hrs until negative, awaiting placement at [facility]

## 2020-05-16 NOTE — PROGRESS NOTE ADULT - ASSESSMENT
80 yo M with PMHx of HTN, BPH, spinal stenosis, dyslipidemia, anxiety, neuropathy, B12 deficiency presented from [facility] rehab for evaluation of weakness and hypotension. 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 Pt medically stable for d/c, awaiting [facility] placement and repeat serial COVID-19 PCR testing

## 2020-05-16 NOTE — PROGRESS NOTE ADULT - SUBJECTIVE AND OBJECTIVE BOX
INTERVAL HPI/OVERNIGHT EVENTS:  No new overnight event.  No N/V/D.  Tolerating diet.     Allergies    No Known Allergies    Intolerances      General:  No wt loss, fevers, chills, night sweats, fatigue,   Eyes:  Good vision, no reported pain  ENT:  No sore throat, pain, runny nose, dysphagia  CV:  No pain, palpitations, hypo/hypertension  Resp:  No dyspnea, cough, tachypnea, wheezing  GI:  No pain, No nausea, No vomiting, No diarrhea, No constipation, No weight loss, No fever, No pruritis, No rectal bleeding, No tarry stools, No dysphagia,  :  No pain, bleeding, incontinence, nocturia  Muscle:  No pain, weakness  Neuro:  No weakness, tingling, memory problems  Psych:  No fatigue, insomnia, mood problems, depression  Endocrine:  No polyuria, polydipsia, cold/heat intolerance  Heme:  No petechiae, ecchymosis, easy bruisability  Skin:  No rash, tattoos, scars, edema      PHYSICAL EXAM:   Vital Signs:  Vital Signs Last 24 Hrs  T(C): 36.9 (16 May 2020 15:36), Max: 36.9 (15 May 2020 17:39)  T(F): 98.4 (16 May 2020 15:36), Max: 98.5 (15 May 2020 17:39)  HR: 94 (16 May 2020 15:36) (68 - 94)  BP: 111/60 (16 May 2020 15:36) (105/51 - 120/68)  BP(mean): --  RR: 18 (16 May 2020 15:36) (18 - 20)  SpO2: 94% (16 May 2020 15:36) (94% - 99%)  Daily     Daily I&O's Summary    15 May 2020 07:01  -  16 May 2020 07:00  --------------------------------------------------------  IN: 780 mL / OUT: 650 mL / NET: 130 mL        GENERAL:  Appears stated age, well-groomed, well-nourished, no distress  HEENT:  NC/AT,  conjunctivae clear and pink, no thyromegaly, nodules, adenopathy, no JVD, sclera -anicteric  CHEST:  Full & symmetric excursion, no increased effort, breath sounds clear  HEART:  Regular rhythm, S1, S2, no murmur/rub/S3/S4, no abdominal bruit, no edema  ABDOMEN:  Soft, non-tender, non-distended, normoactive bowel sounds,  no masses ,no hepato-splenomegaly, no signs of chronic liver disease  EXTEREMITIES:  no cyanosis,clubbing or edema  SKIN:  No rash/erythema/ecchymoses/petechiae/wounds/abscess/warm/dry  NEURO:  Alert, oriented, no asterixis, no tremor, no encephalopathy      LABS:                        7.9    7.66  )-----------( 180      ( 16 May 2020 06:41 )             25.5     05-16    138  |  101  |  26<H>  ----------------------------<  84  3.9   |  28  |  0.70    Ca    8.0<L>      16 May 2020 06:41    TPro  5.8<L>  /  Alb  2.0<L>  /  TBili  0.4  /  DBili  x   /  AST  47<H>  /  ALT  54  /  AlkPhos  115  05-16        amylase   lipase  RADIOLOGY & ADDITIONAL TESTS:

## 2020-05-16 NOTE — PROGRESS NOTE ADULT - SUBJECTIVE AND OBJECTIVE BOX
Chief Complaint: Weakness, AMS    Interval Events: No events overnight.    Review of Systems:  General: No fevers, chills, weight loss or gain  Skin: No rashes, color changes  Cardiovascular: No chest pain, orthopnea  Respiratory: No shortness of breath, cough  Gastrointestinal: No nausea, abdominal pain  Genitourinary: No incontinence, pain with urination  Musculoskeletal: No pain, swelling, decreased range of motion  Neurological: No headache, weakness  Psychiatric: No depression, anxiety  Endocrine: No weight loss or gain, increased thirst  All other systems are comprehensively negative.    Physical Exam:  Vital Signs Last 24 Hrs  T(C): 36.4 (16 May 2020 08:23), Max: 36.9 (15 May 2020 17:39)  T(F): 97.5 (16 May 2020 08:23), Max: 98.5 (15 May 2020 17:39)  HR: 70 (16 May 2020 08:23) (68 - 79)  BP: 105/51 (16 May 2020 08:23) (105/51 - 120/68)  BP(mean): --  RR: 19 (16 May 2020 08:23) (19 - 20)  SpO2: 97% (16 May 2020 08:23) (97% - 99%)  General: NAD  HEENT: MMM  Neck: No JVD, no carotid bruit  Extremities: No LE edema, no cyanosis  Neuro: Non-focal  Skin: No rash    Labs:    05-16    138  |  101  |  26<H>  ----------------------------<  84  3.9   |  28  |  0.70    Ca    8.0<L>      16 May 2020 06:41    TPro  5.8<L>  /  Alb  2.0<L>  /  TBili  0.4  /  DBili  x   /  AST  47<H>  /  ALT  54  /  AlkPhos  115  05-16                        7.9    7.66  )-----------( 180      ( 16 May 2020 06:41 )             25.5

## 2020-05-16 NOTE — PROGRESS NOTE ADULT - PROBLEM SELECTOR PLAN 9
-malnutrition 2/2 poor PO intake  -malnutrition f/u eval recommending Continue Ensure Enlive BID, add Prosource BID to encourage sacral wound healing

## 2020-05-16 NOTE — PROGRESS NOTE ADULT - ATTENDING COMMENTS
The tx plan was discussed in detail with the patient and family members over the phone. Their questions and concerns were addressed to the best of my ability. They are in agreement with the plan as detailed above. They demonstrated adequate understanding of the counseling which I have provided.    Transfuse 1 unit today. Family agrees.

## 2020-05-16 NOTE — PROGRESS NOTE ADULT - PROBLEM SELECTOR PLAN 2
-Anemia possibly 2/2 L hip chronic hematoma as noted on CT AP, GIB less likely  -Hgb appears to be mildly downtrending stable s/p 2 u pRBCs, will hold off on transfusing unless symptomatic or Hgb drops below 8   - Continue Protonix 40 mg daily, Carafate 1 g four times daily  - Monitor for further bleeding, stool color   - Per GI Dr. Horne, no endo/colonoscopy inpatient - f/u as outpatient for procedure endoscopy and colonoscopy -Anemia possibly 2/2 L hip chronic hematoma as noted on CT AP, GIB less likely  -s/p 2 u pRBCs, will hold off on transfusing unless symptomatic or Hgb drops below 8   -Hgb today 7.9. Discussed with pt and pt's wife and will transfuse pt 1u PRBC today.  - Continue Protonix 40 mg daily, Carafate 1 g four times daily  - Monitor for further bleeding, stool color   - Per GI Dr. Horne, no endo/colonoscopy inpatient - f/u as outpatient for procedure endoscopy and colonoscopy -Anemia possibly 2/2 L hip chronic hematoma as noted on CT AP, GIB less likely  -s/p 1u pRBCs ON 4/28  -Hgb today 7.9. Discussed with pt and pt's wife and will transfuse pt 1u PRBC today.  -Continue Protonix 40 mg daily, Carafate 1 g four times daily  -Monitor for further bleeding, stool color   -no plans for urgent inpt endoscopy in setting off COVID. f/u as outpatient for procedure endoscopy and colonoscopy

## 2020-05-17 LAB
ALBUMIN SERPL ELPH-MCNC: 1.9 G/DL — LOW (ref 3.3–5)
ALP SERPL-CCNC: 110 U/L — SIGNIFICANT CHANGE UP (ref 40–120)
ALT FLD-CCNC: 51 U/L — SIGNIFICANT CHANGE UP (ref 12–78)
ANION GAP SERPL CALC-SCNC: 5 MMOL/L — SIGNIFICANT CHANGE UP (ref 5–17)
AST SERPL-CCNC: 37 U/L — SIGNIFICANT CHANGE UP (ref 15–37)
BASOPHILS # BLD AUTO: 0.02 K/UL — SIGNIFICANT CHANGE UP (ref 0–0.2)
BASOPHILS NFR BLD AUTO: 0.3 % — SIGNIFICANT CHANGE UP (ref 0–2)
BILIRUB SERPL-MCNC: 0.6 MG/DL — SIGNIFICANT CHANGE UP (ref 0.2–1.2)
BUN SERPL-MCNC: 22 MG/DL — SIGNIFICANT CHANGE UP (ref 7–23)
CALCIUM SERPL-MCNC: 8 MG/DL — LOW (ref 8.5–10.1)
CHLORIDE SERPL-SCNC: 102 MMOL/L — SIGNIFICANT CHANGE UP (ref 96–108)
CO2 SERPL-SCNC: 30 MMOL/L — SIGNIFICANT CHANGE UP (ref 22–31)
CREAT SERPL-MCNC: 0.64 MG/DL — SIGNIFICANT CHANGE UP (ref 0.5–1.3)
EOSINOPHIL # BLD AUTO: 0.04 K/UL — SIGNIFICANT CHANGE UP (ref 0–0.5)
EOSINOPHIL NFR BLD AUTO: 0.6 % — SIGNIFICANT CHANGE UP (ref 0–6)
GLUCOSE SERPL-MCNC: 84 MG/DL — SIGNIFICANT CHANGE UP (ref 70–99)
HCT VFR BLD CALC: 27.3 % — LOW (ref 39–50)
HGB BLD-MCNC: 8.8 G/DL — LOW (ref 13–17)
IMM GRANULOCYTES NFR BLD AUTO: 0.4 % — SIGNIFICANT CHANGE UP (ref 0–1.5)
LYMPHOCYTES # BLD AUTO: 1.3 K/UL — SIGNIFICANT CHANGE UP (ref 1–3.3)
LYMPHOCYTES # BLD AUTO: 17.9 % — SIGNIFICANT CHANGE UP (ref 13–44)
MCHC RBC-ENTMCNC: 27.4 PG — SIGNIFICANT CHANGE UP (ref 27–34)
MCHC RBC-ENTMCNC: 32.2 GM/DL — SIGNIFICANT CHANGE UP (ref 32–36)
MCV RBC AUTO: 85 FL — SIGNIFICANT CHANGE UP (ref 80–100)
MONOCYTES # BLD AUTO: 0.5 K/UL — SIGNIFICANT CHANGE UP (ref 0–0.9)
MONOCYTES NFR BLD AUTO: 6.9 % — SIGNIFICANT CHANGE UP (ref 2–14)
NEUTROPHILS # BLD AUTO: 5.36 K/UL — SIGNIFICANT CHANGE UP (ref 1.8–7.4)
NEUTROPHILS NFR BLD AUTO: 73.9 % — SIGNIFICANT CHANGE UP (ref 43–77)
NRBC # BLD: 0 /100 WBCS — SIGNIFICANT CHANGE UP (ref 0–0)
PLATELET # BLD AUTO: 160 K/UL — SIGNIFICANT CHANGE UP (ref 150–400)
POTASSIUM SERPL-MCNC: 4 MMOL/L — SIGNIFICANT CHANGE UP (ref 3.5–5.3)
POTASSIUM SERPL-SCNC: 4 MMOL/L — SIGNIFICANT CHANGE UP (ref 3.5–5.3)
PROT SERPL-MCNC: 5.5 G/DL — LOW (ref 6–8.3)
RBC # BLD: 3.21 M/UL — LOW (ref 4.2–5.8)
RBC # FLD: 15.4 % — HIGH (ref 10.3–14.5)
SARS-COV-2 RNA SPEC QL NAA+PROBE: DETECTED
SODIUM SERPL-SCNC: 137 MMOL/L — SIGNIFICANT CHANGE UP (ref 135–145)
WBC # BLD: 7.25 K/UL — SIGNIFICANT CHANGE UP (ref 3.8–10.5)
WBC # FLD AUTO: 7.25 K/UL — SIGNIFICANT CHANGE UP (ref 3.8–10.5)

## 2020-05-17 PROCEDURE — 99232 SBSQ HOSP IP/OBS MODERATE 35: CPT | Mod: CS

## 2020-05-17 RX ADMIN — PANTOPRAZOLE SODIUM 40 MILLIGRAM(S): 20 TABLET, DELAYED RELEASE ORAL at 05:54

## 2020-05-17 RX ADMIN — Medication 1 SPRAY(S): at 05:55

## 2020-05-17 RX ADMIN — Medication 1 GRAM(S): at 17:36

## 2020-05-17 RX ADMIN — Medication 1 GRAM(S): at 22:30

## 2020-05-17 RX ADMIN — OXYCODONE HYDROCHLORIDE 5 MILLIGRAM(S): 5 TABLET ORAL at 05:54

## 2020-05-17 RX ADMIN — PREGABALIN 1000 MICROGRAM(S): 225 CAPSULE ORAL at 11:48

## 2020-05-17 RX ADMIN — Medication 1 SPRAY(S): at 17:36

## 2020-05-17 RX ADMIN — Medication 1 APPLICATION(S): at 11:45

## 2020-05-17 RX ADMIN — SERTRALINE 100 MILLIGRAM(S): 25 TABLET, FILM COATED ORAL at 11:48

## 2020-05-17 RX ADMIN — Medication 50 MILLIGRAM(S): at 17:35

## 2020-05-17 RX ADMIN — Medication 1 GRAM(S): at 11:48

## 2020-05-17 RX ADMIN — Medication 50 MILLIGRAM(S): at 05:54

## 2020-05-17 NOTE — PROGRESS NOTE ADULT - PROBLEM SELECTOR PLAN 8
Stable  - Continue Sertraline Stable  - Will change Sertraline to mirtazapine for added benefit of side effect of increased appetite

## 2020-05-17 NOTE — PROGRESS NOTE ADULT - SUBJECTIVE AND OBJECTIVE BOX
Chief Complaint: Weakness, AMS    Interval Events: No events overnight.    Review of Systems:  General: No fevers, chills, weight loss or gain  Skin: No rashes, color changes  Cardiovascular: No chest pain, orthopnea  Respiratory: No shortness of breath, cough  Gastrointestinal: No nausea, abdominal pain  Genitourinary: No incontinence, pain with urination  Musculoskeletal: No pain, swelling, decreased range of motion  Neurological: No headache, weakness  Psychiatric: No depression, anxiety  Endocrine: No weight loss or gain, increased thirst  All other systems are comprehensively negative.    Physical Exam:  Vital Signs Last 24 Hrs  T(C): 37.3 (17 May 2020 07:15), Max: 37.3 (17 May 2020 07:15)  T(F): 99.2 (17 May 2020 07:15), Max: 99.2 (17 May 2020 07:15)  HR: 66 (17 May 2020 07:15) (66 - 94)  BP: 95/50 (17 May 2020 07:15) (95/50 - 120/63)  BP(mean): --  RR: 19 (17 May 2020 07:15) (17 - 19)  SpO2: 98% (17 May 2020 07:15) (94% - 98%)  General: NAD  HEENT: MMM  Neck: No JVD, no carotid bruit  Extremities: No LE edema, no cyanosis  Neuro: Non-focal  Skin: No rash    Labs:    05-17    137  |  102  |  22  ----------------------------<  84  4.0   |  30  |  0.64    Ca    8.0<L>      17 May 2020 08:56    TPro  5.5<L>  /  Alb  1.9<L>  /  TBili  0.6  /  DBili  x   /  AST  37  /  ALT  51  /  AlkPhos  110  05-17                        8.8    7.25  )-----------( 160      ( 17 May 2020 08:56 )             27.3

## 2020-05-17 NOTE — PROGRESS NOTE ADULT - ASSESSMENT
78 yo M with PMHx of HTN, BPH, spinal stenosis, dyslipidemia, anxiety, neuropathy, B12 deficiency presented from [facility] rehab for evaluation of weakness and hypotension. 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 Pt medically stable for d/c, awaiting OMER placement and repeat serial COVID-19 PCR testing

## 2020-05-17 NOTE — PROGRESS NOTE ADULT - SUBJECTIVE AND OBJECTIVE BOX
This progress note was completed in part by resident acting as telephonic scribe during COVID-19 crisis. Physical exam was completed by attending physician, and findings below are those of the attending physician, and have been reviewed in detail.    Patient is a 79y old  Male who presents with a chief complaint of Generalized weakness (16 May 2020 16:34)      FROM ADMISSION H+P:   HPI:  78 y/o M with PMHx of HTN, BPH, spinal stenosis, dyslipidemia, anxiety, neuropathy, B12 deficiency who presents from [facility] rehab for evaluation of weakness and hypotension. As per transfer papers, pt with confusion after lunch today. Pt admits to generalized weakness. Lives with wife, Stephany and son. He came from [facility] rehab in [place] since Feb 14. Patient lives with wife and son at home and ambulates with cane. Contracted Covid-19 in [facility] rehab and first started having symptoms on Mar 25, reporting symptoms of cough and low grade fever, diarrhea. Denies any chest pain, shortness of breath, headache, dizziness. History obtained from wife, Stephany([phone]) as pt is confused and a poor historian.   Of note, pt was noted to have sacral wounds today and reported low grade fever for two days and mental status changes today and loss of appetite.    The date the pt first felt unwell: March 25  Fever or chills: yes [ x ]   no [  ]   - Tmax:   Fatigue, malaise or generalized weakness: yes [ x ]   no [  ]  Shortness of breath/dyspnea: yes [ x ]   no [  ]  Cough: yes [ x ]   no [  ], sputum production: yes [  ]   no [x  ]  Blood in sputum: yes [  ]   no [ x ]  Anorexia/po intolerance: yes [  ]   no [ x ]  Chest pain or chest tightness: yes [  ]   no [ x ]  Edema in legs: yes [  ]   no [ x ]  Myalgias: yes [  ]   no [ x ]  Headache: yes [  ]   no [x  ]  Sore throat: yes [  ]   no [ x ]  Rhinorrhea: yes [  ]   no [  x]  Abd pain: yes [  ]   no [ x ]  Nausea: yes [  ]   no [x  ]  Vomiting: yes [  ]   no [  x]  Diarrhea: yes [  ]   no [  ]  Skin rashes: yes [  ]   no [  ]  Loss of sense of smell/anosmia: yes [  ]   no [ x ]  Conjunctivitis: yes [  ]   no [ x ]  Recent travel: yes [  ]   no [ x ] - Location:   Any sick contacts: yes [x  ]   no [  ]: Contracted from [facility]  Close contact with someone confirmed positive with COVID-19 / SARS-CoV2 in the last 14 days: yes [  ]   no [ x ]  Code status: DNR, but not DNI      In the ED  Vitals: T--, , BP 84/48, RR 22, O2 96 on RA  Labs(In [place]) significant for: wbc 18.45, hgb 6.8, FOBT+,   Coag studies: PT 14.9, INR 1.33, aPTT 43.7, d-dimer 561  Chem panel: Lactate 2.5, Na 133, AG 3, BUN/Cr 61/1.61, Alb 1.9, Alk phos 195, ,   UA neg  Covid-19 PCR Positive   EKG shows Accelerated Junctional rhythm with occasional PVCs  CXR in [place] ED shows: Mild R atelectasis  In [place] ED, s/p 2L NS bolus, 1 unit pRBC (28 Apr 2020 21:00)      ----  INTERVAL HPI/OVERNIGHT EVENTS: Pt seen and evaluated at the bedside. No acute overnight events occurred.     Fever or chills: yes [  ]   no [  ]  Fatigue, malaise or generalized weakness: yes [  ]   no [  ]  Chest pain: yes [  ]   no [  ]  Palpitations: yes [  ]   no [  ]  Shortness of breath/dyspnea: yes [  ]   no [  ]  Cough: yes [  ]   no [  ], sputum production: yes [  ]   no [  ]  Anorexia/po intolerance: yes [  ]   no [  ]  Myalgias: yes [  ]   no [  ]  Headache: yes [  ]   no [  ]  Sore throat: yes [  ]   no [  ]  Rhinorrhea: yes [  ]   no [  ]  Nausea: yes [  ]   no [  ]  Vomiting: yes [  ]   no [  ]  Diarrhea: yes [  ]   no [  ]  Loss of sense of smell/anosmia: yes [  ]   no [  ]  Conjunctivitis: yes [  ]   no [  ]  Other associated complaints:     ----  PAST MEDICAL & SURGICAL HISTORY:  H/O vitamin B deficiency  Spinal stenosis  HTN (hypertension)  History of tonsillectomy      FAMILY HISTORY:  No pertinent family history in first degree relatives      Allergies    No Known Allergies    Intolerances        ----  REVIEW OF SYSTEMS:  as per HPI    ----  PHYSICAL EXAM:  This encounter took place via video conferencing. The following findings were noted but a full physical examination was not deemed possible  GENERAL: no sign of distress  PULM: no signs of respiratory distress, speaking in full sentences. no coughing during our interaction  NEURO: alert, oriented to person, place, time. voice is clear and not muffled or slurred, no noted tremors  PSYCH: organized thought pattern, able to interact readily  SKIN: visualized skin on arms, legs, face without obvious lesions    Other noted pertinent findings  OXYGEN SUPPORT: Nasal cannula: yes [  ]   no [  ] - if yes, the flow rate:        - nonrebreather mask: yes [  ]   no [  ]         - venti-mask: yes [  ]   no [  ] - if yes, the flow rate:   LINES: central line, midline, PICC line: yes [  ]   no [  ]  REYNA CATHETER: yes [  ]   no [  ]    T(C): 37.1 (05-17-20 @ 04:36), Max: 37.1 (05-17-20 @ 04:36)  HR: 75 (05-17-20 @ 04:36) (72 - 94)  BP: 120/63 (05-17-20 @ 04:36) (111/60 - 120/63)  RR: 18 (05-17-20 @ 04:36) (17 - 18)  SpO2: 96% (05-17-20 @ 04:36) (94% - 96%)  Wt(kg): --    ----  I&O's Summary    16 May 2020 07:01  -  17 May 2020 07:00  --------------------------------------------------------  IN: 307 mL / OUT: 550 mL / NET: -243 mL        LABS:                        7.9    7.66  )-----------( 180      ( 16 May 2020 06:41 )             25.5     05-16    138  |  101  |  26<H>  ----------------------------<  84  3.9   |  28  |  0.70    Ca    8.0<L>      16 May 2020 06:41    TPro  5.8<L>  /  Alb  2.0<L>  /  TBili  0.4  /  DBili  x   /  AST  47<H>  /  ALT  54  /  AlkPhos  115  05-16        CAPILLARY BLOOD GLUCOSE                    COVID-19 Inflammatory Markers:  Auto Neutrophil #: 5.57 K/uL (05-16-20 @ 06:41)  Auto Neutrophil #: 6.44 K/uL (05-15-20 @ 07:30)  Auto Neutrophil #: 7.72 K/uL (05-14-20 @ 06:36)  Auto Neutrophil #: 7.16 K/uL (05-13-20 @ 06:41)  Auto Neutrophil #: 6.58 K/uL (05-12-20 @ 07:01)  Auto Neutrophil #: 6.56 K/uL (05-11-20 @ 06:46)  Auto Neutrophil #: 6.47 K/uL (05-10-20 @ 11:35)  Auto Neutrophil #: 6.11 K/uL (05-09-20 @ 06:38)  Auto Neutrophil #: 6.53 K/uL (05-08-20 @ 06:48)    Auto Lymphocyte #: 1.44 K/uL (05-16-20 @ 06:41)  Auto Lymphocyte #: 1.27 K/uL (05-15-20 @ 07:30)  Auto Lymphocyte #: 1.37 K/uL (05-14-20 @ 06:36)  Auto Lymphocyte #: 1.42 K/uL (05-13-20 @ 06:41)  Auto Lymphocyte #: 1.21 K/uL (05-12-20 @ 07:01)  Auto Lymphocyte #: 1.41 K/uL (05-11-20 @ 06:46)  Auto Lymphocyte #: 1.26 K/uL (05-10-20 @ 11:35)  Auto Lymphocyte #: 1.09 K/uL (05-09-20 @ 06:38)  Auto Lymphocyte #: 1.07 K/uL (05-08-20 @ 06:48)                                ----  Personally reviewed:  Vital sign trends: [  ] yes    [  ] no     [  ] n/a  Laboratory results: [  ] yes    [  ] no     [  ] n/a  Radiology results: [  ] yes    [  ] no     [  ] n/a  Culture results: [  ] yes    [  ] no     [  ] n/a  Consultant recommendations: [  ] yes    [  ] no     [  ] n/a This progress note was completed in part by resident acting as telephonic scribe during COVID-19 crisis. Physical exam was completed by attending physician, and findings below are those of the attending physician, and have been reviewed in detail.    Patient is a 79y old  Male who presents with a chief complaint of Generalized weakness (16 May 2020 16:34)      FROM ADMISSION H+P:   HPI:  78 y/o M with PMHx of HTN, BPH, spinal stenosis, dyslipidemia, anxiety, neuropathy, B12 deficiency who presents from [facility] rehab for evaluation of weakness and hypotension. As per transfer papers, pt with confusion after lunch today. Pt admits to generalized weakness. Lives with wife, Stephany and son. He came from [facility] rehab in [place] since Feb 14. Patient lives with wife and son at home and ambulates with cane. Contracted Covid-19 in [facility] rehab and first started having symptoms on Mar 25, reporting symptoms of cough and low grade fever, diarrhea. Denies any chest pain, shortness of breath, headache, dizziness. History obtained from wife, Stephany([phone]) as pt is confused and a poor historian.   Of note, pt was noted to have sacral wounds today and reported low grade fever for two days and mental status changes today and loss of appetite.    ----  INTERVAL HPI/OVERNIGHT EVENTS: Pt seen and evaluated at the bedside. No acute overnight events occurred. Received 1 unit pRBC'syesterday, tolerated well, appropriate response in h/h.     Fever or chills: yes [  ]   no [  ]  Fatigue, malaise or generalized weakness: yes [  ]   no [  ]  Chest pain: yes [  ]   no [  ]  Palpitations: yes [  ]   no [  ]  Shortness of breath/dyspnea: yes [  ]   no [  ]  Cough: yes [  ]   no [  ], sputum production: yes [  ]   no [  ]  Anorexia/po intolerance: yes [  ]   no [  ]  Myalgias: yes [  ]   no [  ]  Headache: yes [  ]   no [  ]  Sore throat: yes [  ]   no [  ]  Rhinorrhea: yes [  ]   no [  ]  Nausea: yes [  ]   no [  ]  Vomiting: yes [  ]   no [  ]  Diarrhea: yes [  ]   no [  ]  Loss of sense of smell/anosmia: yes [  ]   no [  ]  Conjunctivitis: yes [  ]   no [  ]  Other associated complaints:     ----  PAST MEDICAL & SURGICAL HISTORY:  H/O vitamin B deficiency  Spinal stenosis  HTN (hypertension)  History of tonsillectomy      FAMILY HISTORY:  No pertinent family history in first degree relatives      Allergies    No Known Allergies    Intolerances        ----  REVIEW OF SYSTEMS:  as per HPI    ----  PHYSICAL EXAM:  GENERAL: patient appears weak, tired but is readily arousable  ENMT: no exudates, moist mucous membranes, temporal wasting  LUNGS: reduced air entry, no rhonchi  HEART: S1/S2, regular rate and rhythm, no murmurs noted, no lower extremity edema  GASTROINTESTINAL: abd is markedly thin, active bowel sounds  INTEGUMENT: pale, dry  MUSCULOSKELETAL: no clubbing or cyanosis, no obvious deformity  NEUROLOGIC: awake, alert, oriented x3, good muscle tone in 4 extremities, no obvious sensory deficits    Other noted pertinent findings  OXYGEN SUPPORT: Nasal cannula: yes [  ]   no [ x ]    LINES: central line, midline, PICC line: yes [  ]   no [ x ]  REYNA CATHETER: yes [ x ]   no [  ]    T(C): 37.1 (05-17-20 @ 04:36), Max: 37.1 (05-17-20 @ 04:36)  HR: 75 (05-17-20 @ 04:36) (72 - 94)  BP: 120/63 (05-17-20 @ 04:36) (111/60 - 120/63)  RR: 18 (05-17-20 @ 04:36) (17 - 18)  SpO2: 96% (05-17-20 @ 04:36) (94% - 96%)  Wt(kg): --    ----  I&O's Summary    16 May 2020 07:01  -  17 May 2020 07:00  --------------------------------------------------------  IN: 307 mL / OUT: 550 mL / NET: -243 mL        LABS:                        7.9    7.66  )-----------( 180      ( 16 May 2020 06:41 )             25.5     05-16    138  |  101  |  26<H>  ----------------------------<  84  3.9   |  28  |  0.70    Ca    8.0<L>      16 May 2020 06:41    TPro  5.8<L>  /  Alb  2.0<L>  /  TBili  0.4  /  DBili  x   /  AST  47<H>  /  ALT  54  /  AlkPhos  115  05-16        CAPILLARY BLOOD GLUCOSE                    COVID-19 Inflammatory Markers:  Auto Neutrophil #: 5.57 K/uL (05-16-20 @ 06:41)  Auto Neutrophil #: 6.44 K/uL (05-15-20 @ 07:30)  Auto Neutrophil #: 7.72 K/uL (05-14-20 @ 06:36)  Auto Neutrophil #: 7.16 K/uL (05-13-20 @ 06:41)  Auto Neutrophil #: 6.58 K/uL (05-12-20 @ 07:01)  Auto Neutrophil #: 6.56 K/uL (05-11-20 @ 06:46)  Auto Neutrophil #: 6.47 K/uL (05-10-20 @ 11:35)  Auto Neutrophil #: 6.11 K/uL (05-09-20 @ 06:38)  Auto Neutrophil #: 6.53 K/uL (05-08-20 @ 06:48)    Auto Lymphocyte #: 1.44 K/uL (05-16-20 @ 06:41)  Auto Lymphocyte #: 1.27 K/uL (05-15-20 @ 07:30)  Auto Lymphocyte #: 1.37 K/uL (05-14-20 @ 06:36)  Auto Lymphocyte #: 1.42 K/uL (05-13-20 @ 06:41)  Auto Lymphocyte #: 1.21 K/uL (05-12-20 @ 07:01)  Auto Lymphocyte #: 1.41 K/uL (05-11-20 @ 06:46)  Auto Lymphocyte #: 1.26 K/uL (05-10-20 @ 11:35)  Auto Lymphocyte #: 1.09 K/uL (05-09-20 @ 06:38)  Auto Lymphocyte #: 1.07 K/uL (05-08-20 @ 06:48)                                ----  Personally reviewed:  Vital sign trends: [  ] yes    [  ] no     [  ] n/a  Laboratory results: [  ] yes    [  ] no     [  ] n/a  Radiology results: [  ] yes    [  ] no     [  ] n/a  Culture results: [  ] yes    [  ] no     [  ] n/a  Consultant recommendations: [  ] yes    [  ] no     [  ] n/a This progress note was completed in part by resident acting as telephonic scribe during COVID-19 crisis. Physical exam was completed by attending physician, and findings below are those of the attending physician, and have been reviewed in detail.    Patient is a 79y old  Male who presents with a chief complaint of Generalized weakness (16 May 2020 16:34)      FROM ADMISSION H+P:   HPI:  80 y/o M with PMHx of HTN, BPH, spinal stenosis, dyslipidemia, anxiety, neuropathy, B12 deficiency who presents from [facility] rehab for evaluation of weakness and hypotension. As per transfer papers, pt with confusion after lunch today. Pt admits to generalized weakness. Lives with wife, Stephany and son. He came from [facility] in [place] since Feb 14. Patient lives with wife and son at home and ambulates with cane. Contracted Covid-19 in [facility] rehab and first started having symptoms on Mar 25, reporting symptoms of cough and low grade fever, diarrhea. Denies any chest pain, shortness of breath, headache, dizziness. History obtained from wife, Stephany([phone]) as pt is confused and a poor historian.   Of note, pt was noted to have sacral wounds today and reported low grade fever for two days and mental status changes today and loss of appetite.    ----  INTERVAL HPI/OVERNIGHT EVENTS: Pt seen and evaluated at the bedside. No acute overnight events occurred. Received 1 unit pRBC's yesterday, tolerated well, appropriate response in h/h. This AM pt was noted to be somnolent as he had recently woken up. Pt's wife was conference called during pt encounter this morning and extensive conversation about pt's clinical condition and prognosis was had. Pt's wife expressed concern over pt's lack of appetite. Pt admitted to not having an appetite. However, ROS was limited due to somnolence, but pt denied any acute complaints and was responsive to most questions.      ----  PAST MEDICAL & SURGICAL HISTORY:  H/O vitamin B deficiency  Spinal stenosis  HTN (hypertension)  History of tonsillectomy      FAMILY HISTORY:  No pertinent family history in first degree relatives      Allergies    No Known Allergies    Intolerances        ----  REVIEW OF SYSTEMS:  as per HPI    ----  PHYSICAL EXAM:  GENERAL: patient appears weak and frail, tired but is readily arousable, appears comfortable in bed  ENMT: no exudates, moist mucous membranes, temporal wasting  LUNGS: CTA b/l,, no rhonchi, rales, oe wheezing  HEART: S1/S2, regular rate and rhythm, no murmurs noted, no lower extremity edema  GASTROINTESTINAL: abd is markedly thin, active bowel sounds  INTEGUMENT: pale, dry  MUSCULOSKELETAL: no clubbing or cyanosis, no obvious deformity  NEUROLOGIC: awake, alert, oriented x3, good muscle tone in 4 extremities, no obvious sensory deficits    Other noted pertinent findings  OXYGEN SUPPORT: Nasal cannula: yes [  ]   no [ x ]    LINES: central line, midline, PICC line: yes [  ]   no [ x ]  REYNA CATHETER: yes [ x ]   no [  ]    T(C): 37.1 (05-17-20 @ 04:36), Max: 37.1 (05-17-20 @ 04:36)  HR: 75 (05-17-20 @ 04:36) (72 - 94)  BP: 120/63 (05-17-20 @ 04:36) (111/60 - 120/63)  RR: 18 (05-17-20 @ 04:36) (17 - 18)  SpO2: 96% (05-17-20 @ 04:36) (94% - 96%)  Wt(kg): --    ----  I&O's Summary    16 May 2020 07:01  -  17 May 2020 07:00  --------------------------------------------------------  IN: 307 mL / OUT: 550 mL / NET: -243 mL        LABS:                        7.9    7.66  )-----------( 180      ( 16 May 2020 06:41 )             25.5     05-16    138  |  101  |  26<H>  ----------------------------<  84  3.9   |  28  |  0.70    Ca    8.0<L>      16 May 2020 06:41    TPro  5.8<L>  /  Alb  2.0<L>  /  TBili  0.4  /  DBili  x   /  AST  47<H>  /  ALT  54  /  AlkPhos  115  05-16        CAPILLARY BLOOD GLUCOSE                    COVID-19 Inflammatory Markers:  Auto Neutrophil #: 5.57 K/uL (05-16-20 @ 06:41)  Auto Neutrophil #: 6.44 K/uL (05-15-20 @ 07:30)  Auto Neutrophil #: 7.72 K/uL (05-14-20 @ 06:36)  Auto Neutrophil #: 7.16 K/uL (05-13-20 @ 06:41)  Auto Neutrophil #: 6.58 K/uL (05-12-20 @ 07:01)  Auto Neutrophil #: 6.56 K/uL (05-11-20 @ 06:46)  Auto Neutrophil #: 6.47 K/uL (05-10-20 @ 11:35)  Auto Neutrophil #: 6.11 K/uL (05-09-20 @ 06:38)  Auto Neutrophil #: 6.53 K/uL (05-08-20 @ 06:48)    Auto Lymphocyte #: 1.44 K/uL (05-16-20 @ 06:41)  Auto Lymphocyte #: 1.27 K/uL (05-15-20 @ 07:30)  Auto Lymphocyte #: 1.37 K/uL (05-14-20 @ 06:36)  Auto Lymphocyte #: 1.42 K/uL (05-13-20 @ 06:41)  Auto Lymphocyte #: 1.21 K/uL (05-12-20 @ 07:01)  Auto Lymphocyte #: 1.41 K/uL (05-11-20 @ 06:46)  Auto Lymphocyte #: 1.26 K/uL (05-10-20 @ 11:35)  Auto Lymphocyte #: 1.09 K/uL (05-09-20 @ 06:38)  Auto Lymphocyte #: 1.07 K/uL (05-08-20 @ 06:48)                                ----  Personally reviewed:  Vital sign trends: [  ] yes    [  ] no     [  ] n/a  Laboratory results: [  ] yes    [  ] no     [  ] n/a  Radiology results: [  ] yes    [  ] no     [  ] n/a  Culture results: [  ] yes    [  ] no     [  ] n/a  Consultant recommendations: [  ] yes    [  ] no     [  ] n/a This progress note was completed in part by resident acting as telephonic scribe during COVID-19 crisis. Physical exam was completed by attending physician, and findings below are those of the attending physician, and have been reviewed in detail.    Patient is a 79y old  Male who presents with a chief complaint of Generalized weakness (16 May 2020 16:34)      FROM ADMISSION H+P:   HPI:  80 y/o M with PMHx of HTN, BPH, spinal stenosis, dyslipidemia, anxiety, neuropathy, B12 deficiency who presents from [facility] rehab for evaluation of weakness and hypotension. As per transfer papers, pt with confusion after lunch today. Pt admits to generalized weakness. Lives with wife, Stephany and son. He came from [facility] in [place] since Feb 14. Patient lives with wife and son at home and ambulates with cane. Contracted Covid-19 in [facility] rehab and first started having symptoms on Mar 25, reporting symptoms of cough and low grade fever, diarrhea. Denies any chest pain, shortness of breath, headache, dizziness. History obtained from wife, Stephany ([phone]) as pt is confused and a poor historian.   Of note, pt was noted to have sacral wounds today and reported low grade fever for two days and mental status changes today and loss of appetite.    ----  INTERVAL HPI/OVERNIGHT EVENTS:   Pt seen and evaluated at the bedside. No acute overnight events occurred. Received 1 unit pRBC's yesterday, tolerated well, appropriate response in h/h. This AM pt was noted to be somnolent as he had recently woken up. Pt's wife was conference called during pt encounter this morning and extensive conversation about pt's clinical condition and prognosis was had. Pt's wife expressed concern over pt's lack of appetite. Pt admitted to not having an appetite. However, ROS was limited due to somnolence, but pt denied any acute complaints and was responsive to most questions.      ----  PAST MEDICAL & SURGICAL HISTORY:  H/O vitamin B deficiency  Spinal stenosis  HTN (hypertension)  History of tonsillectomy      FAMILY HISTORY:  No pertinent family history in first degree relatives      Allergies    No Known Allergies    Intolerances        ----  REVIEW OF SYSTEMS:  as per HPI    ----  PHYSICAL EXAM:  GENERAL: patient appears weak and frail, tired but is readily arousable, appears comfortable in bed  ENMT: no exudates, moist mucous membranes, temporal wasting  LUNGS: CTA b/l,, no rhonchi, rales, oe wheezing  HEART: S1/S2, regular rate and rhythm, no murmurs noted, no lower extremity edema  GASTROINTESTINAL: abd is markedly thin, active bowel sounds  INTEGUMENT: pale, dry  MUSCULOSKELETAL: no clubbing or cyanosis, no obvious deformity  NEUROLOGIC: awake, alert, oriented x3, good muscle tone in 4 extremities, no obvious sensory deficits    Other noted pertinent findings  OXYGEN SUPPORT: nasal cannula: yes [  ]   no [ x ]    LINES: central line, midline, PICC line: yes [  ]   no [ x ]  REYNA CATHETER: yes [ x ]   no [  ]    T(C): 37.1 (05-17-20 @ 04:36), Max: 37.1 (05-17-20 @ 04:36)  HR: 75 (05-17-20 @ 04:36) (72 - 94)  BP: 120/63 (05-17-20 @ 04:36) (111/60 - 120/63)  RR: 18 (05-17-20 @ 04:36) (17 - 18)  SpO2: 96% (05-17-20 @ 04:36) (94% - 96%)  Wt(kg): --    ----  I&O's Summary    16 May 2020 07:01  -  17 May 2020 07:00  --------------------------------------------------------  IN: 307 mL / OUT: 550 mL / NET: -243 mL        LABS:                        7.9    7.66  )-----------( 180      ( 16 May 2020 06:41 )             25.5     05-16    138  |  101  |  26<H>  ----------------------------<  84  3.9   |  28  |  0.70    Ca    8.0<L>      16 May 2020 06:41    TPro  5.8<L>  /  Alb  2.0<L>  /  TBili  0.4  /  DBili  x   /  AST  47<H>  /  ALT  54  /  AlkPhos  115  05-16        CAPILLARY BLOOD GLUCOSE                    COVID-19 Inflammatory Markers:  Auto Neutrophil #: 5.57 K/uL (05-16-20 @ 06:41)  Auto Neutrophil #: 6.44 K/uL (05-15-20 @ 07:30)  Auto Neutrophil #: 7.72 K/uL (05-14-20 @ 06:36)  Auto Neutrophil #: 7.16 K/uL (05-13-20 @ 06:41)  Auto Neutrophil #: 6.58 K/uL (05-12-20 @ 07:01)  Auto Neutrophil #: 6.56 K/uL (05-11-20 @ 06:46)  Auto Neutrophil #: 6.47 K/uL (05-10-20 @ 11:35)  Auto Neutrophil #: 6.11 K/uL (05-09-20 @ 06:38)  Auto Neutrophil #: 6.53 K/uL (05-08-20 @ 06:48)    Auto Lymphocyte #: 1.44 K/uL (05-16-20 @ 06:41)  Auto Lymphocyte #: 1.27 K/uL (05-15-20 @ 07:30)  Auto Lymphocyte #: 1.37 K/uL (05-14-20 @ 06:36)  Auto Lymphocyte #: 1.42 K/uL (05-13-20 @ 06:41)  Auto Lymphocyte #: 1.21 K/uL (05-12-20 @ 07:01)  Auto Lymphocyte #: 1.41 K/uL (05-11-20 @ 06:46)  Auto Lymphocyte #: 1.26 K/uL (05-10-20 @ 11:35)  Auto Lymphocyte #: 1.09 K/uL (05-09-20 @ 06:38)  Auto Lymphocyte #: 1.07 K/uL (05-08-20 @ 06:48)                                ----  Personally reviewed:  Vital sign trends: [ x ] yes    [  ] no     [  ] n/a  Laboratory results: [ x ] yes    [  ] no     [  ] n/a  Radiology results: [  ] yes    [  ] no     [ x ] n/a  Culture results: [  ] yes    [  ] no     [  x] n/a  Consultant recommendations: [  ] yes    [  ] no     [ x ] n/a

## 2020-05-17 NOTE — PROGRESS NOTE ADULT - PROBLEM SELECTOR PLAN 2
-Anemia possibly 2/2 L hip chronic hematoma as noted on CT AP, GIB less likely  -s/p 1u pRBCs ON 4/28  -Hgb today 7.9. Discussed with pt and pt's wife and will transfuse pt 1u PRBC today.  -Continue Protonix 40 mg daily, Carafate 1 g four times daily  -Monitor for further bleeding, stool color   -no plans for urgent inpt endoscopy in setting off COVID. f/u as outpatient for procedure endoscopy and colonoscopy -Anemia possibly 2/2 L hip chronic hematoma as noted on CT AP, GIB less likely  -s/p 2u pRBCs, most recently 5/16  -Continue Protonix 40 mg daily, Carafate 1 g four times daily  -Monitor for further bleeding, stool color   -no plans for urgent inpt endoscopy in setting off COVID. f/u as outpatient for procedure endoscopy and colonoscopy

## 2020-05-17 NOTE — PROGRESS NOTE ADULT - SUBJECTIVE AND OBJECTIVE BOX
INTERVAL HPI/OVERNIGHT EVENTS:  No new overnight event.  No N/V/D.  Tolerating diet.      Allergies    No Known Allergies    Intolerances          General:  No wt loss, fevers, chills, night sweats, fatigue,   Eyes:  Good vision, no reported pain  ENT:  No sore throat, pain, runny nose, dysphagia  CV:  No pain, palpitations, hypo/hypertension  Resp:  No dyspnea, cough, tachypnea, wheezing  GI:  No pain, No nausea, No vomiting, No diarrhea, No constipation, No weight loss, No fever, No pruritis, No rectal bleeding, No tarry stools, No dysphagia,  :  No pain, bleeding, incontinence, nocturia  Muscle:  No pain, weakness  Neuro:  No weakness, tingling, memory problems  Psych:  No fatigue, insomnia, mood problems, depression  Endocrine:  No polyuria, polydipsia, cold/heat intolerance  Heme:  No petechiae, ecchymosis, easy bruisability  Skin:  No rash, tattoos, scars, edema      PHYSICAL EXAM:   Vital Signs:  Vital Signs Last 24 Hrs  T(C): 37.3 (17 May 2020 07:15), Max: 37.3 (17 May 2020 07:15)  T(F): 99.2 (17 May 2020 07:15), Max: 99.2 (17 May 2020 07:15)  HR: 66 (17 May 2020 07:15) (66 - 94)  BP: 95/50 (17 May 2020 07:15) (95/50 - 120/63)  BP(mean): --  RR: 19 (17 May 2020 07:15) (17 - 19)  SpO2: 98% (17 May 2020 07:15) (94% - 98%)  Daily     Daily I&O's Summary    16 May 2020 07:01  -  17 May 2020 07:00  --------------------------------------------------------  IN: 307 mL / OUT: 550 mL / NET: -243 mL    17 May 2020 07:01  -  17 May 2020 14:56  --------------------------------------------------------  IN: 0 mL / OUT: 300 mL / NET: -300 mL        GENERAL:  Appears stated age, well-groomed, well-nourished, no distress  HEENT:  NC/AT,  conjunctivae clear and pink, no thyromegaly, nodules, adenopathy, no JVD, sclera -anicteric  CHEST:  Full & symmetric excursion, no increased effort, breath sounds clear  HEART:  Regular rhythm, S1, S2, no murmur/rub/S3/S4, no abdominal bruit, no edema  ABDOMEN:  Soft, non-tender, non-distended, normoactive bowel sounds,  no masses ,no hepato-splenomegaly, no signs of chronic liver disease  EXTEREMITIES:  no cyanosis,clubbing or edema  SKIN:  No rash/erythema/ecchymoses/petechiae/wounds/abscess/warm/dry  NEURO:  Alert, oriented, no asterixis, no tremor, no encephalopathy      LABS:                        8.8    7.25  )-----------( 160      ( 17 May 2020 08:56 )             27.3     05-17    137  |  102  |  22  ----------------------------<  84  4.0   |  30  |  0.64    Ca    8.0<L>      17 May 2020 08:56    TPro  5.5<L>  /  Alb  1.9<L>  /  TBili  0.6  /  DBili  x   /  AST  37  /  ALT  51  /  AlkPhos  110  05-17        amylase   lipase  RADIOLOGY & ADDITIONAL TESTS:

## 2020-05-17 NOTE — PROGRESS NOTE ADULT - ASSESSMENT
80 y/o M with PMHx of HTN, BPH, spinal stenosis, dyslipidemia, anxiety, neuropathy, B12 deficiency who presents from rehab for evaluation of weakness and hypotension    Anemia   no overt gi bleeding  likely r/t sacral decub/hematoma, sp debridement  monitor cbc, transfuse prn  cont ppi and carafate  monitor stool color   defer endoscopic eval as +Covid and increased risk for anesthesia in these pts   op gi f/u for possible colonoscopy if none recent once covid crisis resolved given ct findings    FTT/Dysphagia   unclear etiology; esophagram at [place] 2/2019 normal   correct elytes prn  cont ppi    on supplements per nutrition   defer endoscopic eval as +Covid and increased risk for anesthesia in these pts    COVID-19  monitor rep status   inc lfts in setting of viral illness; resolved   care per primary team

## 2020-05-17 NOTE — PROGRESS NOTE ADULT - PROBLEM SELECTOR PLAN 9
-malnutrition 2/2 poor PO intake  -malnutrition f/u eval recommending Continue Ensure Enlive BID, add Prosource BID to encourage sacral wound healing -malnutrition 2/2 poor PO intake  -nutrition services following. recommending Continue Ensure Enlive BID, add Prosource BID to encourage sacral wound healing

## 2020-05-17 NOTE — PROGRESS NOTE ADULT - SUBJECTIVE AND OBJECTIVE BOX
LISANDRA PADGETT is a 79yMale , patient examined and chart reviewed.    INTERVAL HPI/ OVERNIGHT EVENTS:   NAD. Afebrile. No events.    PAST MEDICAL & SURGICAL HISTORY:  H/O vitamin B deficiency  Spinal stenosis  HTN (hypertension)  History of tonsillectomy      For details regarding the patient's social history, family history, and other miscellaneous elements, please refer the initial infectious diseases consultation and/or the admitting history and physical examination for this admission.    ROS:  CONSTITUTIONAL:  Negative fever or chills  EYES:  Negative  blurry vision or double vision  CARDIOVASCULAR:  Negative for chest pain or palpitations  RESPIRATORY:  Negative for cough, wheezing, or SOB   GASTROINTESTINAL:  Negative for nausea, vomiting, diarrhea, constipation, or abdominal pain  GENITOURINARY:  Negative frequency, urgency or dysuria  NEUROLOGIC:  No headache, confusion, dizziness, lightheadedness  All other systems were reviewed and are negative     Current inpatient medications :  MEDICATIONS  (STANDING):  cyanocobalamin 1000 MICROGram(s) Oral daily  metoprolol tartrate 50 milliGRAM(s) Oral two times a day  pantoprazole    Tablet 40 milliGRAM(s) Oral before breakfast  povidone iodine 10% Solution 1 Application(s) Topical daily  sertraline 100 milliGRAM(s) Oral daily  sodium chloride 0.65% Nasal 1 Spray(s) Both Nostrils two times a day  sucralfate suspension 1 Gram(s) Oral four times a day    MEDICATIONS  (PRN):  oxyCODONE    IR 5 milliGRAM(s) Oral every 6 hours PRN Severe Pain (7 - 10)      Objective:  Vital Signs Last 24 Hrs  T(C): 36.8 (17 May 2020 16:15), Max: 37.3 (17 May 2020 07:15)  T(F): 98.3 (17 May 2020 16:15), Max: 99.2 (17 May 2020 07:15)  HR: 75 (17 May 2020 16:15) (66 - 75)  BP: 102/57 (17 May 2020 16:15) (95/50 - 120/63)  RR: 18 (17 May 2020 16:15) (18 - 19)  SpO2: 97% (17 May 2020 16:15) (96% - 98%)    Physical Exam:  General:  no acute distress  Eyes: sclera anicteric, pupils equal and reactive to light  ENMT: buccal mucosa moist, pharynx not injected  Neck: supple, trachea midline  Lungs: clear, no wheeze/rhonchi  Cardiovascular: regular rate and rhythm, S1 S2  Abdomen: soft, nontender, no organomegaly present, bowel sounds normal  Neurological: alert and oriented x2 Cranial Nerves II-XII grossly intact  Skin: sacral decub drsg c/d/i   Lymph Nodes: no palpable cervical/supraclavicular lymph nodes enlargements  Extremities: no cyanosis/clubbing/edema        LABS:                               8.8    7.25  )-----------( 160      ( 17 May 2020 08:56 )             27.3   05-17    137  |  102  |  22  ----------------------------<  84  4.0   |  30  |  0.64    Ca    8.0<L>      17 May 2020 08:56    TPro  5.5<L>  /  Alb  1.9<L>  /  TBili  0.6  /  DBili  x   /  AST  37  /  ALT  51  /  AlkPhos  110  05-17    MICROBIOLOGY:    Culture - Blood (collected 02 May 2020 00:30)  Source: .Blood Blood  Preliminary Report (03 May 2020 01:03):    No growth to date.    Culture - Blood (collected 02 May 2020 00:30)  Source: .Blood Blood  Preliminary Report (03 May 2020 01:03):    No growth to date.    COVID-19 PCR . (04.28.20 @ 22:41)    COVID-19 PCR: Detected: This test has been validated by [employer] to be accurate;  though it has not been FDA cleared/approved by the usual pathway  As with all laboratory test, results should be correlated with clinical  findings.  https://www.fda.gov/media/453795/download  https://www.fda.gov/media/334790/download    RADIOLOGY & ADDITIONAL STUDIES:    EXAM:  XR CHEST PORTABLE ROUTINE 1V                            PROCEDURE DATE:  04/30/2020          INTERPRETATION:    Examination: XR CHEST    History: ADMDIAG1: K92.2 GASTROINTESTINAL HEMORRHAGE, UNSPECIFIED/, pn pneumonia    TECHNIQUE:  Portable AP view of the chest was obtained.    COMPARISON: A CT chest 1/29/2020 available for review.    FINDINGS:   Lungs clear. RIGHT hemidiaphragm mildly elevated. Posterior lung bases cannot be assessed due to elevated diaphragms. Lateral radiograph recommended if clinically warranted.  . The heart and mediastinum are within normal limits.    Visualized osseous structures are intact.      IMPRESSION: Visualized Lungs clear..   Elevated LEFT hemidiaphragm. Posterior lung bases cannot be assessed dueto elevated diaphragms. Lateral radiograph recommended if clinically warranted.      Assessment :   80 y/o M with PMHx of HTN, BPH, spinal stenosis, dyslipidemia, anxiety, neuropathy, B12 deficiency resident of [place] rehab for evaluation of weakness and hypotension with mental status change. Tested COVID 19 positive early April. In ED he was hypotensive. WBC 19K No SOB cough n/v/d. Pt is a poor historian. Has unstagable sacral decub ulcer. Also with Acute anemia. ?infectious process vs reactive leukocytosis. Leukocytosis poss chronic.  Respiratory status stable. Menendez placed sec AUR 5/3/2020. Seen by surgery and is sp bedside I&D of sacral decub Clinically stable.    Plan :   Monitor off antibiotics  Menendez per urology  Frequent turning  Local wound care per surgery  COVID-19--critical period for decompensation  (7-14 days post symptom onset), avoid aerosolizing procedures, NSAIDs   -monitor respiratory status closely ( route of 02 and saturation) and titrate when able  -isolation precautions per protocol  -avoid excessive blood draws, blood cultures and frequent CXRs  -monitor biomarkers- CBC w diff  for NLRNLR <3 low vs >5 high) Ferritin (lower risk <450 vs >850) CRP (low risk <2 and higher risk >6) and LDH, D Dimer, procalcitonin  -therapies remains investigational-- including Hydroxychloroquine  -antibiotics only if there is concern for a bacterial process  -Steroids short course ( 5 days)  --for hypoxia/ARDS /shock    Dc planning to rehab pending COVID 19 neg x 2       Continue with present regiment.  Appropriate use of antibiotics and adverse effects reviewed.      I have discussed the above plan of care with patient/ family in detail. They expressed understanding of the  treatment plan . Risks, benefits and alternatives discussed in detail. I have asked if they have any questions or concerns and appropriately addressed them to the best of my ability .    > 35 minutes were spent in direct patient care reviewing notes, medications ,labs data/ imaging , discussion with multidisciplinary team.    Thank you for allowing me to participate in care of your patient .    Naima Lopez MD  Infectious Disease  [phone]

## 2020-05-17 NOTE — PROGRESS NOTE ADULT - PROBLEM SELECTOR PLAN 1
COVID-19, maintaining sat >90 on room air   - Maintain on airborne isolation.  - Limit use of NSAIDs.  - Medically stable for d/c, however last 3 repeat COVID19 PCR tests positive 5/12 and 5/13, and 5/15, will repeat PCR every 48hrs until negative, awaiting placement at [facility] COVID-19, maintaining sat >90 on room air   - Maintain on airborne isolation.  - Limit use of NSAIDs.  - Medically stable, however last 3 repeat COVID19 PCR tests positive 5/12 and 5/13, and 5/15, will repeat PCR every 48hrs until negative, awaiting placement at [facility]  - repeat PCR today COVID-19, maintaining sat >90 on room air   - Maintain on airborne isolation.  - Limit use of NSAIDs.  - Medically stable, however last 3 repeat COVID19 PCR tests positive 5/12 and 5/13, and 5/15, will repeat PCR every 48hrs until negative, awaiting placement at [facility]  - repeat Covid PCR pending today COVID-19, maintaining sat >90 on room air   - Maintain on airborne isolation.  - Limit use of NSAIDs.  - Medically stable, however last 3 repeat COVID19 PCR tests positive 5/12 and 5/13, and 5/15, will repeat PCR every 48hrs until negative, awaiting placement at [facility]  - repeat COVID-19 PCR pending today

## 2020-05-17 NOTE — PROGRESS NOTE ADULT - PROBLEM SELECTOR PLAN 10
DVT PPx: SCD's, given GIB  Code Status: Pt is DNR, but NOT DNI, MOLST form in chart DVT PPx: SCD's. concern for possible slow bleed  Code Status: Pt is DNR, but NOT DNI, MOLST form in chart

## 2020-05-18 LAB
ALBUMIN SERPL ELPH-MCNC: 2 G/DL — LOW (ref 3.3–5)
ALP SERPL-CCNC: 129 U/L — HIGH (ref 40–120)
ALT FLD-CCNC: 62 U/L — SIGNIFICANT CHANGE UP (ref 12–78)
ANION GAP SERPL CALC-SCNC: 10 MMOL/L — SIGNIFICANT CHANGE UP (ref 5–17)
AST SERPL-CCNC: 50 U/L — HIGH (ref 15–37)
BASOPHILS # BLD AUTO: 0.01 K/UL — SIGNIFICANT CHANGE UP (ref 0–0.2)
BASOPHILS NFR BLD AUTO: 0.1 % — SIGNIFICANT CHANGE UP (ref 0–2)
BILIRUB SERPL-MCNC: 0.8 MG/DL — SIGNIFICANT CHANGE UP (ref 0.2–1.2)
BUN SERPL-MCNC: 27 MG/DL — HIGH (ref 7–23)
CALCIUM SERPL-MCNC: 8.3 MG/DL — LOW (ref 8.5–10.1)
CHLORIDE SERPL-SCNC: 100 MMOL/L — SIGNIFICANT CHANGE UP (ref 96–108)
CO2 SERPL-SCNC: 25 MMOL/L — SIGNIFICANT CHANGE UP (ref 22–31)
CREAT SERPL-MCNC: 0.83 MG/DL — SIGNIFICANT CHANGE UP (ref 0.5–1.3)
EOSINOPHIL # BLD AUTO: 0 K/UL — SIGNIFICANT CHANGE UP (ref 0–0.5)
EOSINOPHIL NFR BLD AUTO: 0 % — SIGNIFICANT CHANGE UP (ref 0–6)
GLUCOSE SERPL-MCNC: 90 MG/DL — SIGNIFICANT CHANGE UP (ref 70–99)
HCT VFR BLD CALC: 30.2 % — LOW (ref 39–50)
HGB BLD-MCNC: 9.9 G/DL — LOW (ref 13–17)
IMM GRANULOCYTES NFR BLD AUTO: 0.5 % — SIGNIFICANT CHANGE UP (ref 0–1.5)
LYMPHOCYTES # BLD AUTO: 0.9 K/UL — LOW (ref 1–3.3)
LYMPHOCYTES # BLD AUTO: 10.3 % — LOW (ref 13–44)
MCHC RBC-ENTMCNC: 27.1 PG — SIGNIFICANT CHANGE UP (ref 27–34)
MCHC RBC-ENTMCNC: 32.8 GM/DL — SIGNIFICANT CHANGE UP (ref 32–36)
MCV RBC AUTO: 82.7 FL — SIGNIFICANT CHANGE UP (ref 80–100)
MONOCYTES # BLD AUTO: 0.48 K/UL — SIGNIFICANT CHANGE UP (ref 0–0.9)
MONOCYTES NFR BLD AUTO: 5.5 % — SIGNIFICANT CHANGE UP (ref 2–14)
NEUTROPHILS # BLD AUTO: 7.31 K/UL — SIGNIFICANT CHANGE UP (ref 1.8–7.4)
NEUTROPHILS NFR BLD AUTO: 83.6 % — HIGH (ref 43–77)
NRBC # BLD: 0 /100 WBCS — SIGNIFICANT CHANGE UP (ref 0–0)
PLATELET # BLD AUTO: 202 K/UL — SIGNIFICANT CHANGE UP (ref 150–400)
POTASSIUM SERPL-MCNC: 4.1 MMOL/L — SIGNIFICANT CHANGE UP (ref 3.5–5.3)
POTASSIUM SERPL-SCNC: 4.1 MMOL/L — SIGNIFICANT CHANGE UP (ref 3.5–5.3)
PROT SERPL-MCNC: 6.1 G/DL — SIGNIFICANT CHANGE UP (ref 6–8.3)
RBC # BLD: 3.65 M/UL — LOW (ref 4.2–5.8)
RBC # FLD: 15.1 % — HIGH (ref 10.3–14.5)
SODIUM SERPL-SCNC: 135 MMOL/L — SIGNIFICANT CHANGE UP (ref 135–145)
WBC # BLD: 8.74 K/UL — SIGNIFICANT CHANGE UP (ref 3.8–10.5)
WBC # FLD AUTO: 8.74 K/UL — SIGNIFICANT CHANGE UP (ref 3.8–10.5)

## 2020-05-18 PROCEDURE — 99232 SBSQ HOSP IP/OBS MODERATE 35: CPT | Mod: CS

## 2020-05-18 RX ADMIN — Medication 1 GRAM(S): at 13:18

## 2020-05-18 RX ADMIN — Medication 1 APPLICATION(S): at 13:18

## 2020-05-18 RX ADMIN — SERTRALINE 100 MILLIGRAM(S): 25 TABLET, FILM COATED ORAL at 13:18

## 2020-05-18 RX ADMIN — Medication 1 GRAM(S): at 17:06

## 2020-05-18 RX ADMIN — Medication 50 MILLIGRAM(S): at 17:06

## 2020-05-18 RX ADMIN — Medication 1 SPRAY(S): at 05:44

## 2020-05-18 RX ADMIN — PREGABALIN 1000 MICROGRAM(S): 225 CAPSULE ORAL at 13:18

## 2020-05-18 RX ADMIN — Medication 1 GRAM(S): at 23:30

## 2020-05-18 RX ADMIN — Medication 1 GRAM(S): at 05:43

## 2020-05-18 RX ADMIN — PANTOPRAZOLE SODIUM 40 MILLIGRAM(S): 20 TABLET, DELAYED RELEASE ORAL at 05:43

## 2020-05-18 RX ADMIN — OXYCODONE HYDROCHLORIDE 5 MILLIGRAM(S): 5 TABLET ORAL at 09:56

## 2020-05-18 NOTE — PROGRESS NOTE ADULT - PROBLEM SELECTOR PLAN 2
-Anemia possibly 2/2 L hip chronic hematoma as noted on CT AP, GIB less likely. No blood in last BM per pt. S/p one unit pRBCs yesterday, H/H increased appropriately  -Continue Protonix 40 mg daily, Carafate 1 g four times daily  -Monitor for further bleeding, stool color   -no plans for urgent inpt endoscopy in setting off COVID. f/u as outpatient for procedure endoscopy and colonoscopy -Anemia possibly 2/2 L hip chronic hematoma as noted on CT AP and poor nutritonal status; GIB less likely. No blood in last BM per pt. S/p one unit pRBCs 5/16, H/H increased appropriately  -Continue Protonix 40 mg daily, Carafate 1 g four times daily  -Monitor for further bleeding, stool color   -no plans for urgent inpt endoscopy in setting off COVID. f/u as outpatient for procedure endoscopy and colonoscopy  -Continue to encourage adequate PO intake

## 2020-05-18 NOTE — PROGRESS NOTE ADULT - SUBJECTIVE AND OBJECTIVE BOX
Chief Complaint: Weakness, AMS    Interval Events: No events overnight.    Review of Systems:  General: No fevers, chills, weight loss or gain  Skin: No rashes, color changes  Cardiovascular: No chest pain, orthopnea  Respiratory: No shortness of breath, cough  Gastrointestinal: No nausea, abdominal pain  Genitourinary: No incontinence, pain with urination  Musculoskeletal: No pain, swelling, decreased range of motion  Neurological: No headache, weakness  Psychiatric: No depression, anxiety  Endocrine: No weight loss or gain, increased thirst  All other systems are comprehensively negative.    Physical Exam:  Vital Signs Last 24 Hrs  T(C): 37.8 (18 May 2020 07:10), Max: 37.8 (18 May 2020 07:10)  T(F): 100 (18 May 2020 07:10), Max: 100 (18 May 2020 07:10)  HR: 99 (18 May 2020 07:10) (75 - 99)  BP: 97/60 (18 May 2020 08:25) (90/55 - 102/57)  BP(mean): --  RR: 19 (18 May 2020 07:10) (18 - 19)  SpO2: 94% (18 May 2020 07:10) (94% - 97%)  General: NAD  HEENT: MMM  Neck: No JVD, no carotid bruit  Extremities: No LE edema, no cyanosis  Neuro: Non-focal  Skin: No rash    Labs:    05-18    135  |  100  |  27<H>  ----------------------------<  90  4.1   |  25  |  0.83    Ca    8.3<L>      18 May 2020 07:08    TPro  6.1  /  Alb  2.0<L>  /  TBili  0.8  /  DBili  x   /  AST  50<H>  /  ALT  62  /  AlkPhos  129<H>  05-18                        9.9    8.74  )-----------( 202      ( 18 May 2020 07:08 )             30.2

## 2020-05-18 NOTE — PROGRESS NOTE ADULT - PROBLEM SELECTOR PLAN 4
was hypotensive on admission, resolving, BP remains soft  - Off Midodrine, continue BB with hold parameters- will discharge on Metoprolol 50 BID  - Continue to hold off on home Valsartan   - Cardio Dr Ordonez following

## 2020-05-18 NOTE — PROGRESS NOTE ADULT - PROBLEM SELECTOR PLAN 9
-malnutrition 2/2 poor PO intake  -nutrition services following. recommending Continue Ensure Enlive BID, add Prosource BID to encourage sacral wound healing -malnutrition 2/2 poor PO intake  -nutrition services following. recommending Continue Ensure Enlive BID, add Prosource BID to encourage sacral wound healing and improve anemia

## 2020-05-18 NOTE — PROGRESS NOTE ADULT - PROBLEM SELECTOR PLAN 6
Elevated LFTs possibly 2/2 COVID-19 infection - resolving, Alk phos mildly increased today  - Avoid hepatotoxic agents

## 2020-05-18 NOTE — PROGRESS NOTE ADULT - PROBLEM SELECTOR PLAN 3
Stage 4 Sacral Ulcer  - s/p debridement and Zosyn course  - s/p wound vac placement - wound care NP Benita following  - Menendez placed for urinary retention by Urology - as pt not yet ambulatory, will keep Menendez in for wound healing given sacral ulcer, attempt TOV at [facility] once more ambulatory

## 2020-05-18 NOTE — PROGRESS NOTE ADULT - SUBJECTIVE AND OBJECTIVE BOX
INTERVAL HPI/OVERNIGHT EVENTS:  chart reviewed  no overt gib  dec po    MEDICATIONS  (STANDING):  cyanocobalamin 1000 MICROGram(s) Oral daily  metoprolol tartrate 50 milliGRAM(s) Oral two times a day  pantoprazole    Tablet 40 milliGRAM(s) Oral before breakfast  povidone iodine 10% Solution 1 Application(s) Topical daily  sertraline 100 milliGRAM(s) Oral daily  sodium chloride 0.65% Nasal 1 Spray(s) Both Nostrils two times a day  sucralfate suspension 1 Gram(s) Oral four times a day    MEDICATIONS  (PRN):  oxyCODONE    IR 5 milliGRAM(s) Oral every 6 hours PRN Severe Pain (7 - 10)      Allergies    No Known Allergies    Intolerances        Review of Systems:    unable to obtain     Vital Signs Last 24 Hrs  T(C): 37.8 (18 May 2020 07:10), Max: 37.8 (18 May 2020 07:10)  T(F): 100 (18 May 2020 07:10), Max: 100 (18 May 2020 07:10)  HR: 99 (18 May 2020 07:10) (75 - 99)  BP: 97/60 (18 May 2020 08:25) (90/55 - 102/57)  BP(mean): --  RR: 19 (18 May 2020 07:10) (18 - 19)  SpO2: 94% (18 May 2020 07:10) (94% - 97%)    PHYSICAL EXAM:    deferred f/u done remotely as covid +       LABS:                        9.9    8.74  )-----------( 202      ( 18 May 2020 07:08 )             30.2     05-18    135  |  100  |  27<H>  ----------------------------<  90  4.1   |  25  |  0.83    Ca    8.3<L>      18 May 2020 07:08    TPro  6.1  /  Alb  2.0<L>  /  TBili  0.8  /  DBili  x   /  AST  50<H>  /  ALT  62  /  AlkPhos  129<H>  05-18          RADIOLOGY & ADDITIONAL TESTS:

## 2020-05-18 NOTE — PROGRESS NOTE ADULT - SUBJECTIVE AND OBJECTIVE BOX
LISANDRA PADGETT is a 79yMale , patient examined and chart reviewed.    INTERVAL HPI/ OVERNIGHT EVENTS:   NAD. Afebrile. No events.    PAST MEDICAL & SURGICAL HISTORY:  H/O vitamin B deficiency  Spinal stenosis  HTN (hypertension)  History of tonsillectomy      For details regarding the patient's social history, family history, and other miscellaneous elements, please refer the initial infectious diseases consultation and/or the admitting history and physical examination for this admission.    ROS:  CONSTITUTIONAL:  Negative fever or chills  EYES:  Negative  blurry vision or double vision  CARDIOVASCULAR:  Negative for chest pain or palpitations  RESPIRATORY:  Negative for cough, wheezing, or SOB   GASTROINTESTINAL:  Negative for nausea, vomiting, diarrhea, constipation, or abdominal pain  GENITOURINARY:  Negative frequency, urgency or dysuria  NEUROLOGIC:  No headache, confusion, dizziness, lightheadedness  All other systems were reviewed and are negative     Current inpatient medications :  MEDICATIONS  (STANDING):  cyanocobalamin 1000 MICROGram(s) Oral daily  metoprolol tartrate 50 milliGRAM(s) Oral two times a day  pantoprazole    Tablet 40 milliGRAM(s) Oral before breakfast  povidone iodine 10% Solution 1 Application(s) Topical daily  sertraline 100 milliGRAM(s) Oral daily  sodium chloride 0.65% Nasal 1 Spray(s) Both Nostrils two times a day  sucralfate suspension 1 Gram(s) Oral four times a day    MEDICATIONS  (PRN):  oxyCODONE    IR 5 milliGRAM(s) Oral every 6 hours PRN Severe Pain (7 - 10)      Objective:  Vital Signs Last 24 Hrs  T(C): 37.1 (18 May 2020 18:11), Max: 37.8 (18 May 2020 07:10)  T(F): 98.8 (18 May 2020 18:11), Max: 100 (18 May 2020 07:10)  HR: 95 (18 May 2020 18:11) (95 - 99)  BP: 107/63 (18 May 2020 18:11) (90/55 - 107/63)  RR: 19 (18 May 2020 18:11) (18 - 19)  SpO2: 96% (18 May 2020 18:11) (94% - 96%)    Physical Exam:  General:  no acute distress  Eyes: sclera anicteric, pupils equal and reactive to light  ENMT: buccal mucosa moist, pharynx not injected  Neck: supple, trachea midline  Lungs: clear, no wheeze/rhonchi  Cardiovascular: regular rate and rhythm, S1 S2  Abdomen: soft, nontender, no organomegaly present, bowel sounds normal  Neurological: alert and oriented x2 Cranial Nerves II-XII grossly intact  Skin: sacral decub drsg c/d/i   Lymph Nodes: no palpable cervical/supraclavicular lymph nodes enlargements  Extremities: no cyanosis/clubbing/edema        LABS:                                    9.9    8.74  )-----------( 202      ( 18 May 2020 07:08 )             30.2   05-18    135  |  100  |  27<H>  ----------------------------<  90  4.1   |  25  |  0.83    Ca    8.3<L>      18 May 2020 07:08    TPro  6.1  /  Alb  2.0<L>  /  TBili  0.8  /  DBili  x   /  AST  50<H>  /  ALT  62  /  AlkPhos  129<H>  05-18      MICROBIOLOGY:    Culture - Blood (collected 02 May 2020 00:30)  Source: .Blood Blood  Preliminary Report (03 May 2020 01:03):    No growth to date.    Culture - Blood (collected 02 May 2020 00:30)  Source: .Blood Blood  Preliminary Report (03 May 2020 01:03):    No growth to date.    COVID-19 PCR . (04.28.20 @ 22:41)    COVID-19 PCR: Detected: This test has been validated by [employer] to be accurate;  though it has not been FDA cleared/approved by the usual pathway  As with all laboratory test, results should be correlated with clinical  findings.  https://www.fda.gov/media/615808/download  https://www.fda.gov/media/402085/download    RADIOLOGY & ADDITIONAL STUDIES:    EXAM:  XR CHEST PORTABLE ROUTINE 1V                            PROCEDURE DATE:  04/30/2020          INTERPRETATION:    Examination: XR CHEST    History: ADMDIAG1: K92.2 GASTROINTESTINAL HEMORRHAGE, UNSPECIFIED/, pn pneumonia    TECHNIQUE:  Portable AP view of the chest was obtained.    COMPARISON: A CT chest 1/29/2020 available for review.    FINDINGS:   Lungs clear. RIGHT hemidiaphragm mildly elevated. Posterior lung bases cannot be assessed due to elevated diaphragms. Lateral radiograph recommended if clinically warranted.  . The heart and mediastinum are within normal limits.    Visualized osseous structures are intact.      IMPRESSION: Visualized Lungs clear..   Elevated LEFT hemidiaphragm. Posterior lung bases cannot be assessed dueto elevated diaphragms. Lateral radiograph recommended if clinically warranted.      Assessment :   80 y/o M with PMHx of HTN, BPH, spinal stenosis, dyslipidemia, anxiety, neuropathy, B12 deficiency resident of [place] rehab for evaluation of weakness and hypotension with mental status change. Tested COVID 19 positive early April. In ED he was hypotensive. WBC 19K No SOB cough n/v/d. Pt is a poor historian. Has unstagable sacral decub ulcer. Also with Acute anemia. ?infectious process vs reactive leukocytosis. Leukocytosis poss chronic.  Respiratory status stable. Menendez placed sec AUR 5/3/2020. Seen by surgery and is sp bedside I&D of sacral decub Clinically stable.    Plan :   Monitor off antibiotics  Menendez per urology  Frequent turning  Local wound care per surgery  COVID-19--critical period for decompensation  (7-14 days post symptom onset), avoid aerosolizing procedures, NSAIDs   -monitor respiratory status closely ( route of 02 and saturation) and titrate when able  -isolation precautions per protocol  -avoid excessive blood draws, blood cultures and frequent CXRs  -monitor biomarkers- CBC w diff  for NLRNLR <3 low vs >5 high) Ferritin (lower risk <450 vs >850) CRP (low risk <2 and higher risk >6) and LDH, D Dimer, procalcitonin  -therapies remains investigational-- including Hydroxychloroquine  -antibiotics only if there is concern for a bacterial process  -Steroids short course ( 5 days)  --for hypoxia/ARDS /shock    Dc planning to rehab pending COVID 19 neg x 2       Continue with present regiment.  Appropriate use of antibiotics and adverse effects reviewed.      I have discussed the above plan of care with patient/ family in detail. They expressed understanding of the  treatment plan . Risks, benefits and alternatives discussed in detail. I have asked if they have any questions or concerns and appropriately addressed them to the best of my ability .    > 35 minutes were spent in direct patient care reviewing notes, medications ,labs data/ imaging , discussion with multidisciplinary team.    Thank you for allowing me to participate in care of your patient .    Naima Lopez MD  Infectious Disease  [phone]

## 2020-05-18 NOTE — PROGRESS NOTE ADULT - SUBJECTIVE AND OBJECTIVE BOX
This progress note was completed in part by resident acting as telephonic scribe during COVID-19 crisis. Physical exam was completed by attending physician, and findings below are those of the attending physician, and have been reviewed in detail.    Patient is a 79y old  Male who presents with a chief complaint of Generalized weakness (16 May 2020 16:34)      FROM ADMISSION H+P:   HPI:  78 y/o M with PMHx of HTN, BPH, spinal stenosis, dyslipidemia, anxiety, neuropathy, B12 deficiency who presents from [facility] rehab for evaluation of weakness and hypotension. As per transfer papers, pt with confusion after lunch today. Pt admits to generalized weakness. Lives with wife, Stephany and son. He came from [facility] rehab in [place] since Feb 14. Patient lives with wife and son at home and ambulates with cane. Contracted Covid-19 in [facility] rehab and first started having symptoms on Mar 25, reporting symptoms of cough and low grade fever, diarrhea. Denies any chest pain, shortness of breath, headache, dizziness. History obtained from wife, Stephany ([phone]) as pt is confused and a poor historian.   Of note, pt was noted to have sacral wounds today and reported low grade fever for two days and mental status changes today and loss of appetite.    ----  INTERVAL HPI/OVERNIGHT EVENTS:   Pt seen and examined at bedside. No acute events overnight. Resting comfortably in bed. Last BM this morning, denies blood in stool. Admits poor appetite. Denies CP, SOB, abd pain, NVD, fever, chills, muscle weakness.    ----  PAST MEDICAL & SURGICAL HISTORY:  H/O vitamin B deficiency  Spinal stenosis  HTN (hypertension)  History of tonsillectomy      FAMILY HISTORY:  No pertinent family history in first degree relatives      Allergies    No Known Allergies    Intolerances        ----  REVIEW OF SYSTEMS:  as per HPI    ----    T(C): 37.8 (05-18-20 @ 07:10), Max: 37.8 (05-18-20 @ 07:10)  HR: 99 (05-18-20 @ 07:10) (75 - 99)  BP: 97/60 (05-18-20 @ 08:25) (90/55 - 102/57)  RR: 19 (05-18-20 @ 07:10) (18 - 19)  SpO2: 94% (05-18-20 @ 07:10) (94% - 97%)    GENERAL: patient in NAD, cachetic looking, pleasant,  EYES: sclera clear, no exudates, PERRLA  ENMT: oropharynx clear without erythema, no exudates, moist mucous membranes  LUNGS:  clear to auscultation bl,  no rales, wheezing or rhonchi appreciated  HEART: S1/S2, regular rate and rhythm, no murmurs noted, no lower extremity edema  GASTROINTESTINAL: abdomen is soft, nontender, nondistended, normoactive bowel sounds, no palpable masses  INTEGUMENT: good skin turgor, warm, dry and intact  MUSCULOSKELETAL: no clubbing or cyanosis, no obvious deformity  NEUROLOGIC: awake, alert, oriented x3, strength 5/5 in all extremities, no obvious sensory deficits  PSYCHIATRIC: mood is good, affect is congruent, linear and logical thought process  HEME/LYMPH:  no obvious ecchymosis or petechiae     Other noted pertinent findings  OXYGEN SUPPORT: nasal cannula: yes [  ]   no [ x ]    LINES: central line, midline, PICC line: yes [  ]   no [ x ]  REYNA CATHETER: yes [ x ]   no [  ]    ----  I&O's Summary    17 May 2020 07:01  -  18 May 2020 07:00  --------------------------------------------------------  IN: 0 mL / OUT: 1200 mL / NET: -1200 mL        LABS:                        9.9    8.74  )-----------( 202      ( 18 May 2020 07:08 )             30.2   05-18    135  |  100  |  27<H>  ----------------------------<  90  4.1   |  25  |  0.83    Ca    8.3<L>      18 May 2020 07:08    TPro  6.1  /  Alb  2.0<L>  /  TBili  0.8  /  DBili  x   /  AST  50<H>  /  ALT  62  /  AlkPhos  129<H>  05-18          ----  Personally reviewed:  Vital sign trends: [ x ] yes    [  ] no     [  ] n/a  Laboratory results: [ x ] yes    [  ] no     [  ] n/a  Radiology results: [  ] yes    [  ] no     [ x ] n/a  Culture results: [  ] yes    [  ] no     [  x] n/a  Consultant recommendations: [  ] yes    [  ] no     [ x ] n/a This progress note was completed in part by resident acting as telephonic scribe during COVID-19 crisis. Physical exam was completed by attending physician, and findings below are those of the attending physician, and have been reviewed in detail.    Patient is a 79y old  Male who presents with a chief complaint of Generalized weakness (16 May 2020 16:34)      FROM ADMISSION H+P:   HPI:  78 y/o M with PMHx of HTN, BPH, spinal stenosis, dyslipidemia, anxiety, neuropathy, B12 deficiency who presents from [facility] rehab for evaluation of weakness and hypotension. As per transfer papers, pt with confusion after lunch today. Pt admits to generalized weakness. Lives with wife, Stephany and son. He came from [facility] rehab in [place] since Feb 14. Patient lives with wife and son at home and ambulates with cane. Contracted Covid-19 in [facility] rehab and first started having symptoms on Mar 25, reporting symptoms of cough and low grade fever, diarrhea. Denies any chest pain, shortness of breath, headache, dizziness. History obtained from wife, Stephany ([phone]) as pt is confused and a poor historian.   Of note, pt was noted to have sacral wounds today and reported low grade fever for two days and mental status changes today and loss of appetite.    ----  INTERVAL HPI/OVERNIGHT EVENTS:   Pt seen and examined at bedside. No acute events overnight. Resting comfortably in bed. Last BM this morning, denies blood in stool. Admits poor appetite. Denies CP, SOB, abd pain, NVD, fever, chills, muscle weakness.    ----  PAST MEDICAL & SURGICAL HISTORY:  H/O vitamin B deficiency  Spinal stenosis  HTN (hypertension)  History of tonsillectomy      FAMILY HISTORY:  No pertinent family history in first degree relatives      Allergies    No Known Allergies    Intolerances        ----  REVIEW OF SYSTEMS:  as per HPI    ----    T(C): 37.8 (05-18-20 @ 07:10), Max: 37.8 (05-18-20 @ 07:10)  HR: 99 (05-18-20 @ 07:10) (75 - 99)  BP: 97/60 (05-18-20 @ 08:25) (90/55 - 102/57)  RR: 19 (05-18-20 @ 07:10) (18 - 19)  SpO2: 94% (05-18-20 @ 07:10) (94% - 97%)    GENERAL: patient in NAD, cachetic looking, pleasant  EYES: sclera clear, no exudates, PERRLA  ENMT: oropharynx clear without erythema, no exudates, moist mucous membranes  LUNGS:  clear to auscultation bl,  no rales, wheezing or rhonchi appreciated  HEART: S1/S2, regular rate and rhythm, no murmurs noted, no lower extremity edema  GASTROINTESTINAL: abdomen is soft, nontender, nondistended, normoactive bowel sounds, no palpable masses  INTEGUMENT: poor skin turgor, pale, thin skin  MUSCULOSKELETAL: no clubbing or cyanosis, no obvious deformity  NEUROLOGIC: awake, alert, oriented x3, strength 5/5 in all extremities, no obvious sensory deficits  HEME/LYMPH:  no obvious ecchymosis or petechiae     Other noted pertinent findings  OXYGEN SUPPORT: nasal cannula: yes [  ]   no [ x ]    LINES: central line, midline, PICC line: yes [  ]   no [ x ]  REYNA CATHETER: yes [ x ]   no [  ]    ----  I&O's Summary    17 May 2020 07:01  -  18 May 2020 07:00  --------------------------------------------------------  IN: 0 mL / OUT: 1200 mL / NET: -1200 mL        LABS:                        9.9    8.74  )-----------( 202      ( 18 May 2020 07:08 )             30.2   05-18    135  |  100  |  27<H>  ----------------------------<  90  4.1   |  25  |  0.83    Ca    8.3<L>      18 May 2020 07:08    TPro  6.1  /  Alb  2.0<L>  /  TBili  0.8  /  DBili  x   /  AST  50<H>  /  ALT  62  /  AlkPhos  129<H>  05-18          ----  Personally reviewed:  Vital sign trends: [ x ] yes    [  ] no     [  ] n/a  Laboratory results: [ x ] yes    [  ] no     [  ] n/a  Radiology results: [  ] yes    [  ] no     [ x ] n/a  Culture results: [  ] yes    [  ] no     [  x] n/a  Consultant recommendations: [  ] yes    [  ] no     [ x ] n/a

## 2020-05-18 NOTE — PROGRESS NOTE ADULT - PROBLEM SELECTOR PLAN 1
COVID-19, maintaining sat >90 on room air   - Maintain on airborne isolation.  - Limit use of NSAIDs.  - Medically stable, however last 3 repeat COVID19 PCR tests positive 5/12 and 5/13, and 5/15, will repeat PCR every 48hrs until negative, awaiting placement at [facility]  - repeat COVID-19 PCR pending today COVID-19, maintaining sat >90 on room air   - Maintain on airborne isolation.  - Limit use of NSAIDs.  - Medically stable, however last 3 repeat COVID19 PCR tests positive since 5/17. Continue to repeat PCR every 48hrs until negative, awaiting placement at [facility]

## 2020-05-18 NOTE — PROGRESS NOTE ADULT - ASSESSMENT
80 yo M with PMHx of HTN, BPH, spinal stenosis, dyslipidemia, anxiety, neuropathy, B12 deficiency presented from [facility] rehab for evaluation of weakness and hypotension. 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 Pt medically stable for d/c, awaiting [facility] placement and repeat serial COVID-19 PCR testing

## 2020-05-18 NOTE — PROGRESS NOTE ADULT - ATTENDING COMMENTS
I personally conducted a physical examination of the patient. I personally gathered the patient's history. I edited the above listed findings which were prepared by the listed resident physician. I personally discussed the plan of care with the patient. The questions and concerns were addressed to the best of my ability. The patient is in agreement with the listed treatment plan.     - Discussed w/ family  - no clinical events today  - d/c planning once COVID-19 negative  - prognosis is poor longterm. pt has severe protein-calorie malnutrition and failure to thrive months before pj COVID-19. family aware of my assessment.

## 2020-05-18 NOTE — PROGRESS NOTE ADULT - PROBLEM SELECTOR PLAN 8
Stable  - Will change Sertraline to mirtazapine for added benefit of side effect of increased appetite Stable  - Will consider changing Sertraline to mirtazapine for added benefit of side effect of increased appetite Stable  - Considering changing Sertraline to mirtazapine for added benefit of side effect of increased appetite. Want to avoid sedation though so discussed w/ family and will not adjust regimen today

## 2020-05-18 NOTE — PROGRESS NOTE ADULT - PROBLEM SELECTOR PLAN 10
DVT PPx: SCD's. concern for possible slow bleed  Code Status: Pt is DNR, but NOT DNI, MOLST form in chart

## 2020-05-18 NOTE — PROGRESS NOTE ADULT - ASSESSMENT
78 y/o M with PMHx of HTN, BPH, spinal stenosis, dyslipidemia, anxiety, neuropathy, B12 deficiency who presents from rehab for evaluation of weakness and hypotension    Anemia   no overt gi bleeding  likely r/t sacral decub/hematoma, sp debridement  monitor cbc, transfuse prn  cont ppi and carafate  monitor stool color   defer endoscopic eval as +Covid and increased risk for anesthesia in these pts   op gi f/u for possible colonoscopy if none recent once covid crisis resolved given ct findings    FTT/Dysphagia   unclear etiology; esophagram at [facility] 2/2019 normal   correct elytes prn  cont ppi    on supplements per nutrition   defer endoscopic eval as +Covid and increased risk for anesthesia in these pts    COVID-19  monitor rep status   inc lfts likely in setting of viral illness; monitor  liver/gb normal appearing on prior imaging  care per primary team

## 2020-05-19 LAB
ALBUMIN SERPL ELPH-MCNC: 2 G/DL — LOW (ref 3.3–5)
ALP SERPL-CCNC: 112 U/L — SIGNIFICANT CHANGE UP (ref 40–120)
ALT FLD-CCNC: 61 U/L — SIGNIFICANT CHANGE UP (ref 12–78)
ANION GAP SERPL CALC-SCNC: 7 MMOL/L — SIGNIFICANT CHANGE UP (ref 5–17)
AST SERPL-CCNC: 44 U/L — HIGH (ref 15–37)
BASOPHILS # BLD AUTO: 0.02 K/UL — SIGNIFICANT CHANGE UP (ref 0–0.2)
BASOPHILS NFR BLD AUTO: 0.3 % — SIGNIFICANT CHANGE UP (ref 0–2)
BILIRUB SERPL-MCNC: 0.5 MG/DL — SIGNIFICANT CHANGE UP (ref 0.2–1.2)
BUN SERPL-MCNC: 27 MG/DL — HIGH (ref 7–23)
CALCIUM SERPL-MCNC: 8.1 MG/DL — LOW (ref 8.5–10.1)
CHLORIDE SERPL-SCNC: 100 MMOL/L — SIGNIFICANT CHANGE UP (ref 96–108)
CO2 SERPL-SCNC: 30 MMOL/L — SIGNIFICANT CHANGE UP (ref 22–31)
CREAT SERPL-MCNC: 0.75 MG/DL — SIGNIFICANT CHANGE UP (ref 0.5–1.3)
EOSINOPHIL # BLD AUTO: 0.06 K/UL — SIGNIFICANT CHANGE UP (ref 0–0.5)
EOSINOPHIL NFR BLD AUTO: 0.9 % — SIGNIFICANT CHANGE UP (ref 0–6)
GLUCOSE SERPL-MCNC: 87 MG/DL — SIGNIFICANT CHANGE UP (ref 70–99)
HCT VFR BLD CALC: 30.6 % — LOW (ref 39–50)
HGB BLD-MCNC: 9.6 G/DL — LOW (ref 13–17)
IMM GRANULOCYTES NFR BLD AUTO: 0.4 % — SIGNIFICANT CHANGE UP (ref 0–1.5)
LYMPHOCYTES # BLD AUTO: 1.05 K/UL — SIGNIFICANT CHANGE UP (ref 1–3.3)
LYMPHOCYTES # BLD AUTO: 15.1 % — SIGNIFICANT CHANGE UP (ref 13–44)
MCHC RBC-ENTMCNC: 26.8 PG — LOW (ref 27–34)
MCHC RBC-ENTMCNC: 31.4 GM/DL — LOW (ref 32–36)
MCV RBC AUTO: 85.5 FL — SIGNIFICANT CHANGE UP (ref 80–100)
MONOCYTES # BLD AUTO: 0.48 K/UL — SIGNIFICANT CHANGE UP (ref 0–0.9)
MONOCYTES NFR BLD AUTO: 6.9 % — SIGNIFICANT CHANGE UP (ref 2–14)
NEUTROPHILS # BLD AUTO: 5.3 K/UL — SIGNIFICANT CHANGE UP (ref 1.8–7.4)
NEUTROPHILS NFR BLD AUTO: 76.4 % — SIGNIFICANT CHANGE UP (ref 43–77)
NRBC # BLD: 0 /100 WBCS — SIGNIFICANT CHANGE UP (ref 0–0)
PLATELET # BLD AUTO: 204 K/UL — SIGNIFICANT CHANGE UP (ref 150–400)
POTASSIUM SERPL-MCNC: 3.8 MMOL/L — SIGNIFICANT CHANGE UP (ref 3.5–5.3)
POTASSIUM SERPL-SCNC: 3.8 MMOL/L — SIGNIFICANT CHANGE UP (ref 3.5–5.3)
PROT SERPL-MCNC: 5.9 G/DL — LOW (ref 6–8.3)
RBC # BLD: 3.58 M/UL — LOW (ref 4.2–5.8)
RBC # FLD: 15.5 % — HIGH (ref 10.3–14.5)
SARS-COV-2 RNA SPEC QL NAA+PROBE: DETECTED
SODIUM SERPL-SCNC: 137 MMOL/L — SIGNIFICANT CHANGE UP (ref 135–145)
WBC # BLD: 6.94 K/UL — SIGNIFICANT CHANGE UP (ref 3.8–10.5)
WBC # FLD AUTO: 6.94 K/UL — SIGNIFICANT CHANGE UP (ref 3.8–10.5)

## 2020-05-19 PROCEDURE — 99232 SBSQ HOSP IP/OBS MODERATE 35: CPT | Mod: CS,GC

## 2020-05-19 RX ADMIN — Medication 1 GRAM(S): at 11:38

## 2020-05-19 RX ADMIN — OXYCODONE HYDROCHLORIDE 5 MILLIGRAM(S): 5 TABLET ORAL at 05:31

## 2020-05-19 RX ADMIN — PREGABALIN 1000 MICROGRAM(S): 225 CAPSULE ORAL at 11:38

## 2020-05-19 RX ADMIN — Medication 1 APPLICATION(S): at 11:38

## 2020-05-19 RX ADMIN — Medication 1 GRAM(S): at 17:23

## 2020-05-19 RX ADMIN — Medication 50 MILLIGRAM(S): at 17:23

## 2020-05-19 RX ADMIN — Medication 50 MILLIGRAM(S): at 05:32

## 2020-05-19 RX ADMIN — SERTRALINE 100 MILLIGRAM(S): 25 TABLET, FILM COATED ORAL at 11:38

## 2020-05-19 RX ADMIN — Medication 1 GRAM(S): at 05:32

## 2020-05-19 RX ADMIN — Medication 1 SPRAY(S): at 05:32

## 2020-05-19 RX ADMIN — Medication 1 SPRAY(S): at 17:23

## 2020-05-19 RX ADMIN — PANTOPRAZOLE SODIUM 40 MILLIGRAM(S): 20 TABLET, DELAYED RELEASE ORAL at 05:32

## 2020-05-19 NOTE — PROGRESS NOTE ADULT - SUBJECTIVE AND OBJECTIVE BOX
This progress note was completed in part by resident acting as telephonic scribe during COVID-19 crisis. Physical exam was completed by attending physician, and findings below are those of the attending physician, and have been reviewed in detail.    Patient is a 79y old  Male who presents with a chief complaint of Generalized weakness (16 May 2020 16:34)      FROM ADMISSION H+P:   HPI:  78 y/o M with PMHx of HTN, BPH, spinal stenosis, dyslipidemia, anxiety, neuropathy, B12 deficiency who presents from [facility] rehab for evaluation of weakness and hypotension. As per transfer papers, pt with confusion after lunch today. Pt admits to generalized weakness. Lives with wife, Stephany and son. He came from [facility] rehab in [place] since Feb 14. Patient lives with wife and son at home and ambulates with cane. Contracted Covid-19 in [facility] rehab and first started having symptoms on Mar 25, reporting symptoms of cough and low grade fever, diarrhea. Denies any chest pain, shortness of breath, headache, dizziness. History obtained from wife, Stephany ([phone]) as pt is confused and a poor historian.   Of note, pt was noted to have sacral wounds today and reported low grade fever for two days and mental status changes today and loss of appetite.    ----  INTERVAL HPI/OVERNIGHT EVENTS:   Pt seen and examined at bedside. No acute events overnight. Resting comfortably in bed. Admits poor appetite. Denies CP, SOB, abd pain, NVD, fever, chills, muscle weakness.    ----  PAST MEDICAL & SURGICAL HISTORY:  H/O vitamin B deficiency  Spinal stenosis  HTN (hypertension)  History of tonsillectomy      FAMILY HISTORY:  No pertinent family history in first degree relatives      Allergies    No Known Allergies    Intolerances        ----  REVIEW OF SYSTEMS:  as per HPI    ----    T(C): 37.8 (05-18-20 @ 07:10), Max: 37.8 (05-18-20 @ 07:10)  HR: 99 (05-18-20 @ 07:10) (75 - 99)  BP: 97/60 (05-18-20 @ 08:25) (90/55 - 102/57)  RR: 19 (05-18-20 @ 07:10) (18 - 19)  SpO2: 94% (05-18-20 @ 07:10) (94% - 97%)    GENERAL: patient in NAD, cachetic looking, pleasant  EYES: sclera clear, no exudates, PERRLA  ENMT: oropharynx clear without erythema, no exudates, moist mucous membranes  LUNGS:  clear to auscultation bl,  no rales, wheezing or rhonchi appreciated  HEART: S1/S2, regular rate and rhythm, no murmurs noted, no lower extremity edema  GASTROINTESTINAL: abdomen is soft, nontender, nondistended, normoactive bowel sounds, no palpable masses  INTEGUMENT: poor skin turgor, pale, thin skin  MUSCULOSKELETAL: no clubbing or cyanosis, no obvious deformity  NEUROLOGIC: awake, alert, oriented x3, strength 5/5 in all extremities, no obvious sensory deficits  HEME/LYMPH:  no obvious ecchymosis or petechiae     Other noted pertinent findings  OXYGEN SUPPORT: nasal cannula: yes [  ]   no [ x ]    LINES: central line, midline, PICC line: yes [  ]   no [ x ]  REYNA CATHETER: yes [ x ]   no [  ]    ----  I&O's Summary    17 May 2020 07:01  -  18 May 2020 07:00  --------------------------------------------------------  IN: 0 mL / OUT: 1200 mL / NET: -1200 mL        LABS:                        9.9    8.74  )-----------( 202      ( 18 May 2020 07:08 )             30.2   05-18    135  |  100  |  27<H>  ----------------------------<  90  4.1   |  25  |  0.83    Ca    8.3<L>      18 May 2020 07:08    TPro  6.1  /  Alb  2.0<L>  /  TBili  0.8  /  DBili  x   /  AST  50<H>  /  ALT  62  /  AlkPhos  129<H>  05-18          ----  Personally reviewed:  Vital sign trends: [ x ] yes    [  ] no     [  ] n/a  Laboratory results: [ x ] yes    [  ] no     [  ] n/a  Radiology results: [  ] yes    [  ] no     [ x ] n/a  Culture results: [  ] yes    [  ] no     [  x] n/a  Consultant recommendations: [  ] yes    [  ] no     [ x ] n/a This progress note was completed in part by resident acting as telephonic scribe during COVID-19 crisis. Physical exam was completed by attending physician, and findings below are those of the attending physician, and have been reviewed in detail.    Patient is a 79y old  Male who presents with a chief complaint of Generalized weakness (16 May 2020 16:34)      FROM ADMISSION H+P:   HPI:  80 y/o M with PMHx of HTN, BPH, spinal stenosis, dyslipidemia, anxiety, neuropathy, B12 deficiency who presents from [facility] rehab for evaluation of weakness and hypotension. As per transfer papers, pt with confusion after lunch today. Pt admits to generalized weakness. Lives with wife, Stephany and son. He came from [facility] rehab in [place] since Feb 14. Patient lives with wife and son at home and ambulates with cane. Contracted Covid-19 in [facility] rehab and first started having symptoms on Mar 25, reporting symptoms of cough and low grade fever, diarrhea. Denies any chest pain, shortness of breath, headache, dizziness. History obtained from wife, Stephany ([phone]) as pt is confused and a poor historian.   Of note, pt was noted to have sacral wounds today and reported low grade fever for two days and mental status changes today and loss of appetite.    ----  INTERVAL HPI/OVERNIGHT EVENTS:   Pt seen and examined at bedside. No acute events overnight. Resting comfortably in bed. Still has poor appetite. Denies CP, SOB, abd pain, NVD, fever, chills, muscle weakness.    ----  PAST MEDICAL & SURGICAL HISTORY:  H/O vitamin B deficiency  Spinal stenosis  HTN (hypertension)  History of tonsillectomy      FAMILY HISTORY:  No pertinent family history in first degree relatives      Allergies    No Known Allergies    Intolerances        ----  REVIEW OF SYSTEMS:  as per HPI    ----  T(C): 36.9 (05-19-20 @ 07:48), Max: 37.1 (05-18-20 @ 18:11)  HR: 67 (05-19-20 @ 07:48) (67 - 95)  BP: 94/54 (05-19-20 @ 07:48) (94/54 - 118/67)  RR: 21 (05-19-20 @ 07:48) (18 - 21)  SpO2: 95% (05-19-20 @ 07:48) (95% - 96%)    GENERAL: patient in NAD, cachetic looking, pleasant  EYES: sclera clear, no exudates, PERRLA  ENMT: oropharynx clear without erythema, no exudates, moist mucous membranes  LUNGS:  clear to auscultation bl,  no rales, wheezing or rhonchi appreciated  HEART: S1/S2, regular rate and rhythm, no murmurs noted, no lower extremity edema  GASTROINTESTINAL: abdomen is soft, nontender, nondistended, normoactive bowel sounds, no palpable masses  INTEGUMENT: poor skin turgor, pale, thin skin  MUSCULOSKELETAL: no clubbing or cyanosis, no obvious deformity  NEUROLOGIC: awake, alert, oriented x3, strength 5/5 in all extremities, no obvious sensory deficits  HEME/LYMPH:  no obvious ecchymosis or petechiae     Other noted pertinent findings  OXYGEN SUPPORT: nasal cannula: yes [  ]   no [ x ]    LINES: central line, midline, PICC line: yes [  ]   no [ x ]  REYNA CATHETER: yes [ x ]   no [  ]      I&O's Summary    18 May 2020 07:01  -  19 May 2020 07:00  --------------------------------------------------------  IN: 300 mL / OUT: 650 mL / NET: -350 mL          LABS:                        9.6    6.94  )-----------( 204      ( 19 May 2020 06:52 )             30.6   05-19    137  |  100  |  27<H>  ----------------------------<  87  3.8   |  30  |  0.75    Ca    8.1<L>      19 May 2020 06:52    TPro  5.9<L>  /  Alb  2.0<L>  /  TBili  0.5  /  DBili  x   /  AST  44<H>  /  ALT  61  /  AlkPhos  112  05-19          ----  Personally reviewed:  Vital sign trends: [ x ] yes    [  ] no     [  ] n/a  Laboratory results: [ x ] yes    [  ] no     [  ] n/a  Radiology results: [  ] yes    [  ] no     [ x ] n/a  Culture results: [  ] yes    [  ] no     [  x] n/a  Consultant recommendations: [  ] yes    [  ] no     [ x ] n/a This progress note was completed in part by resident acting as telephonic scribe during COVID-19 crisis. Physical exam was completed by attending physician, and findings below are those of the attending physician, and have been reviewed in detail.    Patient is a 79y old  Male who presents with a chief complaint of Generalized weakness (16 May 2020 16:34)      FROM ADMISSION H+P:   HPI:  78 y/o M with PMHx of HTN, BPH, spinal stenosis, dyslipidemia, anxiety, neuropathy, B12 deficiency who presents from [facility] rehab for evaluation of weakness and hypotension. As per transfer papers, pt with confusion after lunch today. Pt admits to generalized weakness. Lives with wife, Stephany and son. He came from [facility]hab in [place] since Feb 14. Patient lives with wife and son at home and ambulates with cane. Contracted Covid-19 in [facility] rehab and first started having symptoms on Mar 25, reporting symptoms of cough and low grade fever, diarrhea. Denies any chest pain, shortness of breath, headache, dizziness. History obtained from wife, Stephany ([phone]) as pt is confused and a poor historian.   Of note, pt was noted to have sacral wounds today and reported low grade fever for two days and mental status changes today and loss of appetite.    ----  INTERVAL HPI/OVERNIGHT EVENTS:   Pt seen and examined at bedside. No acute events overnight. Resting comfortably in bed. Still has poor appetite. Denies CP, SOB, abd pain, NVD, fever, chills, muscle weakness.    ----  PAST MEDICAL & SURGICAL HISTORY:  H/O vitamin B deficiency  Spinal stenosis  HTN (hypertension)  History of tonsillectomy      FAMILY HISTORY:  No pertinent family history in first degree relatives      Allergies    No Known Allergies    Intolerances        ----  REVIEW OF SYSTEMS:  as per HPI    ----  T(C): 36.9 (05-19-20 @ 07:48), Max: 37.1 (05-18-20 @ 18:11)  HR: 67 (05-19-20 @ 07:48) (67 - 95)  BP: 94/54 (05-19-20 @ 07:48) (94/54 - 118/67)  RR: 21 (05-19-20 @ 07:48) (18 - 21)  SpO2: 95% (05-19-20 @ 07:48) (95% - 96%)    GENERAL: patient in NAD, cachetic looking  EYES: sclera clear, no exudates, PERRLA  ENMT: oropharynx clear without erythema, no exudates, moist mucous membranes  LUNGS:  clear to auscultation bl,  no rales, wheezing or rhonchi appreciated  HEART: S1/S2, regular rate and rhythm, no murmurs noted, no lower extremity edema  GASTROINTESTINAL: abdomen is soft, nontender, nondistended, normoactive bowel sounds, no palpable masses  INTEGUMENT: poor skin turgor, pale, thin skin  MUSCULOSKELETAL: no clubbing or cyanosis, no obvious deformity  NEUROLOGIC: awake, alert, oriented x3, strength 5/5 in all extremities, no obvious sensory deficits  HEME/LYMPH:  no obvious ecchymosis or petechiae       Other noted pertinent findings  OXYGEN SUPPORT: nasal cannula: yes [  ]   no [ x ]    LINES: central line, midline, PICC line: yes [  ]   no [ x ]  REYNA CATHETER: yes [ x ]   no [  ]      I&O's Summary    18 May 2020 07:01  -  19 May 2020 07:00  --------------------------------------------------------  IN: 300 mL / OUT: 650 mL / NET: -350 mL          LABS:                        9.6    6.94  )-----------( 204      ( 19 May 2020 06:52 )             30.6   05-19    137  |  100  |  27<H>  ----------------------------<  87  3.8   |  30  |  0.75    Ca    8.1<L>      19 May 2020 06:52    TPro  5.9<L>  /  Alb  2.0<L>  /  TBili  0.5  /  DBili  x   /  AST  44<H>  /  ALT  61  /  AlkPhos  112  05-19          ----  Personally reviewed:  Vital sign trends: [ x ] yes    [  ] no     [  ] n/a  Laboratory results: [ x ] yes    [  ] no     [  ] n/a  Radiology results: [  ] yes    [  ] no     [ x ] n/a  Culture results: [  ] yes    [  ] no     [  x] n/a  Consultant recommendations: [  ] yes    [  ] no     [ x ] n/a

## 2020-05-19 NOTE — PROGRESS NOTE ADULT - PROBLEM SELECTOR PLAN 1
COVID-19, maintaining sat >90 on room air   - Maintain on airborne isolation.  - Limit use of NSAIDs.  - Medically stable, however last 3 repeat COVID19 PCR tests positive since 5/17. Continue to repeat PCR every 48hrs until negative, awaiting placement at [facility]

## 2020-05-19 NOTE — PROGRESS NOTE ADULT - PROBLEM SELECTOR PLAN 9
-malnutrition 2/2 poor PO intake  -nutrition services following. recommending Continue Ensure Enlive BID, add Prosource BID to encourage sacral wound healing and improve anemia

## 2020-05-19 NOTE — PROGRESS NOTE ADULT - PROBLEM SELECTOR PLAN 8
Stable  - Considering changing Sertraline to mirtazapine for added benefit of side effect of increased appetite. Want to avoid sedation though so discussed w/ family and will not adjust regimen today

## 2020-05-19 NOTE — PROGRESS NOTE ADULT - ASSESSMENT
78 yo M with PMHx of HTN, BPH, spinal stenosis, dyslipidemia, anxiety, neuropathy, B12 deficiency presented from [facility] rehab for evaluation of weakness and hypotension. 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 Pt medically stable for d/c, awaiting [facility] placement and repeat serial COVID-19 PCR testing

## 2020-05-19 NOTE — PROGRESS NOTE ADULT - PROBLEM SELECTOR PLAN 6
Elevated LFTs possibly 2/2 COVID-19 infection - resolving, Alk phos mildly increased today  - Avoid hepatotoxic agents Elevated LFTs possibly 2/2 COVID-19 infection - resolving  - Avoid hepatotoxic agents

## 2020-05-19 NOTE — PROGRESS NOTE ADULT - SUBJECTIVE AND OBJECTIVE BOX
LISANDRA PADGETT is a 79yMale , patient examined and chart reviewed.    INTERVAL HPI/ OVERNIGHT EVENTS:   NAD. Afebrile. No events.    PAST MEDICAL & SURGICAL HISTORY:  H/O vitamin B deficiency  Spinal stenosis  HTN (hypertension)  History of tonsillectomy      For details regarding the patient's social history, family history, and other miscellaneous elements, please refer the initial infectious diseases consultation and/or the admitting history and physical examination for this admission.    ROS:  CONSTITUTIONAL:  Negative fever or chills  EYES:  Negative  blurry vision or double vision  CARDIOVASCULAR:  Negative for chest pain or palpitations  RESPIRATORY:  Negative for cough, wheezing, or SOB   GASTROINTESTINAL:  Negative for nausea, vomiting, diarrhea, constipation, or abdominal pain  GENITOURINARY:  Negative frequency, urgency or dysuria  NEUROLOGIC:  No headache, confusion, dizziness, lightheadedness  All other systems were reviewed and are negative     Current inpatient medications :  MEDICATIONS  (STANDING):  cyanocobalamin 1000 MICROGram(s) Oral daily  metoprolol tartrate 50 milliGRAM(s) Oral two times a day  pantoprazole    Tablet 40 milliGRAM(s) Oral before breakfast  povidone iodine 10% Solution 1 Application(s) Topical daily  sertraline 100 milliGRAM(s) Oral daily  sodium chloride 0.65% Nasal 1 Spray(s) Both Nostrils two times a day  sucralfate suspension 1 Gram(s) Oral four times a day    MEDICATIONS  (PRN):  oxyCODONE    IR 5 milliGRAM(s) Oral every 6 hours PRN Severe Pain (7 - 10)      Objective:  Vital Signs Last 24 Hrs  T(C): 36.9 (19 May 2020 07:48), Max: 37.1 (18 May 2020 18:11)  T(F): 98.4 (19 May 2020 07:48), Max: 98.8 (18 May 2020 18:11)  HR: 67 (19 May 2020 07:48) (67 - 95)  BP: 94/54 (19 May 2020 07:48) (94/54 - 118/67)  RR: 21 (19 May 2020 07:48) (18 - 21)  SpO2: 95% (19 May 2020 07:48) (95% - 96%)    Physical Exam:  General:  no acute distress  Eyes: sclera anicteric, pupils equal and reactive to light  ENMT: buccal mucosa moist, pharynx not injected  Neck: supple, trachea midline  Lungs: clear, no wheeze/rhonchi  Cardiovascular: regular rate and rhythm, S1 S2  Abdomen: soft, nontender, no organomegaly present, bowel sounds normal  Neurological: alert and oriented x3 Cranial Nerves II-XII grossly intact  Skin: sacral decub drsg c/d/i   Lymph Nodes: no palpable cervical/supraclavicular lymph nodes enlargements  Extremities: no cyanosis/clubbing/edema        LABS:                                    9.6    6.94  )-----------( 204      ( 19 May 2020 06:52 )             30.6   05-19    137  |  100  |  27<H>  ----------------------------<  87  3.8   |  30  |  0.75    Ca    8.1<L>      19 May 2020 06:52    TPro  5.9<L>  /  Alb  2.0<L>  /  TBili  0.5  /  DBili  x   /  AST  44<H>  /  ALT  61  /  AlkPhos  112  05-19    MICROBIOLOGY:    Culture - Blood (collected 02 May 2020 00:30)  Source: .Blood Blood  Preliminary Report (03 May 2020 01:03):    No growth to date.    Culture - Blood (collected 02 May 2020 00:30)  Source: .Blood Blood  Preliminary Report (03 May 2020 01:03):    No growth to date.    COVID-19 PCR . (04.28.20 @ 22:41)    COVID-19 PCR: Detected: This test has been validated by [employer] to be accurate;  though it has not been FDA cleared/approved by the usual pathway  As with all laboratory test, results should be correlated with clinical  findings.  https://www.fda.gov/media/552857/download  https://www.fda.gov/media/330465/download    RADIOLOGY & ADDITIONAL STUDIES:    EXAM:  XR CHEST PORTABLE ROUTINE 1V                            PROCEDURE DATE:  04/30/2020          INTERPRETATION:    Examination: XR CHEST    History: ADMDIAG1: K92.2 GASTROINTESTINAL HEMORRHAGE, UNSPECIFIED/, pn pneumonia    TECHNIQUE:  Portable AP view of the chest was obtained.    COMPARISON: A CT chest 1/29/2020 available for review.    FINDINGS:   Lungs clear. RIGHT hemidiaphragm mildly elevated. Posterior lung bases cannot be assessed due to elevated diaphragms. Lateral radiograph recommended if clinically warranted.  . The heart and mediastinum are within normal limits.    Visualized osseous structures are intact.      IMPRESSION: Visualized Lungs clear..   Elevated LEFT hemidiaphragm. Posterior lung bases cannot be assessed dueto elevated diaphragms. Lateral radiograph recommended if clinically warranted.      Assessment :   80 y/o M with PMHx of HTN, BPH, spinal stenosis, dyslipidemia, anxiety, neuropathy, B12 deficiency resident of [place] rehab for evaluation of weakness and hypotension with mental status change. Tested COVID 19 positive early April. In ED he was hypotensive. WBC 19K No SOB cough n/v/d. Pt is a poor historian. Has unstagable sacral decub ulcer. Also with Acute anemia. ?infectious process vs reactive leukocytosis. Leukocytosis poss chronic.  Respiratory status stable. Menendez placed sec AUR 5/3/2020. Seen by surgery and is sp bedside I&D of sacral decub Clinically stable.    Plan :   Monitor off antibiotics  Menendez per urology  Frequent turning  Local wound care per surgery  COVID-19--critical period for decompensation  (7-14 days post symptom onset), avoid aerosolizing procedures, NSAIDs   -monitor respiratory status closely ( route of 02 and saturation) and titrate when able  -isolation precautions per protocol  -avoid excessive blood draws, blood cultures and frequent CXRs  -monitor biomarkers- CBC w diff  for NLRNLR <3 low vs >5 high) Ferritin (lower risk <450 vs >850) CRP (low risk <2 and higher risk >6) and LDH, D Dimer, procalcitonin  -therapies remains investigational-- including Hydroxychloroquine  -antibiotics only if there is concern for a bacterial process  -Steroids short course ( 5 days)  --for hypoxia/ARDS /shock    Dc planning to rehab pending COVID 19 neg x 2       Continue with present regiment.  Appropriate use of antibiotics and adverse effects reviewed.      I have discussed the above plan of care with patient/ family in detail. They expressed understanding of the  treatment plan . Risks, benefits and alternatives discussed in detail. I have asked if they have any questions or concerns and appropriately addressed them to the best of my ability .    > 35 minutes were spent in direct patient care reviewing notes, medications ,labs data/ imaging , discussion with multidisciplinary team.    Thank you for allowing me to participate in care of your patient .    Naima Lopez MD  Infectious Disease  [phone]

## 2020-05-19 NOTE — PROGRESS NOTE ADULT - PROBLEM SELECTOR PLAN 2
-Anemia possibly 2/2 L hip chronic hematoma as noted on CT AP and poor nutritonal status; GIB less likely. No blood in last BM per pt. S/p one unit pRBCs 5/16, H/H increased appropriately  -Continue Protonix 40 mg daily, Carafate 1 g four times daily  -Monitor for further bleeding, stool color   -no plans for urgent inpt endoscopy in setting off COVID. f/u as outpatient for procedure endoscopy and colonoscopy  -Continue to encourage adequate PO intake -Anemia possibly 2/2 L hip chronic hematoma as noted on CT AP and poor nutritional status; GIB less likely. No blood in last BM per pt. S/p one unit pRBCs 5/16, H/H increased appropriately. H/H stable this AM.  -Continue Protonix 40 mg daily, Carafate 1 g four times daily  -Monitor for further bleeding, stool color   -no plans for urgent inpt endoscopy in setting off COVID. f/u as outpatient for procedure endoscopy and colonoscopy  -Encourage adequate PO intake

## 2020-05-19 NOTE — PROGRESS NOTE ADULT - PROBLEM SELECTOR PLAN 4
was hypotensive on admission, resolving, BP remains soft  - Off Midodrine, continue BB with hold parameters- will discharge on Metoprolol 50 BID  - Continue to hold off on home Valsartan   - Cardio Dr Ordonez following was hypotensive on admission, resolving, BP remains soft but stable  - Off Midodrine, continue BB with hold parameters- will discharge on Metoprolol 50 BID  - Continue to hold off on home Valsartan   - Cardio Dr Ordonez following

## 2020-05-19 NOTE — PROGRESS NOTE ADULT - ATTENDING COMMENTS
I personally conducted a physical examination of the patient. I personally gathered the patient's history. I edited the above listed findings which were prepared by the listed resident physician. I personally discussed the plan of care with the patient. The questions and concerns were addressed to the best of my ability. The patient is in agreement with the listed treatment plan.     - no events today. await negative COVID-19 testing to plan for dispo to [facility]. monitor h/h  - this tx plan was formulated utilizing my clinical judgement, currently available local/regional anecdotal information, organizational treatment recommendations with COVID-19 specific considerations given rapidly changing information available

## 2020-05-19 NOTE — PROGRESS NOTE ADULT - PROBLEM SELECTOR PLAN 3
Stage 4 Sacral Ulcer  - s/p debridement and Zosyn course  - s/p wound vac placement - wound care NP Benita following  - Menendez placed for urinary retention by Urology - as pt not yet ambulatory, will keep Menendez in for wound healing given sacral ulcer, attempt TOV at [facility] once more ambulatory Stage 4 Sacral Ulcer  - s/p debridement and Zosyn course  - s/p wound vac placement - draining appropriately, serosanguinous discharge.  - wound care NP Benita following  - Menendez placed for urinary retention by Urology - as pt not yet ambulatory, will keep Menendez in for wound healing given sacral ulcer, attempt TOV at [facility] once more ambulatory

## 2020-05-19 NOTE — PROGRESS NOTE ADULT - SUBJECTIVE AND OBJECTIVE BOX
INTERVAL HPI/OVERNIGHT EVENTS:  no gib per nursing    MEDICATIONS  (STANDING):  cyanocobalamin 1000 MICROGram(s) Oral daily  metoprolol tartrate 50 milliGRAM(s) Oral two times a day  pantoprazole    Tablet 40 milliGRAM(s) Oral before breakfast  povidone iodine 10% Solution 1 Application(s) Topical daily  sertraline 100 milliGRAM(s) Oral daily  sodium chloride 0.65% Nasal 1 Spray(s) Both Nostrils two times a day  sucralfate suspension 1 Gram(s) Oral four times a day    MEDICATIONS  (PRN):  oxyCODONE    IR 5 milliGRAM(s) Oral every 6 hours PRN Severe Pain (7 - 10)      Allergies    No Known Allergies    Intolerances        Review of Systems:    unable to obtain     Vital Signs Last 24 Hrs  T(C): 36.9 (19 May 2020 07:48), Max: 37.1 (18 May 2020 18:11)  T(F): 98.4 (19 May 2020 07:48), Max: 98.8 (18 May 2020 18:11)  HR: 67 (19 May 2020 07:48) (67 - 95)  BP: 94/54 (19 May 2020 07:48) (94/54 - 118/67)  BP(mean): --  RR: 21 (19 May 2020 07:48) (18 - 21)  SpO2: 95% (19 May 2020 07:48) (95% - 96%)    PHYSICAL EXAM:    deferred f/u done remotely as covid +       LABS:                        9.6    6.94  )-----------( 204      ( 19 May 2020 06:52 )             30.6     05-19    137  |  100  |  27<H>  ----------------------------<  87  3.8   |  30  |  0.75    Ca    8.1<L>      19 May 2020 06:52    TPro  5.9<L>  /  Alb  2.0<L>  /  TBili  0.5  /  DBili  x   /  AST  44<H>  /  ALT  61  /  AlkPhos  112  05-19          RADIOLOGY & ADDITIONAL TESTS:

## 2020-05-19 NOTE — PROGRESS NOTE ADULT - SUBJECTIVE AND OBJECTIVE BOX
Chief Complaint: Weakness, AMS    Interval Events: No events overnight.    Review of Systems:  General: No fevers, chills, weight loss or gain  Skin: No rashes, color changes  Cardiovascular: No chest pain, orthopnea  Respiratory: No shortness of breath, cough  Gastrointestinal: No nausea, abdominal pain  Genitourinary: No incontinence, pain with urination  Musculoskeletal: No pain, swelling, decreased range of motion  Neurological: No headache, weakness  Psychiatric: No depression, anxiety  Endocrine: No weight loss or gain, increased thirst  All other systems are comprehensively negative.    Physical Exam:  Vital Signs Last 24 Hrs  T(C): 36.9 (19 May 2020 07:48), Max: 37.1 (18 May 2020 18:11)  T(F): 98.4 (19 May 2020 07:48), Max: 98.8 (18 May 2020 18:11)  HR: 67 (19 May 2020 07:48) (67 - 95)  BP: 94/54 (19 May 2020 07:48) (94/54 - 118/67)  BP(mean): --  RR: 21 (19 May 2020 07:48) (18 - 21)  SpO2: 95% (19 May 2020 07:48) (95% - 96%)  General: NAD  HEENT: MMM  Neck: No JVD, no carotid bruit  Extremities: No LE edema, no cyanosis  Neuro: Non-focal  Skin: No rash    Labs:    05-19    137  |  100  |  27<H>  ----------------------------<  87  3.8   |  30  |  0.75    Ca    8.1<L>      19 May 2020 06:52    TPro  5.9<L>  /  Alb  2.0<L>  /  TBili  0.5  /  DBili  x   /  AST  44<H>  /  ALT  61  /  AlkPhos  112  05-19                        9.6    6.94  )-----------( 204      ( 19 May 2020 06:52 )             30.6

## 2020-05-19 NOTE — PROGRESS NOTE ADULT - ASSESSMENT
80 y/o M with PMHx of HTN, BPH, spinal stenosis, dyslipidemia, anxiety, neuropathy, B12 deficiency who presents from rehab for evaluation of weakness and hypotension    Anemia   no overt gi bleeding; hgb stable  likely r/t sacral decub/hematoma, sp debridement  monitor cbc, transfuse prn  cont ppi and carafate  monitor stool color   no plans for endoscopic evaluation as no overt gib/covid+  op gi f/u for possible colonoscopy if none recent once covid crisis resolved given ct findings    FTT/Dysphagia   unclear etiology; esophagram at [place] 2/2019 normal   correct elytes prn  cont ppi    on supplements per nutrition   encouragement/assistance at meals  defer endoscopic eval as +Covid and increased risk for anesthesia in these pts    COVID-19  monitor rep status   inc lfts likely in setting of viral illness; resolving  liver/gb normal appearing on prior imaging  care per primary team

## 2020-05-20 DIAGNOSIS — F43.21 ADJUSTMENT DISORDER WITH DEPRESSED MOOD: ICD-10-CM

## 2020-05-20 LAB
ANION GAP SERPL CALC-SCNC: 6 MMOL/L — SIGNIFICANT CHANGE UP (ref 5–17)
BASOPHILS # BLD AUTO: 0.01 K/UL — SIGNIFICANT CHANGE UP (ref 0–0.2)
BASOPHILS NFR BLD AUTO: 0.1 % — SIGNIFICANT CHANGE UP (ref 0–2)
BUN SERPL-MCNC: 27 MG/DL — HIGH (ref 7–23)
CALCIUM SERPL-MCNC: 8.4 MG/DL — LOW (ref 8.5–10.1)
CHLORIDE SERPL-SCNC: 102 MMOL/L — SIGNIFICANT CHANGE UP (ref 96–108)
CO2 SERPL-SCNC: 29 MMOL/L — SIGNIFICANT CHANGE UP (ref 22–31)
CREAT SERPL-MCNC: 0.76 MG/DL — SIGNIFICANT CHANGE UP (ref 0.5–1.3)
EOSINOPHIL # BLD AUTO: 0.05 K/UL — SIGNIFICANT CHANGE UP (ref 0–0.5)
EOSINOPHIL NFR BLD AUTO: 0.7 % — SIGNIFICANT CHANGE UP (ref 0–6)
GLUCOSE SERPL-MCNC: 85 MG/DL — SIGNIFICANT CHANGE UP (ref 70–99)
HCT VFR BLD CALC: 30.7 % — LOW (ref 39–50)
HGB BLD-MCNC: 9.6 G/DL — LOW (ref 13–17)
IMM GRANULOCYTES NFR BLD AUTO: 0.4 % — SIGNIFICANT CHANGE UP (ref 0–1.5)
LYMPHOCYTES # BLD AUTO: 1.51 K/UL — SIGNIFICANT CHANGE UP (ref 1–3.3)
LYMPHOCYTES # BLD AUTO: 21.3 % — SIGNIFICANT CHANGE UP (ref 13–44)
MAGNESIUM SERPL-MCNC: 2.3 MG/DL — SIGNIFICANT CHANGE UP (ref 1.6–2.6)
MCHC RBC-ENTMCNC: 26.6 PG — LOW (ref 27–34)
MCHC RBC-ENTMCNC: 31.3 GM/DL — LOW (ref 32–36)
MCV RBC AUTO: 85 FL — SIGNIFICANT CHANGE UP (ref 80–100)
MONOCYTES # BLD AUTO: 0.47 K/UL — SIGNIFICANT CHANGE UP (ref 0–0.9)
MONOCYTES NFR BLD AUTO: 6.6 % — SIGNIFICANT CHANGE UP (ref 2–14)
NEUTROPHILS # BLD AUTO: 5.03 K/UL — SIGNIFICANT CHANGE UP (ref 1.8–7.4)
NEUTROPHILS NFR BLD AUTO: 70.9 % — SIGNIFICANT CHANGE UP (ref 43–77)
NRBC # BLD: 0 /100 WBCS — SIGNIFICANT CHANGE UP (ref 0–0)
PLATELET # BLD AUTO: 210 K/UL — SIGNIFICANT CHANGE UP (ref 150–400)
POTASSIUM SERPL-MCNC: 3.7 MMOL/L — SIGNIFICANT CHANGE UP (ref 3.5–5.3)
POTASSIUM SERPL-SCNC: 3.7 MMOL/L — SIGNIFICANT CHANGE UP (ref 3.5–5.3)
RBC # BLD: 3.61 M/UL — LOW (ref 4.2–5.8)
RBC # FLD: 15.4 % — HIGH (ref 10.3–14.5)
SODIUM SERPL-SCNC: 137 MMOL/L — SIGNIFICANT CHANGE UP (ref 135–145)
WBC # BLD: 7.1 K/UL — SIGNIFICANT CHANGE UP (ref 3.8–10.5)
WBC # FLD AUTO: 7.1 K/UL — SIGNIFICANT CHANGE UP (ref 3.8–10.5)

## 2020-05-20 PROCEDURE — 99232 SBSQ HOSP IP/OBS MODERATE 35: CPT

## 2020-05-20 PROCEDURE — 99232 SBSQ HOSP IP/OBS MODERATE 35: CPT | Mod: GC

## 2020-05-20 RX ORDER — OXYCODONE AND ACETAMINOPHEN 5; 325 MG/1; MG/1
2 TABLET ORAL EVERY 6 HOURS
Refills: 0 | Status: DISCONTINUED | OUTPATIENT
Start: 2020-05-20 | End: 2020-05-27

## 2020-05-20 RX ORDER — OXYCODONE AND ACETAMINOPHEN 5; 325 MG/1; MG/1
1 TABLET ORAL EVERY 6 HOURS
Refills: 0 | Status: DISCONTINUED | OUTPATIENT
Start: 2020-05-20 | End: 2020-05-27

## 2020-05-20 RX ORDER — MIRTAZAPINE 45 MG/1
7.5 TABLET, ORALLY DISINTEGRATING ORAL AT BEDTIME
Refills: 0 | Status: DISCONTINUED | OUTPATIENT
Start: 2020-05-20 | End: 2020-05-28

## 2020-05-20 RX ADMIN — Medication 1 GRAM(S): at 22:52

## 2020-05-20 RX ADMIN — MIRTAZAPINE 7.5 MILLIGRAM(S): 45 TABLET, ORALLY DISINTEGRATING ORAL at 22:49

## 2020-05-20 RX ADMIN — Medication 1 GRAM(S): at 04:13

## 2020-05-20 RX ADMIN — PANTOPRAZOLE SODIUM 40 MILLIGRAM(S): 20 TABLET, DELAYED RELEASE ORAL at 04:13

## 2020-05-20 RX ADMIN — SERTRALINE 100 MILLIGRAM(S): 25 TABLET, FILM COATED ORAL at 11:42

## 2020-05-20 RX ADMIN — Medication 1 SPRAY(S): at 16:11

## 2020-05-20 RX ADMIN — Medication 1 GRAM(S): at 11:42

## 2020-05-20 RX ADMIN — Medication 1 GRAM(S): at 16:10

## 2020-05-20 RX ADMIN — Medication 1 SPRAY(S): at 04:13

## 2020-05-20 RX ADMIN — Medication 1 GRAM(S): at 01:01

## 2020-05-20 RX ADMIN — PREGABALIN 1000 MICROGRAM(S): 225 CAPSULE ORAL at 11:42

## 2020-05-20 RX ADMIN — OXYCODONE AND ACETAMINOPHEN 1 TABLET(S): 5; 325 TABLET ORAL at 11:42

## 2020-05-20 RX ADMIN — Medication 50 MILLIGRAM(S): at 16:10

## 2020-05-20 RX ADMIN — Medication 50 MILLIGRAM(S): at 06:49

## 2020-05-20 RX ADMIN — Medication 1 APPLICATION(S): at 11:47

## 2020-05-20 NOTE — PROGRESS NOTE ADULT - SUBJECTIVE AND OBJECTIVE BOX
ABBYJUANJOLISANDRA TURNER is a 79yMale , patient examined and chart reviewed.    INTERVAL HPI/ OVERNIGHT EVENTS:   NAD. Afebrile. No events.    PAST MEDICAL & SURGICAL HISTORY:  H/O vitamin B deficiency  Spinal stenosis  HTN (hypertension)  History of tonsillectomy      For details regarding the patient's social history, family history, and other miscellaneous elements, please refer the initial infectious diseases consultation and/or the admitting history and physical examination for this admission.    ROS:  CONSTITUTIONAL:  Negative fever or chills  EYES:  Negative  blurry vision or double vision  CARDIOVASCULAR:  Negative for chest pain or palpitations  RESPIRATORY:  Negative for cough, wheezing, or SOB   GASTROINTESTINAL:  Negative for nausea, vomiting, diarrhea, constipation, or abdominal pain  GENITOURINARY:  Negative frequency, urgency or dysuria  NEUROLOGIC:  No headache, confusion, dizziness, lightheadedness  All other systems were reviewed and are negative     Current inpatient medications :  MEDICATIONS  (STANDING):  cyanocobalamin 1000 MICROGram(s) Oral daily  metoprolol tartrate 50 milliGRAM(s) Oral two times a day  mirtazapine 7.5 milliGRAM(s) Oral at bedtime  pantoprazole    Tablet 40 milliGRAM(s) Oral before breakfast  povidone iodine 10% Solution 1 Application(s) Topical daily  sertraline 100 milliGRAM(s) Oral daily  sodium chloride 0.65% Nasal 1 Spray(s) Both Nostrils two times a day  sucralfate suspension 1 Gram(s) Oral four times a day    MEDICATIONS  (PRN):  oxycodone    5 mG/acetaminophen 325 mG 1 Tablet(s) Oral every 6 hours PRN Moderate Pain (4 - 6)  oxycodone    5 mG/acetaminophen 325 mG 2 Tablet(s) Oral every 6 hours PRN Severe Pain (7 - 10)      Objective:  Vital Signs Last 24 Hrs  T(C): 36.4 (20 May 2020 15:31), Max: 37.1 (20 May 2020 04:40)  T(F): 97.5 (20 May 2020 15:31), Max: 98.7 (20 May 2020 04:40)  HR: 70 (20 May 2020 15:31) (70 - 80)  BP: 114/52 (20 May 2020 15:31) (102/54 - 130/64)  RR: 19 (20 May 2020 15:31) (19 - 20)  SpO2: 98% (20 May 2020 15:31) (94% - 99%)    Physical Exam:  General:  no acute distress  Eyes: sclera anicteric, pupils equal and reactive to light  ENMT: buccal mucosa moist, pharynx not injected  Neck: supple, trachea midline  Lungs: clear, no wheeze/rhonchi  Cardiovascular: regular rate and rhythm, S1 S2  Abdomen: soft, nontender, no organomegaly present, bowel sounds normal  Neurological: alert and oriented x3 Cranial Nerves II-XII grossly intact  Skin: sacral decub drsg c/d/i   Lymph Nodes: no palpable cervical/supraclavicular lymph nodes enlargements  Extremities: no cyanosis/clubbing/edema        LABS:                                    9.6    7.10  )-----------( 210      ( 20 May 2020 07:44 )             30.7   05-20    137  |  102  |  27<H>  ----------------------------<  85  3.7   |  29  |  0.76    Ca    8.4<L>      20 May 2020 07:44  Mg     2.3     05-20    TPro  5.9<L>  /  Alb  2.0<L>  /  TBili  0.5  /  DBili  x   /  AST  44<H>  /  ALT  61  /  AlkPhos  112  05-19    MICROBIOLOGY:    Culture - Blood (collected 02 May 2020 00:30)  Source: .Blood Blood  Preliminary Report (03 May 2020 01:03):    No growth to date.    Culture - Blood (collected 02 May 2020 00:30)  Source: .Blood Blood  Preliminary Report (03 May 2020 01:03):    No growth to date.    COVID-19 PCR . (04.28.20 @ 22:41)    COVID-19 PCR: Detected: This test has been validated by [employer] to be accurate;  though it has not been FDA cleared/approved by the usual pathway  As with all laboratory test, results should be correlated with clinical  findings.  https://www.fda.gov/media/132763/download  https://www.fda.gov/media/762158/download    RADIOLOGY & ADDITIONAL STUDIES:    EXAM:  XR CHEST PORTABLE ROUTINE 1V                            PROCEDURE DATE:  04/30/2020          INTERPRETATION:    Examination: XR CHEST    History: ADMDIAG1: K92.2 GASTROINTESTINAL HEMORRHAGE, UNSPECIFIED/, pn pneumonia    TECHNIQUE:  Portable AP view of the chest was obtained.    COMPARISON: A CT chest 1/29/2020 available for review.    FINDINGS:   Lungs clear. RIGHT hemidiaphragm mildly elevated. Posterior lung bases cannot be assessed due to elevated diaphragms. Lateral radiograph recommended if clinically warranted.  . The heart and mediastinum are within normal limits.    Visualized osseous structures are intact.      IMPRESSION: Visualized Lungs clear..   Elevated LEFT hemidiaphragm. Posterior lung bases cannot be assessed dueto elevated diaphragms. Lateral radiograph recommended if clinically warranted.      Assessment :   78 y/o M with PMHx of HTN, BPH, spinal stenosis, dyslipidemia, anxiety, neuropathy, B12 deficiency resident of [place] re[facility] for evaluation of weakness and hypotension with mental status change. Tested COVID 19 positive early April. In ED he was hypotensive. WBC 19K No SOB cough n/v/d. Pt is a poor historian. Has unstagable sacral decub ulcer. Also with Acute anemia. ?infectious process vs reactive leukocytosis. Leukocytosis poss chronic.  Respiratory status stable. Menendez placed sec AUR 5/3/2020. Seen by surgery and is sp bedside I&D of sacral decub Clinically stable.    Plan :   Monitor off antibiotics  Menendez per urology  Frequent turning  Local wound care per surgery  COVID-19--critical period for decompensation  (7-14 days post symptom onset), avoid aerosolizing procedures, NSAIDs   -monitor respiratory status closely ( route of 02 and saturation) and titrate when able  -isolation precautions per protocol  -avoid excessive blood draws, blood cultures and frequent CXRs  -monitor biomarkers- CBC w diff  for NLRNLR <3 low vs >5 high) Ferritin (lower risk <450 vs >850) CRP (low risk <2 and higher risk >6) and LDH, D Dimer, procalcitonin  -therapies remains investigational-- including Hydroxychloroquine  -antibiotics only if there is concern for a bacterial process  -Steroids short course ( 5 days)  --for hypoxia/ARDS /shock    Dc planning to rehab pending COVID 19 neg x 2       Continue with present regiment.  Appropriate use of antibiotics and adverse effects reviewed.      I have discussed the above plan of care with patient/ family in detail. They expressed understanding of the  treatment plan . Risks, benefits and alternatives discussed in detail. I have asked if they have any questions or concerns and appropriately addressed them to the best of my ability .    > 35 minutes were spent in direct patient care reviewing notes, medications ,labs data/ imaging , discussion with multidisciplinary team.    Thank you for allowing me to participate in care of your patient .    Naima Lopez MD  Infectious Disease  [phone]

## 2020-05-20 NOTE — PROGRESS NOTE ADULT - ASSESSMENT
78 yo M with PMHx of HTN, BPH, spinal stenosis, dyslipidemia, anxiety, neuropathy, B12 deficiency presented from [facility] rehab for evaluation of weakness and hypotension. 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 Pt medically stable for d/c, awaiting OMER placement and repeat serial COVID-19 PCR testing

## 2020-05-20 NOTE — CHART NOTE - NSCHARTNOTEFT_GEN_A_CORE
Assessment: patient seen for malnutrition follow up. patient waiting for discharge to [facility] continues to test + COVID 19. BM 5/20 noted with loose BM's this admit. wound vac in place.     Factors impacting intake: [ ] none [ ] nausea  [ ] vomiting [ ] diarrhea [ ] constipation  [ ]chewing problems [ ] swallowing issues  [x ] other: persistent low PO intake    Diet Presciption: Diet, Soft:   No Carb Pro[employer] (1pkg = 15gms Protein)     Qty per Day:  2  Supplement Feeding Modality:  Oral  Ensure Enlive Cans or Servings Per Day:  1       Frequency:  Two Times a day (05-15-20 @ 11:29)    Intake: 25-35% consistent during this admit    Current Weight: wt loss noted 5/15 wt 110.4# admit 4/30 wt 119.2#       Pertinent Medications: MEDICATIONS  (STANDING):  cyanocobalamin 1000 MICROGram(s) Oral daily  metoprolol tartrate 50 milliGRAM(s) Oral two times a day  pantoprazole    Tablet 40 milliGRAM(s) Oral before breakfast  povidone iodine 10% Solution 1 Application(s) Topical daily  sertraline 100 milliGRAM(s) Oral daily  sodium chloride 0.65% Nasal 1 Spray(s) Both Nostrils two times a day  sucralfate suspension 1 Gram(s) Oral four times a day    MEDICATIONS  (PRN):  oxycodone    5 mG/acetaminophen 325 mG 1 Tablet(s) Oral every 6 hours PRN Moderate Pain (4 - 6)  oxycodone    5 mG/acetaminophen 325 mG 2 Tablet(s) Oral every 6 hours PRN Severe Pain (7 - 10)    Pertinent Labs: 05-20 Na137 mmol/L Glu 85 mg/dL K+ 3.7 mmol/L Cr  0.76 mg/dL BUN 27 mg/dL<H> 05-19 Alb 2.0 g/dL<L>          POCT Blood Glucose.: 106 mg/dL (19 May 2020 16:47)    Skin: sacrum un stageable right and left ankle DTI rt second toe stage 1 right heel DTI     Estimated Needs:   [x ] no change since previous assessment  [ ] recalculated:     Previous Nutrition Diagnosis:   [ ] Inadequate Energy Intake [ ]Inadequate Oral Intake [ ] Excessive Energy Intake   [ ] Underweight [ ] Increased Nutrient Needs [ ] Overweight/Obesity   [ ] Altered GI Function [ ] Unintended Weight Loss [ ] Food & Nutrition Related Knowledge Deficit [ x] Malnutrition     Nutrition Diagnosis is [x ] ongoing  [ ] resolved [ ] not applicable     New Nutrition Diagnosis: [x ] not applicable       Interventions:   Recommend  [ ] Change Diet To:  [ ] Nutrition Supplement  [ ] Nutrition Support  [ x] Other: continue supplements and diet as ordered. recommend appetite stimulant if medically appropriate recommend MVI and Vit Ci 500mg BID    Monitoring and Evaluation:   [x ] PO intake [ x ] Tolerance to diet prescription [ x ] weights [ x ] labs[ x ] follow up per protocol  [ ] other:

## 2020-05-20 NOTE — BEHAVIORAL HEALTH ASSESSMENT NOTE - NSBHCHARTREVIEWLAB_PSY_A_CORE FT
9.6    7.10  )-----------( 210      ( 20 May 2020 07:44 )             30.7   05-20    137  |  102  |  27<H>  ----------------------------<  85  3.7   |  29  |  0.76    Ca    8.4<L>      20 May 2020 07:44  Mg     2.3     05-20    TPro  5.9<L>  /  Alb  2.0<L>  /  TBili  0.5  /  DBili  x   /  AST  44<H>  /  ALT  61  /  AlkPhos  112  05-19

## 2020-05-20 NOTE — PROGRESS NOTE ADULT - ASSESSMENT
78 y/o M with PMHx of HTN, BPH, spinal stenosis, dyslipidemia, anxiety, neuropathy, B12 deficiency who presents from rehab for evaluation of weakness and hypotension    Anemia   no overt gi bleeding; hgb stable  likely r/t sacral decub/hematoma, sp debridement  monitor cbc, transfuse prn  cont ppi and carafate  monitor stool color   no plans for endoscopic evaluation as no overt gib/covid+  op gi f/u for possible colonoscopy if none recent once covid crisis resolved given ct findings    FTT/Dysphagia   unclear etiology; esophagram at [facility] 2/2019 normal   correct elytes prn  cont ppi    on supplements per nutrition   encouragement/assistance at meals  defer endoscopic eval as +Covid and increased risk for anesthesia in these pts    COVID-19  monitor rep status   inc lfts likely in setting of viral illness; resolving  liver/gb normal appearing on prior imaging  care per primary team

## 2020-05-20 NOTE — PROGRESS NOTE ADULT - PROBLEM SELECTOR PLAN 2
-Anemia possibly 2/2 L hip chronic hematoma as noted on CT AP and poor nutritional status; GIB less likely. No blood in last BM per pt. S/p one unit pRBCs 5/16, H/H increased appropriately. H/H stable this AM.  -Continue Protonix 40 mg daily, Carafate 1 g four times daily  -Monitor for further bleeding, stool color   -no plans for urgent inpt endoscopy in setting off COVID. f/u as outpatient for procedure endoscopy and colonoscopy  -Encourage adequate PO intake

## 2020-05-20 NOTE — BEHAVIORAL HEALTH ASSESSMENT NOTE - NSBHCHARTREVIEWINVESTIGATE_PSY_A_CORE FT
< from: 12 Lead ECG (05.02.20 @ 21:27) >    Ventricular Rate 99 BPM    Atrial Rate 99 BPM    P-R Interval 170 ms    QRS Duration 100 ms    Q-T Interval 416 ms    QTC Calculation(Bezet) 533 ms    P Axis 41 degrees    R Axis -54 degrees    T Axis 35 degrees    Diagnosis Line Poor data quality  svt with pvc  Left anterior fascicular block  Cannot rule out Septal infarct , age undetermined  Abnormal ECG  When compared with ECG of 29-APR-2020 10:11,    QT has lengthened  Confirmed by Justin Oliveira MD ([id number]) on 5/3/2020 11:10:52 AM    < end of copied text >

## 2020-05-20 NOTE — PROGRESS NOTE ADULT - ATTENDING COMMENTS
80 yo M with PMHx of HTN, BPH, spinal stenosis, dyslipidemia, anxiety, neuropathy, B12 deficiency presented from [facility] rehab for evaluation of weakness and hypotension. 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 Pt medically stable for d/c, awaiting [facility] placement and repeat serial COVID-19 PCR testing      2019 novel coronavirus disease (COVID-19).  Plan: COVID-19, maintaining sat >90 on room air   - Maintain on airborne isolation.  - Limit use of NSAIDs.  - Medically stable, however COVID19 PCR testing remains positive. Continue to repeat PCR every 48hrs until negative, awaiting placement at [facility].   Anemia.  Plan: -Anemia possibly 2/2 L hip chronic hematoma as noted on CT AP and poor nutritional status; GIB less likely. No blood in last BM per pt. S/p one unit pRBCs 5/16, H/H increased appropriately. H/H stable this AM.  -Continue Protonix 40 mg daily, Carafate 1 g four times daily  -Monitor for further bleeding, stool color   -no plans for urgent inpt endoscopy in setting off COVID. f/u as outpatient for procedure endoscopy and colonoscopy  -Encourage adequate PO intake.

## 2020-05-20 NOTE — BEHAVIORAL HEALTH ASSESSMENT NOTE - HPI (INCLUDE ILLNESS QUALITY, SEVERITY, DURATION, TIMING, CONTEXT, MODIFYING FACTORS, ASSOCIATED SIGNS AND SYMPTOMS)
Patient seen, evaluated and chart reviewed. Patient is a 80 y/o MWM, with no prior psychiatric hospitalizations or significant treatment, with PMHx of HTN, BPH, spinal stenosis, dyslipidemia, neuropathy, B12 deficiency who presents from [facility] rehab for evaluation of weakness and hypotension. As per transfer papers, pt with confusion after lunch today. Pt admits to generalized weakness. Lives with wife, Stephany and son. He came from [facility] rehab in [place] since Feb 14. Patient lives with wife and son at home and ambulates with cane. Contracted Covid-19 in [facility] rehab and first started having symptoms on Mar 25, reporting symptoms of cough and low grade fever, diarrhea. Denies any chest pain, shortness of breath, headache, dizziness. History obtained from wife, Stephany([phone]) as pt is confused and a poor historian.   Of note, pt was noted to have sacral wounds today and reported low grade fever for two days and mental status changes today and loss of appetite.  Patient admits to being increasingly depressed over having to stay in the hospital for several weeks. He reports worsened mood, with poor energy, worsened concentration, poor sleep and poor appetite. Denies symptoms of psychosis or anne marie.

## 2020-05-20 NOTE — PROGRESS NOTE ADULT - PROBLEM SELECTOR PLAN 4
was hypotensive on admission, resolving, BP remains soft but stable  - Off Midodrine, continue BB with hold parameters- will discharge on Metoprolol 50 BID  - Continue to hold off on home Valsartan   - Cardio Dr Ordonez following

## 2020-05-20 NOTE — PROGRESS NOTE ADULT - SUBJECTIVE AND OBJECTIVE BOX
This progress note was completed in part by resident acting as telephonic scribe during COVID-19 crisis. Physical exam was completed by attending physician, and findings below are those of the attending physician, and have been reviewed in detail.    Patient is a 79y old  Male who presents with a chief complaint of Generalized weakness (16 May 2020 16:34)      FROM ADMISSION H+P:   HPI:  78 y/o M with PMHx of HTN, BPH, spinal stenosis, dyslipidemia, anxiety, neuropathy, B12 deficiency who presents from [facility] rehab for evaluation of weakness and hypotension. As per transfer papers, pt with confusion after lunch today. Pt admits to generalized weakness. Lives with wife, Stephany and son. He came from [facility] rehab in [place] since Feb 14. Patient lives with wife and son at home and ambulates with cane. Contracted Covid-19 in [facility] rehab and first started having symptoms on Mar 25, reporting symptoms of cough and low grade fever, diarrhea. Denies any chest pain, shortness of breath, headache, dizziness. History obtained from wife, Stephany ([phone]) as pt is confused and a poor historian.   Of note, pt was noted to have sacral wounds today and reported low grade fever for two days and mental status changes today and loss of appetite.    ----  INTERVAL HPI/OVERNIGHT EVENTS:   Pt seen and examined at bedside. No acute events overnight. Resting comfortably in bed. Feels frustrated that he cannot leave hospital. Denies CP, SOB, abd pain, NVD, fever, chills, muscle weakness.    ----  PAST MEDICAL & SURGICAL HISTORY:  H/O vitamin B deficiency  Spinal stenosis  HTN (hypertension)  History of tonsillectomy      FAMILY HISTORY:  No pertinent family history in first degree relatives      Allergies    No Known Allergies    Intolerances        ----  REVIEW OF SYSTEMS:  as per HPI    ----  Vital Signs Last 24 Hrs  T(C): 36.3 (20 May 2020 07:10), Max: 37.1 (20 May 2020 04:40)  T(F): 97.4 (20 May 2020 07:10), Max: 98.7 (20 May 2020 04:40)  HR: 70 (20 May 2020 07:10) (70 - 80)  BP: 130/64 (20 May 2020 07:10) (102/54 - 130/64)  BP(mean): --  RR: 19 (20 May 2020 07:10) (19 - 20)  SpO2: 94% (20 May 2020 07:10) (94% - 99%)    GENERAL: patient in NAD, cachetic looking  EYES: sclera clear, no exudates, PERRLA  ENMT: oropharynx clear without erythema, no exudates, moist mucous membranes  LUNGS:  clear to auscultation bl,  no rales, wheezing or rhonchi appreciated  HEART: S1/S2, regular rate and rhythm, no murmurs noted, no lower extremity edema  GASTROINTESTINAL: abdomen is soft, nontender, nondistended, normoactive bowel sounds, no palpable masses  INTEGUMENT: poor skin turgor, pale, thin skin  MUSCULOSKELETAL: no clubbing or cyanosis, no obvious deformity  NEUROLOGIC: awake, alert, oriented x3, strength 5/5 in all extremities, no obvious sensory deficits  HEME/LYMPH:  no obvious ecchymosis or petechiae       Other noted pertinent findings  OXYGEN SUPPORT: nasal cannula: yes [  ]   no [ x ]    LINES: central line, midline, PICC line: yes [  ]   no [ x ]  REYNA CATHETER: yes [ x ]   no [  ]      I&O's Summary    19 May 2020 07:01  -  20 May 2020 07:00  --------------------------------------------------------  IN: 420 mL / OUT: 1375 mL / NET: -955 mL                          9.6    7.10  )-----------( 210      ( 20 May 2020 07:44 )             30.7   05-20    137  |  102  |  27<H>  ----------------------------<  85  3.7   |  29  |  0.76    Ca    8.4<L>      20 May 2020 07:44  Mg     2.3     05-20    TPro  5.9<L>  /  Alb  2.0<L>  /  TBili  0.5  /  DBili  x   /  AST  44<H>  /  ALT  61  /  AlkPhos  112  05-19    Personally reviewed:  Vital sign trends: [ x ] yes    [  ] no     [  ] n/a  Laboratory results: [ x ] yes    [  ] no     [  ] n/a  Radiology results: [  ] yes    [  ] no     [ x ] n/a  Culture results: [  ] yes    [  ] no     [  x] n/a  Consultant recommendations: [  ] yes    [  ] no     [ x ] n/a

## 2020-05-20 NOTE — PROGRESS NOTE ADULT - PROBLEM SELECTOR PLAN 1
COVID-19, maintaining sat >90 on room air   - Maintain on airborne isolation.  - Limit use of NSAIDs.  - Medically stable, however COVID19 PCR testing remains positive. Continue to repeat PCR every 48hrs until negative, awaiting placement at [facility]

## 2020-05-20 NOTE — PROGRESS NOTE ADULT - SUBJECTIVE AND OBJECTIVE BOX
Chief Complaint: Weakness, AMS    Interval Events: No events overnight.    Review of Systems:  General: No fevers, chills, weight loss or gain  Skin: No rashes, color changes  Cardiovascular: No chest pain, orthopnea  Respiratory: No shortness of breath, cough  Gastrointestinal: No nausea, abdominal pain  Genitourinary: No incontinence, pain with urination  Musculoskeletal: No pain, swelling, decreased range of motion  Neurological: No headache, weakness  Psychiatric: No depression, anxiety  Endocrine: No weight loss or gain, increased thirst  All other systems are comprehensively negative.    Physical Exam:  Vital Signs Last 24 Hrs  T(C): 36.3 (20 May 2020 07:10), Max: 37.1 (20 May 2020 04:40)  T(F): 97.4 (20 May 2020 07:10), Max: 98.7 (20 May 2020 04:40)  HR: 70 (20 May 2020 07:10) (70 - 80)  BP: 130/64 (20 May 2020 07:10) (102/54 - 130/64)  BP(mean): --  RR: 19 (20 May 2020 07:10) (19 - 20)  SpO2: 94% (20 May 2020 07:10) (94% - 99%)  General: NAD  HEENT: MMM  Neck: No JVD, no carotid bruit  Extremities: No LE edema, no cyanosis  Neuro: Non-focal  Skin: No rash    Labs:    05-20    137  |  102  |  27<H>  ----------------------------<  85  3.7   |  29  |  0.76    Ca    8.4<L>      20 May 2020 07:44  Mg     2.3     05-20    TPro  5.9<L>  /  Alb  2.0<L>  /  TBili  0.5  /  DBili  x   /  AST  44<H>  /  ALT  61  /  AlkPhos  112  05-19                        9.6    7.10  )-----------( 210      ( 20 May 2020 07:44 )             30.7

## 2020-05-20 NOTE — BEHAVIORAL HEALTH ASSESSMENT NOTE - NSBHCHARTREVIEWVS_PSY_A_CORE FT
Vital Signs Last 24 Hrs  T(C): 36.3 (20 May 2020 07:10), Max: 37.1 (20 May 2020 04:40)  T(F): 97.4 (20 May 2020 07:10), Max: 98.7 (20 May 2020 04:40)  HR: 70 (20 May 2020 07:10) (70 - 80)  BP: 130/64 (20 May 2020 07:10) (102/54 - 130/64)  BP(mean): --  RR: 19 (20 May 2020 07:10) (19 - 20)  SpO2: 94% (20 May 2020 07:10) (94% - 99%)

## 2020-05-20 NOTE — BEHAVIORAL HEALTH ASSESSMENT NOTE - RISK ASSESSMENT
Low Acute Suicide Risk Patient has good social support, is future-oriented, no prior psychiatric history

## 2020-05-20 NOTE — PROGRESS NOTE ADULT - PROBLEM SELECTOR PLAN 3
Stage 4 Sacral Ulcer  - s/p debridement and Zosyn course  - s/p wound vac placement - draining appropriately, serosanguinous discharge.  - wound care NP Benita following  - Menendez placed for urinary retention by Urology - as pt not yet ambulatory, will keep Menendez in for wound healing given sacral ulcer, attempt TOV at [facility] once more ambulatory Stage 4 Sacral Ulcer  - s/p debridement and Zosyn course  - s/p wound vac placement - draining appropriately, serosanguinous discharge.  - wound care NP Benita following  - Pain regimen: acetaminophen for mild pain, oxycodone 5mg q6 PRN for moderate, oxycodone 10mg q6 pRN for severe pain  - Menendez placed for urinary retention by Urology - as pt not yet ambulatory, will keep Menendez in for wound healing given sacral ulcer, attempt TOV at [facility] once more ambulatory

## 2020-05-20 NOTE — BEHAVIORAL HEALTH ASSESSMENT NOTE - NSBHCHARTREVIEWIMAGING_PSY_A_CORE FT
< from: Xray Chest 1 View- PORTABLE-Routine (04.30.20 @ 09:11) >    EXAM:  XR CHEST PORTABLE ROUTINE 1V                            PROCEDURE DATE:  04/30/2020          INTERPRETATION:    Examination: XR CHEST    History: ADMDIAG1: K92.2 GASTROINTESTINAL HEMORRHAGE, UNSPECIFIED/, pn pneumonia    TECHNIQUE:  Portable AP view of the chest was obtained.    COMPARISON: A CT chest 1/29/2020 available for review.    FINDINGS:   Lungs clear. RIGHT hemidiaphragm mildly elevated. Posterior lung bases cannot be assessed due to elevated diaphragms. Lateral radiograph recommended if clinically warranted.  . The heart and mediastinum are within normal limits.    Visualized osseous structures are intact.        IMPRESSION: Visualized Lungs clear..   Elevated LEFT hemidiaphragm. Posterior lung bases cannot be assessed dueto elevated diaphragms. Lateral radiograph recommended if clinically warranted.      < end of copied text >

## 2020-05-20 NOTE — BEHAVIORAL HEALTH ASSESSMENT NOTE - SUMMARY
78 y/o MWM, with no prior psychiatric hospitalizations or significant treatment, with PMHx of HTN, BPH, spinal stenosis, dyslipidemia, neuropathy, B12 deficiency who presents from [facility] rehab for evaluation of weakness and hypotension. As per transfer papers, pt with confusion after lunch today.   Patient admits to being increasingly depressed over having to stay in the hospital for several weeks. He reports worsened mood, with poor energy, worsened concentration, poor sleep and poor appetite. Denies symptoms of psychosis or anne marie.    IMP: Adjustment disorder with depression    REC: Remeron 7.5 mg po at HS

## 2020-05-21 DIAGNOSIS — F41.9 ANXIETY DISORDER, UNSPECIFIED: ICD-10-CM

## 2020-05-21 LAB
ALBUMIN SERPL ELPH-MCNC: 2.2 G/DL — LOW (ref 3.3–5)
ANION GAP SERPL CALC-SCNC: 7 MMOL/L — SIGNIFICANT CHANGE UP (ref 5–17)
BASOPHILS # BLD AUTO: 0.01 K/UL — SIGNIFICANT CHANGE UP (ref 0–0.2)
BASOPHILS NFR BLD AUTO: 0.1 % — SIGNIFICANT CHANGE UP (ref 0–2)
BUN SERPL-MCNC: 25 MG/DL — HIGH (ref 7–23)
CALCIUM SERPL-MCNC: 8.9 MG/DL — SIGNIFICANT CHANGE UP (ref 8.5–10.1)
CHLORIDE SERPL-SCNC: 102 MMOL/L — SIGNIFICANT CHANGE UP (ref 96–108)
CO2 SERPL-SCNC: 30 MMOL/L — SIGNIFICANT CHANGE UP (ref 22–31)
CREAT SERPL-MCNC: 0.7 MG/DL — SIGNIFICANT CHANGE UP (ref 0.5–1.3)
EOSINOPHIL # BLD AUTO: 0.08 K/UL — SIGNIFICANT CHANGE UP (ref 0–0.5)
EOSINOPHIL NFR BLD AUTO: 1.1 % — SIGNIFICANT CHANGE UP (ref 0–6)
GLUCOSE SERPL-MCNC: 88 MG/DL — SIGNIFICANT CHANGE UP (ref 70–99)
HCT VFR BLD CALC: 33.8 % — LOW (ref 39–50)
HGB BLD-MCNC: 10.4 G/DL — LOW (ref 13–17)
IMM GRANULOCYTES NFR BLD AUTO: 0.4 % — SIGNIFICANT CHANGE UP (ref 0–1.5)
LYMPHOCYTES # BLD AUTO: 1.4 K/UL — SIGNIFICANT CHANGE UP (ref 1–3.3)
LYMPHOCYTES # BLD AUTO: 19.8 % — SIGNIFICANT CHANGE UP (ref 13–44)
MCHC RBC-ENTMCNC: 26.3 PG — LOW (ref 27–34)
MCHC RBC-ENTMCNC: 30.8 GM/DL — LOW (ref 32–36)
MCV RBC AUTO: 85.6 FL — SIGNIFICANT CHANGE UP (ref 80–100)
MONOCYTES # BLD AUTO: 0.47 K/UL — SIGNIFICANT CHANGE UP (ref 0–0.9)
MONOCYTES NFR BLD AUTO: 6.7 % — SIGNIFICANT CHANGE UP (ref 2–14)
NEUTROPHILS # BLD AUTO: 5.07 K/UL — SIGNIFICANT CHANGE UP (ref 1.8–7.4)
NEUTROPHILS NFR BLD AUTO: 71.9 % — SIGNIFICANT CHANGE UP (ref 43–77)
NRBC # BLD: 0 /100 WBCS — SIGNIFICANT CHANGE UP (ref 0–0)
PLATELET # BLD AUTO: 255 K/UL — SIGNIFICANT CHANGE UP (ref 150–400)
POTASSIUM SERPL-MCNC: 3.9 MMOL/L — SIGNIFICANT CHANGE UP (ref 3.5–5.3)
POTASSIUM SERPL-SCNC: 3.9 MMOL/L — SIGNIFICANT CHANGE UP (ref 3.5–5.3)
RBC # BLD: 3.95 M/UL — LOW (ref 4.2–5.8)
RBC # FLD: 15.1 % — HIGH (ref 10.3–14.5)
SARS-COV-2 RNA SPEC QL NAA+PROBE: DETECTED
SODIUM SERPL-SCNC: 139 MMOL/L — SIGNIFICANT CHANGE UP (ref 135–145)
WBC # BLD: 7.06 K/UL — SIGNIFICANT CHANGE UP (ref 3.8–10.5)
WBC # FLD AUTO: 7.06 K/UL — SIGNIFICANT CHANGE UP (ref 3.8–10.5)

## 2020-05-21 PROCEDURE — 99232 SBSQ HOSP IP/OBS MODERATE 35: CPT | Mod: GC

## 2020-05-21 RX ADMIN — Medication 1 GRAM(S): at 11:15

## 2020-05-21 RX ADMIN — Medication 50 MILLIGRAM(S): at 06:00

## 2020-05-21 RX ADMIN — Medication 1 GRAM(S): at 16:05

## 2020-05-21 RX ADMIN — Medication 1 APPLICATION(S): at 16:01

## 2020-05-21 RX ADMIN — PREGABALIN 1000 MICROGRAM(S): 225 CAPSULE ORAL at 11:15

## 2020-05-21 RX ADMIN — Medication 1 GRAM(S): at 06:00

## 2020-05-21 RX ADMIN — Medication 50 MILLIGRAM(S): at 16:04

## 2020-05-21 RX ADMIN — SERTRALINE 100 MILLIGRAM(S): 25 TABLET, FILM COATED ORAL at 11:15

## 2020-05-21 RX ADMIN — PANTOPRAZOLE SODIUM 40 MILLIGRAM(S): 20 TABLET, DELAYED RELEASE ORAL at 06:00

## 2020-05-21 RX ADMIN — MIRTAZAPINE 7.5 MILLIGRAM(S): 45 TABLET, ORALLY DISINTEGRATING ORAL at 23:28

## 2020-05-21 RX ADMIN — Medication 1 SPRAY(S): at 06:01

## 2020-05-21 RX ADMIN — Medication 1 SPRAY(S): at 16:05

## 2020-05-21 RX ADMIN — OXYCODONE AND ACETAMINOPHEN 1 TABLET(S): 5; 325 TABLET ORAL at 11:15

## 2020-05-21 NOTE — PROGRESS NOTE ADULT - PROBLEM SELECTOR PLAN 5
-malnutrition 2/2 poor PO intake  -nutrition services following.   -Recommending Continue Ensure Enlive BID, add Prosource BID to encourage sacral wound healing and improve anemia  -Mirtazapine added 5/20; will hopefully increase appetite and improve pt nutritional status

## 2020-05-21 NOTE — PROGRESS NOTE ADULT - ASSESSMENT
78 yo M with PMHx of HTN, BPH, spinal stenosis, dyslipidemia, anxiety, neuropathy, B12 deficiency presented from [facility] rehab for evaluation of weakness and hypotension. 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 Pt medically stable for d/c, awaiting [facility] placement and repeat serial COVID-19 PCR testing

## 2020-05-21 NOTE — PROGRESS NOTE ADULT - PROBLEM SELECTOR PLAN 6
Pt frustrated with length of stay in hospital; seems to have a component of depression contributing to mood.  Has hx of anxiety  Psych consulted- Dr. Russell--> Mirtazapine added to psych regimen to help with mood as well as poor appetite/insomnia  Continue home dose Sertraline

## 2020-05-21 NOTE — PROGRESS NOTE ADULT - SUBJECTIVE AND OBJECTIVE BOX
This progress note was completed in part by resident acting as telephonic scribe during COVID-19 crisis. Physical exam was completed by attending physician, and findings below are those of the attending physician, and have been reviewed in detail.    Patient is a 79y old  Male who presents with a chief complaint of Generalized weakness (16 May 2020 16:34)      FROM ADMISSION H+P:   HPI:  78 y/o M with PMHx of HTN, BPH, spinal stenosis, dyslipidemia, anxiety, neuropathy, B12 deficiency who presents from [facility] rehab for evaluation of weakness and hypotension. As per transfer papers, pt with confusion after lunch today. Pt admits to generalized weakness. Lives with wife, Stephany and son. He came from [facility] rehab in [place] since Feb 14. Patient lives with wife and son at home and ambulates with cane. Contracted Covid-19 in [facility] rehab and first started having symptoms on Mar 25, reporting symptoms of cough and low grade fever, diarrhea. Denies any chest pain, shortness of breath, headache, dizziness. History obtained from wife, Stephany ([phone]) as pt is confused and a poor historian.   Of note, pt was noted to have sacral wounds today and reported low grade fever for two days and mental status changes today and loss of appetite.    ----  INTERVAL HPI/OVERNIGHT EVENTS:   Pt seen and examined at bedside. No acute events overnight. Resting comfortably in bed. Slept well, appetite improved. Denies CP, SOB, abd pain, NVD, fever, chills, muscle weakness.    ----  PAST MEDICAL & SURGICAL HISTORY:  H/O vitamin B deficiency  Spinal stenosis  HTN (hypertension)  History of tonsillectomy      FAMILY HISTORY:  No pertinent family history in first degree relatives      Allergies    No Known Allergies    Intolerances    ----  REVIEW OF SYSTEMS:  as per HPI    ----  Vital Signs Last 24 Hrs  T(C): 36.9 (21 May 2020 07:49), Max: 36.9 (21 May 2020 07:49)  T(F): 98.4 (21 May 2020 07:49), Max: 98.4 (21 May 2020 07:49)  HR: 67 (21 May 2020 07:49) (67 - 70)  BP: 103/58 (21 May 2020 07:49) (103/58 - 137/68)  BP(mean): --  RR: 19 (21 May 2020 07:49) (19 - 19)  SpO2: 98% (21 May 2020 07:49) (94% - 99%)    GENERAL: patient in NAD, cachetic looking  EYES: sclera clear, no exudates, PERRLA  ENMT: oropharynx clear without erythema, no exudates, moist mucous membranes  LUNGS:  clear to auscultation bl,  no rales, wheezing or rhonchi appreciated  HEART: S1/S2, regular rate and rhythm, no murmurs noted, no lower extremity edema  GASTROINTESTINAL: abdomen is soft, nontender, nondistended, normoactive bowel sounds, no palpable masses  INTEGUMENT: poor skin turgor, pale, thin skin  MUSCULOSKELETAL: no clubbing or cyanosis, no obvious deformity  NEUROLOGIC: awake, alert, oriented x3, strength 5/5 in all extremities, no obvious sensory deficits  HEME/LYMPH:  no obvious ecchymosis or petechiae       Other noted pertinent findings  OXYGEN SUPPORT: nasal cannula: yes [  ]   no [ x ]    LINES: central line, midline, PICC line: yes [  ]   no [ x ]  REYNA CATHETER: yes [ x ]   no [  ]      I&O's Summary    20 May 2020 07:01  -  21 May 2020 07:00  --------------------------------------------------------  IN: 700 mL / OUT: 700 mL / NET: 0 mL                          10.4   7.06  )-----------( 255      ( 21 May 2020 07:20 )             33.8   05-21    139  |  102  |  25<H>  ----------------------------<  88  3.9   |  30  |  0.70    Ca    8.9      21 May 2020 07:20  Mg     2.3     05-20    TPro  x   /  Alb  2.2<L>  /  TBili  x   /  DBili  x   /  AST  x   /  ALT  x   /  AlkPhos  x   05-21      Personally reviewed:  Vital sign trends: [ x ] yes    [  ] no     [  ] n/a  Laboratory results: [ x ] yes    [  ] no     [  ] n/a  Radiology results: [  ] yes    [  ] no     [ x ] n/a  Culture results: [  ] yes    [  ] no     [  x] n/a  Consultant recommendations: [  ] yes    [  ] no     [ x ] n/a

## 2020-05-21 NOTE — PROGRESS NOTE ADULT - SUBJECTIVE AND OBJECTIVE BOX
INTERVAL HPI/OVERNIGHT EVENTS:    chart reviewed  no gi events per nursing  tolerating some po, hgb stable    MEDICATIONS  (STANDING):  cyanocobalamin 1000 MICROGram(s) Oral daily  metoprolol tartrate 50 milliGRAM(s) Oral two times a day  mirtazapine 7.5 milliGRAM(s) Oral at bedtime  pantoprazole    Tablet 40 milliGRAM(s) Oral before breakfast  povidone iodine 10% Solution 1 Application(s) Topical daily  sertraline 100 milliGRAM(s) Oral daily  sodium chloride 0.65% Nasal 1 Spray(s) Both Nostrils two times a day  sucralfate suspension 1 Gram(s) Oral four times a day    MEDICATIONS  (PRN):  oxycodone    5 mG/acetaminophen 325 mG 1 Tablet(s) Oral every 6 hours PRN Moderate Pain (4 - 6)  oxycodone    5 mG/acetaminophen 325 mG 2 Tablet(s) Oral every 6 hours PRN Severe Pain (7 - 10)      Allergies    No Known Allergies    Intolerances        Review of Systems:    unable to obtain     Vital Signs Last 24 Hrs  T(C): 36.9 (21 May 2020 07:49), Max: 36.9 (21 May 2020 07:49)  T(F): 98.4 (21 May 2020 07:49), Max: 98.4 (21 May 2020 07:49)  HR: 67 (21 May 2020 07:49) (67 - 70)  BP: 103/58 (21 May 2020 07:49) (103/58 - 137/68)  BP(mean): --  RR: 19 (21 May 2020 07:49) (19 - 19)  SpO2: 98% (21 May 2020 07:49) (94% - 99%)    PHYSICAL EXAM:    deferred f/u done remotely as covid +     LABS:                        10.4   7.06  )-----------( 255      ( 21 May 2020 07:20 )             33.8     05-21    139  |  102  |  25<H>  ----------------------------<  88  3.9   |  30  |  0.70    Ca    8.9      21 May 2020 07:20  Mg     2.3     05-20    TPro  x   /  Alb  2.2<L>  /  TBili  x   /  DBili  x   /  AST  x   /  ALT  x   /  AlkPhos  x   05-21          RADIOLOGY & ADDITIONAL TESTS:

## 2020-05-21 NOTE — PROGRESS NOTE ADULT - ATTENDING COMMENTS
78 yo M with PMHx of HTN, BPH, spinal stenosis, dyslipidemia, anxiety, neuropathy, B12 deficiency presented from [facility] rehab for evaluation of weakness and hypotension. 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 Pt medically stable for d/c, awaiting [facility] placement and repeat serial COVID-19 PCR testing     2019 novel coronavirus disease (COVID-19).  Plan: COVID-19, maintaining sat >90 on room air   - Maintain on airborne isolation.  - Limit use of NSAIDs.  - Medically stable, however COVID19 PCR testing remains positive. Continue to repeat PCR every 48hrs until negative, awaiting placement at [facility].     Anemia.  Plan: -Anemia possibly 2/2 L hip chronic hematoma as noted on CT AP and poor nutritional status; GIB less likely. No blood in last BM per pt. S/p one unit pRBCs 5/16, H/H increased appropriately. H/H stable this AM.  -Continue Protonix 40 mg daily, Carafate 1 g four times daily  -Monitor for further bleeding, stool color   -no plans for urgent inpt endoscopy in setting off COVID. f/u as outpatient for procedure endoscopy and colonoscopy  -Encourage adequate PO intake.

## 2020-05-21 NOTE — PROGRESS NOTE ADULT - SUBJECTIVE AND OBJECTIVE BOX
ABBYJUANJOLISANDRA TURNER is a 79yMale , patient examined and chart reviewed.    INTERVAL HPI/ OVERNIGHT EVENTS:   NAD. Afebrile.    PAST MEDICAL & SURGICAL HISTORY:  H/O vitamin B deficiency  Spinal stenosis  HTN (hypertension)  History of tonsillectomy      For details regarding the patient's social history, family history, and other miscellaneous elements, please refer the initial infectious diseases consultation and/or the admitting history and physical examination for this admission.    ROS:  CONSTITUTIONAL:  Negative fever or chills  EYES:  Negative  blurry vision or double vision  CARDIOVASCULAR:  Negative for chest pain or palpitations  RESPIRATORY:  Negative for cough, wheezing, or SOB   GASTROINTESTINAL:  Negative for nausea, vomiting, diarrhea, constipation, or abdominal pain  GENITOURINARY:  Negative frequency, urgency or dysuria  NEUROLOGIC:  No headache, confusion, dizziness, lightheadedness  All other systems were reviewed and are negative     Current inpatient medications :  MEDICATIONS  (STANDING):  cyanocobalamin 1000 MICROGram(s) Oral daily  metoprolol tartrate 50 milliGRAM(s) Oral two times a day  mirtazapine 7.5 milliGRAM(s) Oral at bedtime  pantoprazole    Tablet 40 milliGRAM(s) Oral before breakfast  povidone iodine 10% Solution 1 Application(s) Topical daily  sertraline 100 milliGRAM(s) Oral daily  sodium chloride 0.65% Nasal 1 Spray(s) Both Nostrils two times a day  sucralfate suspension 1 Gram(s) Oral four times a day    MEDICATIONS  (PRN):  oxycodone    5 mG/acetaminophen 325 mG 1 Tablet(s) Oral every 6 hours PRN Moderate Pain (4 - 6)  oxycodone    5 mG/acetaminophen 325 mG 2 Tablet(s) Oral every 6 hours PRN Severe Pain (7 - 10)      Objective:  Vital Signs Last 24 Hrs  T(C): 36.9 (21 May 2020 07:49), Max: 36.9 (21 May 2020 07:49)  T(F): 98.4 (21 May 2020 07:49), Max: 98.4 (21 May 2020 07:49)  HR: 67 (21 May 2020 07:49) (67 - 70)  BP: 103/58 (21 May 2020 07:49) (103/58 - 137/68)  RR: 19 (21 May 2020 07:49) (19 - 19)  SpO2: 98% (21 May 2020 07:49) (94% - 99%)    Physical Exam:  General:  no acute distress  Eyes: sclera anicteric, pupils equal and reactive to light  ENMT: buccal mucosa moist, pharynx not injected  Neck: supple, trachea midline  Lungs: clear, no wheeze/rhonchi  Cardiovascular: regular rate and rhythm, S1 S2  Abdomen: soft, nontender, no organomegaly present, bowel sounds normal  Neurological: alert and oriented x3 Cranial Nerves II-XII grossly intact  Skin: sacral decub drsg c/d/i   Lymph Nodes: no palpable cervical/supraclavicular lymph nodes enlargements  Extremities: no cyanosis/clubbing/edema        LABS:                                    10.4   7.06  )-----------( 255      ( 21 May 2020 07:20 )             33.8   05-21    139  |  102  |  25<H>  ----------------------------<  88  3.9   |  30  |  0.70    Ca    8.9      21 May 2020 07:20  Mg     2.3     05-20    TPro  x   /  Alb  2.2<L>  /  TBili  x   /  DBili  x   /  AST  x   /  ALT  x   /  AlkPhos  x   05-21      MICROBIOLOGY:    Culture - Blood (collected 02 May 2020 00:30)  Source: .Blood Blood  Preliminary Report (03 May 2020 01:03):    No growth to date.    Culture - Blood (collected 02 May 2020 00:30)  Source: .Blood Blood  Preliminary Report (03 May 2020 01:03):    No growth to date.    COVID-19 PCR . (04.28.20 @ 22:41)    COVID-19 PCR: Detected: This test has been validated by [email or web address] to be accurate;  though it has not been FDA cleared/approved by the usual pathway  As with all laboratory test, results should be correlated with clinical  findings.  https://www.fda.gov/media/582484/download  https://www.fda.gov/media/280140/download    RADIOLOGY & ADDITIONAL STUDIES:    EXAM:  XR CHEST PORTABLE ROUTINE 1V                            PROCEDURE DATE:  04/30/2020          INTERPRETATION:    Examination: XR CHEST    History: ADMDIAG1: K92.2 GASTROINTESTINAL HEMORRHAGE, UNSPECIFIED/, pn pneumonia    TECHNIQUE:  Portable AP view of the chest was obtained.    COMPARISON: A CT chest 1/29/2020 available for review.    FINDINGS:   Lungs clear. RIGHT hemidiaphragm mildly elevated. Posterior lung bases cannot be assessed due to elevated diaphragms. Lateral radiograph recommended if clinically warranted.  . The heart and mediastinum are within normal limits.    Visualized osseous structures are intact.      IMPRESSION: Visualized Lungs clear..   Elevated LEFT hemidiaphragm. Posterior lung bases cannot be assessed dueto elevated diaphragms. Lateral radiograph recommended if clinically warranted.      Assessment :   78 y/o M with PMHx of HTN, BPH, spinal stenosis, dyslipidemia, anxiety, neuropathy, B12 deficiency resident of [place] rehab for evaluation of weakness and hypotension with mental status change. Tested COVID 19 positive early April. In ED he was hypotensive. WBC 19K No SOB cough n/v/d. Pt is a poor historian. Has unstagable sacral decub ulcer. Also with Acute anemia. ?infectious process vs reactive leukocytosis. Leukocytosis poss chronic.  Respiratory status stable. Menendez placed sec AUR 5/3/2020. Seen by surgery and is sp bedside I&D of sacral decub Clinically stable.    Plan :   Monitor off antibiotics  Menendez per urology  Frequent turning  Local wound care per surgery  COVID-19--critical period for decompensation  (7-14 days post symptom onset), avoid aerosolizing procedures, NSAIDs   -monitor respiratory status closely ( route of 02 and saturation) and titrate when able  -isolation precautions per protocol  -avoid excessive blood draws, blood cultures and frequent CXRs  -monitor biomarkers- CBC w diff  for NLRNLR <3 low vs >5 high) Ferritin (lower risk <450 vs >850) CRP (low risk <2 and higher risk >6) and LDH, D Dimer, procalcitonin  -therapies remains investigational-- including Hydroxychloroquine  -antibiotics only if there is concern for a bacterial process  -Steroids short course ( 5 days)  --for hypoxia/ARDS /shock    Dc planning to rehab pending COVID 19 neg x 2       Continue with present regiment.  Appropriate use of antibiotics and adverse effects reviewed.      I have discussed the above plan of care with patient/ family in detail. They expressed understanding of the  treatment plan . Risks, benefits and alternatives discussed in detail. I have asked if they have any questions or concerns and appropriately addressed them to the best of my ability .    > 35 minutes were spent in direct patient care reviewing notes, medications ,labs data/ imaging , discussion with multidisciplinary team.    Thank you for allowing me to participate in care of your patient .    Naima Lopez MD  Infectious Disease  [phone]

## 2020-05-21 NOTE — PROGRESS NOTE ADULT - PROBLEM SELECTOR PLAN 3
Stage 4 Sacral Ulcer  - s/p debridement and Zosyn course  - s/p wound vac placement - draining appropriately, serosanguinous discharge.  - wound care NP Benita following  - Pain regimen: acetaminophen for mild pain, oxycodone 5mg q6 PRN for moderate, oxycodone 10mg q6 pRN for severe pain  - Menendez placed for urinary retention by Urology - as pt not yet ambulatory, will keep Menendez in for wound healing given sacral ulcer, attempt TOV at [facility] once more ambulatory

## 2020-05-21 NOTE — PROGRESS NOTE ADULT - ASSESSMENT
78 y/o M with PMHx of HTN, BPH, spinal stenosis, dyslipidemia, anxiety, neuropathy, B12 deficiency who presents from rehab for evaluation of weakness and hypotension    Anemia   no overt gi bleeding; hgb stable  likely r/t sacral decub/hematoma, sp debridement  monitor cbc, transfuse prn  cont ppi and carafate  monitor stool color   no plans for endoscopic evaluation as no overt gib/covid+  op gi f/u for possible colonoscopy if none recent once covid crisis resolved given ct findings    FTT/Dysphagia   unclear etiology; esophagram at [place] 2/2019 normal   some improvement in po intake  correct elytes prn  cont ppi    cont remeron  on supplements per nutrition   encouragement/assistance at meals      COVID-19  monitor rep status   inc lfts likely in setting of viral illness; resolving  liver/gb normal appearing on prior imaging  care per primary team

## 2020-05-22 LAB
ANION GAP SERPL CALC-SCNC: 4 MMOL/L — LOW (ref 5–17)
BASOPHILS # BLD AUTO: 0.02 K/UL — SIGNIFICANT CHANGE UP (ref 0–0.2)
BASOPHILS NFR BLD AUTO: 0.3 % — SIGNIFICANT CHANGE UP (ref 0–2)
BUN SERPL-MCNC: 26 MG/DL — HIGH (ref 7–23)
CALCIUM SERPL-MCNC: 8.6 MG/DL — SIGNIFICANT CHANGE UP (ref 8.5–10.1)
CHLORIDE SERPL-SCNC: 105 MMOL/L — SIGNIFICANT CHANGE UP (ref 96–108)
CO2 SERPL-SCNC: 30 MMOL/L — SIGNIFICANT CHANGE UP (ref 22–31)
CREAT SERPL-MCNC: 0.58 MG/DL — SIGNIFICANT CHANGE UP (ref 0.5–1.3)
EOSINOPHIL # BLD AUTO: 0.07 K/UL — SIGNIFICANT CHANGE UP (ref 0–0.5)
EOSINOPHIL NFR BLD AUTO: 1 % — SIGNIFICANT CHANGE UP (ref 0–6)
GLUCOSE SERPL-MCNC: 86 MG/DL — SIGNIFICANT CHANGE UP (ref 70–99)
HCT VFR BLD CALC: 32 % — LOW (ref 39–50)
HGB BLD-MCNC: 10 G/DL — LOW (ref 13–17)
IMM GRANULOCYTES NFR BLD AUTO: 0.3 % — SIGNIFICANT CHANGE UP (ref 0–1.5)
LYMPHOCYTES # BLD AUTO: 1.44 K/UL — SIGNIFICANT CHANGE UP (ref 1–3.3)
LYMPHOCYTES # BLD AUTO: 19.7 % — SIGNIFICANT CHANGE UP (ref 13–44)
MCHC RBC-ENTMCNC: 26.5 PG — LOW (ref 27–34)
MCHC RBC-ENTMCNC: 31.3 GM/DL — LOW (ref 32–36)
MCV RBC AUTO: 84.7 FL — SIGNIFICANT CHANGE UP (ref 80–100)
MONOCYTES # BLD AUTO: 0.41 K/UL — SIGNIFICANT CHANGE UP (ref 0–0.9)
MONOCYTES NFR BLD AUTO: 5.6 % — SIGNIFICANT CHANGE UP (ref 2–14)
NEUTROPHILS # BLD AUTO: 5.36 K/UL — SIGNIFICANT CHANGE UP (ref 1.8–7.4)
NEUTROPHILS NFR BLD AUTO: 73.1 % — SIGNIFICANT CHANGE UP (ref 43–77)
NRBC # BLD: 0 /100 WBCS — SIGNIFICANT CHANGE UP (ref 0–0)
PLATELET # BLD AUTO: 272 K/UL — SIGNIFICANT CHANGE UP (ref 150–400)
POTASSIUM SERPL-MCNC: 3.6 MMOL/L — SIGNIFICANT CHANGE UP (ref 3.5–5.3)
POTASSIUM SERPL-SCNC: 3.6 MMOL/L — SIGNIFICANT CHANGE UP (ref 3.5–5.3)
RBC # BLD: 3.78 M/UL — LOW (ref 4.2–5.8)
RBC # FLD: 15.2 % — HIGH (ref 10.3–14.5)
SODIUM SERPL-SCNC: 139 MMOL/L — SIGNIFICANT CHANGE UP (ref 135–145)
WBC # BLD: 7.32 K/UL — SIGNIFICANT CHANGE UP (ref 3.8–10.5)
WBC # FLD AUTO: 7.32 K/UL — SIGNIFICANT CHANGE UP (ref 3.8–10.5)

## 2020-05-22 PROCEDURE — 99233 SBSQ HOSP IP/OBS HIGH 50: CPT | Mod: GC

## 2020-05-22 RX ORDER — SENNA PLUS 8.6 MG/1
2 TABLET ORAL AT BEDTIME
Refills: 0 | Status: DISCONTINUED | OUTPATIENT
Start: 2020-05-22 | End: 2020-06-01

## 2020-05-22 RX ORDER — POLYETHYLENE GLYCOL 3350 17 G/17G
17 POWDER, FOR SOLUTION ORAL DAILY
Refills: 0 | Status: DISCONTINUED | OUTPATIENT
Start: 2020-05-22 | End: 2020-06-01

## 2020-05-22 RX ADMIN — Medication 50 MILLIGRAM(S): at 05:58

## 2020-05-22 RX ADMIN — Medication 1 SPRAY(S): at 17:41

## 2020-05-22 RX ADMIN — Medication 1 GRAM(S): at 12:05

## 2020-05-22 RX ADMIN — MIRTAZAPINE 7.5 MILLIGRAM(S): 45 TABLET, ORALLY DISINTEGRATING ORAL at 22:47

## 2020-05-22 RX ADMIN — Medication 1 GRAM(S): at 05:58

## 2020-05-22 RX ADMIN — SERTRALINE 100 MILLIGRAM(S): 25 TABLET, FILM COATED ORAL at 12:05

## 2020-05-22 RX ADMIN — SENNA PLUS 2 TABLET(S): 8.6 TABLET ORAL at 22:47

## 2020-05-22 RX ADMIN — Medication 1 APPLICATION(S): at 17:38

## 2020-05-22 RX ADMIN — Medication 1 SPRAY(S): at 05:58

## 2020-05-22 RX ADMIN — POLYETHYLENE GLYCOL 3350 17 GRAM(S): 17 POWDER, FOR SOLUTION ORAL at 12:05

## 2020-05-22 RX ADMIN — Medication 50 MILLIGRAM(S): at 17:41

## 2020-05-22 RX ADMIN — Medication 1 GRAM(S): at 17:40

## 2020-05-22 RX ADMIN — PANTOPRAZOLE SODIUM 40 MILLIGRAM(S): 20 TABLET, DELAYED RELEASE ORAL at 05:57

## 2020-05-22 RX ADMIN — PREGABALIN 1000 MICROGRAM(S): 225 CAPSULE ORAL at 12:05

## 2020-05-22 RX ADMIN — Medication 1 GRAM(S): at 22:47

## 2020-05-22 NOTE — PROGRESS NOTE ADULT - SUBJECTIVE AND OBJECTIVE BOX
LISANDRA PADGETT is a 79yMale , patient examined and chart reviewed.    INTERVAL HPI/ OVERNIGHT EVENTS:   NAD. Afebrile.  COVID 19 pcr still positive.    PAST MEDICAL & SURGICAL HISTORY:  H/O vitamin B deficiency  Spinal stenosis  HTN (hypertension)  History of tonsillectomy      For details regarding the patient's social history, family history, and other miscellaneous elements, please refer the initial infectious diseases consultation and/or the admitting history and physical examination for this admission.    ROS:  CONSTITUTIONAL:  Negative fever or chills  EYES:  Negative  blurry vision or double vision  CARDIOVASCULAR:  Negative for chest pain or palpitations  RESPIRATORY:  Negative for cough, wheezing, or SOB   GASTROINTESTINAL:  Negative for nausea, vomiting, diarrhea, constipation, or abdominal pain  GENITOURINARY:  Negative frequency, urgency or dysuria  NEUROLOGIC:  No headache, confusion, dizziness, lightheadedness  All other systems were reviewed and are negative     Current inpatient medications :  MEDICATIONS  (STANDING):  cyanocobalamin 1000 MICROGram(s) Oral daily  metoprolol tartrate 50 milliGRAM(s) Oral two times a day  mirtazapine 7.5 milliGRAM(s) Oral at bedtime  pantoprazole    Tablet 40 milliGRAM(s) Oral before breakfast  polyethylene glycol 3350 17 Gram(s) Oral daily  povidone iodine 10% Solution 1 Application(s) Topical daily  senna 2 Tablet(s) Oral at bedtime  sertraline 100 milliGRAM(s) Oral daily  sodium chloride 0.65% Nasal 1 Spray(s) Both Nostrils two times a day  sucralfate suspension 1 Gram(s) Oral four times a day    MEDICATIONS  (PRN):  oxycodone    5 mG/acetaminophen 325 mG 1 Tablet(s) Oral every 6 hours PRN Moderate Pain (4 - 6)  oxycodone    5 mG/acetaminophen 325 mG 2 Tablet(s) Oral every 6 hours PRN Severe Pain (7 - 10)      Objective:  Vital Signs Last 24 Hrs  T(C): 36.8 (22 May 2020 07:35), Max: 36.8 (22 May 2020 07:35)  T(F): 98.3 (22 May 2020 07:35), Max: 98.3 (22 May 2020 07:35)  HR: 64 (22 May 2020 07:35) (64 - 73)  BP: 128/70 (22 May 2020 07:35) (115/67 - 130/69)  RR: 18 (22 May 2020 07:35) (18 - 19)  SpO2: 95% (22 May 2020 07:35) (95% - 97%)    Physical Exam:  General:  no acute distress  Eyes: sclera anicteric, pupils equal and reactive to light  ENMT: buccal mucosa moist, pharynx not injected  Neck: supple, trachea midline  Lungs: clear, no wheeze/rhonchi  Cardiovascular: regular rate and rhythm, S1 S2  Abdomen: soft, nontender, no organomegaly present, bowel sounds normal  Neurological: alert and oriented x3 Cranial Nerves II-XII grossly intact  Skin: sacral decub drsg c/d/i   Lymph Nodes: no palpable cervical/supraclavicular lymph nodes enlargements  Extremities: no cyanosis/clubbing/edema        LABS:                                     10.0   7.32  )-----------( 272      ( 22 May 2020 07:13 )             32.0   05-22    139  |  105  |  26<H>  ----------------------------<  86  3.6   |  30  |  0.58    Ca    8.6      22 May 2020 07:13    TPro  x   /  Alb  2.2<L>  /  TBili  x   /  DBili  x   /  AST  x   /  ALT  x   /  AlkPhos  x   05-21      MICROBIOLOGY:    Culture - Blood (collected 02 May 2020 00:30)  Source: .Blood Blood  Preliminary Report (03 May 2020 01:03):    No growth to date.    Culture - Blood (collected 02 May 2020 00:30)  Source: .Blood Blood  Preliminary Report (03 May 2020 01:03):    No growth to date.    COVID-19 PCR . (04.28.20 @ 22:41)    COVID-19 PCR: Detected: This test has been validated by [employer] to be accurate;  though it has not been FDA cleared/approved by the usual pathway  As with all laboratory test, results should be correlated with clinical  findings.  https://www.fda.gov/media/732684/download  https://www.fda.gov/media/364889/download    RADIOLOGY & ADDITIONAL STUDIES:    EXAM:  XR CHEST PORTABLE ROUTINE 1V                            PROCEDURE DATE:  04/30/2020          INTERPRETATION:    Examination: XR CHEST    History: ADMDIAG1: K92.2 GASTROINTESTINAL HEMORRHAGE, UNSPECIFIED/, pn pneumonia    TECHNIQUE:  Portable AP view of the chest was obtained.    COMPARISON: A CT chest 1/29/2020 available for review.    FINDINGS:   Lungs clear. RIGHT hemidiaphragm mildly elevated. Posterior lung bases cannot be assessed due to elevated diaphragms. Lateral radiograph recommended if clinically warranted.  . The heart and mediastinum are within normal limits.    Visualized osseous structures are intact.      IMPRESSION: Visualized Lungs clear..   Elevated LEFT hemidiaphragm. Posterior lung bases cannot be assessed dueto elevated diaphragms. Lateral radiograph recommended if clinically warranted.      Assessment :   78 y/o M with PMHx of HTN, BPH, spinal stenosis, dyslipidemia, anxiety, neuropathy, B12 deficiency resident of [place] rehab for evaluation of weakness and hypotension with mental status change. Tested COVID 19 positive early April. In ED he was hypotensive. WBC 19K No SOB cough n/v/d. Pt is a poor historian. Has unstagable sacral decub ulcer. Also with Acute anemia. ?infectious process vs reactive leukocytosis. Leukocytosis poss chronic.  Respiratory status stable. Menendez placed sec AUR 5/3/2020. Seen by surgery and is sp bedside I&D of sacral decub Clinically stable.    Plan :   Monitor off antibiotics  Menendez per urology  Frequent turning  Local wound care per surgery/wound care nurse  COVID-19--critical period for decompensation  (7-14 days post symptom onset), avoid aerosolizing procedures, NSAIDs   -monitor respiratory status closely ( route of 02 and saturation) and titrate when able  -isolation precautions per protocol  -avoid excessive blood draws, blood cultures and frequent CXRs  -monitor biomarkers- CBC w diff  for NLRNLR <3 low vs >5 high) Ferritin (lower risk <450 vs >850) CRP (low risk <2 and higher risk >6) and LDH, D Dimer, procalcitonin  -therapies remains investigational-- including Hydroxychloroquine  -antibiotics only if there is concern for a bacterial process  -Steroids short course ( 5 days)  --for hypoxia/ARDS /shock    Dc planning to rehab pending COVID 19 neg x 2       Continue with present regiment.  Appropriate use of antibiotics and adverse effects reviewed.      I have discussed the above plan of care with patient/ family in detail. They expressed understanding of the  treatment plan . Risks, benefits and alternatives discussed in detail. I have asked if they have any questions or concerns and appropriately addressed them to the best of my ability .    > 35 minutes were spent in direct patient care reviewing notes, medications ,labs data/ imaging , discussion with multidisciplinary team.    Thank you for allowing me to participate in care of your patient .    Naima Lopez MD  Infectious Disease  [phone]

## 2020-05-22 NOTE — PROGRESS NOTE ADULT - ATTENDING COMMENTS
78 yo M with PMHx of HTN, BPH, spinal stenosis, dyslipidemia, anxiety, neuropathy, B12 deficiency presented from [facility] rehab for evaluation of weakness and hypotension. 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 Pt medically stable for d/c, awaiting [facility] placement and repeat serial COVID-19 PCR testing    : 2019 novel coronavirus disease (COVID-19).  Plan: COVID-19, maintaining sat >90 on room air   - Maintain on airborne isolation.  - Limit use of NSAIDs.  - Medically stable, however COVID19 PCR testing remains positive. Continue to repeat PCR every 48hrs until negative, awaiting placement at [facility].      Anemia.  Plan: -Anemia possibly 2/2 L hip chronic hematoma as noted on CT AP and poor nutritional status; GIB less likely. No blood in BMs per pt. S/p one unit pRBCs 5/16, H/H increased appropriately. H/H stable this AM.  -Continue Protonix 40 mg daily, Carafate 1 g four times daily  -Monitor for further bleeding, stool color   -no plans for urgent inpt endoscopy in setting off COVID. f/u as outpatient for procedure endoscopy and colonoscopy

## 2020-05-22 NOTE — ADVANCED PRACTICE NURSE CONSULT - ASSESSMENT
Negative pressure wound therapy (NPWT) veraflo re-applied to sacral pressure injury today: 125mmHg, 20mL NS instilled into wound bed Q2H: Patient tolerated dressing change with mild discomfort/pain: Will continue to follow.   Sacral stage 4 pressure injury dimensions 8 x 6 x 1 undermining 12-12 3 cm.  50% red granulation 50% yellow fibrinous tissue: No warmth, no odor, periwound blanchable erythema will continue to follow.
Per RN NPWT veraflo dressing was changed 5/10/2020
Negative pressure wound dressing taken down and reapplied to patient today Patient tolerated dressing change well. Wound 100% red granulation
Negative pressure wound therapy (NPWT) veraflo applied to sacral pressure injury today: 125mmHg, 20mL NS instilled into wound bed Q2H: Patient tolerated dressing change with mild discomfort/pain: Will continue to follow.   Sacral stage 4 pressure injury dimensions 12 x 15 x 1 undermining 12-12 3 cm.  50% red granulation 50% yellow fibrinous tissue: No warmth, no odor, periwound blanchable erythema will continue to follow.
Patient is a 80yo COVID-19+ male PMHX dyslipidemia, anxiety, neuropathy, [id number] presents with 8 x 8.5 sacral region pressure injury 505 soft slough at wound base no odor copious serosanguinous drainage periwound blanchable -non-blanchable erythema. No s/s of pain/discomfort undermining 12-12. Large amount of liquid stool.
Patient is a 80yo COVID-19+ male PMHX dyslipidemia, anxiety, neuropathy, [id number] presents with 8 x 8.5 sacral region pressure injury   yellow-grey soft slough no odor copious serosanguinous drainage periwound blanchable -non-blanchable erythema pertinent labs: WBC 19.86, H/H 8.4/26.0, D-dimer 736, ferratin 2667

## 2020-05-22 NOTE — PROGRESS NOTE ADULT - PROBLEM SELECTOR PLAN 4
was hypotensive on admission, resolving, BP stable now  - Off Midodrine, continue BB with hold parameters- will discharge on Metoprolol 50 BID  - Continue to hold off on home Valsartan   - Cardio Dr Ordonez following

## 2020-05-22 NOTE — PROGRESS NOTE ADULT - SUBJECTIVE AND OBJECTIVE BOX
Chief Complaint: Weakness, AMS    Interval Events: No events overnight.    Review of Systems:  General: No fevers, chills, weight loss or gain  Skin: No rashes, color changes  Cardiovascular: No chest pain, orthopnea  Respiratory: No shortness of breath, cough  Gastrointestinal: No nausea, abdominal pain  Genitourinary: No incontinence, pain with urination  Musculoskeletal: No pain, swelling, decreased range of motion  Neurological: No headache, weakness  Psychiatric: No depression, anxiety  Endocrine: No weight loss or gain, increased thirst  All other systems are comprehensively negative.    Physical Exam:  Vital Signs Last 24 Hrs  T(C): 36.8 (22 May 2020 07:35), Max: 36.8 (22 May 2020 07:35)  T(F): 98.3 (22 May 2020 07:35), Max: 98.3 (22 May 2020 07:35)  HR: 64 (22 May 2020 07:35) (64 - 73)  BP: 128/70 (22 May 2020 07:35) (115/67 - 130/69)  BP(mean): --  RR: 18 (22 May 2020 07:35) (18 - 19)  SpO2: 95% (22 May 2020 07:35) (95% - 97%)  General: NAD  HEENT: MMM  Neck: No JVD, no carotid bruit  Extremities: No LE edema, no cyanosis  Neuro: Non-focal  Skin: No rash    Labs:    05-22    139  |  105  |  26<H>  ----------------------------<  86  3.6   |  30  |  0.58    Ca    8.6      22 May 2020 07:13    TPro  x   /  Alb  2.2<L>  /  TBili  x   /  DBili  x   /  AST  x   /  ALT  x   /  AlkPhos  x   05-21                        10.0   7.32  )-----------( 272      ( 22 May 2020 07:13 )             32.0

## 2020-05-22 NOTE — PROGRESS NOTE ADULT - PROBLEM SELECTOR PLAN 2
-Anemia possibly 2/2 L hip chronic hematoma as noted on CT AP and poor nutritional status; GIB less likely. No blood in BMs per pt. S/p one unit pRBCs 5/16, H/H increased appropriately. H/H stable this AM.  -Continue Protonix 40 mg daily, Carafate 1 g four times daily  -Monitor for further bleeding, stool color   -no plans for urgent inpt endoscopy in setting off COVID. f/u as outpatient for procedure endoscopy and colonoscopy  -Encourage adequate PO intake

## 2020-05-22 NOTE — PROGRESS NOTE ADULT - PROBLEM SELECTOR PLAN 5
-malnutrition 2/2 poor PO intake  -nutrition services following.   -Recommending Continue Ensure Enlive BID, add Prosource BID to encourage sacral wound healing and improve anemia  -Mirtazapine added 5/20; pt states that appetite and sleep have improved.

## 2020-05-22 NOTE — PROGRESS NOTE ADULT - SUBJECTIVE AND OBJECTIVE BOX
This progress note was completed in part by resident acting as telephonic scribe during COVID-19 crisis. Physical exam was completed by attending physician, and findings below are those of the attending physician, and have been reviewed in detail.    Patient is a 79y old  Male who presents with a chief complaint of Generalized weakness (16 May 2020 16:34)      FROM ADMISSION H+P:   HPI:  80 y/o M with PMHx of HTN, BPH, spinal stenosis, dyslipidemia, anxiety, neuropathy, B12 deficiency who presents from [facility] for evaluation of weakness and hypotension. As per transfer papers, pt with confusion after lunch today. Pt admits to generalized weakness. Lives with wife, Stephany and son. He came from [facility] rehab in [place] since Feb 14. Patient lives with wife and son at home and ambulates with cane. Contracted Covid-19 in [facility] rehab and first started having symptoms on Mar 25, reporting symptoms of cough and low grade fever, diarrhea. Denies any chest pain, shortness of breath, headache, dizziness. History obtained from wife, Stephany ([phone]) as pt is confused and a poor historian.   Of note, pt was noted to have sacral wounds today and reported low grade fever for two days and mental status changes today and loss of appetite.    ----  INTERVAL HPI/OVERNIGHT EVENTS:   Pt seen and examined at bedside. No acute events overnight. Complains of pain at wound site, bed is uncomfortable. Slept well, appetite improved. Denies CP, SOB, abd pain, NVD, fever, chills, muscle weakness.    ----  PAST MEDICAL & SURGICAL HISTORY:  H/O vitamin B deficiency  Spinal stenosis  HTN (hypertension)  History of tonsillectomy      FAMILY HISTORY:  No pertinent family history in first degree relatives      Allergies    No Known Allergies    Intolerances    ----  REVIEW OF SYSTEMS:  as per HPI    ----  Vital Signs Last 24 Hrs  T(C): 36.8 (22 May 2020 07:35), Max: 36.8 (22 May 2020 07:35)  T(F): 98.3 (22 May 2020 07:35), Max: 98.3 (22 May 2020 07:35)  HR: 64 (22 May 2020 07:35) (64 - 73)  BP: 128/70 (22 May 2020 07:35) (115/67 - 130/69)  BP(mean): --  RR: 18 (22 May 2020 07:35) (18 - 19)  SpO2: 95% (22 May 2020 07:35) (95% - 97%)    GENERAL: patient in NAD, cachetic looking  EYES: sclera clear, no exudates, PERRLA  ENMT: oropharynx clear without erythema, no exudates, moist mucous membranes  LUNGS:  clear to auscultation bl,  no rales, wheezing or rhonchi appreciated  HEART: S1/S2, regular rate and rhythm, no murmurs noted, no lower extremity edema  GASTROINTESTINAL: abdomen is soft, nontender, nondistended, normoactive bowel sounds, no palpable masses  INTEGUMENT: poor skin turgor, pale, thin skin  MUSCULOSKELETAL: no clubbing or cyanosis, no obvious deformity  NEUROLOGIC: awake, alert, oriented x3, strength 5/5 in all extremities, no obvious sensory deficits  HEME/LYMPH:  no obvious ecchymosis or petechiae       Other noted pertinent findings  OXYGEN SUPPORT: nasal cannula: yes [  ]   no [ x ]    LINES: central line, midline, PICC line: yes [  ]   no [ x ]  REYNA CATHETER: yes [ x ]   no [  ]    I&O's Summary    21 May 2020 07:01  -  22 May 2020 07:00  --------------------------------------------------------  IN: 617 mL / OUT: 950 mL / NET: -333 mL                          10.0   7.32  )-----------( 272      ( 22 May 2020 07:13 )             32.0   05-22    139  |  105  |  26<H>  ----------------------------<  86  3.6   |  30  |  0.58    Ca    8.6      22 May 2020 07:13    TPro  x   /  Alb  2.2<L>  /  TBili  x   /  DBili  x   /  AST  x   /  ALT  x   /  AlkPhos  x   05-21    Personally reviewed:  Vital sign trends: [ x ] yes    [  ] no     [  ] n/a  Laboratory results: [ x ] yes    [  ] no     [  ] n/a  Radiology results: [  ] yes    [  ] no     [ x ] n/a  Culture results: [  ] yes    [  ] no     [  x] n/a  Consultant recommendations: [  ] yes    [  ] no     [ x ] n/a

## 2020-05-22 NOTE — PROGRESS NOTE ADULT - ASSESSMENT
80 yo M with PMHx of HTN, BPH, spinal stenosis, dyslipidemia, anxiety, neuropathy, B12 deficiency presented from [facility] rehab for evaluation of weakness and hypotension. 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 Pt medically stable for d/c, awaiting [facility] placement and repeat serial COVID-19 PCR testing

## 2020-05-22 NOTE — ADVANCED PRACTICE NURSE CONSULT - RECOMMEDATIONS
Continue with current plan of care
1. Cross amin wound and apply santyl one elda thick daily  Patient was seen with surgical PA  MD made aware  RN educated in the care of this patients wound care
Continue with current plan of care
Continue with current plan of care
NPWT Change three times weekly  Dynaflex RN to obtain order  Patient assessed with RN, RN educated i the care of this patients wound care  LM MD for NPWT and Dynaflex

## 2020-05-22 NOTE — PROGRESS NOTE ADULT - PROBLEM SELECTOR PLAN 3
Stage 4 Sacral Ulcer  - s/p debridement and Zosyn course  - s/p wound vac placement - draining appropriately, serosanguinous discharge.  - wound care NP Benita following  - Pain regimen: acetaminophen for mild pain, oxycodone 5mg q6 PRN for moderate, oxycodone 10mg q6 pRN for severe pain  - Will change bed order to alternate pressure bed   - Menendez placed for urinary retention by Urology - as pt not yet ambulatory, will keep Menendez in for wound healing given sacral ulcer, attempt TOV at [facility] once more ambulatory

## 2020-05-23 LAB
ANION GAP SERPL CALC-SCNC: 5 MMOL/L — SIGNIFICANT CHANGE UP (ref 5–17)
BASOPHILS # BLD AUTO: 0.01 K/UL — SIGNIFICANT CHANGE UP (ref 0–0.2)
BASOPHILS NFR BLD AUTO: 0.1 % — SIGNIFICANT CHANGE UP (ref 0–2)
BUN SERPL-MCNC: 28 MG/DL — HIGH (ref 7–23)
CALCIUM SERPL-MCNC: 8.5 MG/DL — SIGNIFICANT CHANGE UP (ref 8.5–10.1)
CHLORIDE SERPL-SCNC: 105 MMOL/L — SIGNIFICANT CHANGE UP (ref 96–108)
CO2 SERPL-SCNC: 30 MMOL/L — SIGNIFICANT CHANGE UP (ref 22–31)
CREAT SERPL-MCNC: 0.71 MG/DL — SIGNIFICANT CHANGE UP (ref 0.5–1.3)
EOSINOPHIL # BLD AUTO: 0.04 K/UL — SIGNIFICANT CHANGE UP (ref 0–0.5)
EOSINOPHIL NFR BLD AUTO: 0.6 % — SIGNIFICANT CHANGE UP (ref 0–6)
GLUCOSE SERPL-MCNC: 85 MG/DL — SIGNIFICANT CHANGE UP (ref 70–99)
HCT VFR BLD CALC: 29.1 % — LOW (ref 39–50)
HGB BLD-MCNC: 9.3 G/DL — LOW (ref 13–17)
IMM GRANULOCYTES NFR BLD AUTO: 0.3 % — SIGNIFICANT CHANGE UP (ref 0–1.5)
LYMPHOCYTES # BLD AUTO: 1.73 K/UL — SIGNIFICANT CHANGE UP (ref 1–3.3)
LYMPHOCYTES # BLD AUTO: 25.3 % — SIGNIFICANT CHANGE UP (ref 13–44)
MCHC RBC-ENTMCNC: 27.2 PG — SIGNIFICANT CHANGE UP (ref 27–34)
MCHC RBC-ENTMCNC: 32 GM/DL — SIGNIFICANT CHANGE UP (ref 32–36)
MCV RBC AUTO: 85.1 FL — SIGNIFICANT CHANGE UP (ref 80–100)
MONOCYTES # BLD AUTO: 0.44 K/UL — SIGNIFICANT CHANGE UP (ref 0–0.9)
MONOCYTES NFR BLD AUTO: 6.4 % — SIGNIFICANT CHANGE UP (ref 2–14)
NEUTROPHILS # BLD AUTO: 4.59 K/UL — SIGNIFICANT CHANGE UP (ref 1.8–7.4)
NEUTROPHILS NFR BLD AUTO: 67.3 % — SIGNIFICANT CHANGE UP (ref 43–77)
NRBC # BLD: 0 /100 WBCS — SIGNIFICANT CHANGE UP (ref 0–0)
PLATELET # BLD AUTO: 256 K/UL — SIGNIFICANT CHANGE UP (ref 150–400)
POTASSIUM SERPL-MCNC: 3.5 MMOL/L — SIGNIFICANT CHANGE UP (ref 3.5–5.3)
POTASSIUM SERPL-SCNC: 3.5 MMOL/L — SIGNIFICANT CHANGE UP (ref 3.5–5.3)
RBC # BLD: 3.42 M/UL — LOW (ref 4.2–5.8)
RBC # FLD: 15.2 % — HIGH (ref 10.3–14.5)
SODIUM SERPL-SCNC: 140 MMOL/L — SIGNIFICANT CHANGE UP (ref 135–145)
WBC # BLD: 6.83 K/UL — SIGNIFICANT CHANGE UP (ref 3.8–10.5)
WBC # FLD AUTO: 6.83 K/UL — SIGNIFICANT CHANGE UP (ref 3.8–10.5)

## 2020-05-23 PROCEDURE — 99233 SBSQ HOSP IP/OBS HIGH 50: CPT | Mod: GC

## 2020-05-23 RX ORDER — PANTOPRAZOLE SODIUM 20 MG/1
40 TABLET, DELAYED RELEASE ORAL DAILY
Refills: 0 | Status: DISCONTINUED | OUTPATIENT
Start: 2020-05-23 | End: 2020-06-01

## 2020-05-23 RX ADMIN — Medication 50 MILLIGRAM(S): at 19:03

## 2020-05-23 RX ADMIN — MIRTAZAPINE 7.5 MILLIGRAM(S): 45 TABLET, ORALLY DISINTEGRATING ORAL at 22:19

## 2020-05-23 RX ADMIN — Medication 1 GRAM(S): at 19:04

## 2020-05-23 RX ADMIN — SENNA PLUS 2 TABLET(S): 8.6 TABLET ORAL at 22:19

## 2020-05-23 RX ADMIN — SERTRALINE 100 MILLIGRAM(S): 25 TABLET, FILM COATED ORAL at 13:39

## 2020-05-23 RX ADMIN — PREGABALIN 1000 MICROGRAM(S): 225 CAPSULE ORAL at 13:39

## 2020-05-23 RX ADMIN — Medication 1 SPRAY(S): at 19:04

## 2020-05-23 RX ADMIN — Medication 1 GRAM(S): at 13:39

## 2020-05-23 RX ADMIN — Medication 1 SPRAY(S): at 07:19

## 2020-05-23 RX ADMIN — PANTOPRAZOLE SODIUM 40 MILLIGRAM(S): 20 TABLET, DELAYED RELEASE ORAL at 07:19

## 2020-05-23 RX ADMIN — Medication 1 GRAM(S): at 22:19

## 2020-05-23 RX ADMIN — Medication 1 GRAM(S): at 07:19

## 2020-05-23 RX ADMIN — Medication 1 APPLICATION(S): at 13:40

## 2020-05-23 RX ADMIN — Medication 50 MILLIGRAM(S): at 07:18

## 2020-05-23 NOTE — PROGRESS NOTE ADULT - PROBLEM SELECTOR PLAN 3
Stage 4 Sacral Ulcer  - s/p debridement and Zosyn course  - s/p wound vac placement - draining appropriately, serosanguinous discharge.  - wound care NP Benita following  - Pain regimen: acetaminophen for mild pain, oxycodone 5mg q6 PRN for moderate, oxycodone 10mg q6 pRN for severe pain  - Will change bed order to alternate pressure bed   - Menendez placed for urinary retention by Urology - as pt not yet ambulatory, will keep Menendez in for wound healing given sacral ulcer, attempt TOV at [facility] once more ambulatory

## 2020-05-23 NOTE — PROGRESS NOTE ADULT - ASSESSMENT
80 yo M with PMHx of HTN, BPH, spinal stenosis, dyslipidemia, anxiety, neuropathy, B12 deficiency presented from [facility] rehab for evaluation of weakness and hypotension. 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 Pt medically stable for d/c, awaiting [facility] placement and repeat serial COVID-19 PCR testing

## 2020-05-23 NOTE — PROGRESS NOTE ADULT - ASSESSMENT
The patient is a 79 year old male with a history of HTN, HL, pre-DM, anxiety, spinal stenosis who presents with weakness and AMS in the setting of worsening anemia, dehydration, COVID-19.    Plan:  - SVT - continue metoprolol tartrate 50 mg bid  - Underwent debridement of sacral decub  - Not hypoxic on RA  - Awaiting negative COVID-19 for placement - last was positive  - Discharge planning

## 2020-05-23 NOTE — CHART NOTE - NSCHARTNOTEFT_GEN_A_CORE
Assessment: Pt seen for malnutrition follow up, did not open eyes when called. Per RN, very poor po intake recently, refused dinner last night and breakfast today. Not drinking Ensure, per wife pt afraid it is causing diarrhea. Recommend trial of Ensure clear. spoke with wife at length, aware pt not eating well, needs adequate energy/protein intake for wound healing(wound vac in place). Wife concerned regarding pt still seems very depressed, does not participate in face time calls. Menu assistance provided, copy sent to family to help choose foods for pt, snacks added. Pt on remeron Rx for depression with possible benefit as appetite stimulant.    Factors impacting intake: [ ] none [ ] nausea  [ ] vomiting [ ] diarrhea [ ] constipation  [ ]chewing problems [ ] swallowing issues  [ ] other:     Diet Presciption: Diet, Soft:   No Carb Prosource (1pkg = 15gms Protein)     Qty per Day:  2  Supplement Feeding Modality:  Oral  Ensure Enlive Cans or Servings Per Day:  1       Frequency:  Two Times a day (05-15-20 @ 11:29)    Intake:   poor  Current Weight:   % Weight Change    Pertinent Medications: MEDICATIONS  (STANDING):  cyanocobalamin 1000 MICROGram(s) Oral daily  metoprolol tartrate 50 milliGRAM(s) Oral two times a day  mirtazapine 7.5 milliGRAM(s) Oral at bedtime  pantoprazole    Tablet 40 milliGRAM(s) Oral before breakfast  polyethylene glycol 3350 17 Gram(s) Oral daily  povidone iodine 10% Solution 1 Application(s) Topical daily  senna 2 Tablet(s) Oral at bedtime  sertraline 100 milliGRAM(s) Oral daily  sodium chloride 0.65% Nasal 1 Spray(s) Both Nostrils two times a day  sucralfate suspension 1 Gram(s) Oral four times a day    MEDICATIONS  (PRN):  oxycodone    5 mG/acetaminophen 325 mG 1 Tablet(s) Oral every 6 hours PRN Moderate Pain (4 - 6)  oxycodone    5 mG/acetaminophen 325 mG 2 Tablet(s) Oral every 6 hours PRN Severe Pain (7 - 10)    Pertinent Labs: 05-23 Na140 mmol/L Glu 85 mg/dL K+ 3.5 mmol/L Cr  0.71 mg/dL BUN 28 mg/dL<H> 05-21 Alb 2.2 g/dL<L>     CAPILLARY BLOOD GLUCOSE        Skin: wounds noted    Estimated Needs:   [x ] no change since previous assessment  [ ] recalculated:     Previous Nutrition Diagnosis:   [ ] Inadequate Energy Intake [ ]Inadequate Oral Intake [ ] Excessive Energy Intake   [ ] Underweight [ ] Increased Nutrient Needs [ ] Overweight/Obesity   [ ] Altered GI Function [ ] Unintended Weight Loss [ ] Food & Nutrition Related Knowledge Deficit [x ] Malnutrition     Nutrition Diagnosis is [x ] ongoing  [ ] resolved [ ] not applicable     New Nutrition Diagnosis: [ ] not applicable       Interventions:   Recommend  [ ] Change Diet To:  [x ] Nutrition Supplement trial Ensure clear  [ ] Nutrition Support  [x ] Other: add mvi, vit c    Monitoring and Evaluation:   [ ] PO intake [ x ] Tolerance to diet prescription [ x ] weights [ x ] labs[ x ] follow up per protocol  [ ] other:

## 2020-05-23 NOTE — PROGRESS NOTE ADULT - SUBJECTIVE AND OBJECTIVE BOX
Chief Complaint: Weakness, AMS    Interval Events: No events overnight.    Review of Systems:  General: No fevers, chills, weight loss or gain  Skin: No rashes, color changes  Cardiovascular: No chest pain, orthopnea  Respiratory: No shortness of breath, cough  Gastrointestinal: No nausea, abdominal pain  Genitourinary: No incontinence, pain with urination  Musculoskeletal: No pain, swelling, decreased range of motion  Neurological: No headache, weakness  Psychiatric: No depression, anxiety  Endocrine: No weight loss or gain, increased thirst  All other systems are comprehensively negative.    Physical Exam:  Vital Signs Last 24 Hrs  T(C): 36.7 (23 May 2020 08:10), Max: 37.2 (23 May 2020 05:44)  T(F): 98.1 (23 May 2020 08:10), Max: 98.9 (23 May 2020 05:44)  HR: 80 (23 May 2020 08:10) (73 - 90)  BP: 146/71 (23 May 2020 08:10) (101/60 - 146/71)  BP(mean): --  RR: 18 (23 May 2020 08:10) (18 - 18)  SpO2: 98% (23 May 2020 08:10) (95% - 98%)  General: NAD  HEENT: MMM  Neck: No JVD, no carotid bruit  Extremities: No LE edema, no cyanosis  Neuro: Non-focal  Skin: No rash    Labs:    05-23    140  |  105  |  28<H>  ----------------------------<  85  3.5   |  30  |  0.71    Ca    8.5      23 May 2020 06:46                          9.3    6.83  )-----------( 256      ( 23 May 2020 06:46 )             29.1

## 2020-05-23 NOTE — PROGRESS NOTE ADULT - ASSESSMENT
80 y/o M with PMHx of HTN, BPH, spinal stenosis, dyslipidemia, anxiety, neuropathy, B12 deficiency who presents from rehab for evaluation of weakness and hypotension    Anemia   no overt gi bleeding; hgb stable  likely r/t sacral decub/hematoma, sp debridement  monitor cbc, transfuse prn  cont ppi and carafate  monitor stool color   no plans for endoscopic evaluation as no overt gib/covid+  op gi f/u for possible colonoscopy if none recent once covid crisis resolved given ct findings    FTT/Dysphagia   unclear etiology; esophagram at [place] 2/2019 normal   some improvement in po intake  correct elytes prn  cont ppi    cont remeron  on supplements per nutrition   encouragement/assistance at meals      COVID-19  monitor rep status   inc lfts likely in setting of viral illness; resolving  liver/gb normal appearing on prior imaging  care per primary team

## 2020-05-23 NOTE — PROGRESS NOTE ADULT - SUBJECTIVE AND OBJECTIVE BOX
Patient is a 79y old  Male who presents with a chief complaint of Generalized weakness (22 May 2020 14:55)        HPI:  78 y/o M with PMHx of HTN, BPH, spinal stenosis, dyslipidemia, anxiety, neuropathy, B12 deficiency who presents from [facility] rehab for evaluation of weakness and hypotension. As per transfer papers, pt with confusion after lunch today. Pt admits to generalized weakness. Lives with wife, Stephany and son. He came from [facility] in [place] since Feb 14. Patient lives with wife and son at home and ambulates with cane. Contracted Covid-19 in [facility] rehab and first started having symptoms on Mar 25, reporting symptoms of cough and low grade fever, diarrhea. Denies any chest pain, shortness of breath, headache, dizziness. History obtained from wife, Stephany([phone]) as pt is confused and a poor historian.   Of note, pt was noted to have sacral wounds today and reported low grade fever for two days and mental status changes today and loss of appetite.    The date the pt first felt unwell: March 25  Fever or chills: yes [ x ]   no [  ]   - Tmax:   Fatigue, malaise or generalized weakness: yes [ x ]   no [  ]  Shortness of breath/dyspnea: yes [ x ]   no [  ]  Cough: yes [ x ]   no [  ], sputum production: yes [  ]   no [x  ]  Blood in sputum: yes [  ]   no [ x ]  Anorexia/po intolerance: yes [  ]   no [ x ]  Chest pain or chest tightness: yes [  ]   no [ x ]  Edema in legs: yes [  ]   no [ x ]  Myalgias: yes [  ]   no [ x ]  Headache: yes [  ]   no [x  ]  Sore throat: yes [  ]   no [ x ]  Rhinorrhea: yes [  ]   no [  x]  Abd pain: yes [  ]   no [ x ]  Nausea: yes [  ]   no [x  ]  Vomiting: yes [  ]   no [  x]  Diarrhea: yes [  ]   no [  ]  Skin rashes: yes [  ]   no [  ]  Loss of sense of smell/anosmia: yes [  ]   no [ x ]  Conjunctivitis: yes [  ]   no [ x ]  Recent travel: yes [  ]   no [ x ] - Location:   Any sick contacts: yes [x  ]   no [  ]: Contracted from [facility] rehab  Close contact with someone confirmed positive with COVID-19 / SARS-CoV2 in the last 14 days: yes [  ]   no [ x ]  Code status: DNR, but not DNI      In the ED  Vitals: T--, , BP 84/48, RR 22, O2 96 on RA  Labs(In [place]) significant for: wbc 18.45, hgb 6.8, FOBT+,   Coag studies: PT 14.9, INR 1.33, aPTT 43.7, d-dimer 561  Chem panel: Lactate 2.5, Na 133, AG 3, BUN/Cr 61/1.61, Alb 1.9, Alk phos 195, ,   UA neg  Covid-19 PCR Positive   EKG shows Accelerated Junctional rhythm with occasional PVCs  CXR in [place] ED shows: Mild R atelectasis  In [place] ED, s/p 2L NS bolus, 1 unit pRBC (28 Apr 2020 21:00)      SUBJECTIVE & OBJECTIVE: Pt seen and examined at bedside. upset about his contious postive covid testing     PHYSICAL EXAM:  T(C): 36.7 (05-23-20 @ 08:10), Max: 37.2 (05-23-20 @ 05:44)  HR: 80 (05-23-20 @ 08:10) (73 - 90)  BP: 146/71 (05-23-20 @ 08:10) (101/60 - 146/71)  RR: 18 (05-23-20 @ 08:10) (18 - 18)  SpO2: 98% (05-23-20 @ 08:10) (95% - 98%)  Wt(kg): --   GENERAL: NAD,  NERVOUS SYSTEM:  Alert & Oriented X3,   CHEST/LUNG: decrease air entry at the bases   HEART: Regular rate and rhythm; No murmurs, rubs, or gallops  ABDOMEN: Soft, Nontender, Nondistended; Bowel sounds present  EXTREMITIES:  2+ Peripheral Pulses, No clubbing, cyanosis, or edema        MEDICATIONS  (STANDING):  cyanocobalamin 1000 MICROGram(s) Oral daily  metoprolol tartrate 50 milliGRAM(s) Oral two times a day  mirtazapine 7.5 milliGRAM(s) Oral at bedtime  pantoprazole    Tablet 40 milliGRAM(s) Oral before breakfast  polyethylene glycol 3350 17 Gram(s) Oral daily  povidone iodine 10% Solution 1 Application(s) Topical daily  senna 2 Tablet(s) Oral at bedtime  sertraline 100 milliGRAM(s) Oral daily  sodium chloride 0.65% Nasal 1 Spray(s) Both Nostrils two times a day  sucralfate suspension 1 Gram(s) Oral four times a day    MEDICATIONS  (PRN):  oxycodone    5 mG/acetaminophen 325 mG 1 Tablet(s) Oral every 6 hours PRN Moderate Pain (4 - 6)  oxycodone    5 mG/acetaminophen 325 mG 2 Tablet(s) Oral every 6 hours PRN Severe Pain (7 - 10)      LABS:                        9.3    6.83  )-----------( 256      ( 23 May 2020 06:46 )             29.1     05-23    140  |  105  |  28<H>  ----------------------------<  85  3.5   |  30  |  0.71    Ca    8.5      23 May 2020 06:46            CAPILLARY BLOOD GLUCOSE          CAPILLARY BLOOD GLUCOSE        CAPILLARY BLOOD GLUCOSE                RECENT CULTURES:      RADIOLOGY & ADDITIONAL TESTS:                        DVT/GI ppx  Discussed with pt @ bedside

## 2020-05-24 LAB
ALBUMIN SERPL ELPH-MCNC: 2.3 G/DL — LOW (ref 3.3–5)
ALP SERPL-CCNC: 117 U/L — SIGNIFICANT CHANGE UP (ref 40–120)
ALT FLD-CCNC: 42 U/L — SIGNIFICANT CHANGE UP (ref 12–78)
ANION GAP SERPL CALC-SCNC: 4 MMOL/L — LOW (ref 5–17)
AST SERPL-CCNC: 30 U/L — SIGNIFICANT CHANGE UP (ref 15–37)
BILIRUB SERPL-MCNC: 0.6 MG/DL — SIGNIFICANT CHANGE UP (ref 0.2–1.2)
BUN SERPL-MCNC: 29 MG/DL — HIGH (ref 7–23)
CALCIUM SERPL-MCNC: 8.5 MG/DL — SIGNIFICANT CHANGE UP (ref 8.5–10.1)
CHLORIDE SERPL-SCNC: 105 MMOL/L — SIGNIFICANT CHANGE UP (ref 96–108)
CO2 SERPL-SCNC: 31 MMOL/L — SIGNIFICANT CHANGE UP (ref 22–31)
CREAT SERPL-MCNC: 0.75 MG/DL — SIGNIFICANT CHANGE UP (ref 0.5–1.3)
GLUCOSE SERPL-MCNC: 81 MG/DL — SIGNIFICANT CHANGE UP (ref 70–99)
HCT VFR BLD CALC: 33.5 % — LOW (ref 39–50)
HGB BLD-MCNC: 11.1 G/DL — LOW (ref 13–17)
MCHC RBC-ENTMCNC: 28.5 PG — SIGNIFICANT CHANGE UP (ref 27–34)
MCHC RBC-ENTMCNC: 33.1 GM/DL — SIGNIFICANT CHANGE UP (ref 32–36)
MCV RBC AUTO: 85.9 FL — SIGNIFICANT CHANGE UP (ref 80–100)
NRBC # BLD: 0 /100 WBCS — SIGNIFICANT CHANGE UP (ref 0–0)
PLATELET # BLD AUTO: 296 K/UL — SIGNIFICANT CHANGE UP (ref 150–400)
POTASSIUM SERPL-MCNC: 3.6 MMOL/L — SIGNIFICANT CHANGE UP (ref 3.5–5.3)
POTASSIUM SERPL-SCNC: 3.6 MMOL/L — SIGNIFICANT CHANGE UP (ref 3.5–5.3)
PROT SERPL-MCNC: 6.2 G/DL — SIGNIFICANT CHANGE UP (ref 6–8.3)
RBC # BLD: 3.9 M/UL — LOW (ref 4.2–5.8)
RBC # FLD: 15.1 % — HIGH (ref 10.3–14.5)
SARS-COV-2 RNA SPEC QL NAA+PROBE: DETECTED
SODIUM SERPL-SCNC: 140 MMOL/L — SIGNIFICANT CHANGE UP (ref 135–145)
WBC # BLD: 6.35 K/UL — SIGNIFICANT CHANGE UP (ref 3.8–10.5)
WBC # FLD AUTO: 6.35 K/UL — SIGNIFICANT CHANGE UP (ref 3.8–10.5)

## 2020-05-24 PROCEDURE — 99232 SBSQ HOSP IP/OBS MODERATE 35: CPT | Mod: GC

## 2020-05-24 RX ADMIN — Medication 1 GRAM(S): at 23:07

## 2020-05-24 RX ADMIN — PREGABALIN 1000 MICROGRAM(S): 225 CAPSULE ORAL at 12:21

## 2020-05-24 RX ADMIN — PANTOPRAZOLE SODIUM 40 MILLIGRAM(S): 20 TABLET, DELAYED RELEASE ORAL at 07:22

## 2020-05-24 RX ADMIN — Medication 1 GRAM(S): at 12:24

## 2020-05-24 RX ADMIN — Medication 1 APPLICATION(S): at 12:22

## 2020-05-24 RX ADMIN — Medication 1 SPRAY(S): at 07:22

## 2020-05-24 RX ADMIN — Medication 50 MILLIGRAM(S): at 18:31

## 2020-05-24 RX ADMIN — Medication 50 MILLIGRAM(S): at 07:22

## 2020-05-24 RX ADMIN — MIRTAZAPINE 7.5 MILLIGRAM(S): 45 TABLET, ORALLY DISINTEGRATING ORAL at 23:07

## 2020-05-24 RX ADMIN — Medication 1 GRAM(S): at 07:22

## 2020-05-24 NOTE — PROGRESS NOTE ADULT - PROBLEM SELECTOR PLAN 1
COVID-19, maintaining sat >90 on room air   - Maintain on airborne isolation.  - Limit use of NSAIDs.  - Medically stable, however COVID19 PCR testing remains positive. Continue to repeat PCR every 48hrs until negative, awaiting placement at [facility], given long weekend, will repeat covid test on Mon[place] for anticpation of tuesday d/c planning

## 2020-05-24 NOTE — PROGRESS NOTE ADULT - PROBLEM SELECTOR PLAN 3
Stage 4 Sacral Ulcer  - s/p debridement and Zosyn course  - s/p wound vac placement - draining appropriately, serosanguinous discharge.  - wound care NP Benita following  - Pain regimen: acetaminophen for mild pain, oxycodone 5mg q6 PRN for moderate, oxycodone 10mg q6 pRN for severe pain  - Will change bed order to alternate pressure bed   - Menendez placed for urinary retention by Urology - as pt not yet ambulatory, will keep Menendez in for wound healing given sacral ulcer, attempt TOV at [facility] once more ambulatory

## 2020-05-24 NOTE — PROGRESS NOTE ADULT - ASSESSMENT
78 yo M with PMHx of HTN, BPH, spinal stenosis, dyslipidemia, anxiety, neuropathy, B12 deficiency presented from [facility] rehab for evaluation of weakness and hypotension. 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 Pt medically stable for d/c, awaiting [facility] placement and repeat serial COVID-19 PCR testing

## 2020-05-24 NOTE — PROGRESS NOTE ADULT - ASSESSMENT
The patient is a 79 year old male with a history of HTN, HL, pre-DM, anxiety, spinal stenosis who presents with weakness and AMS in the setting of worsening anemia, dehydration, COVID-19.    Plan:  - Continue metoprolol tartrate 50 mg bid  - Not hypoxic on RA  - Awaiting negative COVID-19 for placement - last was positive  - Discharge planning

## 2020-05-24 NOTE — PROGRESS NOTE ADULT - SUBJECTIVE AND OBJECTIVE BOX
INTERVAL HPI/OVERNIGHT EVENTS:    chart reviewed  +bm  eating    MEDICATIONS  (STANDING):  cyanocobalamin 1000 MICROGram(s) Oral daily  metoprolol tartrate 50 milliGRAM(s) Oral two times a day  mirtazapine 7.5 milliGRAM(s) Oral at bedtime  pantoprazole    Tablet 40 milliGRAM(s) Oral before breakfast  povidone iodine 10% Solution 1 Application(s) Topical daily  sertraline 100 milliGRAM(s) Oral daily  sodium chloride 0.65% Nasal 1 Spray(s) Both Nostrils two times a day  sucralfate suspension 1 Gram(s) Oral four times a day    MEDICATIONS  (PRN):  oxycodone    5 mG/acetaminophen 325 mG 1 Tablet(s) Oral every 6 hours PRN Moderate Pain (4 - 6)  oxycodone    5 mG/acetaminophen 325 mG 2 Tablet(s) Oral every 6 hours PRN Severe Pain (7 - 10)      Allergies    No Known Allergies    Intolerances        Review of Systems:    unable to obtain     Vital Signs Last 24 Hrs  T(C): 36.9 (21 May 2020 07:49), Max: 36.9 (21 May 2020 07:49)  T(F): 98.4 (21 May 2020 07:49), Max: 98.4 (21 May 2020 07:49)  HR: 67 (21 May 2020 07:49) (67 - 70)  BP: 103/58 (21 May 2020 07:49) (103/58 - 137/68)  BP(mean): --  RR: 19 (21 May 2020 07:49) (19 - 19)  SpO2: 98% (21 May 2020 07:49) (94% - 99%)    PHYSICAL EXAM:    deferred f/u done remotely as covid +     LABS:                        10.4   7.06  )-----------( 255      ( 21 May 2020 07:20 )             33.8     05-21    139  |  102  |  25<H>  ----------------------------<  88  3.9   |  30  |  0.70    Ca    8.9      21 May 2020 07:20  Mg     2.3     05-20    TPro  x   /  Alb  2.2<L>  /  TBili  x   /  DBili  x   /  AST  x   /  ALT  x   /  AlkPhos  x   05-21          RADIOLOGY & ADDITIONAL TESTS:

## 2020-05-24 NOTE — PROGRESS NOTE ADULT - SUBJECTIVE AND OBJECTIVE BOX
Patient is a 79y old  Male who presents with a chief complaint of Generalized weakness (24 May 2020 10:23)        HPI:  80 y/o M with PMHx of HTN, BPH, spinal stenosis, dyslipidemia, anxiety, neuropathy, B12 deficiency who presents from [facility] rehab for evaluation of weakness and hypotension. As per transfer papers, pt with confusion after lunch today. Pt admits to generalized weakness. Lives with wife, Stephany and son. He came [facility] [place] since Feb 14. Patient lives with wife and son at home and ambulates with cane. Contracted Covid-19 in [facility] rehab and first started having symptoms on Mar 25, reporting symptoms of cough and low grade fever, diarrhea. Denies any chest pain, shortness of breath, headache, dizziness. History obtained from wife, Stephany([phone]) as pt is confused and a poor historian.   Of note, pt was noted to have sacral wounds today and reported low grade fever for two days and mental status changes today and loss of appetite.    The date the pt first felt unwell: March 25  Fever or chills: yes [ x ]   no [  ]   - Tmax:   Fatigue, malaise or generalized weakness: yes [ x ]   no [  ]  Shortness of breath/dyspnea: yes [ x ]   no [  ]  Cough: yes [ x ]   no [  ], sputum production: yes [  ]   no [x  ]  Blood in sputum: yes [  ]   no [ x ]  Anorexia/po intolerance: yes [  ]   no [ x ]  Chest pain or chest tightness: yes [  ]   no [ x ]  Edema in legs: yes [  ]   no [ x ]  Myalgias: yes [  ]   no [ x ]  Headache: yes [  ]   no [x  ]  Sore throat: yes [  ]   no [ x ]  Rhinorrhea: yes [  ]   no [  x]  Abd pain: yes [  ]   no [ x ]  Nausea: yes [  ]   no [x  ]  Vomiting: yes [  ]   no [  x]  Diarrhea: yes [  ]   no [  ]  Skin rashes: yes [  ]   no [  ]  Loss of sense of smell/anosmia: yes [  ]   no [ x ]  Conjunctivitis: yes [  ]   no [ x ]  Recent travel: yes [  ]   no [ x ] - Location:   Any sick contacts: yes [x  ]   no [  ]: Contracted from [facility] rehab  Close contact with someone confirmed positive with COVID-19 / SARS-CoV2 in the last 14 days: yes [  ]   no [ x ]  Code status: DNR, but not DNI      In the ED  Vitals: T--, , BP 84/48, RR 22, O2 96 on RA  Labs(In [place]) significant for: wbc 18.45, hgb 6.8, FOBT+,   Coag studies: PT 14.9, INR 1.33, aPTT 43.7, d-dimer 561  Chem panel: Lactate 2.5, Na 133, AG 3, BUN/Cr 61/1.61, Alb 1.9, Alk phos 195, ,   UA neg  Covid-19 PCR Positive   EKG shows Accelerated Junctional rhythm with occasional PVCs  CXR in [place] ED shows: Mild R atelectasis  In [place] ED, s/p 2L NS bolus, 1 unit pRBC (28 Apr 2020 21:00)      SUBJECTIVE & OBJECTIVE: Pt seen and examined at bedside. upset over repeting covid testing with postive     PHYSICAL EXAM:  T(C): 36.4 (05-24-20 @ 07:34), Max: 36.8 (05-23-20 @ 19:17)  HR: 76 (05-24-20 @ 07:34) (76 - 78)  BP: 135/69 (05-24-20 @ 07:34) (120/64 - 135/69)  RR: 17 (05-24-20 @ 07:34) (17 - 17)  SpO2: 94% (05-24-20 @ 07:34) (93% - 94%)  Wt(kg): --   GENERAL: NAD,  HEAD:  Atraumatic, Normocephalic  NERVOUS SYSTEM:  Alert & Oriented X3,  CHEST/LUNG: decrease aire tnry a the bases   HEART: Regular rate and rhythm; No murmurs, rubs, or gallops  ABDOMEN: Soft, Nontender, Nondistended; Bowel sounds present  EXTREMITIES:  2+ Peripheral Pulses, No clubbing, cyanosis, or edema        MEDICATIONS  (STANDING):  cyanocobalamin 1000 MICROGram(s) Oral daily  metoprolol tartrate 50 milliGRAM(s) Oral two times a day  mirtazapine 7.5 milliGRAM(s) Oral at bedtime  pantoprazole   Suspension 40 milliGRAM(s) Oral daily  polyethylene glycol 3350 17 Gram(s) Oral daily  povidone iodine 10% Solution 1 Application(s) Topical daily  senna 2 Tablet(s) Oral at bedtime  sertraline 100 milliGRAM(s) Oral daily  sodium chloride 0.65% Nasal 1 Spray(s) Both Nostrils two times a day  sucralfate suspension 1 Gram(s) Oral four times a day    MEDICATIONS  (PRN):  oxycodone    5 mG/acetaminophen 325 mG 1 Tablet(s) Oral every 6 hours PRN Moderate Pain (4 - 6)  oxycodone    5 mG/acetaminophen 325 mG 2 Tablet(s) Oral every 6 hours PRN Severe Pain (7 - 10)      LABS:                        11.1   6.35  )-----------( 296      ( 24 May 2020 07:16 )             33.5     05-24    140  |  105  |  29<H>  ----------------------------<  81  3.6   |  31  |  0.75    Ca    8.5      24 May 2020 07:16    TPro  6.2  /  Alb  2.3<L>  /  TBili  0.6  /  DBili  x   /  AST  30  /  ALT  42  /  AlkPhos  117  05-24          CAPILLARY BLOOD GLUCOSE          CAPILLARY BLOOD GLUCOSE        CAPILLARY BLOOD GLUCOSE                RECENT CULTURES:      RADIOLOGY & ADDITIONAL TESTS:                        DVT/GI ppx  Discussed with pt @ bedside

## 2020-05-24 NOTE — PROGRESS NOTE ADULT - ASSESSMENT
78 y/o M with PMHx of HTN, BPH, spinal stenosis, dyslipidemia, anxiety, neuropathy, B12 deficiency who presents from rehab for evaluation of weakness and hypotension    Anemia   no overt gi bleeding; hgb stable  likely r/t sacral decub/hematoma, sp debridement  monitor cbc, transfuse prn  cont ppi and carafate  monitor stool color   no plans for endoscopic evaluation as no overt gib/covid+  op gi f/u for possible colonoscopy if none recent once covid crisis resolved given ct findings    FTT/Dysphagia   unclear etiology; esophagram at [place] 2/2019 normal   some improvement in po intake  correct elytes prn  cont ppi    cont remeron  on supplements per nutrition   encouragement/assistance at meals      COVID-19  monitor rep status   inc lfts likely in setting of viral illness; resolved  liver/gb normal appearing on prior imaging  care per primary team

## 2020-05-24 NOTE — PROGRESS NOTE ADULT - SUBJECTIVE AND OBJECTIVE BOX
Chief Complaint: Weakness, AMS    Interval Events: No events overnight.    Review of Systems:  General: No fevers, chills, weight loss or gain  Skin: No rashes, color changes  Cardiovascular: No chest pain, orthopnea  Respiratory: No shortness of breath, cough  Gastrointestinal: No nausea, abdominal pain  Genitourinary: No incontinence, pain with urination  Musculoskeletal: No pain, swelling, decreased range of motion  Neurological: No headache, weakness  Psychiatric: No depression, anxiety  Endocrine: No weight loss or gain, increased thirst  All other systems are comprehensively negative.    Physical Exam:  Vital Signs Last 24 Hrs  T(C): 36.4 (24 May 2020 07:34), Max: 36.8 (23 May 2020 19:17)  T(F): 97.5 (24 May 2020 07:34), Max: 98.2 (23 May 2020 19:17)  HR: 76 (24 May 2020 07:34) (76 - 78)  BP: 135/69 (24 May 2020 07:34) (120/64 - 135/69)  BP(mean): --  RR: 17 (24 May 2020 07:34) (17 - 17)  SpO2: 94% (24 May 2020 07:34) (93% - 94%)  General: NAD  HEENT: MMM  Neck: No JVD, no carotid bruit  Extremities: No LE edema, no cyanosis  Neuro: Non-focal  Skin: No rash    Labs:    05-24    140  |  105  |  29<H>  ----------------------------<  81  3.6   |  31  |  0.75    Ca    8.5      24 May 2020 07:16    TPro  6.2  /  Alb  2.3<L>  /  TBili  0.6  /  DBili  x   /  AST  30  /  ALT  42  /  AlkPhos  117  05-24                        11.1   6.35  )-----------( 296      ( 24 May 2020 07:16 )             33.5

## 2020-05-25 LAB — SARS-COV-2 RNA SPEC QL NAA+PROBE: SIGNIFICANT CHANGE UP

## 2020-05-25 PROCEDURE — 99232 SBSQ HOSP IP/OBS MODERATE 35: CPT | Mod: GC

## 2020-05-25 RX ADMIN — Medication 1 GRAM(S): at 07:04

## 2020-05-25 RX ADMIN — Medication 1 APPLICATION(S): at 13:10

## 2020-05-25 RX ADMIN — Medication 1 SPRAY(S): at 17:28

## 2020-05-25 RX ADMIN — Medication 1 GRAM(S): at 17:28

## 2020-05-25 RX ADMIN — SERTRALINE 100 MILLIGRAM(S): 25 TABLET, FILM COATED ORAL at 13:10

## 2020-05-25 RX ADMIN — Medication 1 GRAM(S): at 23:08

## 2020-05-25 RX ADMIN — POLYETHYLENE GLYCOL 3350 17 GRAM(S): 17 POWDER, FOR SOLUTION ORAL at 13:09

## 2020-05-25 RX ADMIN — Medication 50 MILLIGRAM(S): at 07:04

## 2020-05-25 RX ADMIN — Medication 1 SPRAY(S): at 07:04

## 2020-05-25 RX ADMIN — Medication 1 GRAM(S): at 13:09

## 2020-05-25 RX ADMIN — PREGABALIN 1000 MICROGRAM(S): 225 CAPSULE ORAL at 14:10

## 2020-05-25 RX ADMIN — Medication 50 MILLIGRAM(S): at 17:28

## 2020-05-25 RX ADMIN — MIRTAZAPINE 7.5 MILLIGRAM(S): 45 TABLET, ORALLY DISINTEGRATING ORAL at 23:08

## 2020-05-25 NOTE — PROGRESS NOTE ADULT - SUBJECTIVE AND OBJECTIVE BOX
Patient is a 79y old  Male who presents with a chief complaint of Generalized weakness (25 May 2020 09:56)        HPI:  78 y/o M with PMHx of HTN, BPH, spinal stenosis, dyslipidemia, anxiety, neuropathy, B12 deficiency who presents from [facility] rehab for evaluation of weakness and hypotension. As per transfer papers, pt with confusion after lunch today. Pt admits to generalized weakness. Lives with wife, Stephany and son. [facility] [place] since [facility] 14. Patient lives with wife and son at home and ambulates with cane. Contracted Covid-19 in [facility] rehab and first started having symptoms on Mar 25, reporting symptoms of cough and low grade fever, diarrhea. Denies any chest pain, shortness of breath, headache, dizziness. History obtained from wife, Stephany([phone]) as pt is confused and a poor historian.   Of note, pt was noted to have sacral wounds today and reported low grade fever for two days and mental status changes today and loss of appetite.    The date the pt first felt unwell: March 25  Fever or chills: yes [ x ]   no [  ]   - Tmax:   Fatigue, malaise or generalized weakness: yes [ x ]   no [  ]  Shortness of breath/dyspnea: yes [ x ]   no [  ]  Cough: yes [ x ]   no [  ], sputum production: yes [  ]   no [x  ]  Blood in sputum: yes [  ]   no [ x ]  Anorexia/po intolerance: yes [  ]   no [ x ]  Chest pain or chest tightness: yes [  ]   no [ x ]  Edema in legs: yes [  ]   no [ x ]  Myalgias: yes [  ]   no [ x ]  Headache: yes [  ]   no [x  ]  Sore throat: yes [  ]   no [ x ]  Rhinorrhea: yes [  ]   no [  x]  Abd pain: yes [  ]   no [ x ]  Nausea: yes [  ]   no [x  ]  Vomiting: yes [  ]   no [  x]  Diarrhea: yes [  ]   no [  ]  Skin rashes: yes [  ]   no [  ]  Loss of sense of smell/anosmia: yes [  ]   no [ x ]  Conjunctivitis: yes [  ]   no [ x ]  Recent travel: yes [  ]   no [ x ] - Location:   Any sick contacts: yes [x  ]   no [  ]: Contracted from [facility] rehab  Close contact with someone confirmed positive with COVID-19 / SARS-CoV2 in the last 14 days: yes [  ]   no [ x ]  Code status: DNR, but not DNI      In the ED  Vitals: T--, , BP 84/48, RR 22, O2 96 on RA  Labs(In [place]) significant for: wbc 18.45, hgb 6.8, FOBT+,   Coag studies: PT 14.9, INR 1.33, aPTT 43.7, d-dimer 561  Chem panel: Lactate 2.5, Na 133, AG 3, BUN/Cr 61/1.61, Alb 1.9, Alk phos 195, ,   UA neg  Covid-19 PCR Positive   EKG shows Accelerated Junctional rhythm with occasional PVCs  CXR in [place] ED shows: Mild R atelectasis  In [place] ED, s/p 2L NS bolus, 1 unit pRBC (28 Apr 2020 21:00)      SUBJECTIVE & OBJECTIVE: Pt seen and examined at bedside. no acute complaints     PHYSICAL EXAM:  T(C): 36.5 (05-25-20 @ 07:30), Max: 36.9 (05-25-20 @ 05:17)  HR: 76 (05-25-20 @ 07:30) (72 - 76)  BP: 156/82 (05-25-20 @ 07:30) (136/67 - 163/83)  RR: 16 (05-25-20 @ 07:30) (16 - 17)  SpO2: 97% (05-25-20 @ 07:30) (95% - 97%)  Wt(kg): --   GENERAL: NAD,   NECK: Supple, No JVD  NERVOUS SYSTEM:  Alert & Oriented X3,   CHEST/LUNG: decrease air entry at the bases   HEART: Regular rate and rhythm; No murmurs, rubs, or gallops  ABDOMEN: Soft, Nontender, Nondistended; Bowel sounds present  EXTREMITIES:  2+ Peripheral Pulses, No clubbing, cyanosis, or edema        MEDICATIONS  (STANDING):  cyanocobalamin 1000 MICROGram(s) Oral daily  metoprolol tartrate 50 milliGRAM(s) Oral two times a day  mirtazapine 7.5 milliGRAM(s) Oral at bedtime  pantoprazole   Suspension 40 milliGRAM(s) Oral daily  polyethylene glycol 3350 17 Gram(s) Oral daily  povidone iodine 10% Solution 1 Application(s) Topical daily  senna 2 Tablet(s) Oral at bedtime  sertraline 100 milliGRAM(s) Oral daily  sodium chloride 0.65% Nasal 1 Spray(s) Both Nostrils two times a day  sucralfate suspension 1 Gram(s) Oral four times a day    MEDICATIONS  (PRN):  oxycodone    5 mG/acetaminophen 325 mG 1 Tablet(s) Oral every 6 hours PRN Moderate Pain (4 - 6)  oxycodone    5 mG/acetaminophen 325 mG 2 Tablet(s) Oral every 6 hours PRN Severe Pain (7 - 10)      LABS:                        11.1   6.35  )-----------( 296      ( 24 May 2020 07:16 )             33.5     05-24    140  |  105  |  29<H>  ----------------------------<  81  3.6   |  31  |  0.75    Ca    8.5      24 May 2020 07:16    TPro  6.2  /  Alb  2.3<L>  /  TBili  0.6  /  DBili  x   /  AST  30  /  ALT  42  /  AlkPhos  117  05-24          CAPILLARY BLOOD GLUCOSE          CAPILLARY BLOOD GLUCOSE        CAPILLARY BLOOD GLUCOSE                RECENT CULTURES:      RADIOLOGY & ADDITIONAL TESTS:                        DVT/GI ppx  Discussed with pt @ bedside

## 2020-05-25 NOTE — PROGRESS NOTE ADULT - PROBLEM SELECTOR PLAN 3
Stage 4 Sacral Ulcer  - s/p debridement and Zosyn course  - s/p wound vac placement - draining appropriately, serosanguinous discharge.  - wound care NP Benita following  - Pain regimen: acetaminophen for mild pain, oxycodone 5mg q6 PRN for moderate, oxycodone 10mg q6 pRN for severe pain  - Will change bed order to alternate pressure bed   - Menendez placed for urinary retention by Urology - as pt not yet ambulatory, will keep Menendez in for wound healing given sacral ulcer, attempt TOV at [facility] once more ambulatory

## 2020-05-25 NOTE — PROGRESS NOTE ADULT - SUBJECTIVE AND OBJECTIVE BOX
Chief Complaint: Weakness, AMS    Interval Events: No events overnight.    Review of Systems:  General: No fevers, chills, weight loss or gain  Skin: No rashes, color changes  Cardiovascular: No chest pain, orthopnea  Respiratory: No shortness of breath, cough  Gastrointestinal: No nausea, abdominal pain  Genitourinary: No incontinence, pain with urination  Musculoskeletal: No pain, swelling, decreased range of motion  Neurological: No headache, weakness  Psychiatric: No depression, anxiety  Endocrine: No weight loss or gain, increased thirst  All other systems are comprehensively negative.    Physical Exam:  Vital Signs Last 24 Hrs  T(C): 36.5 (25 May 2020 07:30), Max: 36.9 (25 May 2020 05:17)  T(F): 97.7 (25 May 2020 07:30), Max: 98.4 (25 May 2020 05:17)  HR: 76 (25 May 2020 07:30) (72 - 76)  BP: 156/82 (25 May 2020 07:30) (136/67 - 163/83)  BP(mean): --  RR: 16 (25 May 2020 07:30) (16 - 17)  SpO2: 97% (25 May 2020 07:30) (95% - 97%)  General: NAD  HEENT: MMM  Neck: No JVD, no carotid bruit  Extremities: No LE edema, no cyanosis  Neuro: Non-focal  Skin: No rash    Labs:    05-24    140  |  105  |  29<H>  ----------------------------<  81  3.6   |  31  |  0.75    Ca    8.5      24 May 2020 07:16    TPro  6.2  /  Alb  2.3<L>  /  TBili  0.6  /  DBili  x   /  AST  30  /  ALT  42  /  AlkPhos  117  05-24                        11.1   6.35  )-----------( 296      ( 24 May 2020 07:16 )             33.5

## 2020-05-25 NOTE — PROGRESS NOTE ADULT - PROBLEM SELECTOR PLAN 1
COVID-19, maintaining sat >90 on room air   - Maintain on airborne isolation.  - Limit use of NSAIDs.  - Medically stable, however COVID19 PCR testing remains positive. Continue to repeat PCR every 48hrs until negative, awaiting placement at [facility], given long weekend, will repeat covid test on [place] for anticpation of tuesday d/c planning

## 2020-05-25 NOTE — PROGRESS NOTE ADULT - ASSESSMENT
80 yo M with PMHx of HTN, BPH, spinal stenosis, dyslipidemia, anxiety, neuropathy, B12 deficiency presented from [facility] rehab for evaluation of weakness and hypotension. 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 Pt medically stable for d/c, awaiting [facility] placement and repeat serial COVID-19 PCR testing

## 2020-05-25 NOTE — PROGRESS NOTE ADULT - ASSESSMENT
78 y/o M with PMHx of HTN, BPH, spinal stenosis, dyslipidemia, anxiety, neuropathy, B12 deficiency who presents from rehab for evaluation of weakness and hypotension    Anemia   no overt gi bleeding; hgb stable  likely r/t sacral decub/hematoma, sp debridement  monitor cbc, transfuse prn  cont ppi and carafate  monitor stool color   no plans for endoscopic evaluation as no overt gib/covid+  op gi f/u for possible colonoscopy if none recent once covid crisis resolved given ct findings    FTT/Dysphagia   unclear etiology; esophagram at [place] 2/2019 normal   some improvement in po intake  correct elytes prn  cont ppi    cont remeron  on supplements per nutrition   encouragement/assistance at meals      COVID-19  monitor rep status   inc lfts likely in setting of viral illness; resolved  liver/gb normal appearing on prior imaging  care per primary team

## 2020-05-25 NOTE — PROGRESS NOTE ADULT - ASSESSMENT
The patient is a 79 year old male with a history of HTN, HL, pre-DM, anxiety, spinal stenosis who presents with weakness and AMS in the setting of worsening anemia, dehydration, COVID-19.    Plan:  - Continue metoprolol tartrate 50 mg bid  - Not hypoxic on RA  - Awaiting negative COVID-19 for placement - continues to be positive  - Discharge planning

## 2020-05-26 LAB
ALBUMIN SERPL ELPH-MCNC: 2.3 G/DL — LOW (ref 3.3–5)
ALP SERPL-CCNC: 119 U/L — SIGNIFICANT CHANGE UP (ref 40–120)
ALT FLD-CCNC: 37 U/L — SIGNIFICANT CHANGE UP (ref 12–78)
ANION GAP SERPL CALC-SCNC: 4 MMOL/L — LOW (ref 5–17)
AST SERPL-CCNC: 26 U/L — SIGNIFICANT CHANGE UP (ref 15–37)
BILIRUB SERPL-MCNC: 0.6 MG/DL — SIGNIFICANT CHANGE UP (ref 0.2–1.2)
BUN SERPL-MCNC: 27 MG/DL — HIGH (ref 7–23)
CALCIUM SERPL-MCNC: 8.7 MG/DL — SIGNIFICANT CHANGE UP (ref 8.5–10.1)
CHLORIDE SERPL-SCNC: 108 MMOL/L — SIGNIFICANT CHANGE UP (ref 96–108)
CO2 SERPL-SCNC: 30 MMOL/L — SIGNIFICANT CHANGE UP (ref 22–31)
CREAT SERPL-MCNC: 0.87 MG/DL — SIGNIFICANT CHANGE UP (ref 0.5–1.3)
GLUCOSE SERPL-MCNC: 71 MG/DL — SIGNIFICANT CHANGE UP (ref 70–99)
HCT VFR BLD CALC: 34.1 % — LOW (ref 39–50)
HGB BLD-MCNC: 10.6 G/DL — LOW (ref 13–17)
MCHC RBC-ENTMCNC: 26.6 PG — LOW (ref 27–34)
MCHC RBC-ENTMCNC: 31.1 GM/DL — LOW (ref 32–36)
MCV RBC AUTO: 85.5 FL — SIGNIFICANT CHANGE UP (ref 80–100)
NRBC # BLD: 0 /100 WBCS — SIGNIFICANT CHANGE UP (ref 0–0)
PLATELET # BLD AUTO: 330 K/UL — SIGNIFICANT CHANGE UP (ref 150–400)
POTASSIUM SERPL-MCNC: 4.4 MMOL/L — SIGNIFICANT CHANGE UP (ref 3.5–5.3)
POTASSIUM SERPL-SCNC: 4.4 MMOL/L — SIGNIFICANT CHANGE UP (ref 3.5–5.3)
PROT SERPL-MCNC: 6.3 G/DL — SIGNIFICANT CHANGE UP (ref 6–8.3)
RBC # BLD: 3.99 M/UL — LOW (ref 4.2–5.8)
RBC # FLD: 15.4 % — HIGH (ref 10.3–14.5)
SODIUM SERPL-SCNC: 142 MMOL/L — SIGNIFICANT CHANGE UP (ref 135–145)
WBC # BLD: 7.54 K/UL — SIGNIFICANT CHANGE UP (ref 3.8–10.5)
WBC # FLD AUTO: 7.54 K/UL — SIGNIFICANT CHANGE UP (ref 3.8–10.5)

## 2020-05-26 PROCEDURE — 99232 SBSQ HOSP IP/OBS MODERATE 35: CPT | Mod: CS,GC

## 2020-05-26 RX ADMIN — Medication 50 MILLIGRAM(S): at 05:41

## 2020-05-26 RX ADMIN — Medication 1 GRAM(S): at 18:26

## 2020-05-26 RX ADMIN — PREGABALIN 1000 MICROGRAM(S): 225 CAPSULE ORAL at 12:28

## 2020-05-26 RX ADMIN — Medication 1 SPRAY(S): at 05:41

## 2020-05-26 RX ADMIN — Medication 1 GRAM(S): at 12:28

## 2020-05-26 RX ADMIN — Medication 50 MILLIGRAM(S): at 18:26

## 2020-05-26 RX ADMIN — Medication 1 GRAM(S): at 05:41

## 2020-05-26 RX ADMIN — PANTOPRAZOLE SODIUM 40 MILLIGRAM(S): 20 TABLET, DELAYED RELEASE ORAL at 05:41

## 2020-05-26 RX ADMIN — Medication 1 APPLICATION(S): at 12:28

## 2020-05-26 RX ADMIN — SERTRALINE 100 MILLIGRAM(S): 25 TABLET, FILM COATED ORAL at 12:28

## 2020-05-26 NOTE — PROGRESS NOTE ADULT - SUBJECTIVE AND OBJECTIVE BOX
LISANDRA PADGETT is a 79yMale , patient examined and chart reviewed.    INTERVAL HPI/ OVERNIGHT EVENTS:   NAD. Afebrile. Weak.    PAST MEDICAL & SURGICAL HISTORY:  H/O vitamin B deficiency  Spinal stenosis  HTN (hypertension)  History of tonsillectomy      For details regarding the patient's social history, family history, and other miscellaneous elements, please refer the initial infectious diseases consultation and/or the admitting history and physical examination for this admission.    ROS:  CONSTITUTIONAL:  Negative fever or chills  EYES:  Negative  blurry vision or double vision  CARDIOVASCULAR:  Negative for chest pain or palpitations  RESPIRATORY:  Negative for cough, wheezing, or SOB   GASTROINTESTINAL:  Negative for nausea, vomiting, diarrhea, constipation, or abdominal pain  GENITOURINARY:  Negative frequency, urgency or dysuria  NEUROLOGIC:  No headache, confusion, dizziness, lightheadedness  All other systems were reviewed and are negative     Current inpatient medications :  MEDICATIONS  (STANDING):  cyanocobalamin 1000 MICROGram(s) Oral daily  metoprolol tartrate 50 milliGRAM(s) Oral two times a day  mirtazapine 7.5 milliGRAM(s) Oral at bedtime  pantoprazole   Suspension 40 milliGRAM(s) Oral daily  polyethylene glycol 3350 17 Gram(s) Oral daily  povidone iodine 10% Solution 1 Application(s) Topical daily  senna 2 Tablet(s) Oral at bedtime  sertraline 100 milliGRAM(s) Oral daily  sodium chloride 0.65% Nasal 1 Spray(s) Both Nostrils two times a day  sucralfate suspension 1 Gram(s) Oral four times a day    MEDICATIONS  (PRN):  oxycodone    5 mG/acetaminophen 325 mG 1 Tablet(s) Oral every 6 hours PRN Moderate Pain (4 - 6)  oxycodone    5 mG/acetaminophen 325 mG 2 Tablet(s) Oral every 6 hours PRN Severe Pain (7 - 10)      Objective:  Vital Signs Last 24 Hrs  T(C): 36.6 (26 May 2020 16:09), Max: 36.7 (25 May 2020 16:56)  T(F): 97.8 (26 May 2020 16:09), Max: 98 (25 May 2020 16:56)  HR: 79 (26 May 2020 16:09) (68 - 79)  BP: 128/64 (26 May 2020 16:09) (126/71 - 137/73)  RR: 17 (26 May 2020 16:09) (17 - 19)  SpO2: 98% (26 May 2020 16:09) (95% - 98%)    Physical Exam:  General:  no acute distress  Eyes: sclera anicteric, pupils equal and reactive to light  ENMT: buccal mucosa moist, pharynx not injected  Neck: supple, trachea midline  Lungs: clear, no wheeze/rhonchi  Cardiovascular: regular rate and rhythm, S1 S2  Abdomen: soft, nontender, no organomegaly present, bowel sounds normal  Neurological: alert and oriented x3 Cranial Nerves II-XII grossly intact  Skin: sacral decub drsg c/d/i   Lymph Nodes: no palpable cervical/supraclavicular lymph nodes enlargements  Extremities: no cyanosis/clubbing/edema        LABS:                                    10.6   7.54  )-----------( 330      ( 26 May 2020 06:36 )             34.1   05-26    142  |  108  |  27<H>  ----------------------------<  71  4.4   |  30  |  0.87    Ca    8.7      26 May 2020 06:36    TPro  6.3  /  Alb  2.3<L>  /  TBili  0.6  /  DBili  x   /  AST  26  /  ALT  37  /  AlkPhos  119  05-26      MICROBIOLOGY:    Culture - Blood (collected 02 May 2020 00:30)  Source: .Blood Blood  Preliminary Report (03 May 2020 01:03):    No growth to date.    Culture - Blood (collected 02 May 2020 00:30)  Source: .Blood Blood  Preliminary Report (03 May 2020 01:03):    No growth to date.    COVID-19 PCR . (04.28.20 @ 22:41)    COVID-19 PCR: Detected    COVID-19 PCR . (05.25.20 @ 14:47)    COVID-19 PCR: NotDetec    RADIOLOGY & ADDITIONAL STUDIES:    EXAM:  XR CHEST PORTABLE ROUTINE 1V                            PROCEDURE DATE:  04/30/2020          INTERPRETATION:    Examination: XR CHEST    History: ADMDIAG1: K92.2 GASTROINTESTINAL HEMORRHAGE, UNSPECIFIED/, pn pneumonia    TECHNIQUE:  Portable AP view of the chest was obtained.    COMPARISON: A CT chest 1/29/2020 available for review.    FINDINGS:   Lungs clear. RIGHT hemidiaphragm mildly elevated. Posterior lung bases cannot be assessed due to elevated diaphragms. Lateral radiograph recommended if clinically warranted.  . The heart and mediastinum are within normal limits.    Visualized osseous structures are intact.      IMPRESSION: Visualized Lungs clear..   Elevated LEFT hemidiaphragm. Posterior lung bases cannot be assessed dueto elevated diaphragms. Lateral radiograph recommended if clinically warranted.      Assessment :   80 y/o M with PMHx of HTN, BPH, spinal stenosis, dyslipidemia, anxiety, neuropathy, B12 deficiency resident of [facility] rehab for evaluation of weakness and hypotension with mental status change. Tested COVID 19 positive early April. In ED he was hypotensive. WBC 19K No SOB cough n/v/d. Pt is a poor historian. Has unstagable sacral decub ulcer. Also with Acute anemia. ?infectious process vs reactive leukocytosis. Leukocytosis poss chronic.  Respiratory status stable. Menendez placed sec AUR 5/3/2020. Seen by surgery and is sp bedside I&D of sacral decub Clinically stable.    Repeat COVID 19 5/25/2020 neg.    Plan :   Monitor off antibiotics  Menendez per urology  Frequent turning  Local wound care per surgery/wound care nurse  COVID-19--critical period for decompensation  (7-14 days post symptom onset), avoid aerosolizing procedures, NSAIDs   -monitor respiratory status closely ( route of 02 and saturation) and titrate when able  -isolation precautions per protocol  -avoid excessive blood draws, blood cultures and frequent CXRs  -monitor biomarkers- CBC w diff  for NLRNLR <3 low vs >5 high) Ferritin (lower risk <450 vs >850) CRP (low risk <2 and higher risk >6) and LDH, D Dimer, procalcitonin  -therapies remains investigational-- including Hydroxychloroquine  -antibiotics only if there is concern for a bacterial process  -Steroids short course ( 5 days)  --for hypoxia/ARDS /shock    Dc planning to rehab pending COVID 19 neg x 2       Continue with present regiment.  Appropriate use of antibiotics and adverse effects reviewed.      I have discussed the above plan of care with patient/ family in detail. They expressed understanding of the  treatment plan . Risks, benefits and alternatives discussed in detail. I have asked if they have any questions or concerns and appropriately addressed them to the best of my ability .    > 35 minutes were spent in direct patient care reviewing notes, medications ,labs data/ imaging , discussion with multidisciplinary team.    Thank you for allowing me to participate in care of your patient .    Naima Lopez MD  Infectious Disease  [phone]

## 2020-05-26 NOTE — PROGRESS NOTE ADULT - ATTENDING COMMENTS
78 yo M with PMHx of HTN, BPH, spinal stenosis, dyslipidemia, anxiety, neuropathy, B12 deficiency presented from [facility] rehab for evaluation of weakness and hypotension. 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 Pt medically stable for d/c, awaiting [facility] placement and repeat serial COVID-19 PCR testing Plan: pt is covid negative x1, pending 2nd covid, dc tomorrow to rehab, apprec  collaboration

## 2020-05-26 NOTE — PROGRESS NOTE ADULT - PROBLEM SELECTOR PLAN 3
Stage 4 Sacral Ulcer  - s/p debridement and Zosyn course  - s/p wound vac placement - draining appropriately, serosanguinous discharge.  - wound care NP Benita following  - Pain regimen: acetaminophen for mild pain, oxycodone 5mg q6 PRN for moderate, oxycodone 10mg q6 pRN for severe pain  - Will change bed order to alternate pressure bed   - Menendez placed for urinary retention by Urology - as pt not yet ambulatory, will keep Menendez in for wound healing given sacral ulcer, attempt TOV at [facility] once more ambulatory

## 2020-05-26 NOTE — PROGRESS NOTE ADULT - ASSESSMENT
78 y/o M with PMHx of HTN, BPH, spinal stenosis, dyslipidemia, anxiety, neuropathy, B12 deficiency who presents from rehab for evaluation of weakness and hypotension    Anemia   no overt gi bleeding; hgb stable  likely r/t sacral decub/hematoma, sp debridement  monitor cbc, transfuse prn  cont ppi and carafate  monitor stool color   no plans for endoscopic evaluation as no overt gib  op gi f/u for possible colonoscopy if none recent once covid crisis resolved given ct findings    FTT/Dysphagia   unclear etiology; esophagram at [place] 2/2019 normal   some improvement in po intake  correct elytes prn  cont ppi    cont remeron  on supplements per nutrition   encouragement/assistance at meals      COVID-19  per primary

## 2020-05-26 NOTE — PROGRESS NOTE ADULT - SUBJECTIVE AND OBJECTIVE BOX
Chief Complaint: Weakness, AMS    Interval Events: No events overnight.    Review of Systems:  General: No fevers, chills, weight loss or gain  Skin: No rashes, color changes  Cardiovascular: No chest pain, orthopnea  Respiratory: No shortness of breath, cough  Gastrointestinal: No nausea, abdominal pain  Genitourinary: No incontinence, pain with urination  Musculoskeletal: No pain, swelling, decreased range of motion  Neurological: No headache, weakness  Psychiatric: No depression, anxiety  Endocrine: No weight loss or gain, increased thirst  All other systems are comprehensively negative.    Physical Exam:  Vital Signs Last 24 Hrs  T(C): 36.6 (26 May 2020 07:30), Max: 36.7 (25 May 2020 16:56)  T(F): 97.9 (26 May 2020 07:30), Max: 98 (25 May 2020 16:56)  HR: 68 (26 May 2020 07:30) (68 - 76)  BP: 137/73 (26 May 2020 07:30) (126/71 - 137/73)  BP(mean): --  RR: 19 (26 May 2020 07:30) (18 - 19)  SpO2: 98% (26 May 2020 07:30) (95% - 98%)  General: NAD  HEENT: MMM  Neck: No JVD, no carotid bruit  Extremities: No LE edema, no cyanosis  Neuro: Non-focal  Skin: No rash    Labs:    05-26    142  |  108  |  27<H>  ----------------------------<  71  4.4   |  30  |  0.87    Ca    8.7      26 May 2020 06:36    TPro  6.3  /  Alb  2.3<L>  /  TBili  0.6  /  DBili  x   /  AST  26  /  ALT  37  /  AlkPhos  119  05-26                        10.6   7.54  )-----------( 330      ( 26 May 2020 06:36 )             34.1

## 2020-05-26 NOTE — PROGRESS NOTE ADULT - ASSESSMENT
The patient is a 79 year old male with a history of HTN, HL, pre-DM, anxiety, spinal stenosis who presents with weakness and AMS in the setting of worsening anemia, dehydration, COVID-19.    Plan:  - Continue metoprolol tartrate 50 mg bid  - Not hypoxic on RA  - Last COVID-19 negative  - Discharge planning

## 2020-05-26 NOTE — PROGRESS NOTE ADULT - SUBJECTIVE AND OBJECTIVE BOX
Patient is a 79y old  Male who presents with a chief complaint of Generalized weakness (25 May 2020 09:56)    HPI:  78 y/o M with PMHx of HTN, BPH, spinal stenosis, dyslipidemia, anxiety, neuropathy, B12 deficiency who presents from [facility] rehab for evaluation of weakness and hypotension. As per transfer papers, pt with confusion after lunch today. Pt admits to generalized weakness. Lives with wife, Stephany and son. He came from [facility] rehab in [place] since Feb 14. Patient lives with wife and son at home and ambulates with cane. Contracted Covid-19 in [facility] rehab and first started having symptoms on Mar 25, reporting symptoms of cough and low grade fever, diarrhea. Denies any chest pain, shortness of breath, headache, dizziness. History obtained from wife, Stephany([phone]) as pt is confused and a poor historian.   Of note, pt was noted to have sacral wounds today and reported low grade fever for two days and mental status changes today and loss of appetite.    SUBJECTIVE & OBJECTIVE: Pt seen and examined at bedside. No acute events overnight. Pt complains that bed is uncomfortable. COVID PCR (-) x1 on 5/25. Denies CP, SOB, abd pain, NVD, cough, fever, chills, weakness, numbness/tingling    MEDICATIONS  (STANDING):  cyanocobalamin 1000 MICROGram(s) Oral daily  metoprolol tartrate 50 milliGRAM(s) Oral two times a day  mirtazapine 7.5 milliGRAM(s) Oral at bedtime  pantoprazole   Suspension 40 milliGRAM(s) Oral daily  polyethylene glycol 3350 17 Gram(s) Oral daily  povidone iodine 10% Solution 1 Application(s) Topical daily  senna 2 Tablet(s) Oral at bedtime  sertraline 100 milliGRAM(s) Oral daily  sodium chloride 0.65% Nasal 1 Spray(s) Both Nostrils two times a day  sucralfate suspension 1 Gram(s) Oral four times a day    MEDICATIONS  (PRN):  oxycodone    5 mG/acetaminophen 325 mG 1 Tablet(s) Oral every 6 hours PRN Moderate Pain (4 - 6)  oxycodone    5 mG/acetaminophen 325 mG 2 Tablet(s) Oral every 6 hours PRN Severe Pain (7 - 10)      T(C): 36.6 (05-26-20 @ 04:46), Max: 36.7 (05-25-20 @ 16:56)  HR: 72 (05-26-20 @ 04:46) (72 - 76)  BP: 136/74 (05-26-20 @ 04:46) (126/71 - 136/74)  RR: 18 (05-26-20 @ 04:46) (18 - 18)  SpO2: 95% (05-26-20 @ 04:46) (95% - 96%)    GENERAL: patient appears well, no acute distress, appropriate  EYES: sclera clear, no exudates, PERRLA  ENMT: oropharynx clear without erythema, no exudates, moist mucous membranes  NECK: supple, soft, no thyromegaly noted  LUNGS:  clear to auscultation,  no rales, wheezing or rhonchi appreciated  HEART: S1/S2, regular rate and rhythm, no murmurs noted, no lower extremity edema  GASTROINTESTINAL: abdomen is soft, nontender, nondistended, normoactive bowel sounds, no palpable masses  INTEGUMENT: good skin turgor, warm, dry and intact  MUSCULOSKELETAL: no clubbing or cyanosis, no obvious deformity  NEUROLOGIC: awake, alert, oriented x3, strength 5/5 in all extremities, no obvious sensory deficits        LABS:                        10.6   7.54  )-----------( 330      ( 26 May 2020 06:36 )             34.1   05-26    142  |  108  |  27<H>  ----------------------------<  71  4.4   |  30  |  0.87    Ca    8.7      26 May 2020 06:36    TPro  6.3  /  Alb  2.3<L>  /  TBili  0.6  /  DBili  x   /  AST  26  /  ALT  37  /  AlkPhos  119  05-26

## 2020-05-26 NOTE — PROGRESS NOTE ADULT - PROBLEM SELECTOR PLAN 1
COVID-19, maintaining sat >90 on room air   - Maintain on airborne isolation.  - Limit use of NSAIDs.  - Medically stable, COVID PCR test on 5/25 negative. Continue to repeat PCR every 48hrs until negative x2, awaiting placement at [facility]

## 2020-05-26 NOTE — PROGRESS NOTE ADULT - SUBJECTIVE AND OBJECTIVE BOX
INTERVAL HPI/OVERNIGHT EVENTS:  chart reviewed    MEDICATIONS  (STANDING):  cyanocobalamin 1000 MICROGram(s) Oral daily  metoprolol tartrate 50 milliGRAM(s) Oral two times a day  mirtazapine 7.5 milliGRAM(s) Oral at bedtime  pantoprazole   Suspension 40 milliGRAM(s) Oral daily  polyethylene glycol 3350 17 Gram(s) Oral daily  povidone iodine 10% Solution 1 Application(s) Topical daily  senna 2 Tablet(s) Oral at bedtime  sertraline 100 milliGRAM(s) Oral daily  sodium chloride 0.65% Nasal 1 Spray(s) Both Nostrils two times a day  sucralfate suspension 1 Gram(s) Oral four times a day    MEDICATIONS  (PRN):  oxycodone    5 mG/acetaminophen 325 mG 1 Tablet(s) Oral every 6 hours PRN Moderate Pain (4 - 6)  oxycodone    5 mG/acetaminophen 325 mG 2 Tablet(s) Oral every 6 hours PRN Severe Pain (7 - 10)      Allergies    No Known Allergies    Intolerances        Review of Systems:    unable to obtain       Vital Signs Last 24 Hrs  T(C): 36.6 (26 May 2020 07:30), Max: 36.7 (25 May 2020 16:56)  T(F): 97.9 (26 May 2020 07:30), Max: 98 (25 May 2020 16:56)  HR: 68 (26 May 2020 07:30) (68 - 76)  BP: 137/73 (26 May 2020 07:30) (126/71 - 137/73)  BP(mean): --  RR: 19 (26 May 2020 07:30) (18 - 19)  SpO2: 98% (26 May 2020 07:30) (95% - 98%)    PHYSICAL EXAM:    deferred f/u done remotely as on [place] isolation      LABS:                        10.6   7.54  )-----------( 330      ( 26 May 2020 06:36 )             34.1     05-26    142  |  108  |  27<H>  ----------------------------<  71  4.4   |  30  |  0.87    Ca    8.7      26 May 2020 06:36    TPro  6.3  /  Alb  2.3<L>  /  TBili  0.6  /  DBili  x   /  AST  26  /  ALT  37  /  AlkPhos  119  05-26          RADIOLOGY & ADDITIONAL TESTS:

## 2020-05-26 NOTE — PROGRESS NOTE ADULT - ASSESSMENT
80 yo M with PMHx of HTN, BPH, spinal stenosis, dyslipidemia, anxiety, neuropathy, B12 deficiency presented from [facility] rehab for evaluation of weakness and hypotension. 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 Pt medically stable for d/c, awaiting [facility] placement and repeat serial COVID-19 PCR testing

## 2020-05-27 DIAGNOSIS — E87.6 HYPOKALEMIA: ICD-10-CM

## 2020-05-27 LAB
ANION GAP SERPL CALC-SCNC: 6 MMOL/L — SIGNIFICANT CHANGE UP (ref 5–17)
BASOPHILS # BLD AUTO: 0.01 K/UL — SIGNIFICANT CHANGE UP (ref 0–0.2)
BASOPHILS NFR BLD AUTO: 0.2 % — SIGNIFICANT CHANGE UP (ref 0–2)
BUN SERPL-MCNC: 33 MG/DL — HIGH (ref 7–23)
CALCIUM SERPL-MCNC: 8.5 MG/DL — SIGNIFICANT CHANGE UP (ref 8.5–10.1)
CHLORIDE SERPL-SCNC: 106 MMOL/L — SIGNIFICANT CHANGE UP (ref 96–108)
CO2 SERPL-SCNC: 30 MMOL/L — SIGNIFICANT CHANGE UP (ref 22–31)
CREAT SERPL-MCNC: 0.84 MG/DL — SIGNIFICANT CHANGE UP (ref 0.5–1.3)
EOSINOPHIL # BLD AUTO: 0.04 K/UL — SIGNIFICANT CHANGE UP (ref 0–0.5)
EOSINOPHIL NFR BLD AUTO: 0.6 % — SIGNIFICANT CHANGE UP (ref 0–6)
GLUCOSE SERPL-MCNC: 91 MG/DL — SIGNIFICANT CHANGE UP (ref 70–99)
HCT VFR BLD CALC: 31.3 % — LOW (ref 39–50)
HGB BLD-MCNC: 9.7 G/DL — LOW (ref 13–17)
IMM GRANULOCYTES NFR BLD AUTO: 0.5 % — SIGNIFICANT CHANGE UP (ref 0–1.5)
LYMPHOCYTES # BLD AUTO: 1.98 K/UL — SIGNIFICANT CHANGE UP (ref 1–3.3)
LYMPHOCYTES # BLD AUTO: 31.9 % — SIGNIFICANT CHANGE UP (ref 13–44)
MCHC RBC-ENTMCNC: 26.4 PG — LOW (ref 27–34)
MCHC RBC-ENTMCNC: 31 GM/DL — LOW (ref 32–36)
MCV RBC AUTO: 85.3 FL — SIGNIFICANT CHANGE UP (ref 80–100)
MONOCYTES # BLD AUTO: 0.45 K/UL — SIGNIFICANT CHANGE UP (ref 0–0.9)
MONOCYTES NFR BLD AUTO: 7.2 % — SIGNIFICANT CHANGE UP (ref 2–14)
NEUTROPHILS # BLD AUTO: 3.7 K/UL — SIGNIFICANT CHANGE UP (ref 1.8–7.4)
NEUTROPHILS NFR BLD AUTO: 59.6 % — SIGNIFICANT CHANGE UP (ref 43–77)
NRBC # BLD: 0 /100 WBCS — SIGNIFICANT CHANGE UP (ref 0–0)
PLATELET # BLD AUTO: 306 K/UL — SIGNIFICANT CHANGE UP (ref 150–400)
POTASSIUM SERPL-MCNC: 3.3 MMOL/L — LOW (ref 3.5–5.3)
POTASSIUM SERPL-SCNC: 3.3 MMOL/L — LOW (ref 3.5–5.3)
RBC # BLD: 3.67 M/UL — LOW (ref 4.2–5.8)
RBC # FLD: 15.2 % — HIGH (ref 10.3–14.5)
SARS-COV-2 RNA SPEC QL NAA+PROBE: DETECTED
SODIUM SERPL-SCNC: 142 MMOL/L — SIGNIFICANT CHANGE UP (ref 135–145)
WBC # BLD: 6.21 K/UL — SIGNIFICANT CHANGE UP (ref 3.8–10.5)
WBC # FLD AUTO: 6.21 K/UL — SIGNIFICANT CHANGE UP (ref 3.8–10.5)

## 2020-05-27 PROCEDURE — 99232 SBSQ HOSP IP/OBS MODERATE 35: CPT | Mod: CS,GC

## 2020-05-27 RX ORDER — OXYCODONE AND ACETAMINOPHEN 5; 325 MG/1; MG/1
1 TABLET ORAL EVERY 6 HOURS
Refills: 0 | Status: DISCONTINUED | OUTPATIENT
Start: 2020-05-27 | End: 2020-06-01

## 2020-05-27 RX ORDER — OXYCODONE AND ACETAMINOPHEN 5; 325 MG/1; MG/1
2 TABLET ORAL EVERY 6 HOURS
Refills: 0 | Status: DISCONTINUED | OUTPATIENT
Start: 2020-05-27 | End: 2020-06-01

## 2020-05-27 RX ORDER — POTASSIUM CHLORIDE 20 MEQ
40 PACKET (EA) ORAL EVERY 4 HOURS
Refills: 0 | Status: COMPLETED | OUTPATIENT
Start: 2020-05-27 | End: 2020-05-27

## 2020-05-27 RX ADMIN — Medication 1 APPLICATION(S): at 11:15

## 2020-05-27 RX ADMIN — SERTRALINE 100 MILLIGRAM(S): 25 TABLET, FILM COATED ORAL at 11:09

## 2020-05-27 RX ADMIN — Medication 40 MILLIEQUIVALENT(S): at 15:33

## 2020-05-27 RX ADMIN — Medication 40 MILLIEQUIVALENT(S): at 11:09

## 2020-05-27 RX ADMIN — PREGABALIN 1000 MICROGRAM(S): 225 CAPSULE ORAL at 11:09

## 2020-05-27 RX ADMIN — Medication 1 GRAM(S): at 06:06

## 2020-05-27 RX ADMIN — Medication 1 SPRAY(S): at 08:40

## 2020-05-27 RX ADMIN — Medication 50 MILLIGRAM(S): at 17:26

## 2020-05-27 RX ADMIN — Medication 50 MILLIGRAM(S): at 06:06

## 2020-05-27 NOTE — PROGRESS NOTE ADULT - SUBJECTIVE AND OBJECTIVE BOX
Patient is a 79y old  Male who presents with a chief complaint of Generalized weakness (25 May 2020 09:56)    HPI:  80 y/o M with PMHx of HTN, BPH, spinal stenosis, dyslipidemia, anxiety, neuropathy, B12 deficiency who presents from [facility] rehab for evaluation of weakness and hypotension. As per transfer papers, pt with confusion after lunch today. Pt admits to generalized weakness. Lives with wife, Stephany and son. He came from [facility] rehab in [place] since Feb 14. Patient lives with wife and son at home and ambulates with cane. Contracted Covid-19 in [facility] rehab and first started having symptoms on Mar 25, reporting symptoms of cough and low grade fever, diarrhea. Denies any chest pain, shortness of breath, headache, dizziness. History obtained from wife, Stephany([phone]) as pt is confused and a poor historian.   Of note, pt was noted to have sacral wounds today and reported low grade fever for two days and mental status changes today and loss of appetite.    SUBJECTIVE & OBJECTIVE: Pt seen and examined at bedside. No acute events overnight. Pt frustrated that he hasn't been able to leave yet.  COVID PCR (+) x1 on 5/26. Denies CP, SOB, abd pain, NVD, cough, fever, chills, weakness, numbness/tingling    MEDICATIONS  (STANDING):  cyanocobalamin 1000 MICROGram(s) Oral daily  metoprolol tartrate 50 milliGRAM(s) Oral two times a day  mirtazapine 7.5 milliGRAM(s) Oral at bedtime  pantoprazole   Suspension 40 milliGRAM(s) Oral daily  polyethylene glycol 3350 17 Gram(s) Oral daily  povidone iodine 10% Solution 1 Application(s) Topical daily  senna 2 Tablet(s) Oral at bedtime  sertraline 100 milliGRAM(s) Oral daily  sodium chloride 0.65% Nasal 1 Spray(s) Both Nostrils two times a day  sucralfate suspension 1 Gram(s) Oral four times a day    MEDICATIONS  (PRN):  oxycodone    5 mG/acetaminophen 325 mG 1 Tablet(s) Oral every 6 hours PRN Moderate Pain (4 - 6)  oxycodone    5 mG/acetaminophen 325 mG 2 Tablet(s) Oral every 6 hours PRN Severe Pain (7 - 10)      Vital Signs Last 24 Hrs  T(C): 36.8 (27 May 2020 07:39), Max: 36.8 (27 May 2020 07:39)  T(F): 98.2 (27 May 2020 07:39), Max: 98.2 (27 May 2020 07:39)  HR: 65 (27 May 2020 07:39) (65 - 79)  BP: 119/68 (27 May 2020 07:39) (115/64 - 160/72)  BP(mean): --  RR: 19 (27 May 2020 07:39) (17 - 19)  SpO2: 99% (27 May 2020 07:39) (96% - 99%)    GENERAL: patient appears well, no acute distress, appropriate  EYES: sclera clear, no exudates, PERRLA  ENMT: oropharynx clear without erythema, no exudates, moist mucous membranes  NECK: supple, soft, no thyromegaly noted  LUNGS:  clear to auscultation,  no rales, wheezing or rhonchi appreciated  HEART: S1/S2, regular rate and rhythm, no murmurs noted, no lower extremity edema  GASTROINTESTINAL: abdomen is soft, nontender, nondistended, normoactive bowel sounds, no palpable masses  INTEGUMENT: good skin turgor, warm, dry and intact  MUSCULOSKELETAL: no clubbing or cyanosis, no obvious deformity. Wound vac in place, draining serosanguinous discharge  NEUROLOGIC: awake, alert, oriented x3, strength 5/5 in all extremities, no obvious sensory deficits        LABS:                        9.7    6.21  )-----------( 306      ( 27 May 2020 07:16 )             31.3   05-27    142  |  106  |  33<H>  ----------------------------<  91  3.3<L>   |  30  |  0.84    Ca    8.5      27 May 2020 07:16    TPro  6.3  /  Alb  2.3<L>  /  TBili  0.6  /  DBili  x   /  AST  26  /  ALT  37  /  AlkPhos  119  05-26 Patient is a 79y old  Male who presents with a chief complaint of Generalized weakness (25 May 2020 09:56)    HPI:  80 y/o M with PMHx of HTN, BPH, spinal stenosis, dyslipidemia, anxiety, neuropathy, B12 deficiency who presents from [facility] rehab for evaluation of weakness and hypotension. As per transfer papers, pt with confusion after lunch today. Pt admits to generalized weakness. Lives with wife, Stephany and son. He came from [facility] rehab in [place] since Feb 14. Patient lives with wife and son at home and ambulates with cane. Contracted Covid-19 in [facility] rehab and first started having symptoms on Mar 25, reporting symptoms of cough and low grade fever, diarrhea. Denies any chest pain, shortness of breath, headache, dizziness. History obtained from wife, Stephany([phone]) as pt is confused and a poor historian.   Of note, pt was noted to have sacral wounds today and reported low grade fever for two days and mental status changes today and loss of appetite.    SUBJECTIVE & OBJECTIVE: Pt seen and examined at bedside. No acute events overnight. Pt frustrated that he hasn't been able to leave yet.  COVID PCR (+) x1 on 5/26. Denies CP, SOB, abd pain, NVD, cough, fever, chills, weakness, numbness/tingling    ROS: 8 systems reviewed and negative unless otherwise noted     MEDICATIONS  (STANDING):  cyanocobalamin 1000 MICROGram(s) Oral daily  metoprolol tartrate 50 milliGRAM(s) Oral two times a day  mirtazapine 7.5 milliGRAM(s) Oral at bedtime  pantoprazole   Suspension 40 milliGRAM(s) Oral daily  polyethylene glycol 3350 17 Gram(s) Oral daily  povidone iodine 10% Solution 1 Application(s) Topical daily  senna 2 Tablet(s) Oral at bedtime  sertraline 100 milliGRAM(s) Oral daily  sodium chloride 0.65% Nasal 1 Spray(s) Both Nostrils two times a day  sucralfate suspension 1 Gram(s) Oral four times a day    MEDICATIONS  (PRN):  oxycodone    5 mG/acetaminophen 325 mG 1 Tablet(s) Oral every 6 hours PRN Moderate Pain (4 - 6)  oxycodone    5 mG/acetaminophen 325 mG 2 Tablet(s) Oral every 6 hours PRN Severe Pain (7 - 10)      Vital Signs Last 24 Hrs  T(C): 36.8 (27 May 2020 07:39), Max: 36.8 (27 May 2020 07:39)  T(F): 98.2 (27 May 2020 07:39), Max: 98.2 (27 May 2020 07:39)  HR: 65 (27 May 2020 07:39) (65 - 79)  BP: 119/68 (27 May 2020 07:39) (115/64 - 160/72)  BP(mean): --  RR: 19 (27 May 2020 07:39) (17 - 19)  SpO2: 99% (27 May 2020 07:39) (96% - 99%)    GENERAL: patient appears chronically ill and weak  EYES: sclera clear, no exudates, PERRLA  ENMT: obvious temporal wasting, dry mucous membranes  NECK: thin, no thyromegaly noted  LUNGS:  reduced air entry b/l, clear to auscultation,  no rales, wheezing or rhonchi appreciated  HEART: S1/S2, regular rate and rhythm, no murmurs noted, no lower extremity edema  GASTROINTESTINAL: abdomen is thin, soft, nontender, nondistended, normoactive bowel sounds, no palpable masses  INTEGUMENT: skin is dry  MUSCULOSKELETAL: no clubbing or cyanosis, no obvious deformity. Wound vac in place, draining serosanguinous discharge  NEUROLOGIC: awake, alert, oriented x3, strength 5/5 in all extremities, no obvious sensory deficits        LABS:                        9.7    6.21  )-----------( 306      ( 27 May 2020 07:16 )             31.3   05-27    142  |  106  |  33<H>  ----------------------------<  91  3.3<L>   |  30  |  0.84    Ca    8.5      27 May 2020 07:16    TPro  6.3  /  Alb  2.3<L>  /  TBili  0.6  /  DBili  x   /  AST  26  /  ALT  37  /  AlkPhos  119  05-26

## 2020-05-27 NOTE — PROGRESS NOTE ADULT - PROBLEM SELECTOR PLAN 8
Likely pre-renal - resolved  - Monitor BMP  - Avoid nephrotoxic meds Elevated LFTs possibly 2/2 COVID-19 infection - resolving  - Avoid hepatotoxic agents

## 2020-05-27 NOTE — PROGRESS NOTE ADULT - PROBLEM SELECTOR PLAN 1
COVID-19, maintaining sat >90 on room air   - Maintain on airborne isolation.  - Limit use of NSAIDs.  - Medically stable, COVID PCR test on 5/25 negative. Repeat test 5/26 covid positive. Continue to repeat PCR every 48hrs until negative x2, awaiting placement at [facility]

## 2020-05-27 NOTE — PROGRESS NOTE ADULT - PROBLEM SELECTOR PLAN 9
DVT PPx: SCD's. concern for possible slow bleed  Code Status: Pt is DNR, but NOT DNI, MOLST form in chart Likely pre-renal - resolved  - Monitor BMP  - Avoid nephrotoxic meds

## 2020-05-27 NOTE — PROGRESS NOTE ADULT - PROBLEM SELECTOR PLAN 5
-malnutrition 2/2 poor PO intake  -nutrition services following.   -Recommending Continue Ensure Enlive BID, add Prosource BID to encourage sacral wound healing and improve anemia  -Mirtazapine added 5/20; pt states that appetite and sleep have improved. was hypotensive on admission, resolving, BP stable now  - Off Midodrine, continue BB with hold parameters- will discharge on Metoprolol 50 BID  - Continue to hold off on home Valsartan   - Cardio Dr Ordonez following

## 2020-05-27 NOTE — PROGRESS NOTE ADULT - PROBLEM SELECTOR PLAN 2
-Anemia possibly 2/2 L hip chronic hematoma as noted on CT AP and poor nutritional status; GIB less likely. No blood in BMs per pt. S/p one unit pRBCs 5/16, H/H increased appropriately. H/H stable this AM.  -Continue Protonix 40 mg daily, Carafate 1 g four times daily  -Monitor for further bleeding, stool color   -no plans for urgent inpt endoscopy in setting off COVID. f/u as outpatient for procedure endoscopy and colonoscopy  -Encourage adequate PO intake repleted today w/ 80meq KCl  - check mg tomorrow  - monitor lytes routinely

## 2020-05-27 NOTE — PROGRESS NOTE ADULT - SUBJECTIVE AND OBJECTIVE BOX
LISANDRA PADGETT is a 79yMale , patient examined and chart reviewed.    INTERVAL HPI/ OVERNIGHT EVENTS:   NAD. Afebrile. Weak.  No events.    PAST MEDICAL & SURGICAL HISTORY:  H/O vitamin B deficiency  Spinal stenosis  HTN (hypertension)  History of tonsillectomy      For details regarding the patient's social history, family history, and other miscellaneous elements, please refer the initial infectious diseases consultation and/or the admitting history and physical examination for this admission.    ROS:  CONSTITUTIONAL:  Negative fever or chills  EYES:  Negative  blurry vision or double vision  CARDIOVASCULAR:  Negative for chest pain or palpitations  RESPIRATORY:  Negative for cough, wheezing, or SOB   GASTROINTESTINAL:  Negative for nausea, vomiting, diarrhea, constipation, or abdominal pain  GENITOURINARY:  Negative frequency, urgency or dysuria  NEUROLOGIC:  No headache, confusion, dizziness, lightheadedness  All other systems were reviewed and are negative     Current inpatient medications :  MEDICATIONS  (STANDING):  cyanocobalamin 1000 MICROGram(s) Oral daily  metoprolol tartrate 50 milliGRAM(s) Oral two times a day  mirtazapine 7.5 milliGRAM(s) Oral at bedtime  pantoprazole   Suspension 40 milliGRAM(s) Oral daily  polyethylene glycol 3350 17 Gram(s) Oral daily  povidone iodine 10% Solution 1 Application(s) Topical daily  senna 2 Tablet(s) Oral at bedtime  sertraline 100 milliGRAM(s) Oral daily  sodium chloride 0.65% Nasal 1 Spray(s) Both Nostrils two times a day  sucralfate suspension 1 Gram(s) Oral four times a day    MEDICATIONS  (PRN):  oxycodone    5 mG/acetaminophen 325 mG 1 Tablet(s) Oral every 6 hours PRN Moderate Pain (4 - 6)  oxycodone    5 mG/acetaminophen 325 mG 2 Tablet(s) Oral every 6 hours PRN Severe Pain (7 - 10)      Objective:  Vital Signs Last 24 Hrs  T(C): 37 (27 May 2020 22:08), Max: 37 (27 May 2020 22:08)  T(F): 98.6 (27 May 2020 22:08), Max: 98.6 (27 May 2020 22:08)  HR: 69 (27 May 2020 22:08) (65 - 74)  BP: 132/79 (27 May 2020 22:08) (119/68 - 160/72)  RR: 18 (27 May 2020 22:08) (18 - 19)  SpO2: 99% (27 May 2020 22:08) (96% - 100%)    Physical Exam:  General:  no acute distress  Eyes: sclera anicteric, pupils equal and reactive to light  ENMT: buccal mucosa moist, pharynx not injected  Neck: supple, trachea midline  Lungs: clear, no wheeze/rhonchi  Cardiovascular: regular rate and rhythm, S1 S2  Abdomen: soft, nontender, no organomegaly present, bowel sounds normal  Neurological: alert and oriented x3 Cranial Nerves II-XII grossly intact  Skin: sacral decub drsg c/d/i   Lymph Nodes: no palpable cervical/supraclavicular lymph nodes enlargements  Extremities: no cyanosis/clubbing/edema        LABS:                                    9.7    6.21  )-----------( 306      ( 27 May 2020 07:16 )             31.3   05-27    142  |  106  |  33<H>  ----------------------------<  91  3.3<L>   |  30  |  0.84    Ca    8.5      27 May 2020 07:16    TPro  6.3  /  Alb  2.3<L>  /  TBili  0.6  /  DBili  x   /  AST  26  /  ALT  37  /  AlkPhos  119  05-26      MICROBIOLOGY:    Culture - Blood (collected 02 May 2020 00:30)  Source: .Blood Blood  Preliminary Report (03 May 2020 01:03):    No growth to date.    Culture - Blood (collected 02 May 2020 00:30)  Source: .Blood Blood  Preliminary Report (03 May 2020 01:03):    No growth to date.    COVID-19 PCR . (04.28.20 @ 22:41)    COVID-19 PCR: Detected    COVID-19 PCR . (05.25.20 @ 14:47)    COVID-19 PCR: NotDetec    RADIOLOGY & ADDITIONAL STUDIES:    EXAM:  XR CHEST PORTABLE ROUTINE 1V                            PROCEDURE DATE:  04/30/2020          INTERPRETATION:    Examination: XR CHEST    History: ADMDIAG1: K92.2 GASTROINTESTINAL HEMORRHAGE, UNSPECIFIED/, pn pneumonia    TECHNIQUE:  Portable AP view of the chest was obtained.    COMPARISON: A CT chest 1/29/2020 available for review.    FINDINGS:   Lungs clear. RIGHT hemidiaphragm mildly elevated. Posterior lung bases cannot be assessed due to elevated diaphragms. Lateral radiograph recommended if clinically warranted.  . The heart and mediastinum are within normal limits.    Visualized osseous structures are intact.      IMPRESSION: Visualized Lungs clear..   Elevated LEFT hemidiaphragm. Posterior lung bases cannot be assessed dueto elevated diaphragms. Lateral radiograph recommended if clinically warranted.      Assessment :   80 y/o M with PMHx of HTN, BPH, spinal stenosis, dyslipidemia, anxiety, neuropathy, B12 deficiency resident of [facility] rehab for evaluation of weakness and hypotension with mental status change. Tested COVID 19 positive early April. In ED he was hypotensive. WBC 19K No SOB cough n/v/d. Pt is a poor historian. Has unstagable sacral decub ulcer. Also with Acute anemia. ?infectious process vs reactive leukocytosis. Leukocytosis poss chronic.  Respiratory status stable. Menendez placed sec AUR 5/3/2020. Seen by surgery and is sp bedside I&D of sacral decub Clinically stable.    Repeat COVID 19 5/25/2020 neg.    Plan :   Monitor off antibiotics  Menendez per urology  Frequent turning  Local wound care per surgery/wound care nurse  COVID-19--critical period for decompensation  (7-14 days post symptom onset), avoid aerosolizing procedures, NSAIDs   -monitor respiratory status closely ( route of 02 and saturation) and titrate when able  -isolation precautions per protocol  -avoid excessive blood draws, blood cultures and frequent CXRs  -monitor biomarkers- CBC w diff  for NLRNLR <3 low vs >5 high) Ferritin (lower risk <450 vs >850) CRP (low risk <2 and higher risk >6) and LDH, D Dimer, procalcitonin  -therapies remains investigational-- including Hydroxychloroquine  -antibiotics only if there is concern for a bacterial process  -Steroids short course ( 5 days)  --for hypoxia/ARDS /shock    Dc planning to rehab pending COVID 19 neg x 2       Continue with present regiment.  Appropriate use of antibiotics and adverse effects reviewed.      I have discussed the above plan of care with patient/ family in detail. They expressed understanding of the  treatment plan . Risks, benefits and alternatives discussed in detail. I have asked if they have any questions or concerns and appropriately addressed them to the best of my ability .    > 35 minutes were spent in direct patient care reviewing notes, medications ,labs data/ imaging , discussion with multidisciplinary team.    Thank you for allowing me to participate in care of your patient .    Naima Lopez MD  Infectious Disease  [phone]

## 2020-05-27 NOTE — PROGRESS NOTE ADULT - PROBLEM SELECTOR PLAN 7
Elevated LFTs possibly 2/2 COVID-19 infection - resolving  - Avoid hepatotoxic agents Pt frustrated with length of stay in hospital; seems to have a component of depression contributing to mood.  Has hx of anxiety  Psych consulted- Dr. Russell--> Mirtazapine added to psych regimen to help with mood as well as poor appetite/insomnia  Continue home dose Sertraline

## 2020-05-27 NOTE — PROGRESS NOTE ADULT - PROBLEM SELECTOR PLAN 6
Pt frustrated with length of stay in hospital; seems to have a component of depression contributing to mood.  Has hx of anxiety  Psych consulted- Dr. Russell--> Mirtazapine added to psych regimen to help with mood as well as poor appetite/insomnia  Continue home dose Sertraline -malnutrition 2/2 poor PO intake  -nutrition services following.   -Recommending Continue Ensure Enlive BID, add Prosource BID to encourage sacral wound healing and improve anemia  -Mirtazapine added 5/20; pt states that appetite and sleep have improved.

## 2020-05-27 NOTE — PROGRESS NOTE ADULT - SUBJECTIVE AND OBJECTIVE BOX
INTERVAL HPI/OVERNIGHT EVENTS:  chart reviewed    MEDICATIONS  (STANDING):  cyanocobalamin 1000 MICROGram(s) Oral daily  metoprolol tartrate 50 milliGRAM(s) Oral two times a day  mirtazapine 7.5 milliGRAM(s) Oral at bedtime  pantoprazole   Suspension 40 milliGRAM(s) Oral daily  polyethylene glycol 3350 17 Gram(s) Oral daily  povidone iodine 10% Solution 1 Application(s) Topical daily  senna 2 Tablet(s) Oral at bedtime  sertraline 100 milliGRAM(s) Oral daily  sodium chloride 0.65% Nasal 1 Spray(s) Both Nostrils two times a day  sucralfate suspension 1 Gram(s) Oral four times a day    MEDICATIONS  (PRN):  oxycodone    5 mG/acetaminophen 325 mG 1 Tablet(s) Oral every 6 hours PRN Moderate Pain (4 - 6)  oxycodone    5 mG/acetaminophen 325 mG 2 Tablet(s) Oral every 6 hours PRN Severe Pain (7 - 10)      Allergies    No Known Allergies    Intolerances        Review of Systems:    unable to obtain       Vital Signs Last 24 Hrs  T(C): 36.6 (26 May 2020 07:30), Max: 36.7 (25 May 2020 16:56)  T(F): 97.9 (26 May 2020 07:30), Max: 98 (25 May 2020 16:56)  HR: 68 (26 May 2020 07:30) (68 - 76)  BP: 137/73 (26 May 2020 07:30) (126/71 - 137/73)  BP(mean): --  RR: 19 (26 May 2020 07:30) (18 - 19)  SpO2: 98% (26 May 2020 07:30) (95% - 98%)    PHYSICAL EXAM:    deferred f/u done remotely as on [employer] isolation      LABS:                        10.6   7.54  )-----------( 330      ( 26 May 2020 06:36 )             34.1     05-26    142  |  108  |  27<H>  ----------------------------<  71  4.4   |  30  |  0.87    Ca    8.7      26 May 2020 06:36    TPro  6.3  /  Alb  2.3<L>  /  TBili  0.6  /  DBili  x   /  AST  26  /  ALT  37  /  AlkPhos  119  05-26          RADIOLOGY & ADDITIONAL TESTS:

## 2020-05-27 NOTE — PROGRESS NOTE ADULT - ASSESSMENT
78 yo M with PMHx of HTN, BPH, spinal stenosis, dyslipidemia, anxiety, neuropathy, B12 deficiency presented from [facility] rehab for evaluation of weakness and hypotension. 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 Pt medically stable for d/c, awaiting OMER placement and repeat serial COVID-19 PCR testing

## 2020-05-27 NOTE — PROGRESS NOTE ADULT - PROBLEM SELECTOR PLAN 3
Stage 4 Sacral Ulcer  - s/p debridement and Zosyn course  - s/p wound vac placement - draining appropriately, serosanguinous discharge.  - wound care NP Benita following  - Pain regimen: acetaminophen for mild pain, oxycodone 5mg q6 PRN for moderate, oxycodone 10mg q6 pRN for severe pain  - Will change bed order to alternate pressure bed   - Menendez placed for urinary retention by Urology - as pt not yet ambulatory, will keep Menendez in for wound healing given sacral ulcer, attempt TOV at [facility] once more ambulatory -Anemia possibly 2/2 L hip chronic hematoma as noted on CT AP and poor nutritional status; GIB less likely. No blood in BMs per pt. S/p one unit pRBCs 5/16, H/H increased appropriately. H/H stable this AM.  -Continue Protonix 40 mg daily, Carafate 1 g four times daily  -Monitor for further bleeding, stool color   -no plans for urgent inpt endoscopy in setting off COVID. f/u as outpatient for procedure endoscopy and colonoscopy  -Encourage adequate PO intake

## 2020-05-27 NOTE — PROGRESS NOTE ADULT - ATTENDING COMMENTS
I personally conducted a physical examination of the patient. I personally gathered the patient's history. I edited the above listed findings which were prepared by the listed resident physician. I personally discussed the plan of care with the patient. The questions and concerns were addressed to the best of my ability. The patient is in agreement with the listed treatment plan.     - discussed w/ spouse Sydni over the phone. updated on clinical tx plan and dispo timeline. concerns addressed to the best of my ability. d/c planning to [facility] once COVID-19 PCR stays negative

## 2020-05-27 NOTE — PROGRESS NOTE ADULT - ASSESSMENT
78 y/o M with PMHx of HTN, BPH, spinal stenosis, dyslipidemia, anxiety, neuropathy, B12 deficiency who presents from rehab for evaluation of weakness and hypotension    Anemia   no overt gi bleeding; hgb stable  likely r/t sacral decub/hematoma, sp debridement  monitor cbc, transfuse prn  cont ppi and carafate  monitor stool color   no plans for endoscopic evaluation as no overt gib      FTT/Dysphagia   unclear etiology; esophagram at [place] 2/2019 normal   some improvement in po intake  correct elytes prn  cont ppi    cont remeron  on supplements per nutrition   encouragement/assistance at meals      COVID-19  per primary

## 2020-05-27 NOTE — PROGRESS NOTE ADULT - PROBLEM SELECTOR PLAN 4
was hypotensive on admission, resolving, BP stable now  - Off Midodrine, continue BB with hold parameters- will discharge on Metoprolol 50 BID  - Continue to hold off on home Valsartan   - Cardio Dr Ordonez following Stage 4 Sacral Ulcer  - s/p debridement and Zosyn course  - s/p wound vac placement - draining appropriately, serosanguinous discharge.  - wound care NP Benita following  - Pain regimen: acetaminophen for mild pain, oxycodone 5mg q6 PRN for moderate, oxycodone 10mg q6 pRN for severe pain  - Will change bed order to alternate pressure bed   - Menendez placed for urinary retention by Urology - as pt not yet ambulatory, will keep Menendez in for wound healing given sacral ulcer, attempt TOV at [facility] once more ambulatory

## 2020-05-28 DIAGNOSIS — R44.3 HALLUCINATIONS, UNSPECIFIED: ICD-10-CM

## 2020-05-28 LAB
ANION GAP SERPL CALC-SCNC: 3 MMOL/L — LOW (ref 5–17)
BUN SERPL-MCNC: 28 MG/DL — HIGH (ref 7–23)
CALCIUM SERPL-MCNC: 8.6 MG/DL — SIGNIFICANT CHANGE UP (ref 8.5–10.1)
CHLORIDE SERPL-SCNC: 108 MMOL/L — SIGNIFICANT CHANGE UP (ref 96–108)
CO2 SERPL-SCNC: 30 MMOL/L — SIGNIFICANT CHANGE UP (ref 22–31)
CREAT SERPL-MCNC: 0.83 MG/DL — SIGNIFICANT CHANGE UP (ref 0.5–1.3)
GLUCOSE SERPL-MCNC: 79 MG/DL — SIGNIFICANT CHANGE UP (ref 70–99)
HCT VFR BLD CALC: 34.2 % — LOW (ref 39–50)
HGB BLD-MCNC: 10.5 G/DL — LOW (ref 13–17)
MAGNESIUM SERPL-MCNC: 2.3 MG/DL — SIGNIFICANT CHANGE UP (ref 1.6–2.6)
MCHC RBC-ENTMCNC: 26.2 PG — LOW (ref 27–34)
MCHC RBC-ENTMCNC: 30.7 GM/DL — LOW (ref 32–36)
MCV RBC AUTO: 85.3 FL — SIGNIFICANT CHANGE UP (ref 80–100)
NRBC # BLD: 0 /100 WBCS — SIGNIFICANT CHANGE UP (ref 0–0)
PLATELET # BLD AUTO: 291 K/UL — SIGNIFICANT CHANGE UP (ref 150–400)
POTASSIUM SERPL-MCNC: 4.5 MMOL/L — SIGNIFICANT CHANGE UP (ref 3.5–5.3)
POTASSIUM SERPL-SCNC: 4.5 MMOL/L — SIGNIFICANT CHANGE UP (ref 3.5–5.3)
RBC # BLD: 4.01 M/UL — LOW (ref 4.2–5.8)
RBC # FLD: 15.3 % — HIGH (ref 10.3–14.5)
SODIUM SERPL-SCNC: 141 MMOL/L — SIGNIFICANT CHANGE UP (ref 135–145)
WBC # BLD: 6.54 K/UL — SIGNIFICANT CHANGE UP (ref 3.8–10.5)
WBC # FLD AUTO: 6.54 K/UL — SIGNIFICANT CHANGE UP (ref 3.8–10.5)

## 2020-05-28 PROCEDURE — 99232 SBSQ HOSP IP/OBS MODERATE 35: CPT | Mod: CS,GC

## 2020-05-28 PROCEDURE — 70450 CT HEAD/BRAIN W/O DYE: CPT | Mod: 26

## 2020-05-28 RX ORDER — ASCORBIC ACID 60 MG
500 TABLET,CHEWABLE ORAL DAILY
Refills: 0 | Status: DISCONTINUED | OUTPATIENT
Start: 2020-05-28 | End: 2020-06-01

## 2020-05-28 RX ADMIN — Medication 1 APPLICATION(S): at 11:45

## 2020-05-28 RX ADMIN — PREGABALIN 1000 MICROGRAM(S): 225 CAPSULE ORAL at 11:36

## 2020-05-28 RX ADMIN — Medication 50 MILLIGRAM(S): at 17:26

## 2020-05-28 RX ADMIN — Medication 1 SPRAY(S): at 05:34

## 2020-05-28 RX ADMIN — Medication 1 SPRAY(S): at 17:25

## 2020-05-28 RX ADMIN — Medication 50 MILLIGRAM(S): at 05:34

## 2020-05-28 NOTE — PROGRESS NOTE ADULT - PROBLEM SELECTOR PLAN 1
Pt with new onset of hallucinations. Likely secondary to hospital induced delirium vs polypharmacy. Hospital day #30.   CT head done overnight unremarkable  Possibly related to remeron. Pt missed dose last night  Encourage frequent reorientation

## 2020-05-28 NOTE — PROGRESS NOTE ADULT - SUBJECTIVE AND OBJECTIVE BOX
Patient is a 79y old  Male who presents with a chief complaint of Generalized weakness (25 May 2020 09:56)    HPI:  78 y/o M with PMHx of HTN, BPH, spinal stenosis, dyslipidemia, anxiety, neuropathy, B12 deficiency who presents from [facility] rehab for evaluation of weakness and hypotension. As per transfer papers, pt with confusion after lunch today. Pt admits to generalized weakness. Lives with wife, Stephany and son. He came from [facility] rehab in [place] since Feb 14. Patient lives with wife and son at home and ambulates with cane. Contracted Covid-19 in [facility] rehab and first started having symptoms on Mar 25, reporting symptoms of cough and low grade fever, diarrhea. Denies any chest pain, shortness of breath, headache, dizziness. History obtained from wife, Stephany([phone]) as pt is confused and a poor historian.   Of note, pt was noted to have sacral wounds today and reported low grade fever for two days and mental status changes today and loss of appetite.    SUBJECTIVE & OBJECTIVE: Pt seen and examined at bedside. Overnight, provider called by RN due to hallucinations. This AM, pt continues to hallucinate, states that there is smoke coming out of the window. Refusing PO meds, did not remeron dose last night. Denies CP, SOB, cough, abd pain, fever, chills, NVD.     ROS: 8 systems reviewed and negative unless otherwise noted     MEDICATIONS  (STANDING):  cyanocobalamin 1000 MICROGram(s) Oral daily  metoprolol tartrate 50 milliGRAM(s) Oral two times a day  mirtazapine 7.5 milliGRAM(s) Oral at bedtime  pantoprazole   Suspension 40 milliGRAM(s) Oral daily  polyethylene glycol 3350 17 Gram(s) Oral daily  povidone iodine 10% Solution 1 Application(s) Topical daily  senna 2 Tablet(s) Oral at bedtime  sertraline 100 milliGRAM(s) Oral daily  sodium chloride 0.65% Nasal 1 Spray(s) Both Nostrils two times a day  sucralfate suspension 1 Gram(s) Oral four times a day    MEDICATIONS  (PRN):  oxycodone    5 mG/acetaminophen 325 mG 1 Tablet(s) Oral every 6 hours PRN Moderate Pain (4 - 6)  oxycodone    5 mG/acetaminophen 325 mG 2 Tablet(s) Oral every 6 hours PRN Severe Pain (7 - 10)    Vital Signs Last 24 Hrs  T(C): 36.8 (28 May 2020 07:43), Max: 37 (27 May 2020 22:08)  T(F): 98.2 (28 May 2020 07:43), Max: 98.6 (27 May 2020 22:08)  HR: 71 (28 May 2020 07:43) (69 - 76)  BP: 164/78 (28 May 2020 07:56) (96/58 - 164/78)  BP(mean): --  RR: 20 (28 May 2020 07:43) (18 - 20)  SpO2: 99% (28 May 2020 07:43) (99% - 100%)    GENERAL: patient appears chronically ill and weak, cachetic   EYES: sclera clear, no exudates, PERRLA  ENMT: obvious temporal wasting, dry mucous membranes  NECK: thin, no thyromegaly noted  LUNGS:  reduced air entry b/l, clear to auscultation,  no rales, wheezing or rhonchi appreciated  HEART: S1/S2, regular rate and rhythm, no murmurs noted, no lower extremity edema  GASTROINTESTINAL: abdomen is thin, soft, nontender, nondistended, normoactive bowel sounds, no palpable masses  INTEGUMENT: skin is dry  MUSCULOSKELETAL: no clubbing or cyanosis, no obvious deformity. Wound vac in place, draining serosanguinous discharge  NEUROLOGIC: awake, alert, oriented x3, strength 5/5 in all extremities, no obvious sensory deficits        LABS:                        10.5   6.54  )-----------( 291      ( 28 May 2020 06:57 )             34.2   05-28    141  |  108  |  28<H>  ----------------------------<  79  4.5   |  30  |  0.83    Ca    8.6      28 May 2020 06:57  Mg     2.3     05-28

## 2020-05-28 NOTE — PROGRESS NOTE ADULT - ASSESSMENT
78 yo M with PMHx of HTN, BPH, spinal stenosis, dyslipidemia, anxiety, neuropathy, B12 deficiency presented from [facility] rehab for evaluation of weakness and hypotension. 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 Pt medically stable for d/c, awaiting OMER placement and repeat serial COVID-19 PCR testing. Now with new onset hallucinations.

## 2020-05-28 NOTE — PROGRESS NOTE ADULT - ASSESSMENT
80 y/o M with PMHx of HTN, BPH, spinal stenosis, dyslipidemia, anxiety, neuropathy, B12 deficiency who presents from rehab for evaluation of weakness and hypotension    Anemia   no overt gi bleeding; hgb stable  likely r/t sacral decub/hematoma, sp debridement  monitor cbc, transfuse prn  cont ppi and carafate  monitor stool color   no plans for endoscopic evaluation as no overt gib      FTT/Dysphagia   unclear etiology; esophagram at [place] 2/2019 normal   some improvement in po intake  correct elytes prn  cont ppi    cont remeron as tolerated  on supplements per nutrition   encouragement/assistance at meals      COVID-19  per primary

## 2020-05-28 NOTE — PROGRESS NOTE ADULT - SUBJECTIVE AND OBJECTIVE BOX
INTERVAL HPI/OVERNIGHT EVENTS:  overnight events noted  hgb stable    MEDICATIONS  (STANDING):  cyanocobalamin 1000 MICROGram(s) Oral daily  metoprolol tartrate 50 milliGRAM(s) Oral two times a day  mirtazapine 7.5 milliGRAM(s) Oral at bedtime  pantoprazole   Suspension 40 milliGRAM(s) Oral daily  polyethylene glycol 3350 17 Gram(s) Oral daily  senna 2 Tablet(s) Oral at bedtime  sertraline 100 milliGRAM(s) Oral daily  sodium chloride 0.65% Nasal 1 Spray(s) Both Nostrils two times a day  sucralfate suspension 1 Gram(s) Oral four times a day    MEDICATIONS  (PRN):  oxycodone    5 mG/acetaminophen 325 mG 1 Tablet(s) Oral every 6 hours PRN Moderate Pain (4 - 6)  oxycodone    5 mG/acetaminophen 325 mG 2 Tablet(s) Oral every 6 hours PRN Severe Pain (7 - 10)      Allergies    No Known Allergies    Intolerances        Review of Systems:    unable to obtain       Vital Signs Last 24 Hrs  T(C): 36.8 (28 May 2020 07:43), Max: 37 (27 May 2020 22:08)  T(F): 98.2 (28 May 2020 07:43), Max: 98.6 (27 May 2020 22:08)  HR: 71 (28 May 2020 07:43) (69 - 76)  BP: 164/78 (28 May 2020 07:56) (96/58 - 164/78)  BP(mean): --  RR: 20 (28 May 2020 07:43) (18 - 20)  SpO2: 99% (28 May 2020 07:43) (99% - 100%)    PHYSICAL EXAM:    deferred f/u done remotely as on [employer] isolation    LABS:                        10.5   6.54  )-----------( 291      ( 28 May 2020 06:57 )             34.2     05-28    141  |  108  |  28<H>  ----------------------------<  79  4.5   |  30  |  0.83    Ca    8.6      28 May 2020 06:57  Mg     2.3     05-28            RADIOLOGY & ADDITIONAL TESTS:

## 2020-05-28 NOTE — PROGRESS NOTE ADULT - SUBJECTIVE AND OBJECTIVE BOX
LISANDRA PADGETT is a 79yMale , patient examined and chart reviewed.    INTERVAL HPI/ OVERNIGHT EVENTS:   NAD. Afebrile. Weak.    PAST MEDICAL & SURGICAL HISTORY:  H/O vitamin B deficiency  Spinal stenosis  HTN (hypertension)  History of tonsillectomy      For details regarding the patient's social history, family history, and other miscellaneous elements, please refer the initial infectious diseases consultation and/or the admitting history and physical examination for this admission.    ROS:  CONSTITUTIONAL:  Negative fever or chills  EYES:  Negative  blurry vision or double vision  CARDIOVASCULAR:  Negative for chest pain or palpitations  RESPIRATORY:  Negative for cough, wheezing, or SOB   GASTROINTESTINAL:  Negative for nausea, vomiting, diarrhea, constipation, or abdominal pain  GENITOURINARY:  Negative frequency, urgency or dysuria  NEUROLOGIC:  No headache, confusion, dizziness, lightheadedness  All other systems were reviewed and are negative     Current inpatient medications :  MEDICATIONS  (STANDING):  cyanocobalamin 1000 MICROGram(s) Oral daily  metoprolol tartrate 50 milliGRAM(s) Oral two times a day  mirtazapine 7.5 milliGRAM(s) Oral at bedtime  pantoprazole   Suspension 40 milliGRAM(s) Oral daily  polyethylene glycol 3350 17 Gram(s) Oral daily  senna 2 Tablet(s) Oral at bedtime  sertraline 100 milliGRAM(s) Oral daily  sodium chloride 0.65% Nasal 1 Spray(s) Both Nostrils two times a day  sucralfate suspension 1 Gram(s) Oral four times a day    MEDICATIONS  (PRN):  oxycodone    5 mG/acetaminophen 325 mG 1 Tablet(s) Oral every 6 hours PRN Moderate Pain (4 - 6)  oxycodone    5 mG/acetaminophen 325 mG 2 Tablet(s) Oral every 6 hours PRN Severe Pain (7 - 10)      Objective:  Vital Signs Last 24 Hrs  T(C): 37.3 (28 May 2020 15:49), Max: 37.3 (28 May 2020 15:49)  T(F): 99.2 (28 May 2020 15:49), Max: 99.2 (28 May 2020 15:49)  HR: 81 (28 May 2020 15:49) (69 - 81)  BP: 127/69 (28 May 2020 15:49) (96/58 - 164/78)  RR: 20 (28 May 2020 15:49) (18 - 20)  SpO2: 98% (28 May 2020 15:49) (98% - 100%)    Physical Exam:  General:  no acute distress  Eyes: sclera anicteric, pupils equal and reactive to light  ENMT: buccal mucosa moist, pharynx not injected  Neck: supple, trachea midline  Lungs: clear, no wheeze/rhonchi  Cardiovascular: regular rate and rhythm, S1 S2  Abdomen: soft, nontender, no organomegaly present, bowel sounds normal  Neurological: alert and oriented x3 Cranial Nerves II-XII grossly intact  Skin: sacral decub drsg c/d/i   Lymph Nodes: no palpable cervical/supraclavicular lymph nodes enlargements  Extremities: no cyanosis/clubbing/edema        LABS:                                             10.5   6.54  )-----------( 291      ( 28 May 2020 06:57 )             34.2   05-28    141  |  108  |  28<H>  ----------------------------<  79  4.5   |  30  |  0.83    Ca    8.6      28 May 2020 06:57  Mg     2.3     05-28      MICROBIOLOGY:    Culture - Blood (collected 02 May 2020 00:30)  Source: .Blood Blood  Preliminary Report (03 May 2020 01:03):    No growth to date.    Culture - Blood (collected 02 May 2020 00:30)  Source: .Blood Blood  Preliminary Report (03 May 2020 01:03):    No growth to date.    COVID-19 PCR . (04.28.20 @ 22:41)    COVID-19 PCR: Detected    COVID-19 PCR . (05.25.20 @ 14:47)    COVID-19 PCR: NotDetec    RADIOLOGY & ADDITIONAL STUDIES:    EXAM:  XR CHEST PORTABLE ROUTINE 1V                            PROCEDURE DATE:  04/30/2020          INTERPRETATION:    Examination: XR CHEST    History: ADMDIAG1: K92.2 GASTROINTESTINAL HEMORRHAGE, UNSPECIFIED/, pn pneumonia    TECHNIQUE:  Portable AP view of the chest was obtained.    COMPARISON: A CT chest 1/29/2020 available for review.    FINDINGS:   Lungs clear. RIGHT hemidiaphragm mildly elevated. Posterior lung bases cannot be assessed due to elevated diaphragms. Lateral radiograph recommended if clinically warranted.  . The heart and mediastinum are within normal limits.    Visualized osseous structures are intact.      IMPRESSION: Visualized Lungs clear..   Elevated LEFT hemidiaphragm. Posterior lung bases cannot be assessed dueto elevated diaphragms. Lateral radiograph recommended if clinically warranted.      Assessment :   78 y/o M with PMHx of HTN, BPH, spinal stenosis, dyslipidemia, anxiety, neuropathy, B12 deficiency resident of [place] rehab for evaluation of weakness and hypotension with mental status change. Tested COVID 19 positive early April. In ED he was hypotensive. WBC 19K No SOB cough n/v/d. Pt is a poor historian. Has unstagable sacral decub ulcer. Also with Acute anemia. ?infectious process vs reactive leukocytosis. Leukocytosis poss chronic.  Respiratory status stable. Menendez placed sec AUR 5/3/2020. Seen by surgery and is sp bedside I&D of sacral decub Clinically stable.    Repeat COVID 19 5/25/2020 neg.    Plan :   Monitor off antibiotics  Menendez per urology  Frequent turning  Local wound care per surgery/wound care nurse  Supportive care for COVID-19  Dc planning to rehab pending COVID 19 neg x 2       Continue with present regiment.  Appropriate use of antibiotics and adverse effects reviewed.      I have discussed the above plan of care with patient/ family in detail. They expressed understanding of the  treatment plan . Risks, benefits and alternatives discussed in detail. I have asked if they have any questions or concerns and appropriately addressed them to the best of my ability .    > 35 minutes were spent in direct patient care reviewing notes, medications ,labs data/ imaging , discussion with multidisciplinary team.    Thank you for allowing me to participate in care of your patient .    Naima Lopez MD  Infectious Disease  [phone]

## 2020-05-28 NOTE — PROGRESS NOTE ADULT - PROBLEM SELECTOR PLAN 4
Stage 4 Sacral Ulcer  - s/p debridement and Zosyn course  - s/p wound vac placement - draining appropriately, serosanguinous discharge.  - wound care NP Benita following  - Pain regimen: acetaminophen for mild pain, oxycodone 5mg q6 PRN for moderate, oxycodone 10mg q6 pRN for severe pain  - Will change bed order to alternate pressure bed   - Menendez placed for urinary retention by Urology - as pt not yet ambulatory, will keep Menendez in for wound healing given sacral ulcer, attempt TOV at [facility] once more ambulatory
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
## 2020-05-28 NOTE — PROGRESS NOTE ADULT - PROBLEM SELECTOR PLAN 7
Pt frustrated with length of stay in hospital; seems to have a component of depression contributing to mood.  Has hx of anxiety  Psych consulted- Dr. Russell--> Mirtazapine added to psych regimen to help with mood as well as poor appetite/insomnia. Pt refused last night  Continue home dose Sertraline

## 2020-05-28 NOTE — PROGRESS NOTE ADULT - SUBJECTIVE AND OBJECTIVE BOX
Chief Complaint: Weakness, AMS    Interval Events: Delirious overnight.    Review of Systems:  General: No fevers, chills, weight loss or gain  Skin: No rashes, color changes  Cardiovascular: No chest pain, orthopnea  Respiratory: No shortness of breath, cough  Gastrointestinal: No nausea, abdominal pain  Genitourinary: No incontinence, pain with urination  Musculoskeletal: No pain, swelling, decreased range of motion  Neurological: No headache, weakness  Psychiatric: No depression, anxiety  Endocrine: No weight loss or gain, increased thirst  All other systems are comprehensively negative.    Physical Exam:  Vital Signs Last 24 Hrs  T(C): 36.8 (28 May 2020 04:40), Max: 37 (27 May 2020 22:08)  T(F): 98.3 (28 May 2020 04:40), Max: 98.6 (27 May 2020 22:08)  HR: 76 (28 May 2020 04:40) (65 - 76)  BP: 142/79 (28 May 2020 04:40) (119/68 - 144/76)  BP(mean): --  RR: 19 (28 May 2020 04:40) (18 - 19)  SpO2: 100% (28 May 2020 04:40) (99% - 100%)  General: NAD  HEENT: MMM  Neck: No JVD, no carotid bruit  Extremities: No LE edema, no cyanosis  Neuro: Non-focal  Skin: No rash    Labs:    05-27    142  |  106  |  33<H>  ----------------------------<  91  3.3<L>   |  30  |  0.84    Ca    8.5      27 May 2020 07:16                          10.5   6.54  )-----------( 291      ( 28 May 2020 06:57 )             34.2

## 2020-05-28 NOTE — PROGRESS NOTE ADULT - ATTENDING COMMENTS
I personally conducted a physical examination of the patient. I personally gathered the patient's history. I edited the above listed findings which were prepared by the listed resident physician. I personally discussed the plan of care with the patient. The questions and concerns were addressed to the best of my ability. The patient is in agreement with the listed treatment plan.     - no events today. d/c planning once COVID-19 PCR is negative x24hrs.  - was hallucinating overnight, would hope to avoid further disorienting medications especially antipsychotics and benzos. suspect hospital acquired delirium

## 2020-05-28 NOTE — PROGRESS NOTE ADULT - ASSESSMENT
The patient is a 79 year old male with a history of HTN, HL, pre-DM, anxiety, spinal stenosis who presents with weakness and AMS in the setting of worsening anemia, dehydration, COVID-19.    Plan:  - Continue metoprolol tartrate 50 mg bid  - Not hypoxic on RA  - Last COVID-19 positive  - Overnight events noted - CT head negative for acute pathology

## 2020-05-29 LAB
SARS-COV-2 RNA SPEC QL NAA+PROBE: SIGNIFICANT CHANGE UP
SARS-COV-2 RNA SPEC QL NAA+PROBE: SIGNIFICANT CHANGE UP

## 2020-05-29 PROCEDURE — 99233 SBSQ HOSP IP/OBS HIGH 50: CPT | Mod: CS,GC

## 2020-05-29 RX ADMIN — Medication 500 MILLIGRAM(S): at 14:50

## 2020-05-29 RX ADMIN — Medication 1 SPRAY(S): at 05:35

## 2020-05-29 RX ADMIN — PREGABALIN 1000 MICROGRAM(S): 225 CAPSULE ORAL at 14:50

## 2020-05-29 RX ADMIN — Medication 1 TABLET(S): at 14:49

## 2020-05-29 RX ADMIN — OXYCODONE AND ACETAMINOPHEN 1 TABLET(S): 5; 325 TABLET ORAL at 15:29

## 2020-05-29 RX ADMIN — Medication 50 MILLIGRAM(S): at 17:55

## 2020-05-29 RX ADMIN — Medication 50 MILLIGRAM(S): at 05:35

## 2020-05-29 RX ADMIN — SERTRALINE 100 MILLIGRAM(S): 25 TABLET, FILM COATED ORAL at 14:50

## 2020-05-29 NOTE — PROGRESS NOTE ADULT - SUBJECTIVE AND OBJECTIVE BOX
Chief Complaint: Weakness, AMS    Interval Events: No significant changes.    Review of Systems:  General: No fevers, chills, weight loss or gain  Skin: No rashes, color changes  Cardiovascular: No chest pain, orthopnea  Respiratory: No shortness of breath, cough  Gastrointestinal: No nausea, abdominal pain  Genitourinary: No incontinence, pain with urination  Musculoskeletal: No pain, swelling, decreased range of motion  Neurological: No headache, weakness  Psychiatric: No depression, anxiety  Endocrine: No weight loss or gain, increased thirst  All other systems are comprehensively negative.    Physical Exam:  Vital Signs Last 24 Hrs  T(C): 36.3 (29 May 2020 07:33), Max: 37.3 (28 May 2020 15:49)  T(F): 97.4 (29 May 2020 07:33), Max: 99.2 (28 May 2020 15:49)  HR: 67 (29 May 2020 07:33) (67 - 81)  BP: 126/65 (29 May 2020 07:33) (126/65 - 137/70)  BP(mean): --  RR: 18 (29 May 2020 07:33) (18 - 20)  SpO2: 98% (29 May 2020 07:33) (78% - 98%)  General: NAD  HEENT: MMM  Neck: No JVD, no carotid bruit  Extremities: No LE edema, no cyanosis  Neuro: Non-focal  Skin: No rash    Labs:    05-28    141  |  108  |  28<H>  ----------------------------<  79  4.5   |  30  |  0.83    Ca    8.6      28 May 2020 06:57  Mg     2.3     05-28                          10.5   6.54  )-----------( 291      ( 28 May 2020 06:57 )             34.2

## 2020-05-29 NOTE — PROVIDER CONTACT NOTE (OTHER) - ASSESSMENT
Pt now alert and oriented x1 since last night. Pt went for CT head. Pt confused to place, time, situation which is different from baseline since admission. No s/s of acute distress. No c/o pain/discomfort. Pt a&ox1. which is change in baseline since admission. Pt went for CT head 5/28 for AMS & hallucinations. No s/s of acute distress. No c/o pain/discomfort. Pt's speech is clear, spontaneous & at times illogical. Pt a&ox1 which is change in baseline since admission. Pt went for CT head 5/28 for AMS & hallucinations. No s/s of acute distress. No c/o pain/discomfort. Pt's speech is clear, spontaneous & at times illogical. Pt fluctuates in mental status; at times pt is oriented to place & time but at night is a&ox1 along with hallucinations which is change in baseline since admission. Pt went for CT head 5/28 for AMS & hallucinations. No s/s of acute distress. No c/o pain/discomfort. Pt's speech is clear, spontaneous however at times illogical & inappropriate to situation.

## 2020-05-29 NOTE — PROGRESS NOTE ADULT - NSTELEHLTHVISITTYOTHER_GEN_ALL_CORE
chart

## 2020-05-29 NOTE — PROVIDER CONTACT NOTE (OTHER) - RECOMMENDATIONS
dc senna and possible dc 5/5. will re-eval 5/5
Neuro consult?
Dr Lo said she will review patient chart and put in orders.

## 2020-05-29 NOTE — PROGRESS NOTE ADULT - SUBJECTIVE AND OBJECTIVE BOX
Patient is a 79y old  Male who presents with a chief complaint of Generalized weakness (25 May 2020 09:56)    HPI:  78 y/o M with PMHx of HTN, BPH, spinal stenosis, dyslipidemia, anxiety, neuropathy, B12 deficiency who presents from [facility] rehab for evaluation of weakness and hypotension. As per transfer papers, pt with confusion after lunch today. Pt admits to generalized weakness. Lives with wife, Stephany and son. He came from [facility] rehab in [place] since Feb 14. Patient lives with wife and son at home and ambulates with cane. Contracted Covid-19 in [facility] rehab and first started having symptoms on Mar 25, reporting symptoms of cough and low grade fever, diarrhea. Denies any chest pain, shortness of breath, headache, dizziness. History obtained from wife, Stephany([phone]) as pt is confused and a poor historian.   Of note, pt was noted to have sacral wounds today and reported low grade fever for two days and mental status changes today and loss of appetite.    SUBJECTIVE & OBJECTIVE: Pt seen and examined at bedside. No acute events overnight. Hallucinations signficantly improved today. No complaints. Denies CP, SOB, cough, abd pain, fever, chills, NVD.     ROS: 8 systems reviewed and negative unless otherwise noted     MEDICATIONS  (STANDING):  cyanocobalamin 1000 MICROGram(s) Oral daily  metoprolol tartrate 50 milliGRAM(s) Oral two times a day  mirtazapine 7.5 milliGRAM(s) Oral at bedtime  pantoprazole   Suspension 40 milliGRAM(s) Oral daily  polyethylene glycol 3350 17 Gram(s) Oral daily  povidone iodine 10% Solution 1 Application(s) Topical daily  senna 2 Tablet(s) Oral at bedtime  sertraline 100 milliGRAM(s) Oral daily  sodium chloride 0.65% Nasal 1 Spray(s) Both Nostrils two times a day  sucralfate suspension 1 Gram(s) Oral four times a day    MEDICATIONS  (PRN):  oxycodone    5 mG/acetaminophen 325 mG 1 Tablet(s) Oral every 6 hours PRN Moderate Pain (4 - 6)  oxycodone    5 mG/acetaminophen 325 mG 2 Tablet(s) Oral every 6 hours PRN Severe Pain (7 - 10)    Vital Signs Last 24 Hrs  T(C): 36.8 (28 May 2020 07:43), Max: 37 (27 May 2020 22:08)  T(F): 98.2 (28 May 2020 07:43), Max: 98.6 (27 May 2020 22:08)  HR: 71 (28 May 2020 07:43) (69 - 76)  BP: 164/78 (28 May 2020 07:56) (96/58 - 164/78)  BP(mean): --  RR: 20 (28 May 2020 07:43) (18 - 20)  SpO2: 99% (28 May 2020 07:43) (99% - 100%)    GENERAL: patient appears chronically ill and weak, cachetic   EYES: sclera clear, no exudates, PERRLA  ENMT: obvious temporal wasting, dry mucous membranes  NECK: thin, no thyromegaly noted  LUNGS:  reduced air entry b/l, clear to auscultation,  no rales, wheezing or rhonchi appreciated  HEART: S1/S2, regular rate and rhythm, no murmurs noted, no lower extremity edema  GASTROINTESTINAL: abdomen is thin, soft, nontender, nondistended, normoactive bowel sounds, no palpable masses  INTEGUMENT: skin is dry  MUSCULOSKELETAL: no clubbing or cyanosis, no obvious deformity. Wound vac in place, draining serosanguinous discharge  NEUROLOGIC: awake, alert, oriented x3, strength 5/5 in all extremities, no obvious sensory deficits        LABS:                        10.5   6.54  )-----------( 291      ( 28 May 2020 06:57 )             34.2   05-28    141  |  108  |  28<H>  ----------------------------<  79  4.5   |  30  |  0.83    Ca    8.6      28 May 2020 06:57  Mg     2.3     05-28 Patient is a 79y old  Male who presents with a chief complaint of Generalized weakness (25 May 2020 09:56)    HPI:  80 y/o M with PMHx of HTN, BPH, spinal stenosis, dyslipidemia, anxiety, neuropathy, B12 deficiency who presents from [facility] rehab for evaluation of weakness and hypotension. As per transfer papers, pt with confusion after lunch today. Pt admits to generalized weakness. Lives with wife, Stephany and son. He came from [facility] rehab in [place] since Feb 14. Patient lives with wife and son at home and ambulates with cane. Contracted Covid-19 in [facility] rehab and first started having symptoms on Mar 25, reporting symptoms of cough and low grade fever, diarrhea. Denies any chest pain, shortness of breath, headache, dizziness. History obtained from wife, Stephany([phone]) as pt is confused and a poor historian.   Of note, pt was noted to have sacral wounds today and reported low grade fever for two days and mental status changes today and loss of appetite.    SUBJECTIVE & OBJECTIVE: Pt seen and examined at bedside. No acute events overnight. Hallucinations signficantly improved today. No complaints. Denies CP, SOB, cough, abd pain, fever, chills, NVD.     ROS: 8 systems reviewed and negative unless otherwise noted     MEDICATIONS  (STANDING):  cyanocobalamin 1000 MICROGram(s) Oral daily  metoprolol tartrate 50 milliGRAM(s) Oral two times a day  mirtazapine 7.5 milliGRAM(s) Oral at bedtime  pantoprazole   Suspension 40 milliGRAM(s) Oral daily  polyethylene glycol 3350 17 Gram(s) Oral daily  povidone iodine 10% Solution 1 Application(s) Topical daily  senna 2 Tablet(s) Oral at bedtime  sertraline 100 milliGRAM(s) Oral daily  sodium chloride 0.65% Nasal 1 Spray(s) Both Nostrils two times a day  sucralfate suspension 1 Gram(s) Oral four times a day    MEDICATIONS  (PRN):  oxycodone    5 mG/acetaminophen 325 mG 1 Tablet(s) Oral every 6 hours PRN Moderate Pain (4 - 6)  oxycodone    5 mG/acetaminophen 325 mG 2 Tablet(s) Oral every 6 hours PRN Severe Pain (7 - 10)    Vital Signs Last 24 Hrs  T(C): 36.3 (29 May 2020 07:33), Max: 37.3 (28 May 2020 15:49)  T(F): 97.4 (29 May 2020 07:33), Max: 99.2 (28 May 2020 15:49)  HR: 67 (29 May 2020 07:33) (67 - 81)  BP: 126/65 (29 May 2020 07:33) (126/65 - 137/70)  BP(mean): --  RR: 18 (29 May 2020 07:33) (18 - 20)  SpO2: 98% (29 May 2020 07:33) (78% - 98%)    GENERAL: patient appears chronically ill and weak, cachetic   EYES: sclera clear, no exudates, PERRLA  ENMT: obvious temporal wasting, dry mucous membranes  NECK: thin, no thyromegaly noted  LUNGS:  reduced air entry b/l, clear to auscultation,  no rales, wheezing or rhonchi appreciated  HEART: S1/S2, regular rate and rhythm, no murmurs noted, no lower extremity edema  GASTROINTESTINAL: abdomen is thin, soft, nontender, nondistended, normoactive bowel sounds, no palpable masses  INTEGUMENT: skin is dry  MUSCULOSKELETAL: no clubbing or cyanosis, no obvious deformity. Wound vac in place, draining serosanguinous discharge  NEUROLOGIC: awake, alert, oriented x3, strength 5/5 in all extremities, no obvious sensory deficits        LABS:                        10.5   6.54  )-----------( 291      ( 28 May 2020 06:57 )             34.2   05-28    141  |  108  |  28<H>  ----------------------------<  79  4.5   |  30  |  0.83    Ca    8.6      28 May 2020 06:57  Mg     2.3     05-28

## 2020-05-29 NOTE — PROGRESS NOTE ADULT - NSREFPHYEXREFTO_GEN_ALL_CORE
Inpatient Physical Exam
No

## 2020-05-29 NOTE — PROGRESS NOTE ADULT - NS ED BHA TELEPSYCH PATIENT LOCATION
[place]

## 2020-05-29 NOTE — PROGRESS NOTE ADULT - ASSESSMENT
The patient is a 79 year old male with a history of HTN, HL, pre-DM, anxiety, spinal stenosis who presents with weakness and AMS in the setting of worsening anemia, dehydration, COVID-19.    Plan:  - Continue metoprolol tartrate 50 mg bid  - Not hypoxic on RA  - Last COVID-19 positive  - CT head negative for acute pathology

## 2020-05-29 NOTE — PROGRESS NOTE ADULT - SUBJECTIVE AND OBJECTIVE BOX
INTERVAL HPI/OVERNIGHT EVENTS:  brown bm ON per rn  no new labs    MEDICATIONS  (STANDING):  ascorbic acid 500 milliGRAM(s) Oral daily  cyanocobalamin 1000 MICROGram(s) Oral daily  metoprolol tartrate 50 milliGRAM(s) Oral two times a day  multivitamin 1 Tablet(s) Oral daily  pantoprazole   Suspension 40 milliGRAM(s) Oral daily  polyethylene glycol 3350 17 Gram(s) Oral daily  senna 2 Tablet(s) Oral at bedtime  sertraline 100 milliGRAM(s) Oral daily  sodium chloride 0.65% Nasal 1 Spray(s) Both Nostrils two times a day  sucralfate suspension 1 Gram(s) Oral four times a day    MEDICATIONS  (PRN):  oxycodone    5 mG/acetaminophen 325 mG 1 Tablet(s) Oral every 6 hours PRN Moderate Pain (4 - 6)  oxycodone    5 mG/acetaminophen 325 mG 2 Tablet(s) Oral every 6 hours PRN Severe Pain (7 - 10)      Allergies    No Known Allergies    Intolerances        Review of Systems:  unable to obtain       Vital Signs Last 24 Hrs  T(C): 36.3 (29 May 2020 07:33), Max: 37.3 (28 May 2020 15:49)  T(F): 97.4 (29 May 2020 07:33), Max: 99.2 (28 May 2020 15:49)  HR: 67 (29 May 2020 07:33) (67 - 81)  BP: 126/65 (29 May 2020 07:33) (126/65 - 137/70)  BP(mean): --  RR: 18 (29 May 2020 07:33) (18 - 20)  SpO2: 98% (29 May 2020 07:33) (78% - 98%)    PHYSICAL EXAM:    deferred f/u done remotely as covid +     LABS:                        10.5   6.54  )-----------( 291      ( 28 May 2020 06:57 )             34.2     05-28    141  |  108  |  28<H>  ----------------------------<  79  4.5   |  30  |  0.83    Ca    8.6      28 May 2020 06:57  Mg     2.3     05-28            RADIOLOGY & ADDITIONAL TESTS:

## 2020-05-29 NOTE — PROGRESS NOTE ADULT - NS ED BHA TELEPSYCH PATIENT INTERVIEW PRIVATE SPACE
Patient interviewed in a private space.

## 2020-05-29 NOTE — PROVIDER CONTACT NOTE (OTHER) - ACTION/TREATMENT ORDERED:
Dr. Murray notified & suggested neurological consult. Suggested neuro consult with Dr Murray who is going to d/w day team.

## 2020-05-29 NOTE — CHART NOTE - NSCHARTNOTEFT_GEN_A_CORE
Assessment: pt persists with poor po intake. States he is drinking ensure at times, unable to provide any additional preferences except for ice cream. Per EMR, takes 20-50% of meals. Remeron Rx continues. BM yesterday noted. COVID(-)x1.    Factors impacting intake: [ ] none [ ] nausea  [ ] vomiting [ ] diarrhea [ ] constipation  [ ]chewing problems [ ] swallowing issues  [ ] other:     Diet Presciption: Diet, Soft:   No Carb Prosource (1pkg = 15gms Protein)     Qty per Day:  2  Supplement Feeding Modality:  Oral  Ensure Enlive Cans or Servings Per Day:  1       Frequency:  Two Times a day (05-15-20 @ 11:29)    Intake: usually poor to fair    Current Weight: wt's mid may 110-115#, now 104#, appears cachectic  % Weight Change    Pertinent Medications: MEDICATIONS  (STANDING):  ascorbic acid 500 milliGRAM(s) Oral daily  cyanocobalamin 1000 MICROGram(s) Oral daily  metoprolol tartrate 50 milliGRAM(s) Oral two times a day  multivitamin 1 Tablet(s) Oral daily  pantoprazole   Suspension 40 milliGRAM(s) Oral daily  polyethylene glycol 3350 17 Gram(s) Oral daily  senna 2 Tablet(s) Oral at bedtime  sertraline 100 milliGRAM(s) Oral daily  sodium chloride 0.65% Nasal 1 Spray(s) Both Nostrils two times a day  sucralfate suspension 1 Gram(s) Oral four times a day    MEDICATIONS  (PRN):  oxycodone    5 mG/acetaminophen 325 mG 1 Tablet(s) Oral every 6 hours PRN Moderate Pain (4 - 6)  oxycodone    5 mG/acetaminophen 325 mG 2 Tablet(s) Oral every 6 hours PRN Severe Pain (7 - 10)    Pertinent Labs: 05-28 Na141 mmol/L Glu 79 mg/dL K+ 4.5 mmol/L Cr  0.83 mg/dL BUN 28 mg/dL<H> 05-26 Alb 2.3 g/dL<L>     CAPILLARY BLOOD GLUCOSE        Skin: pressure injury/DTI noted, on mvi/vit c    Estimated Needs:   [ ] no change since previous assessment  [ ] recalculated:     Previous Nutrition Diagnosis:   [ ] Inadequate Energy Intake [ ]Inadequate Oral Intake [ ] Excessive Energy Intake   [ ] Underweight [ ] Increased Nutrient Needs [ ] Overweight/Obesity   [ ] Altered GI Function [ ] Unintended Weight Loss [ ] Food & Nutrition Related Knowledge Deficit [x ] Malnutrition     Nutrition Diagnosis is [x ] ongoing  [ ] resolved [ ] not applicable     New Nutrition Diagnosis: [ ] not applicable       Interventions: Continue diet/supplement , snacks added  Recommend  [ ] Change Diet To:  [ ] Nutrition Supplement  [ ] Nutrition Support  [x ] Other: consider palliative care eval for GOC discussion to adress malnutrition, enteral nutrition support    Monitoring and Evaluation:   [ ] PO intake [ x ] Tolerance to diet prescription [ x ] weights [ x ] labs[ x ] follow up per protocol  [ ] other: Assessment: pt persists with poor po intake. States he is drinking ensure at times, unable to provide any additional preferences except for ice cream. Per EMR, takes 20-50% of meals. Remeron Rx continues. BM yesterday noted. COVID(-)x1.Wound vac in place.    Factors impacting intake: [ ] none [ ] nausea  [ ] vomiting [ ] diarrhea [ ] constipation  [ ]chewing problems [ ] swallowing issues  [ ] other:     Diet Presciption: Diet, Soft:   No Carb Prosource (1pkg = 15gms Protein)     Qty per Day:  2  Supplement Feeding Modality:  Oral  Ensure Enlive Cans or Servings Per Day:  1       Frequency:  Two Times a day (05-15-20 @ 11:29)    Intake: usually poor to fair    Current Weight: wt's mid may 110-115#, now 104#, appears cachectic  % Weight Change    Pertinent Medications: MEDICATIONS  (STANDING):  ascorbic acid 500 milliGRAM(s) Oral daily  cyanocobalamin 1000 MICROGram(s) Oral daily  metoprolol tartrate 50 milliGRAM(s) Oral two times a day  multivitamin 1 Tablet(s) Oral daily  pantoprazole   Suspension 40 milliGRAM(s) Oral daily  polyethylene glycol 3350 17 Gram(s) Oral daily  senna 2 Tablet(s) Oral at bedtime  sertraline 100 milliGRAM(s) Oral daily  sodium chloride 0.65% Nasal 1 Spray(s) Both Nostrils two times a day  sucralfate suspension 1 Gram(s) Oral four times a day    MEDICATIONS  (PRN):  oxycodone    5 mG/acetaminophen 325 mG 1 Tablet(s) Oral every 6 hours PRN Moderate Pain (4 - 6)  oxycodone    5 mG/acetaminophen 325 mG 2 Tablet(s) Oral every 6 hours PRN Severe Pain (7 - 10)    Pertinent Labs: 05-28 Na141 mmol/L Glu 79 mg/dL K+ 4.5 mmol/L Cr  0.83 mg/dL BUN 28 mg/dL<H> 05-26 Alb 2.3 g/dL<L>     CAPILLARY BLOOD GLUCOSE        Skin: pressure injury/DTI noted, on mvi/vit c. Pt with wound vac in place    Estimated Needs:   [ ] no change since previous assessment  [ ] recalculated:     Previous Nutrition Diagnosis:   [ ] Inadequate Energy Intake [ ]Inadequate Oral Intake [ ] Excessive Energy Intake   [ ] Underweight [ ] Increased Nutrient Needs [ ] Overweight/Obesity   [ ] Altered GI Function [ ] Unintended Weight Loss [ ] Food & Nutrition Related Knowledge Deficit [x ] Malnutrition     Nutrition Diagnosis is [x ] ongoing  [ ] resolved [ ] not applicable     New Nutrition Diagnosis: [ ] not applicable       Interventions: Continue diet/supplement , snacks added  Recommend  [ ] Change Diet To:  [ ] Nutrition Supplement  [ ] Nutrition Support  [x ] Other: consider palliative care eval for GOC discussion to adress malnutrition, enteral nutrition support    Monitoring and Evaluation:   [ ] PO intake [ x ] Tolerance to diet prescription [ x ] weights [ x ] labs[ x ] follow up per protocol  [ ] other: Assessment: pt persists with poor po intake. States he is drinking ensure at times, unable to provide any additional preferences except for ice cream. Per EMR, takes 20-50% of meals. Remeron Rx continues. BM yesterday noted. COVID(-)x1.Wound vac in place. Spoke with Dr Acevedo, will address poor po/malnutrition with possible further wt loss with residents.    Factors impacting intake: [ ] none [ ] nausea  [ ] vomiting [ ] diarrhea [ ] constipation  [ ]chewing problems [ ] swallowing issues  [ ] other:     Diet Presciption: Diet, Soft:   No Carb Prosource (1pkg = 15gms Protein)     Qty per Day:  2  Supplement Feeding Modality:  Oral  Ensure Enlive Cans or Servings Per Day:  1       Frequency:  Two Times a day (05-15-20 @ 11:29)    Intake: usually poor to fair    Current Weight: wt's mid may 110-115#, now 104#, appears cachectic  % Weight Change    Pertinent Medications: MEDICATIONS  (STANDING):  ascorbic acid 500 milliGRAM(s) Oral daily  cyanocobalamin 1000 MICROGram(s) Oral daily  metoprolol tartrate 50 milliGRAM(s) Oral two times a day  multivitamin 1 Tablet(s) Oral daily  pantoprazole   Suspension 40 milliGRAM(s) Oral daily  polyethylene glycol 3350 17 Gram(s) Oral daily  senna 2 Tablet(s) Oral at bedtime  sertraline 100 milliGRAM(s) Oral daily  sodium chloride 0.65% Nasal 1 Spray(s) Both Nostrils two times a day  sucralfate suspension 1 Gram(s) Oral four times a day    MEDICATIONS  (PRN):  oxycodone    5 mG/acetaminophen 325 mG 1 Tablet(s) Oral every 6 hours PRN Moderate Pain (4 - 6)  oxycodone    5 mG/acetaminophen 325 mG 2 Tablet(s) Oral every 6 hours PRN Severe Pain (7 - 10)    Pertinent Labs: 05-28 Na141 mmol/L Glu 79 mg/dL K+ 4.5 mmol/L Cr  0.83 mg/dL BUN 28 mg/dL<H> 05-26 Alb 2.3 g/dL<L>     CAPILLARY BLOOD GLUCOSE        Skin: pressure injury/DTI noted, on mvi/vit c. Pt with wound vac in place    Estimated Needs:   [ ] no change since previous assessment  [ ] recalculated:     Previous Nutrition Diagnosis:   [ ] Inadequate Energy Intake [ ]Inadequate Oral Intake [ ] Excessive Energy Intake   [ ] Underweight [ ] Increased Nutrient Needs [ ] Overweight/Obesity   [ ] Altered GI Function [ ] Unintended Weight Loss [ ] Food & Nutrition Related Knowledge Deficit [x ] Malnutrition     Nutrition Diagnosis is [x ] ongoing  [ ] resolved [ ] not applicable     New Nutrition Diagnosis: [ ] not applicable       Interventions: Continue diet/supplement , snacks added  Recommend  [ ] Change Diet To:  [ ] Nutrition Supplement  [ ] Nutrition Support  [x ] Other: consider palliative care eval for GOC discussion to adress malnutrition, enteral nutrition support    Monitoring and Evaluation:   [ ] PO intake [ x ] Tolerance to diet prescription [ x ] weights [ x ] labs[ x ] follow up per protocol  [ ] other:

## 2020-05-29 NOTE — PROVIDER CONTACT NOTE (OTHER) - ACTION/TREATMENT ORDERED:
Infectious disease doctor (Dr. Lopez) agreed with plan to move patient to a clean non-covid medical floor. Awaiting bed placement.

## 2020-05-29 NOTE — PROGRESS NOTE ADULT - ASSESSMENT
80 yo M with PMHx of HTN, BPH, spinal stenosis, dyslipidemia, anxiety, neuropathy, B12 deficiency presented from [facility] rehab for evaluation of weakness and hypotension. 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 Pt medically stable for d/c, awaiting OMER placement and repeat serial COVID-19 PCR testing. Now with new onset hallucinations. 80 yo M with PMHx of HTN, BPH, spinal stenosis, dyslipidemia, anxiety, neuropathy, B12 deficiency presented from [facility] rehab for evaluation of weakness and hypotension. 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 Pt medically stable for d/c, awaiting OMER placement and repeat serial COVID-19 PCR testing. Now with new onset hallucinations- improved today.

## 2020-05-29 NOTE — PROGRESS NOTE ADULT - PROBLEM SELECTOR PLAN 7
Pt frustrated with length of stay in hospital; seems to have a component of depression contributing to mood.  Has hx of anxiety  Psych consulted- Dr. Russell--> Mirtazapine added to psych regimen to help with mood as well as poor appetite/insomnia. Pt refused last night  Continue home dose Sertraline Pt frustrated with length of stay in hospital; seems to have a component of depression contributing to mood.  Has hx of anxiety  Psych consulted- Dr. Russell--> Mirtazapine d/c per psych recs   Continue home dose Sertraline

## 2020-05-29 NOTE — PROGRESS NOTE ADULT - PROBLEM SELECTOR PLAN 1
Pt with new onset of hallucinations. Likely secondary to hospital induced delirium vs polypharmacy. Hospital day #30.   CT head done overnight unremarkable  Possibly related to remeron. Pt missed dose last night  Encourage frequent reorientation Pt with new onset of hallucinations. Likely secondary to hospital induced delirium vs polypharmacy. Hospital day #31--> significantly improved today.  CT head done overnight unremarkable  Possibly related to remeron, d/c 5/28 per psych  Encourage frequent reorientation

## 2020-05-29 NOTE — PROGRESS NOTE ADULT - NS ED BHA TELEPSYCH PROVIDER LOCATION
[place]

## 2020-05-29 NOTE — PROGRESS NOTE ADULT - SUBJECTIVE AND OBJECTIVE BOX
ABBYJUANJOLISANDRA TURNER is a 79yMale , patient examined and chart reviewed.    INTERVAL HPI/ OVERNIGHT EVENTS:   Afebrile. No events.    PAST MEDICAL & SURGICAL HISTORY:  H/O vitamin B deficiency  Spinal stenosis  HTN (hypertension)  History of tonsillectomy      For details regarding the patient's social history, family history, and other miscellaneous elements, please refer the initial infectious diseases consultation and/or the admitting history and physical examination for this admission.    ROS:  CONSTITUTIONAL:  Negative fever or chills  EYES:  Negative  blurry vision or double vision  CARDIOVASCULAR:  Negative for chest pain or palpitations  RESPIRATORY:  Negative for cough, wheezing, or SOB   GASTROINTESTINAL:  Negative for nausea, vomiting, diarrhea, constipation, or abdominal pain  GENITOURINARY:  Negative frequency, urgency or dysuria  NEUROLOGIC:  No headache, confusion, dizziness, lightheadedness  All other systems were reviewed and are negative     Current inpatient medications :  MEDICATIONS  (STANDING):  ascorbic acid 500 milliGRAM(s) Oral daily  cyanocobalamin 1000 MICROGram(s) Oral daily  metoprolol tartrate 50 milliGRAM(s) Oral two times a day  multivitamin 1 Tablet(s) Oral daily  pantoprazole   Suspension 40 milliGRAM(s) Oral daily  polyethylene glycol 3350 17 Gram(s) Oral daily  senna 2 Tablet(s) Oral at bedtime  sertraline 100 milliGRAM(s) Oral daily  sodium chloride 0.65% Nasal 1 Spray(s) Both Nostrils two times a day  sucralfate suspension 1 Gram(s) Oral four times a day    MEDICATIONS  (PRN):  oxycodone    5 mG/acetaminophen 325 mG 1 Tablet(s) Oral every 6 hours PRN Moderate Pain (4 - 6)  oxycodone    5 mG/acetaminophen 325 mG 2 Tablet(s) Oral every 6 hours PRN Severe Pain (7 - 10)      Objective:  Vital Signs Last 24 Hrs  T(C): 36.3 (29 May 2020 07:33), Max: 36.9 (28 May 2020 20:03)  T(F): 97.4 (29 May 2020 07:33), Max: 98.4 (28 May 2020 20:03)  HR: 67 (29 May 2020 07:33) (67 - 81)  BP: 126/65 (29 May 2020 07:33) (126/65 - 137/70)  RR: 18 (29 May 2020 07:33) (18 - 19)  SpO2: 98% (29 May 2020 07:33) (78% - 98%)    Physical Exam:  General:  no acute distress  Eyes: sclera anicteric, pupils equal and reactive to light  ENMT: buccal mucosa moist, pharynx not injected  Neck: supple, trachea midline  Lungs: clear, no wheeze/rhonchi  Cardiovascular: regular rate and rhythm, S1 S2  Abdomen: soft, nontender, no organomegaly present, bowel sounds normal  Neurological: alert and oriented x3 Cranial Nerves II-XII grossly intact  Skin: sacral decub drsg c/d/i   Lymph Nodes: no palpable cervical/supraclavicular lymph nodes enlargements  Extremities: no cyanosis/clubbing/edema        LABS:                                    10.5   6.54  )-----------( 291      ( 28 May 2020 06:57 )             34.2   05-28    141  |  108  |  28<H>  ----------------------------<  79  4.5   |  30  |  0.83    Ca    8.6      28 May 2020 06:57  Mg     2.3     05-28    MICROBIOLOGY:  COVID-19 PCR . (04.28.20 @ 22:41)    COVID-19 PCR: Detected    COVID-19 PCR . (05.25.20 @ 14:47)    COVID-19 PCR: NotDetec    COVID-19 PCR . (05.28.20 @ 12:02)    COVID-19 PCR: NotDetec      RADIOLOGY & ADDITIONAL STUDIES:    EXAM:  XR CHEST PORTABLE ROUTINE 1V                            PROCEDURE DATE:  04/30/2020          INTERPRETATION:    Examination: XR CHEST    History: ADMDIAG1: K92.2 GASTROINTESTINAL HEMORRHAGE, UNSPECIFIED/, pn pneumonia    TECHNIQUE:  Portable AP view of the chest was obtained.    COMPARISON: A CT chest 1/29/2020 available for review.    FINDINGS:   Lungs clear. RIGHT hemidiaphragm mildly elevated. Posterior lung bases cannot be assessed due to elevated diaphragms. Lateral radiograph recommended if clinically warranted.  . The heart and mediastinum are within normal limits.    Visualized osseous structures are intact.      IMPRESSION: Visualized Lungs clear..   Elevated LEFT hemidiaphragm. Posterior lung bases cannot be assessed dueto elevated diaphragms. Lateral radiograph recommended if clinically warranted.      Assessment :   78 y/o M with PMHx of HTN, BPH, spinal stenosis, dyslipidemia, anxiety, neuropathy, B12 deficiency resident of [facility] rehab for evaluation of weakness and hypotension with mental status change. Tested COVID 19 positive early April. In ED he was hypotensive. WBC 19K No SOB cough n/v/d. Pt is a poor historian. Has unstagable sacral decub ulcer. Also with Acute anemia. ?infectious process vs reactive leukocytosis. Leukocytosis poss chronic.  Respiratory status stable. Menendez placed sec AUR 5/3/2020. Seen by surgery and is sp bedside I&D of sacral decub Clinically stable.    Repeat COVID 19 5/25/2020 neg.    Plan :   Monitor off antibiotics  Menendez per urology  Frequent turning  Local wound care per surgery/wound care nurse  Supportive care for COVID-19  Dc planning to rehab pending COVID 19 neg x 2       Continue with present regiment.  Appropriate use of antibiotics and adverse effects reviewed.      I have discussed the above plan of care with patient/ family in detail. They expressed understanding of the  treatment plan . Risks, benefits and alternatives discussed in detail. I have asked if they have any questions or concerns and appropriately addressed them to the best of my ability .    > 35 minutes were spent in direct patient care reviewing notes, medications ,labs data/ imaging , discussion with multidisciplinary team.    Thank you for allowing me to participate in care of your patient .    Naima Lopez MD  Infectious Disease  [phone]

## 2020-05-29 NOTE — PROGRESS NOTE ADULT - PROBLEM SELECTOR PLAN 6
-malnutrition 2/2 poor PO intake  -nutrition services following.   -Recommending Continue Ensure Enlive BID, add Prosource BID to encourage sacral wound healing and improve anemia  -Mirtazapine added 5/20; pt states that appetite and sleep have improved. -malnutrition 2/2 poor PO intake  -nutrition services following.   -Recommending Continue Ensure Enlive BID, add Prosource BID to encourage sacral wound healing and improve anemia  -Mirtazapine added 5/20; discontinued 5/28 due to hallucinations

## 2020-05-29 NOTE — PROVIDER CONTACT NOTE (OTHER) - BACKGROUND
Admitting dx: Gastrointestinal hemorrhage, COVID19 (+)
Patient is admitted for GI bleed and Covid 19
pt has two covid- (5/28, 5/29), has had no temperature, and no respiratory distress

## 2020-05-30 LAB
ANION GAP SERPL CALC-SCNC: 4 MMOL/L — LOW (ref 5–17)
BASOPHILS # BLD AUTO: 0.01 K/UL — SIGNIFICANT CHANGE UP (ref 0–0.2)
BASOPHILS NFR BLD AUTO: 0.1 % — SIGNIFICANT CHANGE UP (ref 0–2)
BUN SERPL-MCNC: 22 MG/DL — SIGNIFICANT CHANGE UP (ref 7–23)
CALCIUM SERPL-MCNC: 8.5 MG/DL — SIGNIFICANT CHANGE UP (ref 8.5–10.1)
CHLORIDE SERPL-SCNC: 104 MMOL/L — SIGNIFICANT CHANGE UP (ref 96–108)
CO2 SERPL-SCNC: 31 MMOL/L — SIGNIFICANT CHANGE UP (ref 22–31)
CREAT SERPL-MCNC: 0.75 MG/DL — SIGNIFICANT CHANGE UP (ref 0.5–1.3)
EOSINOPHIL # BLD AUTO: 0.11 K/UL — SIGNIFICANT CHANGE UP (ref 0–0.5)
EOSINOPHIL NFR BLD AUTO: 1.5 % — SIGNIFICANT CHANGE UP (ref 0–6)
GLUCOSE SERPL-MCNC: 81 MG/DL — SIGNIFICANT CHANGE UP (ref 70–99)
HCT VFR BLD CALC: 33.1 % — LOW (ref 39–50)
HGB BLD-MCNC: 10.2 G/DL — LOW (ref 13–17)
IMM GRANULOCYTES NFR BLD AUTO: 0.3 % — SIGNIFICANT CHANGE UP (ref 0–1.5)
LYMPHOCYTES # BLD AUTO: 2.28 K/UL — SIGNIFICANT CHANGE UP (ref 1–3.3)
LYMPHOCYTES # BLD AUTO: 30.4 % — SIGNIFICANT CHANGE UP (ref 13–44)
MCHC RBC-ENTMCNC: 26.2 PG — LOW (ref 27–34)
MCHC RBC-ENTMCNC: 30.8 GM/DL — LOW (ref 32–36)
MCV RBC AUTO: 85.1 FL — SIGNIFICANT CHANGE UP (ref 80–100)
MONOCYTES # BLD AUTO: 0.51 K/UL — SIGNIFICANT CHANGE UP (ref 0–0.9)
MONOCYTES NFR BLD AUTO: 6.8 % — SIGNIFICANT CHANGE UP (ref 2–14)
NEUTROPHILS # BLD AUTO: 4.56 K/UL — SIGNIFICANT CHANGE UP (ref 1.8–7.4)
NEUTROPHILS NFR BLD AUTO: 60.9 % — SIGNIFICANT CHANGE UP (ref 43–77)
NRBC # BLD: 0 /100 WBCS — SIGNIFICANT CHANGE UP (ref 0–0)
PLATELET # BLD AUTO: 268 K/UL — SIGNIFICANT CHANGE UP (ref 150–400)
POTASSIUM SERPL-MCNC: 4.1 MMOL/L — SIGNIFICANT CHANGE UP (ref 3.5–5.3)
POTASSIUM SERPL-SCNC: 4.1 MMOL/L — SIGNIFICANT CHANGE UP (ref 3.5–5.3)
RBC # BLD: 3.89 M/UL — LOW (ref 4.2–5.8)
RBC # FLD: 15.3 % — HIGH (ref 10.3–14.5)
SODIUM SERPL-SCNC: 139 MMOL/L — SIGNIFICANT CHANGE UP (ref 135–145)
WBC # BLD: 7.49 K/UL — SIGNIFICANT CHANGE UP (ref 3.8–10.5)
WBC # FLD AUTO: 7.49 K/UL — SIGNIFICANT CHANGE UP (ref 3.8–10.5)

## 2020-05-30 PROCEDURE — 99232 SBSQ HOSP IP/OBS MODERATE 35: CPT | Mod: CS

## 2020-05-30 RX ADMIN — POLYETHYLENE GLYCOL 3350 17 GRAM(S): 17 POWDER, FOR SOLUTION ORAL at 11:41

## 2020-05-30 RX ADMIN — SERTRALINE 100 MILLIGRAM(S): 25 TABLET, FILM COATED ORAL at 11:41

## 2020-05-30 RX ADMIN — PREGABALIN 1000 MICROGRAM(S): 225 CAPSULE ORAL at 11:42

## 2020-05-30 RX ADMIN — Medication 50 MILLIGRAM(S): at 08:06

## 2020-05-30 RX ADMIN — Medication 1 SPRAY(S): at 18:10

## 2020-05-30 RX ADMIN — Medication 1 TABLET(S): at 11:42

## 2020-05-30 RX ADMIN — Medication 500 MILLIGRAM(S): at 11:42

## 2020-05-30 NOTE — PROGRESS NOTE ADULT - ATTENDING COMMENTS
Dispo to [facility] on 6/1 once wound vac is available. No further labs recommended at this time.

## 2020-05-30 NOTE — PROGRESS NOTE ADULT - PROBLEM SELECTOR PLAN 6
-malnutrition 2/2 poor PO intake  -nutrition services following.   -Recommending Continue Ensure Enlive BID, add Prosource BID to encourage sacral wound healing and improve anemia  -Mirtazapine added 5/20; discontinued 5/28 due to hallucinations

## 2020-05-30 NOTE — PROGRESS NOTE ADULT - ASSESSMENT
78 yo M with PMHx of HTN, BPH, spinal stenosis, dyslipidemia, anxiety, neuropathy, B12 deficiency presented from [facility] rehab for evaluation of weakness and hypotension. 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 Pt medically stable for d/c, awaiting OMER placement and repeat serial COVID-19 PCR testing. Hallucinating likely 2/2 sundowning and hospital acquired delirium

## 2020-05-30 NOTE — PROGRESS NOTE ADULT - PROBLEM SELECTOR PLAN 1
Pt with new onset of hallucinations. Likely secondary to hospital induced delirium vs polypharmacy. Hospital day #32--> now alert/oriented and calm  - CT head from 5/28: no acute findings  - Possibly related to remeron, d/c'd 5/28 after discussion w/ spouse, pt was refusing the med regardless  - Encourage frequent reorientation

## 2020-05-30 NOTE — PROGRESS NOTE ADULT - ASSESSMENT
The patient is a 79 year old male with a history of HTN, HL, pre-DM, anxiety, spinal stenosis who presents with weakness and AMS in the setting of worsening anemia, dehydration, COVID-19.    Plan:  - Continue metoprolol tartrate 50 mg bid  - Not hypoxic on RA  - Last two COVID-19 negative  - CT head negative for acute pathology  - Discharge planning

## 2020-05-30 NOTE — PROGRESS NOTE ADULT - SUBJECTIVE AND OBJECTIVE BOX
infectious diseases progress note:    LISANDRA PADGETT is a 79y y. o. Male patient    No concerning overnight events    Allergies    No Known Allergies    Intolerances        ANTIBIOTICS/RELEVANT:  antimicrobials    immunologic:    OTHER:  ascorbic acid 500 milliGRAM(s) Oral daily  cyanocobalamin 1000 MICROGram(s) Oral daily  metoprolol tartrate 50 milliGRAM(s) Oral two times a day  multivitamin 1 Tablet(s) Oral daily  oxycodone    5 mG/acetaminophen 325 mG 1 Tablet(s) Oral every 6 hours PRN  oxycodone    5 mG/acetaminophen 325 mG 2 Tablet(s) Oral every 6 hours PRN  pantoprazole   Suspension 40 milliGRAM(s) Oral daily  polyethylene glycol 3350 17 Gram(s) Oral daily  senna 2 Tablet(s) Oral at bedtime  sertraline 100 milliGRAM(s) Oral daily  sodium chloride 0.65% Nasal 1 Spray(s) Both Nostrils two times a day  sucralfate suspension 1 Gram(s) Oral four times a day      Objective:  Vital Signs Last 24 Hrs  T(C): 36.7 (30 May 2020 13:25), Max: 36.8 (29 May 2020 23:16)  T(F): 98 (30 May 2020 13:25), Max: 98.2 (29 May 2020 23:16)  HR: 69 (30 May 2020 13:25) (64 - 74)  BP: 125/68 (30 May 2020 13:25) (112/69 - 146/68)  BP(mean): --  RR: 16 (30 May 2020 13:25) (16 - 18)  SpO2: 98% (30 May 2020 13:25) (94% - 99%)    T(C): 36.7 (05-30-20 @ 13:25), Max: 37.3 (05-28-20 @ 15:49)  T(C): 36.7 (05-30-20 @ 13:25), Max: 37.3 (05-28-20 @ 15:49)  T(C): 36.7 (05-30-20 @ 13:25), Max: 37.3 (05-28-20 @ 15:49)    PHYSICAL EXAM:  HEENT: NC atraumatic  Neck: supple  Respiratory: no accessory muscle use, breathing comfortably  Cardiovascular: distant  Gastrointestinal: normal appearing, nondistended  Extremities: no clubbing, no cyanosis,      LABS:                          10.2   7.49  )-----------( 268      ( 30 May 2020 06:58 )             33.1       7.49 05-30 @ 06:58  6.54 05-28 @ 06:57  6.21 05-27 @ 07:16  7.54 05-26 @ 06:36  6.35 05-24 @ 07:16      05-30    139  |  104  |  22  ----------------------------<  81  4.1   |  31  |  0.75    Ca    8.5      30 May 2020 06:58        Creatinine, Serum: 0.75 mg/dL (05-30-20 @ 06:58)  Creatinine, Serum: 0.83 mg/dL (05-28-20 @ 06:57)  Creatinine, Serum: 0.84 mg/dL (05-27-20 @ 07:16)  Creatinine, Serum: 0.87 mg/dL (05-26-20 @ 06:36)  Creatinine, Serum: 0.75 mg/dL (05-24-20 @ 07:16)                INFLAMMATORY MARKERS  Auto Neutrophil #: 4.56 K/uL (05-30-20 @ 06:58)  Auto Lymphocyte #: 2.28 K/uL (05-30-20 @ 06:58)  Auto Neutrophil #: 3.70 K/uL (05-27-20 @ 07:16)  Auto Lymphocyte #: 1.98 K/uL (05-27-20 @ 07:16)  Auto Neutrophil #: 4.59 K/uL (05-23-20 @ 06:46)  Auto Lymphocyte #: 1.73 K/uL (05-23-20 @ 06:46)  Auto Neutrophil #: 5.36 K/uL (05-22-20 @ 07:13)  Auto Lymphocyte #: 1.44 K/uL (05-22-20 @ 07:13)  Auto Neutrophil #: 5.07 K/uL (05-21-20 @ 07:20)  Auto Lymphocyte #: 1.40 K/uL (05-21-20 @ 07:20)  Auto Neutrophil #: 5.03 K/uL (05-20-20 @ 07:44)  Auto Lymphocyte #: 1.51 K/uL (05-20-20 @ 07:44)  Auto Neutrophil #: 5.30 K/uL (05-19-20 @ 06:52)  Auto Lymphocyte #: 1.05 K/uL (05-19-20 @ 06:52)  Auto Neutrophil #: 7.31 K/uL (05-18-20 @ 07:08)  Auto Lymphocyte #: 0.90 K/uL (05-18-20 @ 07:08)  Auto Neutrophil #: 5.36 K/uL (05-17-20 @ 08:56)  Auto Lymphocyte #: 1.30 K/uL (05-17-20 @ 08:56)      Auto Eosinophil #: 0.11 K/uL (05-30-20 @ 06:58)  Auto Eosinophil #: 0.04 K/uL (05-27-20 @ 07:16)  Auto Eosinophil #: 0.04 K/uL (05-23-20 @ 06:46)  Auto Eosinophil #: 0.07 K/uL (05-22-20 @ 07:13)  Auto Eosinophil #: 0.08 K/uL (05-21-20 @ 07:20)  Auto Eosinophil #: 0.05 K/uL (05-20-20 @ 07:44)  Auto Eosinophil #: 0.06 K/uL (05-19-20 @ 06:52)  Auto Eosinophil #: 0.00 K/uL (05-18-20 @ 07:08)  Auto Eosinophil #: 0.04 K/uL (05-17-20 @ 08:56)                                MICROBIOLOGY:              RADIOLOGY & ADDITIONAL STUDIES:

## 2020-05-30 NOTE — PROGRESS NOTE ADULT - SUBJECTIVE AND OBJECTIVE BOX
Patient is a 79y old  Male who presents with a chief complaint of Generalized weakness (29 May 2020 15:24)      FROM ADMISSION H+P:   HPI:  80 y/o M with PMHx of HTN, BPH, spinal stenosis, dyslipidemia, anxiety, neuropathy, B12 deficiency who presents from [facility] for evaluation of weakness and hypotension. As per transfer papers, pt with confusion after lunch today. Pt admits to generalized weakness. Lives with wife, Stephany and son. He came from [facility]hab in [place] since Feb 14. Patient lives with wife and son at home and ambulates with cane. Contracted Covid-19 in [facility] re[facility] and first started having symptoms on Mar 25, reporting symptoms of cough and low grade fever, diarrhea. Denies any chest pain, shortness of breath, headache, dizziness. History obtained from wife, Stephany([phone]) as pt is confused and a poor historian.   Of note, pt was noted to have sacral wounds today and reported low grade fever for two days and mental status changes today and loss of appetite.    ----  INTERVAL HPI/OVERNIGHT EVENTS: Pt seen and evaluated at the bedside. No acute overnight events occurred. Pt has no complaints this morning. Discussed w/ overnight team and RN, no events or concerns overnight. COVID-19 negative PCR x2 now. Pt is eager for dispo to [facility]. Feels hungry. Wants to eat breakfast. No other complaints. Wound vac to be arranged Monday for safe dispo to [facility].     ----  PAST MEDICAL & SURGICAL HISTORY:  H/O vitamin B deficiency  Spinal stenosis  HTN (hypertension)  History of tonsillectomy      FAMILY HISTORY:  No pertinent family history in first degree relatives      Allergies    No Known Allergies    Intolerances        ----  REVIEW OF SYSTEMS:  CONSTITUTIONAL: admits mild fatigue, weakness  HEENT: denies blurred vision, sore throat   CARDIOVASCULAR: denies chest pain, chest pressure, palpitations  RESPIRATORY: denies shortness of breath, sputum production  GASTROINTESTINAL: denies nausea, vomiting, diarrhea, abdominal pain. feels hungry   NEUROLOGICAL: denies numbness, headache, focal weakness  MUSCULOSKELETAL: denies new joint pain, muscle aches  HEMATOLOGIC: denies gross bleeding, bruising  LYMPHATICS: denies enlarged lymph nodes, extremity swelling   ENDOCRINOLOGIC: denies sweating, cold or heat intolerance    ----  PE:  GENERAL: patient appears chronically ill and weak, cachetic   EYES: sclera clear, no exudates, some small ecchymosis around R eye but no corresponding edema  ENMT: obvious temporal wasting, dry mucous membranes  NECK: thin, no thyromegaly noted  LUNGS:  reduced air entry b/l, clear to auscultation,  no rales, wheezing or rhonchi appreciated  HEART: S1/S2, regular rate and rhythm, no murmurs noted, no lower extremity edema  GASTROINTESTINAL: abdomen is thin, soft, nontender, nondistended, hypoactive bs  INTEGUMENT: skin is dry  MUSCULOSKELETAL: no clubbing or cyanosis, no obvious deformity. Wound vac in place, draining serosanguinous discharge  NEUROLOGIC: awake, alert, oriented x3, moving 4 extremities but poor muscle mass and tone    T(C): 36.4 (05-30-20 @ 04:38), Max: 36.8 (05-29-20 @ 23:16)  HR: 74 (05-30-20 @ 04:38) (64 - 74)  BP: 125/67 (05-30-20 @ 04:38) (112/69 - 146/68)  RR: 18 (05-30-20 @ 04:38) (17 - 18)  SpO2: 95% (05-30-20 @ 04:38) (94% - 99%)  Wt(kg): --    ----  I&O's Summary    29 May 2020 07:01  -  30 May 2020 07:00  --------------------------------------------------------  IN: 0 mL / OUT: 750 mL / NET: -750 mL        LABS:                        10.2   7.49  )-----------( 268      ( 30 May 2020 06:58 )             33.1     05-30    139  |  104  |  22  ----------------------------<  81  4.1   |  31  |  0.75    Ca    8.5      30 May 2020 06:58          CAPILLARY BLOOD GLUCOSE                    ----  Personally reviewed:  Vital sign trends: [ x ] yes    [  ] no     [  ] n/a  Laboratory results: [ x ] yes    [  ] no     [  ] n/a  Radiology results: [ x ] yes    [  ] no     [  ] n/a - ct head  Culture results: [  x] yes    [  ] no     [  ] n/a - covid neg x2  Consultant recommendations: [ x ] yes    [  ] no     [  ] n/a

## 2020-05-30 NOTE — PROGRESS NOTE ADULT - SUBJECTIVE AND OBJECTIVE BOX
Chief Complaint: Weakness, AMS    Interval Events: No events overnight.    Review of Systems:  General: No fevers, chills, weight loss or gain  Skin: No rashes, color changes  Cardiovascular: No chest pain, orthopnea  Respiratory: No shortness of breath, cough  Gastrointestinal: No nausea, abdominal pain  Genitourinary: No incontinence, pain with urination  Musculoskeletal: No pain, swelling, decreased range of motion  Neurological: No headache, weakness  Psychiatric: No depression, anxiety  Endocrine: No weight loss or gain, increased thirst  All other systems are comprehensively negative.    Physical Exam:  Vitals:        Vital Signs Last 24 Hrs  T(C): 36.4 (30 May 2020 04:38), Max: 36.8 (29 May 2020 23:16)  T(F): 97.5 (30 May 2020 04:38), Max: 98.2 (29 May 2020 23:16)  HR: 74 (30 May 2020 04:38) (64 - 74)  BP: 125/67 (30 May 2020 04:38) (112/69 - 146/68)  BP(mean): --  RR: 18 (30 May 2020 04:38) (17 - 18)  SpO2: 95% (30 May 2020 04:38) (94% - 99%)  General: NAD  HEENT: MMM  Neck: No JVD, no carotid bruit  Lungs: CTAB  CV: RRR, nl S1/S2, no M/R/G  Abdomen: S/NT/ND, +BS  Extremities: No LE edema, no cyanosis  Neuro: Non-focal  Skin: No rash    Labs:                        10.2   7.49  )-----------( 268      ( 30 May 2020 06:58 )             33.1     05-30    139  |  104  |  22  ----------------------------<  81  4.1   |  31  |  0.75    Ca    8.5      30 May 2020 06:58

## 2020-05-30 NOTE — PROGRESS NOTE ADULT - PROBLEM SELECTOR PLAN 7
Pt frustrated with length of stay in hospital; seems to have a component of depression contributing to mood.  Has hx of anxiety  Psych consulted- Dr. Russell--> Mirtazapine d/c per psych recs   Continue home dose Sertraline

## 2020-05-30 NOTE — PROGRESS NOTE ADULT - PROBLEM SELECTOR PLAN 3
-Anemia possibly 2/2 L hip chronic hematoma as noted on CT AP and poor nutritional status; GIB less likely. No blood in BMs per pt. S/p one unit pRBCs 5/16, H/H increased appropriately. H/H remains stable  -Continue Protonix 40 mg daily, Carafate 1 g four times daily  -Monitor for further bleeding, stool color   -no plans for urgent inpt endoscopy in setting of COVID. f/u as outpatient for procedure endoscopy and colonoscopy  -Encourage adequate PO intake

## 2020-05-30 NOTE — PROGRESS NOTE ADULT - PROBLEM SELECTOR PLAN 4
Stage 4 Sacral Ulcer  - s/p debridement and Zosyn course  - s/p wound vac placement - needs wound vac in [facility] which isn't avail until 6/1  - wound care NP Benita following  - Pain regimen: will attempt to avoid narcotics as possible  - Change bed order to alternate pressure bed   - Menendez placed for urinary retention by Urology - as pt not yet ambulatory, will keep Menendez in for wound healing given sacral ulcer, attempt TOV at [facility] once more ambulatory

## 2020-05-31 PROCEDURE — 99232 SBSQ HOSP IP/OBS MODERATE 35: CPT | Mod: CS

## 2020-05-31 RX ADMIN — Medication 500 MILLIGRAM(S): at 17:16

## 2020-05-31 RX ADMIN — Medication 50 MILLIGRAM(S): at 06:57

## 2020-05-31 RX ADMIN — PREGABALIN 1000 MICROGRAM(S): 225 CAPSULE ORAL at 17:16

## 2020-05-31 RX ADMIN — Medication 1 TABLET(S): at 17:16

## 2020-05-31 NOTE — PROGRESS NOTE ADULT - ASSESSMENT
80 y/o M with PMHx of HTN, BPH, spinal stenosis, dyslipidemia, anxiety, neuropathy, B12 deficiency resident of [facility] for evaluation of weakness and hypotension with mental status change. Tested COVID 19 positive early April. In ED he was hypotensive. WBC 19K No SOB cough n/v/d. Pt is a poor historian. Has unstagable sacral decub ulcer. Also with Acute anemia. ?infectious process vs reactive leukocytosis. Leukocytosis poss chronic.  Respiratory status stable. Menendez placed sec AUR 5/3/2020. Seen by surgery and is sp bedside I&D of sacral decub Clinically stable.    Repeat COVID 19 5/25/2020 neg. afebrile nl wbc    Plan :   Monitor off antibiotics  Menendez per urology  Frequent turning  Local wound care per surgery/wound care nurse  Supportive care for COVID-19  Dc planning to rehab pending COVID 19 neg x 2

## 2020-05-31 NOTE — PROGRESS NOTE ADULT - SUBJECTIVE AND OBJECTIVE BOX
infectious diseases progress note:    LISANDRA PADGETT is a 79y y. o. Male patient    No concerning overnight events    Allergies    No Known Allergies    Intolerances        ANTIBIOTICS/RELEVANT:  antimicrobials    immunologic:    OTHER:  ascorbic acid 500 milliGRAM(s) Oral daily  cyanocobalamin 1000 MICROGram(s) Oral daily  metoprolol tartrate 50 milliGRAM(s) Oral two times a day  multivitamin 1 Tablet(s) Oral daily  oxycodone    5 mG/acetaminophen 325 mG 1 Tablet(s) Oral every 6 hours PRN  oxycodone    5 mG/acetaminophen 325 mG 2 Tablet(s) Oral every 6 hours PRN  pantoprazole   Suspension 40 milliGRAM(s) Oral daily  polyethylene glycol 3350 17 Gram(s) Oral daily  senna 2 Tablet(s) Oral at bedtime  sertraline 100 milliGRAM(s) Oral daily  sodium chloride 0.65% Nasal 1 Spray(s) Both Nostrils two times a day  sucralfate suspension 1 Gram(s) Oral four times a day      Objective:  Vital Signs Last 24 Hrs  T(C): 36.8 (31 May 2020 05:13), Max: 36.9 (30 May 2020 20:43)  T(F): 98.3 (31 May 2020 05:13), Max: 98.5 (30 May 2020 20:43)  HR: 85 (31 May 2020 05:13) (69 - 85)  BP: 149/89 (31 May 2020 05:13) (125/68 - 161/82)  BP(mean): --  RR: 18 (31 May 2020 05:13) (16 - 18)  SpO2: 98% (31 May 2020 05:13) (97% - 98%)    T(C): 36.8 (05-31-20 @ 05:13), Max: 36.9 (05-30-20 @ 20:43)  T(C): 36.8 (05-31-20 @ 05:13), Max: 37.3 (05-28-20 @ 15:49)  T(C): 36.8 (05-31-20 @ 05:13), Max: 37.3 (05-28-20 @ 15:49)    PHYSICAL EXAM:  HEENT: NC atraumatic  Neck: supple  Respiratory: no accessory muscle use, breathing comfortably  Cardiovascular: distant  Gastrointestinal: normal appearing, nondistended  Extremities: no clubbing, no cyanosis,      LABS:                          10.2   7.49  )-----------( 268      ( 30 May 2020 06:58 )             33.1       7.49 05-30 @ 06:58  6.54 05-28 @ 06:57  6.21 05-27 @ 07:16  7.54 05-26 @ 06:36      05-30    139  |  104  |  22  ----------------------------<  81  4.1   |  31  |  0.75    Ca    8.5      30 May 2020 06:58        Creatinine, Serum: 0.75 mg/dL (05-30-20 @ 06:58)  Creatinine, Serum: 0.83 mg/dL (05-28-20 @ 06:57)  Creatinine, Serum: 0.84 mg/dL (05-27-20 @ 07:16)  Creatinine, Serum: 0.87 mg/dL (05-26-20 @ 06:36)                INFLAMMATORY MARKERS  Auto Neutrophil #: 4.56 K/uL (05-30-20 @ 06:58)  Auto Lymphocyte #: 2.28 K/uL (05-30-20 @ 06:58)  Auto Neutrophil #: 3.70 K/uL (05-27-20 @ 07:16)  Auto Lymphocyte #: 1.98 K/uL (05-27-20 @ 07:16)  Auto Neutrophil #: 4.59 K/uL (05-23-20 @ 06:46)  Auto Lymphocyte #: 1.73 K/uL (05-23-20 @ 06:46)  Auto Neutrophil #: 5.36 K/uL (05-22-20 @ 07:13)  Auto Lymphocyte #: 1.44 K/uL (05-22-20 @ 07:13)  Auto Neutrophil #: 5.07 K/uL (05-21-20 @ 07:20)  Auto Lymphocyte #: 1.40 K/uL (05-21-20 @ 07:20)  Auto Neutrophil #: 5.03 K/uL (05-20-20 @ 07:44)  Auto Lymphocyte #: 1.51 K/uL (05-20-20 @ 07:44)  Auto Neutrophil #: 5.30 K/uL (05-19-20 @ 06:52)  Auto Lymphocyte #: 1.05 K/uL (05-19-20 @ 06:52)  Auto Neutrophil #: 7.31 K/uL (05-18-20 @ 07:08)  Auto Lymphocyte #: 0.90 K/uL (05-18-20 @ 07:08)      Auto Eosinophil #: 0.11 K/uL (05-30-20 @ 06:58)  Auto Eosinophil #: 0.04 K/uL (05-27-20 @ 07:16)  Auto Eosinophil #: 0.04 K/uL (05-23-20 @ 06:46)  Auto Eosinophil #: 0.07 K/uL (05-22-20 @ 07:13)  Auto Eosinophil #: 0.08 K/uL (05-21-20 @ 07:20)  Auto Eosinophil #: 0.05 K/uL (05-20-20 @ 07:44)  Auto Eosinophil #: 0.06 K/uL (05-19-20 @ 06:52)  Auto Eosinophil #: 0.00 K/uL (05-18-20 @ 07:08)                                MICROBIOLOGY:              RADIOLOGY & ADDITIONAL STUDIES:

## 2020-05-31 NOTE — PROGRESS NOTE ADULT - SUBJECTIVE AND OBJECTIVE BOX
Patient is a 79y old  Male who presents with a chief complaint of Generalized weakness (31 May 2020 09:27)      FROM ADMISSION H+P:   HPI:  80 y/o M with PMHx of HTN, BPH, spinal stenosis, dyslipidemia, anxiety, neuropathy, B12 deficiency who presents from [facility]hab for evaluation of weakness and hypotension. As per transfer papers, pt with confusion after lunch today. Pt admits to generalized weakness. Lives with wife, Stephany and son. He came from [facility] rehab in [place] since Feb 14. Patient lives with wife and son at home and ambulates with cane. Contracted Covid-19 in [facility] and first started having symptoms on Mar 25, reporting symptoms of cough and low grade fever, diarrhea. Denies any chest pain, shortness of breath, headache, dizziness. History obtained from wife, Stephany([phone]) as pt is confused and a poor historian.   Of note, pt was noted to have sacral wounds today and reported low grade fever for two days and mental status changes today and loss of appetite.    The date the pt first felt unwell: March 25  Fever or chills: yes [ x ]   no [  ]   - Tmax:   Fatigue, malaise or generalized weakness: yes [ x ]   no [  ]  Shortness of breath/dyspnea: yes [ x ]   no [  ]  Cough: yes [ x ]   no [  ], sputum production: yes [  ]   no [x  ]  Blood in sputum: yes [  ]   no [ x ]  Anorexia/po intolerance: yes [  ]   no [ x ]  Chest pain or chest tightness: yes [  ]   no [ x ]  Edema in legs: yes [  ]   no [ x ]  Myalgias: yes [  ]   no [ x ]  Headache: yes [  ]   no [x  ]  Sore throat: yes [  ]   no [ x ]  Rhinorrhea: yes [  ]   no [  x]  Abd pain: yes [  ]   no [ x ]  Nausea: yes [  ]   no [x  ]  Vomiting: yes [  ]   no [  x]  Diarrhea: yes [  ]   no [  ]  Skin rashes: yes [  ]   no [  ]  Loss of sense of smell/anosmia: yes [  ]   no [ x ]  Conjunctivitis: yes [  ]   no [ x ]  Recent travel: yes [  ]   no [ x ] - Location:   Any sick contacts: yes [x  ]   no [  ]: Contracted from [facility]  Close contact with someone confirmed positive with COVID-19 / SARS-CoV2 in the last 14 days: yes [  ]   no [ x ]  Code status: DNR, but not DNI      In the ED  Vitals: T--, , BP 84/48, RR 22, O2 96 on RA  Labs(In [place]) significant for: wbc 18.45, hgb 6.8, FOBT+,   Coag studies: PT 14.9, INR 1.33, aPTT 43.7, d-dimer 561  Chem panel: Lactate 2.5, Na 133, AG 3, BUN/Cr 61/1.61, Alb 1.9, Alk phos 195, ,   UA neg  Covid-19 PCR Positive   EKG shows Accelerated Junctional rhythm with occasional PVCs  CXR in [place] ED shows: Mild R atelectasis  In [place] ED, s/p 2L NS bolus, 1 unit pRBC (28 Apr 2020 21:00)      ----  INTERVAL HPI/OVERNIGHT EVENTS: Pt seen and evaluated at the bedside. No acute overnight events occurred.     ----  PAST MEDICAL & SURGICAL HISTORY:  H/O vitamin B deficiency  Spinal stenosis  HTN (hypertension)  History of tonsillectomy      FAMILY HISTORY:  No pertinent family history in first degree relatives      Allergies    No Known Allergies    Intolerances        ----  REVIEW OF SYSTEMS:  CONSTITUTIONAL: admits mild fatigue, weakness   CARDIOVASCULAR: denies chest pain, chest pressure, palpitations   GASTROINTESTINAL: denies nausea, vomiting, diarrhea, abdominal pain. feels hungry   NEUROLOGICAL: denies numbness, headache, focal weakness  MUSCULOSKELETAL: denies new joint pain, muscle aches   LYMPHATICS: denies enlarged lymph nodes, extremity swelling   ENDOCRINOLOGIC: denies sweating, cold or heat intolerance    ----  PE:  GENERAL: patient appears chronically ill and weak, cachetic    ENMT: obvious temporal wasting, dry mucous membranes   LUNGS:  reduced air entry b/l, clear to auscultation,  no rales, wheezing or rhonchi appreciated  HEART: S1/S2, regular rate and rhythm, no murmurs noted, no lower extremity edema  GASTROINTESTINAL: abdomen is thin, soft, nontender, nondistended, hypoactive bs   MUSCULOSKELETAL: no clubbing or cyanosis, no obvious deformity  NEUROLOGIC: awake, alert, remains aao x3 (person, place, situation)    T(C): 36.8 (05-31-20 @ 05:13), Max: 36.9 (05-30-20 @ 20:43)  HR: 85 (05-31-20 @ 05:13) (69 - 85)  BP: 149/89 (05-31-20 @ 05:13) (125/68 - 161/82)  RR: 18 (05-31-20 @ 05:13) (16 - 18)  SpO2: 98% (05-31-20 @ 05:13) (97% - 98%)  Wt(kg): --    ----  I&O's Summary    30 May 2020 07:01  -  31 May 2020 07:00  --------------------------------------------------------  IN: 0 mL / OUT: 250 mL / NET: -250 mL        LABS:                        10.2   7.49  )-----------( 268      ( 30 May 2020 06:58 )             33.1     05-30    139  |  104  |  22  ----------------------------<  81  4.1   |  31  |  0.75    Ca    8.5      30 May 2020 06:58          CAPILLARY BLOOD GLUCOSE                    ----  Personally reviewed:  Vital sign trends: [ x ] yes    [  ] no     [  ] n/a  Laboratory results: [ x ] yes    [  ] no     [  ] n/a  Radiology results: [  ] yes    [  ] no     [ x ] n/a  Culture results: [  ] yes    [  ] no     [ x ] n/a  Consultant recommendations: [ x ] yes    [  ] no     [  ] n/a Patient is a 79y old  Male who presents with a chief complaint of Generalized weakness (31 May 2020 09:27)      FROM ADMISSION H+P:   HPI:  80 y/o M with PMHx of HTN, BPH, spinal stenosis, dyslipidemia, anxiety, neuropathy, B12 deficiency who presents from [facility] for evaluation of weakness and hypotension. As per transfer papers, pt with confusion after lunch today. Pt admits to generalized weakness. Lives with wife, Stephany and son. He came from [facility] in [place] since Feb 14. Patient lives with wife and son at home and ambulates with cane. Contracted Covid-19 in [facility] and first started having symptoms on Mar 25, reporting symptoms of cough and low grade fever, diarrhea. Denies any chest pain, shortness of breath, headache, dizziness. History obtained from wife, Stephany([phone]) as pt is confused and a poor historian.   Of note, pt was noted to have sacral wounds today and reported low grade fever for two days and mental status changes today and loss of appetite.    ----  INTERVAL HPI/OVERNIGHT EVENTS: Pt seen and evaluated at the bedside. No acute overnight events occurred. No complaints today. Spouse @ bedside. No complaints or events today. Reports feeling well.     ----  PAST MEDICAL & SURGICAL HISTORY:  H/O vitamin B deficiency  Spinal stenosis  HTN (hypertension)  History of tonsillectomy      FAMILY HISTORY:  No pertinent family history in first degree relatives      Allergies    No Known Allergies    Intolerances        ----  REVIEW OF SYSTEMS:  CONSTITUTIONAL: admits mild fatigue, weakness   CARDIOVASCULAR: denies chest pain, chest pressure, palpitations   GASTROINTESTINAL: denies nausea, vomiting, diarrhea, abdominal pain. feels hungry   NEUROLOGICAL: denies numbness, headache, focal weakness  MUSCULOSKELETAL: denies new joint pain, muscle aches   LYMPHATICS: denies enlarged lymph nodes, extremity swelling   ENDOCRINOLOGIC: denies sweating, cold or heat intolerance    ----  PE:  GENERAL: patient appears chronically ill and weak, cachetic    ENMT: obvious temporal wasting, dry mucous membranes   LUNGS:  reduced air entry b/l, clear to auscultation,  no rales, wheezing or rhonchi appreciated  HEART: S1/S2, regular rate and rhythm, no murmurs noted, no lower extremity edema  GASTROINTESTINAL: abdomen is thin, soft, nontender, nondistended, hypoactive bs   MUSCULOSKELETAL: no clubbing or cyanosis, no obvious deformity  NEUROLOGIC: awake, alert, remains aao x3 (person, place, situation)    T(C): 36.8 (05-31-20 @ 05:13), Max: 36.9 (05-30-20 @ 20:43)  HR: 85 (05-31-20 @ 05:13) (69 - 85)  BP: 149/89 (05-31-20 @ 05:13) (125/68 - 161/82)  RR: 18 (05-31-20 @ 05:13) (16 - 18)  SpO2: 98% (05-31-20 @ 05:13) (97% - 98%)  Wt(kg): --    ----  I&O's Summary    30 May 2020 07:01  -  31 May 2020 07:00  --------------------------------------------------------  IN: 0 mL / OUT: 250 mL / NET: -250 mL        LABS:                        10.2   7.49  )-----------( 268      ( 30 May 2020 06:58 )             33.1     05-30    139  |  104  |  22  ----------------------------<  81  4.1   |  31  |  0.75    Ca    8.5      30 May 2020 06:58          CAPILLARY BLOOD GLUCOSE                    ----  Personally reviewed:  Vital sign trends: [ x ] yes    [  ] no     [  ] n/a  Laboratory results: [ x ] yes    [  ] no     [  ] n/a  Radiology results: [  ] yes    [  ] no     [ x ] n/a  Culture results: [  ] yes    [  ] no     [ x ] n/a  Consultant recommendations: [ x ] yes    [  ] no     [  ] n/a

## 2020-05-31 NOTE — PROGRESS NOTE ADULT - ASSESSMENT
FULL NOTE TO FOLLOW  -dc planning to rehab tomorrow 78 yo M with PMHx of HTN, BPH, spinal stenosis, dyslipidemia, anxiety, neuropathy, B12 deficiency presented from [facility] rehab for evaluation of weakness and hypotension. 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 Pt medically stable for d/c, awaiting OMER placement and repeat serial COVID-19 PCR testing. Hallucinating likely 2/2 sundowning and hospital acquired delirium

## 2020-05-31 NOTE — PROGRESS NOTE ADULT - SUBJECTIVE AND OBJECTIVE BOX
Chief Complaint: Weakness, AMS    Interval Events: No events overnight.    Review of Systems:  General: No fevers, chills, weight loss or gain  Skin: No rashes, color changes  Cardiovascular: No chest pain, orthopnea  Respiratory: No shortness of breath, cough  Gastrointestinal: No nausea, abdominal pain  Genitourinary: No incontinence, pain with urination  Musculoskeletal: No pain, swelling, decreased range of motion  Neurological: No headache, weakness  Psychiatric: No depression, anxiety  Endocrine: No weight loss or gain, increased thirst  All other systems are comprehensively negative.    Physical Exam:  Vital Signs Last 24 Hrs  T(C): 36.8 (31 May 2020 05:13), Max: 36.9 (30 May 2020 20:43)  T(F): 98.3 (31 May 2020 05:13), Max: 98.5 (30 May 2020 20:43)  HR: 85 (31 May 2020 05:13) (69 - 85)  BP: 149/89 (31 May 2020 05:13) (125/68 - 161/82)  BP(mean): --  RR: 18 (31 May 2020 05:13) (16 - 18)  SpO2: 98% (31 May 2020 05:13) (97% - 98%)  General: NAD  HEENT: MMM  Neck: No JVD, no carotid bruit  Lungs: CTAB  CV: RRR, nl S1/S2, no M/R/G  Abdomen: S/NT/ND, +BS  Extremities: No LE edema, no cyanosis  Neuro: Non-focal  Skin: No rash    Labs:    05-30    139  |  104  |  22  ----------------------------<  81  4.1   |  31  |  0.75    Ca    8.5      30 May 2020 06:58                          10.2   7.49  )-----------( 268      ( 30 May 2020 06:58 )             33.1

## 2020-05-31 NOTE — PROGRESS NOTE ADULT - PROBLEM SELECTOR PLAN 4
Stage 4 Sacral Ulcer  - s/p debridement and Zosyn course  - s/p wound vac placement - needs wound vac in [facility] which isn't avail until 6/1  - wound care NP Benita following  - Pain regimen: will attempt to avoid narcotics as possible  - Change bed order to alternate pressure bed   - Menendez placed for urinary retention by Urology - as pt not yet ambulatory, will keep Menendez in for wound healing given sacral ulcer, attempt TOV at [facility] once more ambulatory

## 2020-05-31 NOTE — PROGRESS NOTE ADULT - PROBLEM SELECTOR PLAN 1
Pt with new onset of hallucinations. Likely secondary to hospital induced delirium vs polypharmacy.   - Now alert, calm, no hallucinating last 24hrs  - CT head from 5/28: no acute findings  - Possibly related to remeron, d/c'd 5/28 after discussion w/ spouse, pt was refusing the med regardless  - Encourage frequent reorientation

## 2020-05-31 NOTE — PROGRESS NOTE ADULT - PROBLEM SELECTOR PROBLEM 10
DVT prophylaxis

## 2020-05-31 NOTE — PROGRESS NOTE ADULT - ASSESSMENT
The patient is a 79 year old male with a history of HTN, HL, pre-DM, anxiety, spinal stenosis who presents with weakness and AMS in the setting of worsening anemia, dehydration, COVID-19.    Plan:  - Continue metoprolol tartrate 50 mg bid  - Last two COVID-19 negative  - CT head negative for acute pathology  - Discharge planning

## 2020-06-01 VITALS
TEMPERATURE: 98 F | SYSTOLIC BLOOD PRESSURE: 150 MMHG | DIASTOLIC BLOOD PRESSURE: 77 MMHG | HEART RATE: 92 BPM | RESPIRATION RATE: 17 BRPM | OXYGEN SATURATION: 96 %

## 2020-06-01 LAB
ANION GAP SERPL CALC-SCNC: 4 MMOL/L — LOW (ref 5–17)
BUN SERPL-MCNC: 27 MG/DL — HIGH (ref 7–23)
CALCIUM SERPL-MCNC: 8.6 MG/DL — SIGNIFICANT CHANGE UP (ref 8.5–10.1)
CHLORIDE SERPL-SCNC: 105 MMOL/L — SIGNIFICANT CHANGE UP (ref 96–108)
CO2 SERPL-SCNC: 30 MMOL/L — SIGNIFICANT CHANGE UP (ref 22–31)
CREAT SERPL-MCNC: 0.78 MG/DL — SIGNIFICANT CHANGE UP (ref 0.5–1.3)
GLUCOSE SERPL-MCNC: 87 MG/DL — SIGNIFICANT CHANGE UP (ref 70–99)
HCT VFR BLD CALC: 34.9 % — LOW (ref 39–50)
HGB BLD-MCNC: 11 G/DL — LOW (ref 13–17)
MCHC RBC-ENTMCNC: 26.8 PG — LOW (ref 27–34)
MCHC RBC-ENTMCNC: 31.5 GM/DL — LOW (ref 32–36)
MCV RBC AUTO: 84.9 FL — SIGNIFICANT CHANGE UP (ref 80–100)
NRBC # BLD: 0 /100 WBCS — SIGNIFICANT CHANGE UP (ref 0–0)
OB PNL STL: NEGATIVE — SIGNIFICANT CHANGE UP
PLATELET # BLD AUTO: 325 K/UL — SIGNIFICANT CHANGE UP (ref 150–400)
POTASSIUM SERPL-MCNC: 3.8 MMOL/L — SIGNIFICANT CHANGE UP (ref 3.5–5.3)
POTASSIUM SERPL-SCNC: 3.8 MMOL/L — SIGNIFICANT CHANGE UP (ref 3.5–5.3)
RBC # BLD: 4.11 M/UL — LOW (ref 4.2–5.8)
RBC # FLD: 15.6 % — HIGH (ref 10.3–14.5)
SODIUM SERPL-SCNC: 139 MMOL/L — SIGNIFICANT CHANGE UP (ref 135–145)
WBC # BLD: 8.41 K/UL — SIGNIFICANT CHANGE UP (ref 3.8–10.5)
WBC # FLD AUTO: 8.41 K/UL — SIGNIFICANT CHANGE UP (ref 3.8–10.5)

## 2020-06-01 PROCEDURE — 80048 BASIC METABOLIC PNL TOTAL CA: CPT

## 2020-06-01 PROCEDURE — 97116 GAIT TRAINING THERAPY: CPT

## 2020-06-01 PROCEDURE — 82728 ASSAY OF FERRITIN: CPT

## 2020-06-01 PROCEDURE — 85014 HEMATOCRIT: CPT

## 2020-06-01 PROCEDURE — 82040 ASSAY OF SERUM ALBUMIN: CPT

## 2020-06-01 PROCEDURE — 85379 FIBRIN DEGRADATION QUANT: CPT

## 2020-06-01 PROCEDURE — 83615 LACTATE (LD) (LDH) ENZYME: CPT

## 2020-06-01 PROCEDURE — 85018 HEMOGLOBIN: CPT

## 2020-06-01 PROCEDURE — 86900 BLOOD TYPING SEROLOGIC ABO: CPT

## 2020-06-01 PROCEDURE — 87641 MR-STAPH DNA AMP PROBE: CPT

## 2020-06-01 PROCEDURE — 82272 OCCULT BLD FECES 1-3 TESTS: CPT

## 2020-06-01 PROCEDURE — 71250 CT THORAX DX C-: CPT

## 2020-06-01 PROCEDURE — 87635 SARS-COV-2 COVID-19 AMP PRB: CPT

## 2020-06-01 PROCEDURE — 84132 ASSAY OF SERUM POTASSIUM: CPT

## 2020-06-01 PROCEDURE — 86901 BLOOD TYPING SEROLOGIC RH(D): CPT

## 2020-06-01 PROCEDURE — 86850 RBC ANTIBODY SCREEN: CPT

## 2020-06-01 PROCEDURE — 80076 HEPATIC FUNCTION PANEL: CPT

## 2020-06-01 PROCEDURE — 86923 COMPATIBILITY TEST ELECTRIC: CPT

## 2020-06-01 PROCEDURE — 36415 COLL VENOUS BLD VENIPUNCTURE: CPT

## 2020-06-01 PROCEDURE — 36430 TRANSFUSION BLD/BLD COMPNT: CPT

## 2020-06-01 PROCEDURE — 85652 RBC SED RATE AUTOMATED: CPT

## 2020-06-01 PROCEDURE — 99239 HOSP IP/OBS DSCHRG MGMT >30: CPT | Mod: CS

## 2020-06-01 PROCEDURE — 85027 COMPLETE CBC AUTOMATED: CPT

## 2020-06-01 PROCEDURE — P9016: CPT

## 2020-06-01 PROCEDURE — 71045 X-RAY EXAM CHEST 1 VIEW: CPT

## 2020-06-01 PROCEDURE — 70450 CT HEAD/BRAIN W/O DYE: CPT

## 2020-06-01 PROCEDURE — 84443 ASSAY THYROID STIM HORMONE: CPT

## 2020-06-01 PROCEDURE — U0003: CPT

## 2020-06-01 PROCEDURE — 84145 PROCALCITONIN (PCT): CPT

## 2020-06-01 PROCEDURE — 99285 EMERGENCY DEPT VISIT HI MDM: CPT | Mod: 25

## 2020-06-01 PROCEDURE — 97110 THERAPEUTIC EXERCISES: CPT

## 2020-06-01 PROCEDURE — 87040 BLOOD CULTURE FOR BACTERIA: CPT

## 2020-06-01 PROCEDURE — 86140 C-REACTIVE PROTEIN: CPT

## 2020-06-01 PROCEDURE — 84100 ASSAY OF PHOSPHORUS: CPT

## 2020-06-01 PROCEDURE — 97530 THERAPEUTIC ACTIVITIES: CPT

## 2020-06-01 PROCEDURE — 83540 ASSAY OF IRON: CPT

## 2020-06-01 PROCEDURE — 74176 CT ABD & PELVIS W/O CONTRAST: CPT

## 2020-06-01 PROCEDURE — 82607 VITAMIN B-12: CPT

## 2020-06-01 PROCEDURE — 87640 STAPH A DNA AMP PROBE: CPT

## 2020-06-01 PROCEDURE — 83550 IRON BINDING TEST: CPT

## 2020-06-01 PROCEDURE — 82962 GLUCOSE BLOOD TEST: CPT

## 2020-06-01 PROCEDURE — 83735 ASSAY OF MAGNESIUM: CPT

## 2020-06-01 PROCEDURE — 97162 PT EVAL MOD COMPLEX 30 MIN: CPT

## 2020-06-01 PROCEDURE — 82746 ASSAY OF FOLIC ACID SERUM: CPT

## 2020-06-01 PROCEDURE — 80053 COMPREHEN METABOLIC PANEL: CPT

## 2020-06-01 PROCEDURE — 93005 ELECTROCARDIOGRAM TRACING: CPT

## 2020-06-01 RX ORDER — SENNA PLUS 8.6 MG/1
2 TABLET ORAL
Qty: 0 | Refills: 0 | DISCHARGE
Start: 2020-06-01

## 2020-06-01 RX ORDER — PREGABALIN 225 MG/1
1 CAPSULE ORAL
Qty: 0 | Refills: 0 | DISCHARGE
Start: 2020-06-01

## 2020-06-01 RX ORDER — POLYETHYLENE GLYCOL 3350 17 G/17G
17 POWDER, FOR SOLUTION ORAL
Qty: 0 | Refills: 0 | DISCHARGE
Start: 2020-06-01

## 2020-06-01 RX ORDER — ASCORBIC ACID 60 MG
1 TABLET,CHEWABLE ORAL
Qty: 0 | Refills: 0 | DISCHARGE
Start: 2020-06-01

## 2020-06-01 RX ADMIN — Medication 500 MILLIGRAM(S): at 12:01

## 2020-06-01 RX ADMIN — Medication 1 GRAM(S): at 12:01

## 2020-06-01 RX ADMIN — Medication 50 MILLIGRAM(S): at 06:37

## 2020-06-01 RX ADMIN — PREGABALIN 1000 MICROGRAM(S): 225 CAPSULE ORAL at 12:01

## 2020-06-01 NOTE — PROGRESS NOTE ADULT - SUBJECTIVE AND OBJECTIVE BOX
Chief Complaint: Weakness, AMS    Interval Events: No events overnight.    Review of Systems:  General: No fevers, chills, weight loss or gain  Skin: No rashes, color changes  Cardiovascular: No chest pain, orthopnea  Respiratory: No shortness of breath, cough  Gastrointestinal: No nausea, abdominal pain  Genitourinary: No incontinence, pain with urination  Musculoskeletal: No pain, swelling, decreased range of motion  Neurological: No headache, weakness  Psychiatric: No depression, anxiety  Endocrine: No weight loss or gain, increased thirst  All other systems are comprehensively negative.    Physical Exam:  Vital Signs Last 24 Hrs  T(C): 36.8 (01 Jun 2020 04:58), Max: 37.2 (31 May 2020 20:20)  T(F): 98.2 (01 Jun 2020 04:58), Max: 98.9 (31 May 2020 20:20)  HR: 92 (01 Jun 2020 04:58) (75 - 92)  BP: 150/77 (01 Jun 2020 04:58) (116/62 - 150/77)  BP(mean): --  RR: 17 (01 Jun 2020 04:58) (17 - 18)  SpO2: 96% (01 Jun 2020 04:58) (96% - 98%)  General: NAD  HEENT: MMM  Neck: No JVD, no carotid bruit  Lungs: CTAB  CV: RRR, nl S1/S2, no M/R/G  Abdomen: S/NT/ND, +BS  Extremities: No LE edema, no cyanosis  Neuro: Non-focal  Skin: No rash    Labs:

## 2020-06-01 NOTE — PROGRESS NOTE ADULT - ASSESSMENT
78 y/o M with PMHx of HTN, BPH, spinal stenosis, dyslipidemia, anxiety, neuropathy, B12 deficiency who presents from rehab for evaluation of weakness and hypotension    Anemia   no overt gi bleeding; hgb stable  likely r/t sacral decub/hematoma, sp debridement  monitor cbc, transfuse prn  cont ppi and carafate  monitor stool color   no plans for endoscopic evaluation as no overt gib      FTT/Dysphagia   improvement in po intake  correct elytes prn  cont ppi    on supplements per nutrition   encouragement/assistance at meals      COVID-19  resolved; per primary

## 2020-06-01 NOTE — DISCHARGE NOTE NURSING/CASE MANAGEMENT/SOCIAL WORK - PATIENT PORTAL LINK FT
You can access the FollowMyHealth Patient Portal offered by [facility] by registering at the following website: [email or web address]. By joining [employer]’s FollowMyHealth portal, you will also be able to view your health information using other applications (apps) compatible with our system.

## 2020-06-01 NOTE — PROGRESS NOTE ADULT - REASON FOR ADMISSION
Generalized weakness

## 2020-06-01 NOTE — PROGRESS NOTE ADULT - SUBJECTIVE AND OBJECTIVE BOX
LISANDRA PADGETT is a 79yMale , patient examined and chart reviewed.    INTERVAL HPI/ OVERNIGHT EVENTS:   Afebrile. No events.  Stable.    PAST MEDICAL & SURGICAL HISTORY:  H/O vitamin B deficiency  Spinal stenosis  HTN (hypertension)  History of tonsillectomy      For details regarding the patient's social history, family history, and other miscellaneous elements, please refer the initial infectious diseases consultation and/or the admitting history and physical examination for this admission.    ROS:  CONSTITUTIONAL:  Negative fever or chills  EYES:  Negative  blurry vision or double vision  CARDIOVASCULAR:  Negative for chest pain or palpitations  RESPIRATORY:  Negative for cough, wheezing, or SOB   GASTROINTESTINAL:  Negative for nausea, vomiting, diarrhea, constipation, or abdominal pain  GENITOURINARY:  Negative frequency, urgency or dysuria  NEUROLOGIC:  No headache, confusion, dizziness, lightheadedness  All other systems were reviewed and are negative     Current inpatient medications :  MEDICATIONS  (STANDING):  ascorbic acid 500 milliGRAM(s) Oral daily  cyanocobalamin 1000 MICROGram(s) Oral daily  metoprolol tartrate 50 milliGRAM(s) Oral two times a day  multivitamin 1 Tablet(s) Oral daily  pantoprazole   Suspension 40 milliGRAM(s) Oral daily  polyethylene glycol 3350 17 Gram(s) Oral daily  senna 2 Tablet(s) Oral at bedtime  sertraline 100 milliGRAM(s) Oral daily  sodium chloride 0.65% Nasal 1 Spray(s) Both Nostrils two times a day  sucralfate suspension 1 Gram(s) Oral four times a day    MEDICATIONS  (PRN):  oxycodone    5 mG/acetaminophen 325 mG 1 Tablet(s) Oral every 6 hours PRN Moderate Pain (4 - 6)  oxycodone    5 mG/acetaminophen 325 mG 2 Tablet(s) Oral every 6 hours PRN Severe Pain (7 - 10)      Objective:  Vital Signs Last 24 Hrs  T(C): 36.8 (01 Jun 2020 04:58), Max: 37.2 (31 May 2020 20:20)  T(F): 98.2 (01 Jun 2020 04:58), Max: 98.9 (31 May 2020 20:20)  HR: 92 (01 Jun 2020 04:58) (75 - 92)  BP: 150/77 (01 Jun 2020 04:58) (116/62 - 150/77)  RR: 17 (01 Jun 2020 04:58) (17 - 18)  SpO2: 96% (01 Jun 2020 04:58) (96% - 98%)    Physical Exam:  General:  no acute distress  Eyes: sclera anicteric, pupils equal and reactive to light  ENMT: buccal mucosa moist, pharynx not injected  Neck: supple, trachea midline  Lungs: clear, no wheeze/rhonchi  Cardiovascular: regular rate and rhythm, S1 S2  Abdomen: soft, nontender, no organomegaly present, bowel sounds normal  Neurological: alert and oriented x3 Cranial Nerves II-XII grossly intact  Skin: sacral decub drsg c/d/i   Lymph Nodes: no palpable cervical/supraclavicular lymph nodes enlargements  Extremities: no cyanosis/clubbing/edema        LABS:                                    11.0   8.41  )-----------( 325      ( 01 Jun 2020 09:19 )             34.9   06-01    139  |  105  |  27<H>  ----------------------------<  87  3.8   |  30  |  0.78    Ca    8.6      01 Jun 2020 09:19      MICROBIOLOGY:  COVID-19 PCR . (04.28.20 @ 22:41)    COVID-19 PCR: Detected    COVID-19 PCR . (05.25.20 @ 14:47)    COVID-19 PCR: NotDetec    COVID-19 PCR . (05.28.20 @ 12:02)    COVID-19 PCR: NotDetec      RADIOLOGY & ADDITIONAL STUDIES:    EXAM:  XR CHEST PORTABLE ROUTINE 1V                            PROCEDURE DATE:  04/30/2020          INTERPRETATION:    Examination: XR CHEST    History: ADMDIAG1: K92.2 GASTROINTESTINAL HEMORRHAGE, UNSPECIFIED/, pn pneumonia    TECHNIQUE:  Portable AP view of the chest was obtained.    COMPARISON: A CT chest 1/29/2020 available for review.    FINDINGS:   Lungs clear. RIGHT hemidiaphragm mildly elevated. Posterior lung bases cannot be assessed due to elevated diaphragms. Lateral radiograph recommended if clinically warranted.  . The heart and mediastinum are within normal limits.    Visualized osseous structures are intact.      IMPRESSION: Visualized Lungs clear..   Elevated LEFT hemidiaphragm. Posterior lung bases cannot be assessed dueto elevated diaphragms. Lateral radiograph recommended if clinically warranted.      Assessment :   80 y/o M with PMHx of HTN, BPH, spinal stenosis, dyslipidemia, anxiety, neuropathy, B12 deficiency resident of [facility] for evaluation of weakness and hypotension with mental status change. Tested COVID 19 positive early April. In ED he was hypotensive. WBC 19K No SOB cough n/v/d. Pt is a poor historian. Has unstagable sacral decub ulcer. Also with Acute anemia. ?infectious process vs reactive leukocytosis. Leukocytosis poss chronic.  Respiratory status stable. Menendez placed sec AUR 5/3/2020. Seen by surgery and is sp bedside I&D of sacral decub Clinically stable.    Repeat COVID 19 5/25/2020 neg.    Plan :   Monitor off antibiotics  Menendez per urology/primary team  Frequent turning  Local wound care per surgery/wound care nurse  Supportive care for COVID-19  Dc planning to rehab now that COVID 19 neg x 2       Continue with present regiment.  Appropriate use of antibiotics and adverse effects reviewed.      I have discussed the above plan of care with patient/ family in detail. They expressed understanding of the  treatment plan . Risks, benefits and alternatives discussed in detail. I have asked if they have any questions or concerns and appropriately addressed them to the best of my ability .    > 35 minutes were spent in direct patient care reviewing notes, medications ,labs data/ imaging , discussion with multidisciplinary team.    Thank you for allowing me to participate in care of your patient .    Naima Lopez MD  Infectious Disease  [phone]

## 2020-06-01 NOTE — PROGRESS NOTE ADULT - SUBJECTIVE AND OBJECTIVE BOX
INTERVAL HPI/OVERNIGHT EVENTS:  pt seen and examined  denies n/v/abd pain  reports +gi fxn and eating better  labs pending      MEDICATIONS  (STANDING):  ascorbic acid 500 milliGRAM(s) Oral daily  cyanocobalamin 1000 MICROGram(s) Oral daily  metoprolol tartrate 50 milliGRAM(s) Oral two times a day  multivitamin 1 Tablet(s) Oral daily  pantoprazole   Suspension 40 milliGRAM(s) Oral daily  polyethylene glycol 3350 17 Gram(s) Oral daily  senna 2 Tablet(s) Oral at bedtime  sertraline 100 milliGRAM(s) Oral daily  sodium chloride 0.65% Nasal 1 Spray(s) Both Nostrils two times a day  sucralfate suspension 1 Gram(s) Oral four times a day    MEDICATIONS  (PRN):  oxycodone    5 mG/acetaminophen 325 mG 1 Tablet(s) Oral every 6 hours PRN Moderate Pain (4 - 6)  oxycodone    5 mG/acetaminophen 325 mG 2 Tablet(s) Oral every 6 hours PRN Severe Pain (7 - 10)      Allergies    No Known Allergies    Intolerances        Review of Systems:    General:  No wt loss, fevers, chills, night sweats, fatigue   Eyes:  Good vision, no reported pain  ENT:  No sore throat, pain, runny nose, dysphagia  CV:  No pain, palpitations, hypo/hypertension  Resp:  No dyspnea, cough, tachypnea, wheezing  GI:  No pain, No nausea, No vomiting, No diarrhea, No constipation, No weight loss, No fever, No pruritis, No rectal bleeding, No melena, No dysphagia  :  No pain, bleeding, incontinence, nocturia  Muscle:  No pain, weakness  Neuro:  No weakness, tingling, memory problems  Psych:  No fatigue, insomnia, mood problems, depression  Endocrine:  No polyuria, polydypsia, cold/heat intolerance  Heme:  No petechiae, ecchymosis, easy bruisability  Skin:  No rash, tattoos, scars, edema      Vital Signs Last 24 Hrs  T(C): 36.8 (01 Jun 2020 04:58), Max: 37.2 (31 May 2020 20:20)  T(F): 98.2 (01 Jun 2020 04:58), Max: 98.9 (31 May 2020 20:20)  HR: 92 (01 Jun 2020 04:58) (75 - 92)  BP: 150/77 (01 Jun 2020 04:58) (116/62 - 150/77)  BP(mean): --  RR: 17 (01 Jun 2020 04:58) (17 - 18)  SpO2: 96% (01 Jun 2020 04:58) (96% - 98%)    PHYSICAL EXAM:    Constitutional: NAD frail  HEENT: ncat  Neck: No LAD  Gastrointestinal: soft nt nd  Extremities: No peripheral edema  Vascular: + peripheral pulses  Neurological: Awake alert appropriate    LABS:                RADIOLOGY & ADDITIONAL TESTS:

## 2020-10-14 NOTE — PROGRESS NOTE ADULT - SUBJECTIVE AND OBJECTIVE BOX
Will obtain signed GIUSEPPE when pt. Comes in for appt.   LISANDRA PADGETT is a 79yMale , patient examined and chart reviewed.    INTERVAL HPI/ OVERNIGHT EVENTS:   NAD. Afebrile. On RA.  No events.    PAST MEDICAL & SURGICAL HISTORY:  H/O vitamin B deficiency  Spinal stenosis  HTN (hypertension)  History of tonsillectomy      For details regarding the patient's social history, family history, and other miscellaneous elements, please refer the initial infectious diseases consultation and/or the admitting history and physical examination for this admission.    ROS:  CONSTITUTIONAL:  Negative fever or chills  EYES:  Negative  blurry vision or double vision  CARDIOVASCULAR:  Negative for chest pain or palpitations  RESPIRATORY:  Negative for cough, wheezing, or SOB   GASTROINTESTINAL:  Negative for nausea, vomiting, diarrhea, constipation, or abdominal pain  GENITOURINARY:  Negative frequency, urgency or dysuria  NEUROLOGIC:  No headache, confusion, dizziness, lightheadedness  All other systems were reviewed and are negative     Current inpatient medications :  MEDICATIONS  (STANDING):  collagenase Ointment 1 Application(s) Topical three times a day  metoprolol tartrate 50 milliGRAM(s) Oral two times a day  pantoprazole    Tablet 40 milliGRAM(s) Oral before breakfast  sertraline 100 milliGRAM(s) Oral daily  sodium chloride 0.65% Nasal 1 Spray(s) Both Nostrils two times a day  sucralfate suspension 1 Gram(s) Oral four times a day    Objective:  Vital Signs Last 24 Hrs  T(C): 36.6 (12 May 2020 07:44), Max: 36.7 (12 May 2020 04:17)  T(F): 97.8 (12 May 2020 07:44), Max: 98 (12 May 2020 04:17)  HR: 63 (12 May 2020 07:44) (63 - 70)  BP: 145/72 (12 May 2020 07:44) (106/64 - 152/76)  RR: 18 (12 May 2020 07:44) (18 - 19)  SpO2: 99% (12 May 2020 07:44) (97% - 99%)    Physical Exam:  General:  no acute distress  Eyes: sclera anicteric, pupils equal and reactive to light  ENMT: buccal mucosa moist, pharynx not injected  Neck: supple, trachea midline  Lungs: clear, no wheeze/rhonchi  Cardiovascular: regular rate and rhythm, S1 S2  Abdomen: soft, nontender, no organomegaly present, bowel sounds normal  Neurological: alert and oriented x2 Cranial Nerves II-XII grossly intact  Skin: sacral decub drsg c/d/i   Lymph Nodes: no palpable cervical/supraclavicular lymph nodes enlargements  Extremities: no cyanosis/clubbing/edema        LABS:                    9.3    8.42  )-----------( 242      ( 12 May 2020 07:01 )             29.7   05-12    138  |  105  |  25<H>  ----------------------------<  94  4.7   |  30  |  0.75    Ca    8.2<L>      12 May 2020 07:01    TPro  5.8<L>  /  Alb  2.1<L>  /  TBili  0.4  /  DBili  x   /  AST  31  /  ALT  49  /  AlkPhos  132<H>  05-12      MICROBIOLOGY:    Culture - Blood (collected 02 May 2020 00:30)  Source: .Blood Blood  Preliminary Report (03 May 2020 01:03):    No growth to date.    Culture - Blood (collected 02 May 2020 00:30)  Source: .Blood Blood  Preliminary Report (03 May 2020 01:03):    No growth to date.    COVID-19 PCR . (04.28.20 @ 22:41)    COVID-19 PCR: Detected: This test has been validated by [employer] to be accurate;  though it has not been FDA cleared/approved by the usual pathway  As with all laboratory test, results should be correlated with clinical  findings.  https://www.fda.gov/media/454673/download  https://www.fda.gov/media/079961/download    RADIOLOGY & ADDITIONAL STUDIES:    EXAM:  CT ABDOMEN AND PELVIS                          EXAM:  CT CHEST                            PROCEDURE DATE:  04/29/2020          INTERPRETATION:  CT CHEST, ABDOMEN AND PELVIS WITHOUT CONTRAST    INDICATION: Shortness of breath. Hypotension. Altered mental status.     TECHNIQUE: Noncontrast CT of the chest, abdomen and pelvis. Images are reformatted in the sagittal and coronal planes.Postprocessed MIP reformatted chest images were created and reviewed.    COMPARISON: None.    FINDINGS:    Absence of intravenous contrast limits evaluation for traumatic injury, focal lesions, neoplasm, and vascular pathology.    Thorax:  Lines and tubes: None.  Airways: Tracheobronchial tree is patent.  Lungs and Pleura: Right basilar opacity containing air bronchograms. Mild patchy left lower lobe opacities are also identified. No significant pleural effusion. No pneumothorax.  Mediastinum and lymph nodes: No bulky adenopathy.  Heart: Trace pericardial effusion and/or thickening without significant cardiomegaly. Coronary artery calcification and/or stenting. Valvular calcifications.  Vessels: Atherosclerotic disease of the aorta and its branches. Ascending thoracic aorta measures up to 4.3 cm in maximum AP diameter.   Chest Wall: Within normal limits.    Abdomen/Pelvis:  Liver: No suspicious lesions.  Biliary: No significant dilatation. No calcified gallstones within the gallbladder.  Spleen: No suspicious lesions.  Pancreas: No inflammatory changes or ductal dilatation.  Adrenals: Normal.  Kidneys: No hydronephrosis. Bilateral renal cysts as well as too small to characterize hypodensities. Sub-cm nonobstructive right renal calculus.  Vessels: Atherosclerotic disease of the aorta and its branches.     GI tract: There is suggestion of focal circumferential wall thickening of distal ascending colonic loop without significant pericolonic stranding as best seen on images 78 through 79 of series 2. Consider nonemergent colonoscopy evaluation to exclude underlying malignancy. No evidence ofsmall bowel obstruction. No CT findings of acute appendicitis.    Peritoneum/retroperitoneum and mesentery: No free air. No organized fluid collection. No adenopathy.    Pelvic organs/Bladder: Heterogeneous prominent prostate with nodular density near the apex, cause mass effect and protrusion at the bladder base. Correlate with PSA and consider nonemergent prostatic workup to exclude malignancy. No significant bladder wall thickening.    Abdominal wall: Suggestion of left-sided sacral decubitus ulcer with punctate bubbles of air diffusely extending along symmetrically prominent left gluteal muscle and soft tissue folds. Partially imaged asymmetric density measuring 3.9 x 3.3 cm posteromedial to the left proximal femur on image 168 of series 2. Possibility of intramuscular hematoma considered in the differential.    Bones and soft tissues: Multilevel degenerative changes of the spine noted. Advanced osteoarthritis of bilateral hip joint. Advanced osteoarthritis of left shoulder joint withassociated soft tissue calcifications. Vague linear lucency identified at the level of the sacrococcygeal tip as best seen on images 149 through 151 of series 2. Correlate with point tenderness to exclude underlying nondisplaced fracture.    IMPRESSION:    Right basilar opacity containing air bronchograms. Additional patchy left lower lobe opacities. Bacterial and viral pneumonias including atypical agent such as COVID-19 considered in the differential. Correlate with clinical parameters, laboratory values and follow-up chest radiograph advised. No significant pleural effusion.     Suggestion of focal circumferential wall thickening of distal ascending colonic loop without significant pericolonic stranding. Consider nonemergent colonoscopy evaluation to exclude underlying malignancy. No evidence of small bowel obstruction.     Vague linear lucency identified at the level of the sacrococcygeal tip. Correlate with point tenderness to exclude underlying nondisplaced fracture.    Suggestion of left-sided sacral decubitus ulcer with punctate bubbles of air diffusely extending along symmetrically prominent left gluteal muscle and soft tissue folds. Partially imaged asymmetric density measuring 3.9 x 3.3 cm posteromedial to the left proximalfemur. Possibility of intramuscular hematoma considered in the differential.    Assessment :   80 y/o M with PMHx of HTN, BPH, spinal stenosis, dyslipidemia, anxiety, neuropathy, B12 deficiency resident of [facility] for evaluation of weakness and hypotension with mental status change. Tested COVID 19 positive early April. In ED he was hypotensive. WBC 19K No SOB cough n/v/d. Pt is a poor historian. Has unstagable sacral decub ulcer. Also with Acute anemia. ?infectious process vs reactive leukocytosis. Leukocytosis poss chronic.  Respiratory status stable. Menendez placed sec AUR 5/3/2020. Seen by surgery and is sp bedside I&D of sacral decub Clinically stable.    Plan :   Monitor off antibiotics  Completed Zosyn   Cultures ngtd  Menendez per urology  Frequent turning  Local wound care per surgery  COVID-19--critical period for decompensation  (7-14 days post symptom onset), avoid aerosolizing procedures, NSAIDs   -monitor respiratory status closely ( route of 02 and saturation) and titrate when able  -isolation precautions per protocol  -avoid excessive blood draws, blood cultures and frequent CXRs  -monitor biomarkers- CBC w diff  for NLRNLR <3 low vs >5 high) Ferritin (lower risk <450 vs >850) CRP (low risk <2 and higher risk >6) and LDH, D Dimer, procalcitonin  -therapies remains investigational-- including Hydroxychloroquine  -antibiotics only if there is concern for a bacterial process  -Steroids short course ( 5 days)  --for hypoxia/ARDS /shock    Dc planning to rehab pending COVID 19 neg x 2       Continue with present regiment.  Appropriate use of antibiotics and adverse effects reviewed.      I have discussed the above plan of care with patient/ family in detail. They expressed understanding of the  treatment plan . Risks, benefits and alternatives discussed in detail. I have asked if they have any questions or concerns and appropriately addressed them to the best of my ability .    > 35 minutes were spent in direct patient care reviewing notes, medications ,labs data/ imaging , discussion with multidisciplinary team.    Thank you for allowing me to participate in care of your patient .    Naima Lopez MD  Infectious Disease  [phone]

## 2022-01-20 NOTE — PROGRESS NOTE ADULT - PROBLEM SELECTOR PROBLEM 2
"Continue scrubs, fish oil. Use warm compresses on closed eyelids for 10 minutes at least once a day. Use 1/4"" of Lorena 128 or lubricant ointment in both eyes at bedtime nightly. " 2019 novel coronavirus disease (COVID-19)

## 2022-02-13 NOTE — ED PROVIDER NOTE - DATE/TIME 3
CLINICAL INDICATION: Patient with complaints of chest pain. Onset

7:00 a.m. today. Patient feels like at times heart is racing.



EXAM: Portable chest x-ray, upright view.



COMPARISON: None.



FINDINGS:



Lungs/pleura: Lungs are clear. There is no pneumothorax. There is

no pleural effusion.



Mediastinum: Unremarkable. 



Pulmonary vasculature: Unremarkable.



Heart: Unremarkable.



Bones/extrathoracic soft tissue: Unremarkable.



IMPRESSION:

There is no radiographic evidence of acute cardiopulmonary

process.



Dictated by: 



  Dictated on workstation # [id number] 09-Feb-2020 20:15

## 2022-03-11 NOTE — ED PROVIDER NOTE - ENMT, MLM
Patient was placed in lithotomy position.  Thorough scrubbing her lower abdomen and external genitalia was performed.  Patient was draped in a sterile fashion.  We used 1% Xylocaine with epinephrine and 0.2% ropivacaine equal amounts and injected in the midline of scrotum.  Chinese technique was used and a nick was made in the mid scrotum with the forceps.  Right vas was grasped with a clamp and using cautery it was isolated from the blood vessels and about 2.5 cm of the vas was removed and the edges were coagulated.    Same thing was done on the left side.  Hemostasis was acquired.  4 x 4 dressing was given on the incision and patient was sent home in good condition.  We used about 20 mL of local anesthesia  
Airway patent. Mouth with dry mucosa.

## 2022-04-11 RX ORDER — THIAMINE MONONITRATE (VIT B1) 100 MG
0 TABLET ORAL
Qty: 0 | Refills: 0 | DISCHARGE

## 2022-04-11 RX ORDER — ENOXAPARIN SODIUM 100 MG/ML
0 INJECTION SUBCUTANEOUS
Qty: 0 | Refills: 0 | DISCHARGE

## 2022-04-11 RX ORDER — GABAPENTIN 400 MG/1
1 CAPSULE ORAL
Qty: 0 | Refills: 0 | DISCHARGE

## 2022-04-11 RX ORDER — ACETAMINOPHEN 500 MG
2 TABLET ORAL
Qty: 0 | Refills: 0 | DISCHARGE

## 2022-04-11 RX ORDER — MENTHOL AND METHYL SALICYLATE 10; 30 G/100G; G/100G
0 STICK TOPICAL
Qty: 0 | Refills: 0 | DISCHARGE

## 2022-04-11 RX ORDER — SERTRALINE 25 MG/1
1 TABLET, FILM COATED ORAL
Qty: 0 | Refills: 0 | DISCHARGE

## 2022-04-11 RX ORDER — VALSARTAN 80 MG/1
1 TABLET ORAL
Qty: 0 | Refills: 0 | DISCHARGE

## 2022-04-11 RX ORDER — PREGABALIN 225 MG/1
0 CAPSULE ORAL
Qty: 0 | Refills: 0 | DISCHARGE

## 2022-04-11 RX ORDER — COLLAGENASE CLOSTRIDIUM HIST. 250 UNIT/G
1 OINTMENT (GRAM) TOPICAL
Qty: 0 | Refills: 0 | DISCHARGE

## 2022-04-11 RX ORDER — LOPERAMIDE HCL 2 MG
0 TABLET ORAL
Qty: 0 | Refills: 0 | DISCHARGE

## 2022-04-11 RX ORDER — METOPROLOL TARTRATE 50 MG
1 TABLET ORAL
Qty: 0 | Refills: 0 | DISCHARGE

## 2022-04-11 RX ORDER — ASCORBIC ACID 60 MG
1 TABLET,CHEWABLE ORAL
Qty: 0 | Refills: 0 | DISCHARGE

## 2022-04-11 RX ORDER — IRBESARTAN 75 MG/1
1 TABLET ORAL
Qty: 0 | Refills: 0 | DISCHARGE

## 2022-07-15 NOTE — PHYSICAL THERAPY INITIAL EVALUATION ADULT - MANUAL MUSCLE TESTING RESULTS, REHAB EVAL
BLEs 4/5 proximally and 4-/5 distally DF/PF Cibinqo Pregnancy And Lactation Text: It is unknown if this medication will adversely affect pregnancy or breast feeding.  You should not take this medication if you are currently pregnant or planning a pregnancy or while breastfeeding.

## 2022-08-15 NOTE — ED ADULT TRIAGE NOTE - TEMPERATURE IN CELSIUS (DEGREES C)
"SUBJECTIVE:   CC: Wmsharon Gaines is an 60 year old male who presents for preventative health visit.         Insurance changed  Patient at [facility] - didn't know if he needs referral - wants a back kim to see him  Had back injury 2012 -     Occasionally skin is tender  But feet feel like they are wrapped in ACE bandages  No surgeries  Had it looked at in past -   Doesn't give him a lot of pain now - just is cautious -     When had knee surgery - did a sonogram - circulation is okay   Pretty much in foot area  Has drop foot in left foot -   Occasionally falls due to this -     Has had both hips/knees replaced  Thinks losing weight would help    Basically - homeless for 1-1/2 years  Has an apartment now  Helping painting    Colon cancer screening - declines -  Had cologuard in past - doesn't want to do again  \"if cancer comes, I'm going to ride it out\"      Healthy Habits:     Getting at least 3 servings of Calcium per day:  Yes    Bi-annual eye exam:  Yes    Dental care twice a year:  NO    Sleep apnea or symptoms of sleep apnea:  None    Diet:  Regular (no restrictions)    Frequency of exercise:  None    Taking medications regularly:  Yes    Medication side effects:  Not applicable    PHQ-2 Total Score: 1    Additional concerns today:  Yes      Today's PHQ-2 Score:   PHQ-2 ( 1999 Pfizer) 8/13/2022   Q1: Little interest or pleasure in doing things 1   Q2: Feeling down, depressed or hopeless 0   PHQ-2 Score 1   Q1: Little interest or pleasure in doing things Several days   Q2: Feeling down, depressed or hopeless Not at all   PHQ-2 Score 1       Abuse: Current or Past(Physical, Sexual or Emotional)- No  Do you feel safe in your environment? Yes    Have you ever done Advance Care Planning? (For example, a Health Directive, POLST, or a discussion with a medical provider or your loved ones about your wishes): No, advance care planning information given to patient to review.  Patient plans to discuss their wishes with loved " ones or provider.      Social History     Tobacco Use     Smoking status: Current Every Day Smoker     Packs/day: 2.00     Years: 42.00     Pack years: 84.00     Types: Cigarettes     Start date: 1980     Last attempt to quit: 2018     Years since quittin.0     Smokeless tobacco: Never Used   Substance Use Topics     Alcohol use: Not Currently     If you drink alcohol do you typically have >3 drinks per day or >7 drinks per week? No    No flowsheet data found.    Last PSA:   Prostate Specific Antigen Screen   Date Value Ref Range Status   03/15/2021 1.1 0.0 - 3.5 ng/mL Final       Reviewed orders with patient. Reviewed health maintenance and updated orders accordingly - Yes  BP Readings from Last 3 Encounters:   08/15/22 121/83   03/15/21 130/87   20 125/87    Wt Readings from Last 3 Encounters:   08/15/22 99.3 kg (219 lb)   03/15/21 101.7 kg (224 lb 2 oz)   20 93.4 kg (206 lb)                  Patient Active Problem List   Diagnosis     Family history of diabetes mellitus     Personal history of tobacco use, presenting hazards to health     Hip pain, right     Left knee pain     Chronic low back pain     Alcohol use     Primary osteoarthritis of right hip     Primary osteoarthritis of left knee     Primary osteoarthritis of left hip     Primary osteoarthritis of right knee     Status post total right knee replacement     Past Surgical History:   Procedure Laterality Date     ARTHROSCOPY SHOULDER ROTATOR CUFF REPAIR Right 2019     JOINT REPLACEMENT Left 2019    TKA     SOFT TISSUE SURGERY  2019    Rotator cuff surgery left shoulder     [facility] TOTAL HIP ARTHROPLASTY Right 2018    Procedure: RIGHT TOTAL HIP ARTHROPLASTY, POSTERIOR APPROACH;  Surgeon: Gerard Evans MD;  Location: [facility] OR;  Service: Orthopedics     [facility] TOTAL HIP ARTHROPLASTY Left 10/02/2019    Procedure: LEFT DIRECT ANTERIOR TOTAL HIP ARTHROPLASTY;  Surgeon: Gerard Evans MD;  Location: [facility]  Main OR;  Service: Orthopedics     [facility] TOTAL KNEE ARTHROPLASTY Left 2019    Procedure: LEFT TOTAL KNEE ARTHROPLASTY;  Surgeon: Gerard Evans MD;  Location: [facility] Main OR;  Service: Orthopedics     [facility] TOTAL KNEE ARTHROPLASTY Right 2020    Procedure: RIGHT TOTAL KNEE ARTHROPLASTY;  Surgeon: Gerard Evans MD;  Location: [facility] Main OR;  Service: Orthopedics       Social History     Tobacco Use     Smoking status: Current Every Day Smoker     Packs/day: 2.00     Years: 42.00     Pack years: 84.00     Types: Cigarettes     Start date: 1980     Last attempt to quit: 2018     Years since quittin.0     Smokeless tobacco: Never Used   Substance Use Topics     Alcohol use: Not Currently     No family history on file.      Current Outpatient Medications   Medication Sig Dispense Refill     ibuprofen (ADVIL/MOTRIN) 800 MG tablet TAKE 1 TABLET(800 MG) BY MOUTH TWICE DAILY AS NEEDED 60 tablet 0     varenicline (CHANTIX LIZETH) 0.5 MG X 11 & 1 MG X 42 tablet Take 0.5 mg tab daily for 3 days, THEN 0.5 mg tab twice daily for 4 days, THEN 1 mg twice daily. 53 tablet 0     varenicline (CHANTIX) 1 MG tablet Take 1 tablet (1 mg) by mouth 2 times daily 180 tablet 0     No Known Allergies    Reviewed and updated as needed this visit by clinical staff   Tobacco  Allergies  Meds                Reviewed and updated as needed this visit by Provider                   Past Medical History:   Diagnosis Date     Arthritis       Past Surgical History:   Procedure Laterality Date     ARTHROSCOPY SHOULDER ROTATOR CUFF REPAIR Right 2019     JOINT REPLACEMENT Left 2019    TKA     SOFT TISSUE SURGERY  2019    Rotator cuff surgery left shoulder     [facility] TOTAL HIP ARTHROPLASTY Right 2018    Procedure: RIGHT TOTAL HIP ARTHROPLASTY, POSTERIOR APPROACH;  Surgeon: Gerard Evans MD;  Location: [facility] OR;  Service: Orthopedics     [facility] TOTAL HIP ARTHROPLASTY Left 10/02/2019    Procedure: LEFT  DIRECT ANTERIOR TOTAL HIP ARTHROPLASTY;  Surgeon: Gerard Evans MD;  Location: [facility] OR;  Service: Orthopedics     [facility] TOTAL KNEE ARTHROPLASTY Left 02/06/2019    Procedure: LEFT TOTAL KNEE ARTHROPLASTY;  Surgeon: Gerard Evans MD;  Location: [facility] OR;  Service: Orthopedics     [facility] TOTAL KNEE ARTHROPLASTY Right 11/04/2020    Procedure: RIGHT TOTAL KNEE ARTHROPLASTY;  Surgeon: Gerard Evans MD;  Location: [facility] OR;  Service: Orthopedics       Review of Systems   Constitutional: Negative for chills and fever.   HENT: Negative for congestion, ear pain, hearing loss and sore throat.    Eyes: Positive for visual disturbance. Negative for pain.   Respiratory: Positive for shortness of breath. Negative for cough.    Cardiovascular: Negative for chest pain, palpitations and peripheral edema.   Gastrointestinal: Negative for abdominal pain, constipation, diarrhea, heartburn, hematochezia and nausea.   Genitourinary: Positive for frequency. Negative for dysuria, genital sores, hematuria, impotence, penile discharge and urgency.   Musculoskeletal: Negative for arthralgias, joint swelling and myalgias.   Skin: Negative for rash.   Neurological: Positive for weakness and paresthesias. Negative for dizziness and headaches.   Psychiatric/Behavioral: Negative for mood changes. The patient is not nervous/anxious.      CONSTITUTIONAL: NEGATIVE for fever, chills, change in weight  INTEGUMENTARY/SKIN: NEGATIVE for worrisome rashes, moles or lesions  EYES: NEGATIVE for vision changes or irritation  ENT: NEGATIVE for ear, mouth and throat problems  RESP: NEGATIVE for significant cough or SOB  CV: NEGATIVE for chest pain, palpitations or peripheral edema  GI: NEGATIVE for nausea, abdominal pain, heartburn, or change in bowel habits   male: negative for dysuria, hematuria, decreased urinary stream, erectile dysfunction, urethral discharge  MUSCULOSKELETAL: NEGATIVE for significant arthralgias or  "myalgia  NEURO: NEGATIVE for weakness, dizziness or paresthesias  PSYCHIATRIC: NEGATIVE for changes in mood or affect    OBJECTIVE:   /83 (BP Location: Left arm, Patient Position: Sitting, Cuff Size: Adult Regular)   Pulse 95   Temp 98.1  F (36.7  C)   Resp 24   Ht 1.85 m (6' 0.84\")   Wt 99.3 kg (219 lb)   SpO2 97%   BMI 29.02 kg/m      Physical Exam  Vitals reviewed.   Constitutional:       General: He is not in acute distress.     Appearance: He is well-developed.   HENT:      Right Ear: Tympanic membrane and external ear normal.      Left Ear: Tympanic membrane and external ear normal.      Nose: Nose normal.      Mouth/Throat:      Mouth: No oral lesions.      Pharynx: No oropharyngeal exudate.   Eyes:      General:         Right eye: No discharge.         Left eye: No discharge.      Conjunctiva/sclera: Conjunctivae normal.      Pupils: Pupils are equal, round, and reactive to light.   Neck:      Thyroid: No thyromegaly.      Trachea: No tracheal deviation.   Cardiovascular:      Rate and Rhythm: Normal rate and regular rhythm.      Pulses: Normal pulses.      Heart sounds: Normal heart sounds, S1 normal and S2 normal. No murmur heard.    No S3 or S4 sounds.   Pulmonary:      Effort: Pulmonary effort is normal. No respiratory distress.      Breath sounds: Normal breath sounds. No wheezing or rales.   Abdominal:      General: Bowel sounds are normal.      Palpations: Abdomen is soft. There is no mass.      Tenderness: There is no abdominal tenderness.   Musculoskeletal:         General: No deformity. Normal range of motion.      Cervical back: Neck supple.      Comments: Left foot drop - mild weakness     Lymphadenopathy:      Cervical: No cervical adenopathy.   Skin:     General: Skin is warm and dry.      Findings: No lesion or rash.   Neurological:      Mental Status: He is alert and oriented to person, place, and time.      Sensory: Sensory deficit (mild sensory deficit in left lower extremity) " present.      Motor: No abnormal muscle tone.      Deep Tendon Reflexes: Reflexes are normal and symmetric.   Psychiatric:         Speech: Speech normal.         Thought Content: Thought content normal.         Judgment: Judgment normal.           Diagnostic Test Results:  Labs reviewed in Epic  Results for orders placed or performed in visit on 08/15/22   HIV Antigen Antibody Combo     Status: Normal   Result Value Ref Range    HIV Antigen Antibody Combo Nonreactive Nonreactive   Hepatitis C Screen Reflex to HCV RNA Quant and Genotype     Status: Normal   Result Value Ref Range    Hepatitis C Antibody Nonreactive Nonreactive    Narrative    Assay performance characteristics have not been established for newborns, infants, and children.   Comprehensive metabolic panel (BMP + Alb, Alk Phos, ALT, AST, Total. Bili, TP)     Status: Abnormal   Result Value Ref Range    Sodium 137 136 - 145 mmol/L    Potassium 4.8 3.4 - 5.3 mmol/L    Creatinine 0.65 (L) 0.67 - 1.17 mg/dL    Urea Nitrogen 9.3 8.0 - 23.0 mg/dL    Chloride 99 98 - 107 mmol/L    Carbon Dioxide (CO2) 29 22 - 29 mmol/L    Anion Gap 9 7 - 15 mmol/L    Glucose 94 70 - 99 mg/dL    Calcium 9.9 8.6 - 10.0 mg/dL    Protein Total 7.7 6.4 - 8.3 g/dL    Albumin 4.6 3.5 - 5.2 g/dL    Bilirubin Total 0.4 <=1.2 mg/dL    Alkaline Phosphatase 114 40 - 129 U/L    AST 41 10 - 50 U/L    ALT 38 10 - 50 U/L    GFR Estimate >90 >60 mL/min/1.73m2   Vitamin D Deficiency     Status: Normal   Result Value Ref Range    Vitamin D, Total (25-Hydroxy) 32 20 - 75 ug/L    Narrative    Season, race, dietary intake, and treatment affect the concentration of 25-hydroxy-Vitamin D. Values may decrease during winter months and increase during summer months. Values 20-29 ug/L may indicate Vitamin D insufficiency and values <20 ug/L may indicate Vitamin D deficiency.    Vitamin D determination is routinely performed by an immunoassay specific for 25 hydroxyvitamin D3.  If an individual is on  vitamin D2(ergocalciferol) supplementation, please specify 25 OH vitamin D2 and D3 level determination by LCMSMS test VITD23.     Hemoglobin A1c     Status: Abnormal   Result Value Ref Range    Hemoglobin A1C 5.7 (H) 0.0 - 5.6 %   Vitamin B12     Status: Normal   Result Value Ref Range    Vitamin B12 719 232 - 1,245 pg/mL   TSH     Status: Normal   Result Value Ref Range    TSH 2.23 0.30 - 4.20 uIU/mL       ASSESSMENT/PLAN:   1. Adult general medical exam  This is a 59 yo male here for physical exam     2. Callus of foot  Patient has significant callus on foot - likely related to neuropathy; will refer to Ortho  - Orthopedic  Referral; Future    3. 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 Bili, TP); Future  - Comprehensive metabolic panel (BMP + Alb, Alk Phos, ALT, AST, Total. Bili, TP)    4. Lumbar radiculopathy  As above  - Spine  Referral; Future  - Comprehensive metabolic panel (BMP + Alb, Alk Phos, ALT, AST, Total. Bili, TP); Future  - Comprehensive metabolic panel (BMP + Alb, Alk Phos, ALT, AST, Total. Bili, TP)    5. Neuropathy  As above  - Spine  Referral; Future  - Vitamin B12; Future  - TSH; Future  - Vitamin B12  - TSH    6. Primary osteoarthritis of left knee  H/o left knee DJD - uses NSAID prn   - ibuprofen (ADVIL/MOTRIN) 800 MG tablet; TAKE 1 TABLET(800 MG) BY MOUTH TWICE DAILY AS NEEDED  Dispense: 60 tablet; Refill: 0    7. Personal history of tobacco use, presenting hazards to health  Uses nicotine - try Chantix  - varenicline (CHANTIX LIZETH) 0.5 MG X 11 & 1 MG X 42 tablet; Take 0.5 mg tab daily for 3 days, THEN 0.5 mg tab twice daily for 4 days, THEN 1 mg twice daily.  Dispense: 53 tablet; Refill: 0  - varenicline (CHANTIX) 1 MG tablet; Take 1 tablet (1 mg) by mouth 2 times daily  Dispense: 180 tablet;  "Refill: 0    8. Family history of diabetes mellitus  Has family history of diabetes - check labs - treat accordingly   - Comprehensive metabolic panel (BMP + Alb, Alk Phos, ALT, AST, Total. Bili, TP); Future  - Hemoglobin A1c; Future  - Comprehensive metabolic panel (BMP + Alb, Alk Phos, ALT, AST, Total. Bili, TP)  - Hemoglobin A1c    9. Vitamin D deficiency  H/o low vitamin D - check levels  - Vitamin D Deficiency; Future  - Vitamin D Deficiency    10. Screen for colon cancer  Due for colon cancer screening - discussed - declined at this time     11. Screening for HIV (human immunodeficiency virus)  Discussed recommendation for HIV screening - low risk - ordered    - HIV Antigen Antibody Combo; Future  - HIV Antigen Antibody Combo    12. Need for hepatitis C screening test  Discussed recommendation for Hepatitis C screening - low risk - ordered    - Hepatitis C Screen Reflex to HCV RNA Quant and Genotype; Future  - Hepatitis C Screen Reflex to HCV RNA Quant and Genotype      Patient has been advised of split billing requirements and indicates understanding: Yes    COUNSELING:   Reviewed preventive health counseling, as reflected in patient instructions       Regular exercise       Healthy diet/nutrition    Estimated body mass index is 29.02 kg/m  as calculated from the following:    Height as of this encounter: 1.85 m (6' 0.84\").    Weight as of this encounter: 99.3 kg (219 lb).     Weight management plan: Discussed healthy diet and exercise guidelines    He reports that he has been smoking cigarettes. He started smoking about 42 years ago. He has a 84.00 pack-year smoking history. He has never used smokeless tobacco.  Nicotine/Tobacco Cessation Plan:   Information offered: Patient not interested at this time      Counseling Resources:  ATP IV Guidelines  Pooled Cohorts Equation Calculator  FRAX Risk Assessment  ICSI Preventive Guidelines  Dietary Guidelines for Americans, 2010  USDA's MyPlate  ASA " Prophylaxis  Lung CA Screening    MARLON TRIVEDI MD  [facility]   36.7

## 2022-09-19 NOTE — PROGRESS NOTE ADULT - SUBJECTIVE AND OBJECTIVE BOX
Patient recently dx with diverticulosis, was in hospital    Dx with sciatica pain LISANDRA PADGETT is a 79yMale , patient examined and chart reviewed.    INTERVAL HPI/ OVERNIGHT EVENTS:   NAD.  Afebrile. On RA    PAST MEDICAL & SURGICAL HISTORY:  H/O vitamin B deficiency  Spinal stenosis  HTN (hypertension)  History of tonsillectomy      For details regarding the patient's social history, family history, and other miscellaneous elements, please refer the initial infectious diseases consultation and/or the admitting history and physical examination for this admission.    ROS:  CONSTITUTIONAL:  Negative fever or chills  EYES:  Negative  blurry vision or double vision  CARDIOVASCULAR:  Negative for chest pain or palpitations  RESPIRATORY:  Negative for cough, wheezing, or SOB   GASTROINTESTINAL:  Negative for nausea, vomiting, diarrhea, constipation, or abdominal pain  GENITOURINARY:  Negative frequency, urgency or dysuria  NEUROLOGIC:  No headache, confusion, dizziness, lightheadedness  All other systems were reviewed and are negative         Current inpatient medications :    ANTIBIOTICS/RELEVANT:  piperacillin/tazobactam IVPB.. 3.375 Gram(s) IV Intermittent every 8 hours    MEDICATIONS  (STANDING):  collagenase Ointment 1 Application(s) Topical three times a day  pantoprazole    Tablet 40 milliGRAM(s) Oral before breakfast  senna 2 Tablet(s) Oral at bedtime  sertraline 100 milliGRAM(s) Oral daily  sodium chloride 0.65% Nasal 1 Spray(s) Both Nostrils two times a day  sucralfate suspension 1 Gram(s) Oral four times a day      Objective:  Vital Signs Last 24 Hrs  T(C): 36.6 (01 May 2020 07:49), Max: 36.8 (01 May 2020 00:22)  T(F): 97.8 (01 May 2020 07:49), Max: 98.2 (01 May 2020 00:22)  HR: 75 (01 May 2020 07:49) (74 - 93)  BP: 150/66 (01 May 2020 07:49) (131/70 - 154/73)  RR: 18 (01 May 2020 07:49) (18 - 20)  SpO2: 97% (01 May 2020 07:49) (94% - 98%)    Physical Exam:  General:  no acute distress  Eyes: sclera anicteric, pupils equal and reactive to light  ENMT: buccal mucosa moist, pharynx not injected  Neck: supple, trachea midline  Lungs: clear, no wheeze/rhonchi  Cardiovascular: regular rate and rhythm, S1 S2  Abdomen: soft, nontender, no organomegaly present, bowel sounds normal  Neurological: alert and oriented x2 Cranial Nerves II-XII grossly intact  Skin: sacral decub stage 4 drsg c/d/i no malodor no drainage  Lymph Nodes: no palpable cervical/supraclavicular lymph nodes enlargements  Extremities: no cyanosis/clubbing/edema        LABS:                        10.1   18.52 )-----------( 366      ( 01 May 2020 06:36 )             31.6   05-01    140  |  103  |  50<H>  ----------------------------<  107<H>  3.6   |  28  |  1.10    Ca    8.8      01 May 2020 06:36      MICROBIOLOGY:  COVID-19 PCR . (04.28.20 @ 22:41)    COVID-19 PCR: Detected: This test has been validated by [employer] to be accurate;  though it has not been FDA cleared/approved by the usual pathway  As with all laboratory test, results should be correlated with clinical  findings.  https://www.fda.gov/media/199798/download  https://www.fda.gov/media/118639/download    RADIOLOGY & ADDITIONAL STUDIES:    EXAM:  CT ABDOMEN AND PELVIS                          EXAM:  CT CHEST                            PROCEDURE DATE:  04/29/2020          INTERPRETATION:  CT CHEST, ABDOMEN AND PELVIS WITHOUT CONTRAST    INDICATION: Shortness of breath. Hypotension. Altered mental status.     TECHNIQUE: Noncontrast CT of the chest, abdomen and pelvis. Images are reformatted in the sagittal and coronal planes.Postprocessed MIP reformatted chest images were created and reviewed.    COMPARISON: None.    FINDINGS:    Absence of intravenous contrast limits evaluation for traumatic injury, focal lesions, neoplasm, and vascular pathology.    Thorax:  Lines and tubes: None.  Airways: Tracheobronchial tree is patent.  Lungs and Pleura: Right basilar opacity containing air bronchograms. Mild patchy left lower lobe opacities are also identified. No significant pleural effusion. No pneumothorax.  Mediastinum and lymph nodes: No bulky adenopathy.  Heart: Trace pericardial effusion and/or thickening without significant cardiomegaly. Coronary artery calcification and/or stenting. Valvular calcifications.  Vessels: Atherosclerotic disease of the aorta and its branches. Ascending thoracic aorta measures up to 4.3 cm in maximum AP diameter.   Chest Wall: Within normal limits.    Abdomen/Pelvis:  Liver: No suspicious lesions.  Biliary: No significant dilatation. No calcified gallstones within the gallbladder.  Spleen: No suspicious lesions.  Pancreas: No inflammatory changes or ductal dilatation.  Adrenals: Normal.  Kidneys: No hydronephrosis. Bilateral renal cysts as well as too small to characterize hypodensities. Sub-cm nonobstructive right renal calculus.  Vessels: Atherosclerotic disease of the aorta and its branches.     GI tract: There is suggestion of focal circumferential wall thickening of distal ascending colonic loop without significant pericolonic stranding as best seen on images 78 through 79 of series 2. Consider nonemergent colonoscopy evaluation to exclude underlying malignancy. No evidence ofsmall bowel obstruction. No CT findings of acute appendicitis.    Peritoneum/retroperitoneum and mesentery: No free air. No organized fluid collection. No adenopathy.    Pelvic organs/Bladder: Heterogeneous prominent prostate with nodular density near the apex, cause mass effect and protrusion at the bladder base. Correlate with PSA and consider nonemergent prostatic workup to exclude malignancy. No significant bladder wall thickening.    Abdominal wall: Suggestion of left-sided sacral decubitus ulcer with punctate bubbles of air diffusely extending along symmetrically prominent left gluteal muscle and soft tissue folds. Partially imaged asymmetric density measuring 3.9 x 3.3 cm posteromedial to the left proximal femur on image 168 of series 2. Possibility of intramuscular hematoma considered in the differential.    Bones and soft tissues: Multilevel degenerative changes of the spine noted. Advanced osteoarthritis of bilateral hip joint. Advanced osteoarthritis of left shoulder joint withassociated soft tissue calcifications. Vague linear lucency identified at the level of the sacrococcygeal tip as best seen on images 149 through 151 of series 2. Correlate with point tenderness to exclude underlying nondisplaced fracture.    IMPRESSION:    Right basilar opacity containing air bronchograms. Additional patchy left lower lobe opacities. Bacterial and viral pneumonias including atypical agent such as COVID-19 considered in the differential. Correlate with clinical parameters, laboratory values and follow-up chest radiograph advised. No significant pleural effusion.     Suggestion of focal circumferential wall thickening of distal ascending colonic loop without significant pericolonic stranding. Consider nonemergent colonoscopy evaluation to exclude underlying malignancy. No evidence of small bowel obstruction.     Vague linear lucency identified at the level of the sacrococcygeal tip. Correlate with point tenderness to exclude underlying nondisplaced fracture.    Suggestion of left-sided sacral decubitus ulcer with punctate bubbles of air diffusely extending along symmetrically prominent left gluteal muscle and soft tissue folds. Partially imaged asymmetric density measuring 3.9 x 3.3 cm posteromedial to the left proximalfemur. Possibility of intramuscular hematoma considered in the differential.    Assessment :   80 y/o M with PMHx of HTN, BPH, spinal stenosis, dyslipidemia, anxiety, neuropathy, B12 deficiency resident of [facility] for evaluation of weakness and hypotension with mental status change. Tested COVID 19 positive early April. In ED he was hypotensive. WBC 19K No SOB cough n/v/d. Pt is a poor historian. Has unstagable sacral decub ulcer. Also with Acute anemia. ?infectious process vs reactive leukocytosis. Leukocytosis poss chronic.  Respiratory status stable    Plan :   On Zosyn day 3/5  Cultures ngtd  Seen by surgery and is sp bedside I&D of sacral decub  Frequent turning  COVID-19--critical period for decompensation  (7-14 days post symptom onset), avoid aerosolizing procedures, NSAIDs   -monitor respiratory status closely ( route of 02 and saturation) and titrate when able  -isolation precautions per protocol  -avoid excessive blood draws, blood cultures and frequent CXRs  -monitor biomarkers- CBC w diff  for NLRNLR <3 low vs >5 high) Ferritin (lower risk <450 vs >850) CRP (low risk <2 and higher risk >6) and LDH, D Dimer, procalcitonin  -therapies remains investigational-- including Hydroxychloroquine  -antibiotics only if there is concern for a bacterial process  -Steroids short course ( 5 days)  --for hypoxia/ARDS /shock      D/W Dr Hirsch    Continue with present regiment.  Appropriate use of antibiotics and adverse effects reviewed.      I have discussed the above plan of care with patient/ family in detail. They expressed understanding of the  treatment plan . Risks, benefits and alternatives discussed in detail. I have asked if they have any questions or concerns and appropriately addressed them to the best of my ability .    > 35 minutes were spent in direct patient care reviewing notes, medications ,labs data/ imaging , discussion with multidisciplinary team.    Thank you for allowing me to participate in care of your patient .    Naima Lopez MD  Infectious Disease  [phone]

## 2022-10-21 NOTE — ED ADULT NURSE NOTE - [facility] ED NUR SYMPTOMS POS
As I explained to the patient during the encounter, the prescription is unique to [facility] and it cannot be sent to other pharmacies and is not available at other pharmacies  There is no similar medication available at his preferred pharmacies that would be likely to be better than what he has been using, which has been helpful and successful thus far  He should continue with the topical clindamycin if he is not interested in the [facility] product 
Pt called and asked if doctor can please send his script to    [address] [facility] [place] [address] [place] [address]   [address]  [address] [facility] [place] [address]      Pt said the script is supposed to go to [facility] and he doesn't want to fill everything out for this pharmacy  Pt said if the prescription that is being sent there can't be sent to either of these other pharmacies he then asked if the doctor could provide a different medication that can be sent to either of these pharmacies  Please call pt and advise   Thank you
FATIGUE/FEVER/WEAKNESS

## 2023-03-22 NOTE — ED ADULT NURSE NOTE - NURSING NEURO ORIENTATION
Korin calling requesting Pap test explained. Results and MD note explained with no further questions.  Alejandra Gross, RN on 3/22/2023 at 4:41 PM      Reason for Disposition    Information only question and nurse able to answer    Protocols used: INFORMATION ONLY CALL - NO TRIAGE-A-OH       oriented to person, place and time

## 2023-06-19 NOTE — PROGRESS NOTE ADULT - ASSESSMENT
80 y/o M with PMHx of HTN, BPH, spinal stenosis, dyslipidemia, anxiety, neuropathy, B12 deficiency resident of [facility] for evaluation of weakness and hypotension with mental status change. Tested COVID 19 positive early April. In ED he was hypotensive. WBC 19K No SOB cough n/v/d. Pt is a poor historian. Has unstagable sacral decub ulcer. Also with Acute anemia. ?infectious process vs reactive leukocytosis. Leukocytosis poss chronic.  Respiratory status stable. Menendez placed sec AUR 5/3/2020. Seen by surgery and is sp bedside I&D of sacral decub Clinically stable.    Repeat COVID 19 5/25/2020 neg. afebrile nl wbc    Plan :   Monitor off antibiotics  Menendez per urology  Frequent turning  Local wound care per surgery/wound care nurse  Supportive care for COVID-19  Dc planning to rehab pending COVID 19 neg x 2 1

## 2023-09-10 NOTE — PROGRESS NOTE ADULT - NSHPATTENDINGPLANDISCUSS_GEN_ALL_CORE
Driving and Equipment Restrictions  Some medical problems make it dangerous to drive, ride a bike, or use machines. Some of these problems are:  A hard blow to the head (concussion). Passing out (fainting). Twitching and shaking (seizures). Low blood sugar. Taking medicine to help you relax (sedatives). Taking pain medicines. Wearing an eye patch. Wearing splints. This can make it hard to use parts of your body that you need to drive safely. HOME CARE   Do not drive until your doctor says it is okay. Do not use machines until your doctor says it is okay. You may need a form signed by your doctor (medical release) before you can drive again. You may also need this form before you do other tasks where you need to be fully alert. MAKE SURE YOU:  Understand these instructions. Will watch your condition. Will get help right away if you are not doing well or get worse. team

## 2023-12-18 NOTE — PROGRESS NOTE ADULT - PROBLEM SELECTOR PLAN 7
- resolved, likely pre-prenal from [facility]  - Avoid nephrotoxic meds Time-based billing (NON-critical care).     More than 50% of the visit was spent counseling and / or coordinating care by the attending physician.      The necessity of the time spent during the encounter on this date of service was due to: documentation in [place], reviewing chart and coordinating care with patient/resident and interdisciplinary staff (such as , social workers, etc) as well as reviewing vitals, laboratory data, radiology, medication list, consultants' recommendations and prior records. Interventions were performed as documented above. Time-based billing (NON-critical care).     More than 50% of the visit was spent counseling and / or coordinating care by the attending physician.      The necessity of the time spent during the encounter on this date of service was due to: documentation in [place], reviewing chart and coordinating care with patient/resident and interdisciplinary staff (such as , social workers, etc) as well as reviewing vitals, laboratory data, radiology, medication list, consultants' recommendations and prior records. Interventions were performed as documented above.

## 2024-10-01 NOTE — PROGRESS NOTE ADULT - PROBLEM/PLAN-10
show
DISPLAY PLAN FREE TEXT

## 2024-11-07 NOTE — ED PROVIDER NOTE - NSRISKOFTRANSFER_ED_A_ED
English
Transportation Risk (There is always a risk of traffic delays resulting in deterioration of condition.)/Deterioration of Condition En Route

## 2025-05-06 NOTE — PROGRESS NOTE ADULT - MINUTES
Patient Name: Luh Horner  : 1952    MRN: [id number]                              Today's Date: 2025       Admit Date: 2025    Visit Dx:     ICD-10-CM ICD-9-CM   1. Primary osteoarthritis of right shoulder  M19.011 715.11     Patient Active Problem List   Diagnosis    Atopic rhinitis    Anxiety    Carpal tunnel syndrome    Clenching of teeth    Depression    Sleep disorder    Gastroesophageal reflux disease    Hyperlipidemia    Hypertension    Low back pain    Mitral valve insufficiency    BMI 27.0-27.9,adult    Gluteal tendinitis of right buttock    Postmenopausal disorder    Preventative health care    Other chronic pain    Spondylosis of lumbar region without myelopathy or radiculopathy    Lumbar scoliosis    Irritable bowel syndrome    Migraine without aura and without status migrainosus, not intractable    Lumbar stenosis with neurogenic claudication    Ligamentum flavum hypertrophy    Degeneration of lumbar or lumbosacral intervertebral disc    Epidural lipomatosis    Lumbar paraspinal muscle spasm    Neurogenic claudication due to lumbar spinal stenosis    Sciatica associated with disorder of lumbar spine     Past Medical History:   Diagnosis Date    Allergic 2000    Allergic rhinitis     Anesthesia complication     had to have O2 cannula during endoscopy    Anxiety 2004    Arthritis of back     Bone pain     right foot    Cancer 2005    Skin cancer squamous    Cataract 2010    Surgery recently    Cervical disc disorder     Chronic pain disorder 2012    Coronary artery disease 2005    Mitral valve    Depression 1981    1981 - Hospitalized    Dislocation of finger     Fibrocystic breast disease 2000    Current benign breast biopsies    GERD (gastroesophageal reflux disease) 2001    Glaucoma Surgery recently    Headache     None this year    Hiatal hernia     Hyperlipidemia     Medication    Hypertension 1997    Elevated today    Insomnia 2013    Irritable bowel syndrome (IBS)  2002    Recurrent abdominal cramps    Joint pain 2012    Knee swelling     Low back pain 2008    Low back strain     Lumbar scoliosis 2017    Lumbar spondylosis 2000    Chronic low back pain    Lumbosacral disc disease     Migraine 2021    Mitral valve insufficiency     Mitral valve prolapse 2000    Neck pain 2022    Obesity 2000    Osteoarthritis     Osteopenia     Periarthritis of shoulder     right    Peripheral neuropathy     Post menopausal syndrome 2004    Refractory hot flashes    Rotator cuff syndrome 2023    right    Sleep disorder 06/14/2016    Spinal stenosis 2022    Thoracic disc disorder     Wears reading eyeglasses     Wrist sprain     left     Past Surgical History:   Procedure Laterality Date    BACK SURGERY  09/2023    BREAST BIOPSY Right remote    benign disease    CATARACT EXTRACTION, BILATERAL Bilateral 2021    CHOLECYSTECTOMY  1998    COLONOSCOPY  ?    ENDOSCOPY      HYSTERECTOMY  1998    LAMINECTOMY  09/2023    LUMBAR LAMINECTOMY Bilateral 10/18/2023    Procedure: LAMINECTOMY BILATERAL DECOMPRESSION L4-5 RIGHT;  Surgeon: Jonel Browning MD;  Location: [facility];  Service: Neurosurgery;  Laterality: Bilateral;    REFRACTIVE SURGERY  1994    RK    SKIN BIOPSY      SPINE SURGERY  2023    TRIGGER POINT INJECTION  2021    Knee    URETHRAL SUSPENSION  2011    laparoscopic for stress incontinence      General Information       Row Name 05/06/25 1503          OT Time and Intention    Document Type evaluation  -AR     Mode of Treatment occupational therapy;individual therapy  -AR     Patient Effort excellent  -AR       Row Name 05/06/25 150          General Information    Patient Profile Reviewed yes  -AR     Prior Level of Function independent:;all household mobility;community mobility;gait;transfer;ADL's  -AR     Existing Precautions/Restrictions fall;non-weight bearing;shoulder;right;other (see comments)  interscalene nerve catheter, simple sling  -AR       Row Name 05/06/25 1501           Occupational Profile    Reason for Services/Referral (Occupational Profile) Pt caregiver training was provided face-to-face on strategies and techniques related to activities of daily living, transfers, mobility, home safety, durable medical equipment, and brace management for 30  minutes without the patient present.  -AR       Row Name 05/06/25 1503          Living Environment    Current Living Arrangements home  -AR     People in Home spouse  -AR       Row Name 05/06/25 1503          Home Main Entrance    Number of Stairs, Main Entrance none  -AR       Row Name 05/06/25 1503          Stairs Within Home, Primary    Number of Stairs, Within Home, Primary none  -AR       Row Name 05/06/25 1503          Cognition    Orientation Status (Cognition) oriented x 4  -AR       Row Name 05/06/25 1503          Safety Issues/Impairments Affecting Functional Mobility    Impairments Affecting Function (Mobility) motor control;range of motion (ROM);sensation/sensory awareness;strength  -AR               User Key  (r) = Recorded By, (t) = Taken By, (c) = Cosigned By      Initials Name Provider Type    Erma Bautista OT Occupational Therapist                     Mobility/ADL's       Row Name 05/06/25 1504          Bed Mobility    Bed Mobility scooting/bridging;supine-sit  -AR     Scooting/Bridging [place] (Bed Mobility) supervision  -AR     Supine-Sit [place] (Bed Mobility) supervision;verbal cues  -AR     Assistive Device (Bed Mobility) head of bed elevated  -AR     Comment, (Bed Mobility) OT educated pt and spouse on importance of maintaining NWB and safe sleeping position.  -AR       Row Name 05/06/25 1504          Transfers    Transfers sit-stand transfer;stand-sit transfer  -AR     Comment, (Transfers) Educated pt on importance of attending to interscalene nerve catheter to avoid dislodgement.  -AR       Row Name 05/06/25 1504          Sit-Stand Transfer    Sit-Stand [place] (Transfers) supervision  -AR        Row Name 05/06/25 1504          Stand-Sit Transfer    Stand-Sit [place] (Transfers) supervision  -AR       Row Name 05/06/25 1504          Functional Mobility    Functional Mobility- Ind. Level supervision required  -AR     Functional Mobility-Distance (Feet) 200  -AR     Functional Mobility- Comment Pt passed mobility screen, no PT needs. Pt and spouse declined need for gait belt for home use.  -AR     Patient was able to Ambulate yes  -AR       Row Name 05/06/25 1504          Activities of Daily Living    BADL Assessment/Intervention upper body dressing;bathing;lower body dressing  -AR       Row Name 05/06/25 1504          Mobility    Extremity Weight-bearing Status right upper extremity  -AR     Right Upper Extremity (Weight-bearing Status) non weight-bearing (NWB)  -AR       Row Name 05/06/25 1504          Upper Body Dressing Assessment/Training    [place] Level (Upper Body Dressing) doff;front opening garment;moderate assist (50% patient effort);don;pull-over garment;maximum assist (25% patient effort)  sling  -AR     Position (Upper Body Dressing) edge of bed sitting  -AR     Comment, (Upper Body Dressing) Educated pt and spouse on shoulder precautions, ADL retraining to maintain, sling management and care of interscalene nerve catheter during ADLs to avoid dislodgement.  -AR       Row Name 05/06/25 1504          Bathing Assessment/Intervention    Comment, (Bathing) Educated pt and spouse on shoulder precautions and axilla care to maintain, reviewed that nerve catheter cannot get wet.  -AR       Row Name 05/06/25 1504          Lower Body Dressing Assessment/Training    [place] Level (Lower Body Dressing) don;pants/bottoms;shoes/slippers;moderate assist (50% patient effort)  -AR     Position (Lower Body Dressing) edge of bed sitting;unsupported standing  -AR     Comment, (Lower Body Dressing) Pt declined need for AE to assist at home.  -AR               User Key  (r) = Recorded By, (t) =  30 Taken By, (c) = Cosigned By      Initials Name Provider Type    Erma Bautista, OT Occupational Therapist                   Obj/Interventions       Row Name 05/06/25 1506          Sensory Assessment (Somatosensory)    Sensory Assessment (Somatosensory) right UE  -AR     Sensory Subjective Reports numbness  -AR       Row Name 05/06/25 1506          Vision Assessment/Intervention    Visual Impairment/Limitations WNL  -AR       Row Name 05/06/25 1506          Range of Motion Comprehensive    Comment, General Range of Motion LUE WNL, R elbow/wrist/hand WFL  -AR       Row Name 05/06/25 1506          Strength Comprehensive (MMT)    Comment, General Manual Muscle Testing (MMT) Assessment LUE WNL, RUE deferred  -AR       Row Name 05/06/25 1506          Shoulder (Therapeutic Exercise)    Shoulder (Therapeutic Exercise) PROM (passive range of motion)  -AR     Shoulder PROM (Therapeutic Exercise) right;flexion;extension;sitting;10 repetitions  -AR       Row Name 05/06/25 1506          Elbow/Forearm (Therapeutic Exercise)    Elbow/Forearm (Therapeutic Exercise) AAROM (active assistive range of motion)  -AR     Elbow/Forearm AAROM (Therapeutic Exercise) left assist right;flexion;extension;sitting;10 repetitions;supination;pronation  -AR       Row Name 05/06/25 1506          Wrist (Therapeutic Exercise)    Wrist (Therapeutic Exercise) AROM (active range of motion)  -AR     Wrist AROM (Therapeutic Exercise) right;flexion;extension;10 repetitions  -AR       Row Name 05/06/25 1506          Hand (Therapeutic Exercise)    Hand (Therapeutic Exercise) AROM (active range of motion)  -AR     Hand AROM/AAROM (Therapeutic Exercise) right;AROM (active range of motion);finger flexion;finger extension;10 repetitions  -AR       Row Name 05/06/25 1506          Motor Skills    Therapeutic Exercise elbow/forearm;wrist;hand;shoulder  Issued and reviewed written HEP  -AR       Row Name 05/06/25 1506          Balance    Balance Assessment  sitting static balance;sitting dynamic balance;standing static balance;standing dynamic balance  -AR     Static Sitting Balance independent  -AR     Dynamic Sitting Balance independent  -AR     Position, Sitting Balance unsupported;sitting edge of bed  -AR     Static Standing Balance supervision  -AR     Dynamic Standing Balance supervision  -AR     Position/Device Used, Standing Balance unsupported  -AR               User Key  (r) = Recorded By, (t) = Taken By, (c) = Cosigned By      Initials Name Provider Type    Erma Bautista, OT Occupational Therapist                   Goals/Plan       Row Name 05/06/25 1510          Transfer Goal 1 (OT)    Activity/Assistive Device (Transfer Goal 1, OT) sit-to-stand/stand-to-sit;toilet  -AR     [place] Level/Cues Needed (Transfer Goal 1, OT) supervision required;verbal cues required  -AR     Time Frame (Transfer Goal 1, OT) long term goal (LTG);by discharge  -AR     Progress/Outcome (Transfer Goal 1, OT) goal met  -AR       [facility] Name 05/06/25 1510          Dressing Goal 1 (OT)    Time Frame (Dressing Goal 1, OT) long term goal (LTG);by discharge  -AR     Strategies/Barriers (Dressing Goal 1, OT) Pt and/or family will don/doff sling with supervision  -AR     Progress/Outcome (Dressing Goal 1, OT) goal met  -AR       [facility] Name 05/06/25 1510          ROM Goal 1 (OT)    Time Frame (ROM Goal 1, OT) long term goal (LTG);by discharge  -AR     Strategies/Barriers (ROM Goal 1, OT) Pt and/or family will complete UE HEP within physician parameters with supervison  -AR     Progress/Outcome (ROM Goal 1, OT) goal met  -AR       Row Name 05/06/25 1510          Therapy Assessment/Plan (OT)    Planned Therapy Interventions (OT) BADL retraining;edema control/reduction;functional balance retraining;IADL retraining;occupation/activity based interventions;orthotic fabrication/fitting/training;patient/caregiver education/training;ROM/therapeutic exercise;passive  ROM/stretching;transfer/mobility retraining  -AR               User Key  (r) = Recorded By, (t) = Taken By, (c) = Cosigned By      Initials Name Provider Type    Erma Bautista, OT Occupational Therapist                   Clinical Impression       Row Name 05/06/25 1508          Pain Assessment    Pretreatment Pain Rating 0/10 - no pain  -AR     Posttreatment Pain Rating 0/10 - no pain  -AR       Row Name 05/06/25 1508          Plan of Care Review    Plan of Care Reviewed With patient;spouse  -AR     Outcome Evaluation OT educated pt and family on shoulder precautions, ADL retraining to maintain, sling management and HEP. Pt tolerated R shoulder PROM  with excellent teachback from spouse, issued pulleys for home and educated on use. She ambulated 200 feet with supervision and passed mobility screen, no PT needs. Recommend DC home with spouse assist as needed.  -AR       [facility] Name 05/06/25 1508          Therapy Assessment/Plan (OT)    Rehab Potential (OT) good  -AR     Criteria for Skilled Therapeutic Interventions Met (OT) yes  -AR     Therapy Frequency (OT) daily  -AR       [facility] Name 05/06/25 1508          Therapy Plan Review/Discharge Plan (OT)    Anticipated Discharge Disposition (OT) home with 24/7 care  -AR       [facility] Name 05/06/25 1508          Vital Signs    Pre SpO2 (%) 95  -AR     O2 Delivery Pre Treatment room air  -AR     Intra SpO2 (%) 92  -AR     O2 Delivery Intra Treatment room air  -AR     Post SpO2 (%) 94  -AR     O2 Delivery Post Treatment room air  -AR     Pre Patient Position Supine  -AR     Intra Patient Position Standing  -AR     Post Patient Position Sitting  -AR       [facility] Name 05/06/25 1508          Positioning and Restraints    Pre-Treatment Position in bed  -AR     Post Treatment Position wheelchair  -AR     In Wheelchair sitting;with family/caregiver;with nsg;with brace  -AR               User Key  (r) = Recorded By, (t) = Taken By, (c) = Cosigned By      Initials Name Provider  Type    AR Erma Joiner OT Occupational Therapist                   Outcome Measures       Row Name 05/06/25 1511          How much help from another is currently needed...    Putting on and taking off regular lower body clothing? 2  -AR     Bathing (including washing, rinsing, and drying) 3  -AR     Toileting (which includes using toilet bed pan or urinal) 3  -AR     Putting on and taking off regular upper body clothing 2  -AR     Taking care of personal grooming (such as brushing teeth) 3  -AR     Eating meals 3  -AR     AM-PAC 6 Clicks Score (OT) 16  -AR       Row Name 05/06/25 1511          Functional Assessment    Outcome Measure Options AM-PAC 6 Clicks Daily Activity (OT)  -AR               User Key  (r) = Recorded By, (t) = Taken By, (c) = Cosigned By      Initials Name Provider Type    AR Erma Joiner OT Occupational Therapist                    Occupational Therapy Education       Title: PT OT SLP Therapies (Done)       Topic: Occupational Therapy (Done)       Point: ADL training (Done)       Learning Progress Summary            Patient Eager, E,TB,D,H, DU,VU by AR at 5/6/2025 1512   Family Eager, E,TB,D,H, DU,VU by AR at 5/6/2025 1512                      Point: Home exercise program (Done)       Learning Progress Summary            Patient Eager, E,TB,D,H, DU,VU by AR at 5/6/2025 1512   Family Eager, E,TB,D,H, DU,VU by AR at 5/6/2025 1512                      Point: Precautions (Done)       Learning Progress Summary            Patient Eager, E,TB,D,H, DU,VU by AR at 5/6/2025 1512   Family Eager, E,TB,D,H, DU,VU by AR at 5/6/2025 1512                      Point: Body mechanics (Done)       Learning Progress Summary            Patient Eager, E,TB,D,H, DU,VU by AR at 5/6/2025 1512   Family Eager, E,TB,D,H, DU,VU by AR at 5/6/2025 1512                                      User Key       Initials Effective Dates Name Provider Type Discipline    AR 07/11/23 -  Erma Joiner OT  Occupational Therapist OT                  OT Recommendation and Plan  Planned Therapy Interventions (OT): BADL retraining, edema control/reduction, functional balance retraining, IADL retraining, occupation/activity based interventions, orthotic fabrication/fitting/training, patient/caregiver education/training, ROM/therapeutic exercise, passive ROM/stretching, transfer/mobility retraining  Therapy Frequency (OT): daily  Plan of Care Review  Plan of Care Reviewed With: patient, spouse  Outcome Evaluation: OT educated pt and family on shoulder precautions, ADL retraining to maintain, sling management and HEP. Pt tolerated R shoulder PROM  with excellent teachback from spouse, issued pulleys for home and educated on use. She ambulated 200 feet with supervision and passed mobility screen, no PT needs. Recommend DC home with spouse assist as needed.     Time Calculation:   Evaluation Complexity (OT)  Review Occupational Profile/Medical/Therapy History Complexity: brief/low complexity  Assessment, Occupational Performance/Identification of Deficit Complexity: 1-3 performance deficits  Clinical Decision Making Complexity (OT): problem focused assessment/low complexity  Overall Complexity of Evaluation (OT): low complexity     Time Calculation- OT       Row Name 05/06/25 1512             Time Calculation- OT    OT Start Time 1240  -AR      OT Received On 05/06/25  -AR      OT Goal Re-Cert Due Date 05/16/25  -AR         Timed Charges    31802 - OT Therapeutic Exercise Minutes 15  -AR      98531 - OT Therapeutic Activity Minutes 8  -AR      05077 - OT Self Care/Mgmt Minutes 18  -AR         Untimed Charges    OT Eval/Re-eval Minutes 30  -AR         Total Minutes    Timed Charges Total Minutes 41  -AR      Untimed Charges Total Minutes 30  -AR       Total Minutes 71  -AR                User Key  (r) = Recorded By, (t) = Taken By, (c) = Cosigned By      Initials Name Provider Type    Erma Bautista, OT  Occupational Therapist                  Therapy Charges for Today       Code Description Service Date Service Provider Modifiers Qty    86650674729 HC OT SELF CARE/MGMT/TRAIN EA 15 MIN 5/6/2025 Erma Joiner, OT GO 1    56421172355 HC OT THERAPEUTIC ACT EA 15 MIN 5/6/2025 Erma Joiner, OT GO 1    16159956053 HC OT THER PROC EA 15 MIN 5/6/2025 Erma Joiner OT GO 1    38057612741 HC OT EVAL LOW COMPLEXITY 2 5/6/2025 Erma Joiner, OT GO 1    24945946840 HC CAREGIVER TRAINING STRATEGIES & TQ 1ST 30 MINUTES 5/6/2025 Erma Joiner OT  1    30381829819 HC OT THER SUPP EA 15 MIN 5/6/2025 Erma Joiner OT GO 3                 Erma Joiner OT  5/6/2025

## 2025-07-14 NOTE — PROGRESS NOTE ADULT - PROBLEM SELECTOR PROBLEM 5
Return to the ER for worsening symptoms. Return to the ER if the area increases in size or becomes dark in color. Return to the ER if you develop a fever.    Altered mental status